# Patient Record
Sex: FEMALE | Race: WHITE | NOT HISPANIC OR LATINO | Employment: FULL TIME | URBAN - METROPOLITAN AREA
[De-identification: names, ages, dates, MRNs, and addresses within clinical notes are randomized per-mention and may not be internally consistent; named-entity substitution may affect disease eponyms.]

---

## 2020-07-19 ENCOUNTER — APPOINTMENT (OUTPATIENT)
Dept: RADIOLOGY | Facility: HOSPITAL | Age: 30
End: 2020-07-19
Payer: COMMERCIAL

## 2020-07-19 ENCOUNTER — HOSPITAL ENCOUNTER (OUTPATIENT)
Facility: HOSPITAL | Age: 30
Setting detail: OBSERVATION
Discharge: HOME | End: 2020-07-20
Attending: EMERGENCY MEDICINE | Admitting: INTERNAL MEDICINE
Payer: COMMERCIAL

## 2020-07-19 DIAGNOSIS — R11.2 NAUSEA & VOMITING: Primary | ICD-10-CM

## 2020-07-19 DIAGNOSIS — R10.9 INTRACTABLE ABDOMINAL PAIN: ICD-10-CM

## 2020-07-19 DIAGNOSIS — K25.9 MULTIPLE GASTRIC ULCERS: ICD-10-CM

## 2020-07-19 DIAGNOSIS — R10.13 EPIGASTRIC PAIN: ICD-10-CM

## 2020-07-19 PROBLEM — E87.6 HYPOKALEMIA: Status: ACTIVE | Noted: 2020-07-19

## 2020-07-19 PROBLEM — M43.06 LUMBAR SPONDYLOLYSIS: Status: ACTIVE | Noted: 2020-07-19

## 2020-07-19 LAB
ALBUMIN SERPL BCP-MCNC: 4.2 G/DL (ref 3.5–5)
ALP SERPL-CCNC: 55 U/L (ref 46–116)
ALT SERPL W P-5'-P-CCNC: 17 U/L (ref 12–78)
ANION GAP SERPL CALCULATED.3IONS-SCNC: 9 MMOL/L (ref 4–13)
AST SERPL W P-5'-P-CCNC: 9 U/L (ref 5–45)
BASOPHILS # BLD AUTO: 0.05 THOUSANDS/ΜL (ref 0–0.1)
BASOPHILS NFR BLD AUTO: 1 % (ref 0–1)
BILIRUB SERPL-MCNC: 0.85 MG/DL (ref 0.2–1)
BILIRUB UR QL STRIP: NEGATIVE
BUN SERPL-MCNC: 7 MG/DL (ref 5–25)
CALCIUM SERPL-MCNC: 9.9 MG/DL (ref 8.3–10.1)
CHLORIDE SERPL-SCNC: 106 MMOL/L (ref 100–108)
CLARITY UR: CLEAR
CO2 SERPL-SCNC: 23 MMOL/L (ref 21–32)
COLOR UR: YELLOW
CREAT SERPL-MCNC: 0.78 MG/DL (ref 0.6–1.3)
EOSINOPHIL # BLD AUTO: 0.13 THOUSAND/ΜL (ref 0–0.61)
EOSINOPHIL NFR BLD AUTO: 1 % (ref 0–6)
ERYTHROCYTE [DISTWIDTH] IN BLOOD BY AUTOMATED COUNT: 12.8 % (ref 11.6–15.1)
EXT PREG TEST URINE: NEGATIVE
EXT. CONTROL ED NAV: NORMAL
GFR SERPL CREATININE-BSD FRML MDRD: 104 ML/MIN/1.73SQ M
GLUCOSE SERPL-MCNC: 106 MG/DL (ref 65–140)
GLUCOSE UR STRIP-MCNC: NEGATIVE MG/DL
HCT VFR BLD AUTO: 42.1 % (ref 34.8–46.1)
HGB BLD-MCNC: 14.8 G/DL (ref 11.5–15.4)
HGB UR QL STRIP.AUTO: NEGATIVE
IMM GRANULOCYTES # BLD AUTO: 0.05 THOUSAND/UL (ref 0–0.2)
IMM GRANULOCYTES NFR BLD AUTO: 1 % (ref 0–2)
KETONES UR STRIP-MCNC: ABNORMAL MG/DL
LEUKOCYTE ESTERASE UR QL STRIP: NEGATIVE
LIPASE SERPL-CCNC: 84 U/L (ref 73–393)
LYMPHOCYTES # BLD AUTO: 1.95 THOUSANDS/ΜL (ref 0.6–4.47)
LYMPHOCYTES NFR BLD AUTO: 19 % (ref 14–44)
MCH RBC QN AUTO: 30.6 PG (ref 26.8–34.3)
MCHC RBC AUTO-ENTMCNC: 35.2 G/DL (ref 31.4–37.4)
MCV RBC AUTO: 87 FL (ref 82–98)
MONOCYTES # BLD AUTO: 0.8 THOUSAND/ΜL (ref 0.17–1.22)
MONOCYTES NFR BLD AUTO: 8 % (ref 4–12)
NEUTROPHILS # BLD AUTO: 7.51 THOUSANDS/ΜL (ref 1.85–7.62)
NEUTS SEG NFR BLD AUTO: 70 % (ref 43–75)
NITRITE UR QL STRIP: NEGATIVE
NRBC BLD AUTO-RTO: 0 /100 WBCS
PH UR STRIP.AUTO: 8.5 [PH] (ref 4.5–8)
PLATELET # BLD AUTO: 191 THOUSANDS/UL (ref 149–390)
PMV BLD AUTO: 11.4 FL (ref 8.9–12.7)
POTASSIUM SERPL-SCNC: 3.4 MMOL/L (ref 3.5–5.3)
PROT SERPL-MCNC: 7.3 G/DL (ref 6.4–8.2)
PROT UR STRIP-MCNC: NEGATIVE MG/DL
RBC # BLD AUTO: 4.83 MILLION/UL (ref 3.81–5.12)
SODIUM SERPL-SCNC: 138 MMOL/L (ref 136–145)
SP GR UR STRIP.AUTO: 1.02 (ref 1–1.03)
UROBILINOGEN UR QL STRIP.AUTO: 0.2 E.U./DL
WBC # BLD AUTO: 10.49 THOUSAND/UL (ref 4.31–10.16)

## 2020-07-19 PROCEDURE — 80053 COMPREHEN METABOLIC PANEL: CPT | Performed by: STUDENT IN AN ORGANIZED HEALTH CARE EDUCATION/TRAINING PROGRAM

## 2020-07-19 PROCEDURE — 74022 RADEX COMPL AQT ABD SERIES: CPT

## 2020-07-19 PROCEDURE — 96375 TX/PRO/DX INJ NEW DRUG ADDON: CPT

## 2020-07-19 PROCEDURE — 81003 URINALYSIS AUTO W/O SCOPE: CPT

## 2020-07-19 PROCEDURE — 36415 COLL VENOUS BLD VENIPUNCTURE: CPT | Performed by: STUDENT IN AN ORGANIZED HEALTH CARE EDUCATION/TRAINING PROGRAM

## 2020-07-19 PROCEDURE — 96374 THER/PROPH/DIAG INJ IV PUSH: CPT

## 2020-07-19 PROCEDURE — C9113 INJ PANTOPRAZOLE SODIUM, VIA: HCPCS | Performed by: INTERNAL MEDICINE

## 2020-07-19 PROCEDURE — 85025 COMPLETE CBC W/AUTO DIFF WBC: CPT | Performed by: STUDENT IN AN ORGANIZED HEALTH CARE EDUCATION/TRAINING PROGRAM

## 2020-07-19 PROCEDURE — 81025 URINE PREGNANCY TEST: CPT | Performed by: STUDENT IN AN ORGANIZED HEALTH CARE EDUCATION/TRAINING PROGRAM

## 2020-07-19 PROCEDURE — 83690 ASSAY OF LIPASE: CPT | Performed by: STUDENT IN AN ORGANIZED HEALTH CARE EDUCATION/TRAINING PROGRAM

## 2020-07-19 PROCEDURE — 99220 PR INITIAL OBSERVATION CARE/DAY 70 MINUTES: CPT | Performed by: INTERNAL MEDICINE

## 2020-07-19 PROCEDURE — 96376 TX/PRO/DX INJ SAME DRUG ADON: CPT

## 2020-07-19 PROCEDURE — 99285 EMERGENCY DEPT VISIT HI MDM: CPT

## 2020-07-19 PROCEDURE — 99285 EMERGENCY DEPT VISIT HI MDM: CPT | Performed by: EMERGENCY MEDICINE

## 2020-07-19 PROCEDURE — 96361 HYDRATE IV INFUSION ADD-ON: CPT

## 2020-07-19 RX ORDER — PROMETHAZINE HYDROCHLORIDE 25 MG/ML
25 INJECTION, SOLUTION INTRAMUSCULAR; INTRAVENOUS EVERY 6 HOURS PRN
Status: DISCONTINUED | OUTPATIENT
Start: 2020-07-19 | End: 2020-07-20 | Stop reason: HOSPADM

## 2020-07-19 RX ORDER — LORAZEPAM 2 MG/ML
0.5 INJECTION INTRAMUSCULAR EVERY 6 HOURS PRN
Status: DISCONTINUED | OUTPATIENT
Start: 2020-07-19 | End: 2020-07-20 | Stop reason: HOSPADM

## 2020-07-19 RX ORDER — ONDANSETRON 2 MG/ML
4 INJECTION INTRAMUSCULAR; INTRAVENOUS ONCE
Status: COMPLETED | OUTPATIENT
Start: 2020-07-19 | End: 2020-07-19

## 2020-07-19 RX ORDER — SUCRALFATE ORAL 1 G/10ML
1000 SUSPENSION ORAL
Status: DISCONTINUED | OUTPATIENT
Start: 2020-07-19 | End: 2020-07-20 | Stop reason: HOSPADM

## 2020-07-19 RX ORDER — FENTANYL CITRATE 50 UG/ML
INJECTION, SOLUTION INTRAMUSCULAR; INTRAVENOUS
Status: COMPLETED
Start: 2020-07-19 | End: 2020-07-19

## 2020-07-19 RX ORDER — METHOCARBAMOL 500 MG/1
500 TABLET, FILM COATED ORAL DAILY PRN
COMMUNITY
Start: 2020-06-17 | End: 2020-12-05 | Stop reason: HOSPADM

## 2020-07-19 RX ORDER — HYDROMORPHONE HCL/PF 1 MG/ML
1 SYRINGE (ML) INJECTION ONCE
Status: DISCONTINUED | OUTPATIENT
Start: 2020-07-19 | End: 2020-07-20

## 2020-07-19 RX ORDER — HEPARIN SODIUM 5000 [USP'U]/ML
5000 INJECTION, SOLUTION INTRAVENOUS; SUBCUTANEOUS EVERY 8 HOURS SCHEDULED
Status: DISCONTINUED | OUTPATIENT
Start: 2020-07-19 | End: 2020-07-20 | Stop reason: HOSPADM

## 2020-07-19 RX ORDER — SENNOSIDES 8.6 MG
1 TABLET ORAL DAILY
Status: DISCONTINUED | OUTPATIENT
Start: 2020-07-19 | End: 2020-07-20 | Stop reason: HOSPADM

## 2020-07-19 RX ORDER — PANTOPRAZOLE SODIUM 40 MG/1
40 INJECTION, POWDER, FOR SOLUTION INTRAVENOUS
Status: DISCONTINUED | OUTPATIENT
Start: 2020-07-19 | End: 2020-07-20

## 2020-07-19 RX ORDER — DOCUSATE SODIUM 100 MG/1
100 CAPSULE, LIQUID FILLED ORAL 2 TIMES DAILY
Status: DISCONTINUED | OUTPATIENT
Start: 2020-07-19 | End: 2020-07-20 | Stop reason: HOSPADM

## 2020-07-19 RX ORDER — METOCLOPRAMIDE HYDROCHLORIDE 5 MG/5ML
10 SOLUTION ORAL ONCE
Status: DISCONTINUED | OUTPATIENT
Start: 2020-07-19 | End: 2020-07-19

## 2020-07-19 RX ORDER — DICYCLOMINE HYDROCHLORIDE 10 MG/5ML
10 SOLUTION ORAL
Status: DISCONTINUED | OUTPATIENT
Start: 2020-07-19 | End: 2020-07-19

## 2020-07-19 RX ORDER — LIDOCAINE HYDROCHLORIDE 20 MG/ML
15 SOLUTION OROPHARYNGEAL ONCE
Status: COMPLETED | OUTPATIENT
Start: 2020-07-19 | End: 2020-07-19

## 2020-07-19 RX ORDER — DICYCLOMINE HYDROCHLORIDE 10 MG/1
10 CAPSULE ORAL
Status: DISCONTINUED | OUTPATIENT
Start: 2020-07-19 | End: 2020-07-20 | Stop reason: HOSPADM

## 2020-07-19 RX ORDER — FENTANYL CITRATE 50 UG/ML
50 INJECTION, SOLUTION INTRAMUSCULAR; INTRAVENOUS ONCE
Status: COMPLETED | OUTPATIENT
Start: 2020-07-19 | End: 2020-07-19

## 2020-07-19 RX ORDER — SUCRALFATE ORAL 1 G/10ML
1000 SUSPENSION ORAL ONCE
Status: COMPLETED | OUTPATIENT
Start: 2020-07-19 | End: 2020-07-19

## 2020-07-19 RX ORDER — ONDANSETRON 2 MG/ML
4 INJECTION INTRAMUSCULAR; INTRAVENOUS EVERY 6 HOURS PRN
Status: DISCONTINUED | OUTPATIENT
Start: 2020-07-19 | End: 2020-07-19

## 2020-07-19 RX ORDER — SUCRALFATE ORAL 1 G/10ML
1 SUSPENSION ORAL
COMMUNITY
Start: 2020-07-16 | End: 2022-02-16 | Stop reason: SDUPTHER

## 2020-07-19 RX ORDER — ACETAMINOPHEN 325 MG/1
975 TABLET ORAL ONCE
Status: COMPLETED | OUTPATIENT
Start: 2020-07-19 | End: 2020-07-19

## 2020-07-19 RX ORDER — DEXTROSE, SODIUM CHLORIDE, AND POTASSIUM CHLORIDE 5; .45; .15 G/100ML; G/100ML; G/100ML
100 INJECTION INTRAVENOUS CONTINUOUS
Status: DISCONTINUED | OUTPATIENT
Start: 2020-07-19 | End: 2020-07-20

## 2020-07-19 RX ORDER — PANTOPRAZOLE SODIUM 40 MG/1
40 TABLET, DELAYED RELEASE ORAL 2 TIMES DAILY
Status: ON HOLD | COMMUNITY
Start: 2020-07-16 | End: 2021-01-23 | Stop reason: SDUPTHER

## 2020-07-19 RX ORDER — METHOCARBAMOL 500 MG/1
500 TABLET, FILM COATED ORAL EVERY 6 HOURS PRN
Status: DISCONTINUED | OUTPATIENT
Start: 2020-07-19 | End: 2020-07-19

## 2020-07-19 RX ORDER — ONDANSETRON 2 MG/ML
4 INJECTION INTRAMUSCULAR; INTRAVENOUS EVERY 4 HOURS PRN
Status: DISCONTINUED | OUTPATIENT
Start: 2020-07-19 | End: 2020-07-20 | Stop reason: HOSPADM

## 2020-07-19 RX ORDER — ONDANSETRON 4 MG/1
4 TABLET, ORALLY DISINTEGRATING ORAL EVERY 8 HOURS PRN
COMMUNITY
Start: 2020-07-16 | End: 2020-12-05 | Stop reason: HOSPADM

## 2020-07-19 RX ORDER — MAGNESIUM HYDROXIDE/ALUMINUM HYDROXICE/SIMETHICONE 120; 1200; 1200 MG/30ML; MG/30ML; MG/30ML
30 SUSPENSION ORAL ONCE
Status: COMPLETED | OUTPATIENT
Start: 2020-07-19 | End: 2020-07-19

## 2020-07-19 RX ORDER — PROMETHAZINE HYDROCHLORIDE 25 MG/ML
25 INJECTION, SOLUTION INTRAMUSCULAR; INTRAVENOUS EVERY 6 HOURS PRN
Status: DISCONTINUED | OUTPATIENT
Start: 2020-07-19 | End: 2020-07-19

## 2020-07-19 RX ORDER — METOCLOPRAMIDE HYDROCHLORIDE 5 MG/ML
10 INJECTION INTRAMUSCULAR; INTRAVENOUS ONCE
Status: COMPLETED | OUTPATIENT
Start: 2020-07-19 | End: 2020-07-19

## 2020-07-19 RX ORDER — HYDROMORPHONE HCL/PF 1 MG/ML
0.5 SYRINGE (ML) INJECTION EVERY 4 HOURS PRN
Status: DISCONTINUED | OUTPATIENT
Start: 2020-07-19 | End: 2020-07-20

## 2020-07-19 RX ADMIN — LIDOCAINE HYDROCHLORIDE 15 ML: 20 SOLUTION ORAL; TOPICAL at 11:34

## 2020-07-19 RX ADMIN — SUCRALFATE 1000 MG: 1 SUSPENSION ORAL at 21:12

## 2020-07-19 RX ADMIN — FENTANYL CITRATE 50 MCG: 50 INJECTION, SOLUTION INTRAMUSCULAR; INTRAVENOUS at 09:58

## 2020-07-19 RX ADMIN — MORPHINE SULFATE 2 MG: 2 INJECTION, SOLUTION INTRAMUSCULAR; INTRAVENOUS at 13:45

## 2020-07-19 RX ADMIN — FENTANYL CITRATE 50 MCG: 50 INJECTION INTRAMUSCULAR; INTRAVENOUS at 09:58

## 2020-07-19 RX ADMIN — ONDANSETRON 4 MG: 2 INJECTION INTRAMUSCULAR; INTRAVENOUS at 13:45

## 2020-07-19 RX ADMIN — ONDANSETRON 4 MG: 2 INJECTION INTRAMUSCULAR; INTRAVENOUS at 09:38

## 2020-07-19 RX ADMIN — FENTANYL CITRATE 50 MCG: 50 INJECTION INTRAMUSCULAR; INTRAVENOUS at 12:15

## 2020-07-19 RX ADMIN — LIDOCAINE HYDROCHLORIDE 10 ML: 20 SOLUTION ORAL; TOPICAL at 21:11

## 2020-07-19 RX ADMIN — SODIUM CHLORIDE 1000 ML: 0.9 INJECTION, SOLUTION INTRAVENOUS at 09:59

## 2020-07-19 RX ADMIN — SUCRALFATE 1000 MG: 1 SUSPENSION ORAL at 10:37

## 2020-07-19 RX ADMIN — METOCLOPRAMIDE 10 MG: 5 INJECTION, SOLUTION INTRAMUSCULAR; INTRAVENOUS at 11:33

## 2020-07-19 RX ADMIN — ALUMINUM HYDROXIDE, MAGNESIUM HYDROXIDE, AND SIMETHICONE 30 ML: 200; 200; 20 SUSPENSION ORAL at 11:34

## 2020-07-19 RX ADMIN — DEXTROSE, SODIUM CHLORIDE, AND POTASSIUM CHLORIDE 100 ML/HR: 5; .45; .15 INJECTION INTRAVENOUS at 14:59

## 2020-07-19 RX ADMIN — PANTOPRAZOLE SODIUM 40 MG: 40 INJECTION, POWDER, FOR SOLUTION INTRAVENOUS at 14:59

## 2020-07-19 RX ADMIN — SUCRALFATE 1000 MG: 1 SUSPENSION ORAL at 17:37

## 2020-07-19 RX ADMIN — PROMETHAZINE HYDROCHLORIDE 25 MG: 25 INJECTION INTRAMUSCULAR; INTRAVENOUS at 17:43

## 2020-07-19 RX ADMIN — ACETAMINOPHEN 975 MG: 325 TABLET, FILM COATED ORAL at 11:33

## 2020-07-19 RX ADMIN — MORPHINE SULFATE 2 MG: 2 INJECTION, SOLUTION INTRAMUSCULAR; INTRAVENOUS at 17:33

## 2020-07-19 RX ADMIN — HYDROMORPHONE HYDROCHLORIDE 0.5 MG: 1 INJECTION, SOLUTION INTRAMUSCULAR; INTRAVENOUS; SUBCUTANEOUS at 21:23
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
## 2020-07-19 NOTE — ASSESSMENT & PLAN NOTE
Probably due to GI losses due to vomiting and not eating  Will continue monitoring   Started fluids with potassium as she already not able to take po
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
## 2020-07-19 NOTE — ED PROVIDER NOTES
History  Chief Complaint   Patient presents with    Vomiting     Pt  reports being to the hospital numerous times over the past 9 days  States that she is having severe epigastric pain and vomiting  States she cant even drink water or take medications  Every time she trys to take anything it keeps coming back up  Patient is a 55-year-old female with a past medical history of multiple antral gastric ulcers who presents to the ED for constant, nonradiating, sharp, epigastric abdominal pain  Patient states she has been seen multiple times for this in the past several days, where she usually gets pain control and that is discharged  However, over the last day, patient has been unable to tolerate p o  And so she cannot take her prescribed Carafate and Protonix  This morning, she could not tolerate the pain any longer so she decided to come in  She states she has a follow-up appointment with GI in the next week or so, but could not wait till then to be seen  Associated symptoms include chills  Last menstrual period 1 week ago  History provided by:  Patient and significant other   used: No    Vomiting   Severity:  Mild  Timing:  Constant  Progression:  Worsening  Chronicity:  Recurrent  Recent urination:  Normal  Associated symptoms: abdominal pain and chills    Associated symptoms: no cough, no diarrhea, no fever and no sore throat        Prior to Admission Medications   Prescriptions Last Dose Informant Patient Reported? Taking? methocarbamol (ROBAXIN) 500 mg tablet   Yes Yes   Sig: Take 500 mg by mouth daily as needed   ondansetron (ZOFRAN-ODT) 4 mg disintegrating tablet   Yes Yes   Sig: Take 4 mg by mouth every 8 (eight) hours as needed   pantoprazole (PROTONIX) 40 mg tablet   Yes Yes   Sig: Take 40 mg by mouth 2 (two) times a day   sucralfate (CARAFATE) 1 g/10 mL suspension   Yes Yes   Sig: Take 1 g by mouth      Facility-Administered Medications: None       History reviewed   No pertinent past medical history  History reviewed  No pertinent surgical history  History reviewed  No pertinent family history  I have reviewed and agree with the history as documented  E-Cigarette/Vaping    E-Cigarette Use Never User      E-Cigarette/Vaping Substances     Social History     Tobacco Use    Smoking status: Never Smoker    Smokeless tobacco: Never Used   Substance Use Topics    Alcohol use: Not Currently     Frequency: Monthly or less     Drinks per session: 1 or 2     Binge frequency: Never    Drug use: Yes     Types: Marijuana        Review of Systems   Constitutional: Positive for chills  Negative for fever  HENT: Negative for sore throat and trouble swallowing  Eyes: Negative for redness and itching  Respiratory: Negative for cough and shortness of breath  Cardiovascular: Negative for chest pain and leg swelling  Gastrointestinal: Positive for abdominal pain, nausea and vomiting  Negative for diarrhea  Genitourinary: Negative for difficulty urinating, dysuria and hematuria  Musculoskeletal: Negative for back pain, neck pain and neck stiffness  Skin: Negative for rash and wound  All other systems reviewed and are negative  Physical Exam  ED Triage Vitals [07/19/20 0920]   Temperature Pulse Respirations Blood Pressure SpO2   98 °F (36 7 °C) 94 (!) 24 135/90 99 %      Temp Source Heart Rate Source Patient Position - Orthostatic VS BP Location FiO2 (%)   Oral Monitor Lying Right arm --      Pain Score       9             Orthostatic Vital Signs  Vitals:    07/19/20 0920 07/19/20 1106 07/19/20 1200   BP: 135/90 (!) 143/109 128/67   Pulse: 94 92 64   Patient Position - Orthostatic VS: Lying Lying Lying       Physical Exam   Constitutional: She appears well-developed and well-nourished  She appears distressed  HENT:   Head: Normocephalic and atraumatic     Right Ear: External ear normal    Left Ear: External ear normal    Nose: Nose normal    Eyes: EOM and lids are normal  No scleral icterus  Neck: Normal range of motion  Neck supple  Cardiovascular: Normal rate, regular rhythm and normal heart sounds  Exam reveals no gallop and no friction rub  No murmur heard  Pulmonary/Chest: Effort normal and breath sounds normal  No respiratory distress  She has no wheezes  She has no rales  Abdominal: Soft  Normal appearance and bowel sounds are normal  She exhibits no distension  There is tenderness in the epigastric area  There is guarding (Voluntary)  There is no rebound  Musculoskeletal: Normal range of motion  She exhibits no edema or deformity  Neurological: She is alert  Skin: Skin is warm and dry  Capillary refill takes less than 2 seconds  She is not diaphoretic  Psychiatric: She has a normal mood and affect  Her behavior is normal    Nursing note and vitals reviewed        ED Medications  Medications   ondansetron (ZOFRAN) injection 4 mg (4 mg Intravenous Given 7/19/20 0938)   sodium chloride 0 9 % bolus 1,000 mL (0 mL Intravenous Stopped 7/19/20 1134)   fentanyl citrate (PF) 100 MCG/2ML 50 mcg (50 mcg Intravenous Given 7/19/20 0958)   sucralfate (CARAFATE) oral suspension 1,000 mg (1,000 mg Oral Given 7/19/20 1037)   Lidocaine Viscous HCl (XYLOCAINE) 2 % mucosal solution 15 mL (15 mL Swish & Swallow Given 7/19/20 1134)   aluminum-magnesium hydroxide-simethicone (MYLANTA) 200-200-20 mg/5 mL oral suspension 30 mL (30 mL Oral Given 7/19/20 1134)   acetaminophen (TYLENOL) tablet 975 mg (975 mg Oral Given 7/19/20 1133)   metoclopramide (REGLAN) injection 10 mg (10 mg Intravenous Given 7/19/20 1133)   fentanyl citrate (PF) 100 MCG/2ML 50 mcg (50 mcg Intravenous Given 7/19/20 1215)       Diagnostic Studies  Results Reviewed     Procedure Component Value Units Date/Time    POCT pregnancy, urine [[id number]]  (Normal) Resulted:  07/19/20 1152    Lab Status:  Final result Updated:  07/19/20 1152     EXT PREG TEST UR (Ref: Negative) Negative     Control Valid    Urine Macroscopic, POC [[id number]]  (Abnormal) Collected:  07/19/20 1152    Lab Status:  Final result Specimen:  Urine Updated:  07/19/20 1151     Color, UA Yellow     Clarity, UA Clear     pH, UA 8 5     Leukocytes, UA Negative     Nitrite, UA Negative     Protein, UA Negative mg/dl      Glucose, UA Negative mg/dl      Ketones, UA 40 (2+) mg/dl      Urobilinogen, UA 0 2 E U /dl      Bilirubin, UA Negative     Blood, UA Negative     Specific Gravity, UA 1 020    Narrative:       CLINITEK RESULT    Lipase [[id number]]  (Normal) Collected:  07/19/20 1000    Lab Status:  Final result Specimen:  Blood from Arm, Right Updated:  07/19/20 1034     Lipase 84 u/L     Comprehensive metabolic panel [[id number]]  (Abnormal) Collected:  07/19/20 1000    Lab Status:  Final result Specimen:  Blood from Arm, Right Updated:  07/19/20 1034     Sodium 138 mmol/L      Potassium 3 4 mmol/L      Chloride 106 mmol/L      CO2 23 mmol/L      ANION GAP 9 mmol/L      BUN 7 mg/dL      Creatinine 0 78 mg/dL      Glucose 106 mg/dL      Calcium 9 9 mg/dL      AST 9 U/L      ALT 17 U/L      Alkaline Phosphatase 55 U/L      Total Protein 7 3 g/dL      Albumin 4 2 g/dL      Total Bilirubin 0 85 mg/dL      eGFR 104 ml/min/1 73sq m     Narrative:       [place]or guidelines for Chronic Kidney Disease (CKD):     Stage 1 with normal or high GFR (GFR > 90 mL/min/1 73 square meters)    Stage 2 Mild CKD (GFR = 60-89 mL/min/1 73 square meters)    Stage 3A Moderate CKD (GFR = 45-59 mL/min/1 73 square meters)    Stage 3B Moderate CKD (GFR = 30-44 mL/min/1 73 square meters)    Stage 4 Severe CKD (GFR = 15-29 mL/min/1 73 square meters)    Stage 5 End Stage CKD (GFR <15 mL/min/1 73 square meters)  Note: GFR calculation is accurate only with a steady state creatinine    CBC and differential [[id number]]  (Abnormal) Collected:  07/19/20 1000    Lab Status:  Final result Specimen:  Blood from Arm, Right Updated:  07/19/20 1007     WBC 10 49 Thousand/uL      RBC 4 83 Million/uL      Hemoglobin 14 8 g/dL      Hematocrit 42 1 %      MCV 87 fL      MCH 30 6 pg      MCHC 35 2 g/dL      RDW 12 8 %      MPV 11 4 fL      Platelets 828 Thousands/uL      nRBC 0 /100 WBCs      Neutrophils Relative 70 %      Immat GRANS % 1 %      Lymphocytes Relative 19 %      Monocytes Relative 8 %      Eosinophils Relative 1 %      Basophils Relative 1 %      Neutrophils Absolute 7 51 Thousands/µL      Immature Grans Absolute 0 05 Thousand/uL      Lymphocytes Absolute 1 95 Thousands/µL      Monocytes Absolute 0 80 Thousand/µL      Eosinophils Absolute 0 13 Thousand/µL      Basophils Absolute 0 05 Thousands/µL                  XR abdomen obstruction series    (Results Pending)         Procedures  Procedures      ED Course  ED Course as of Jul 19 1242   Sun Jul 19, 2020   1002 Zofran and 50 of fentanyl given  Patient reports significant resolution of her pain with some nausea relief  Will get abdominal labs and hydrate,      1032 Patient states she still has nausea  Will try Reglan  1035 Lipase   1139 Patient is actively dry heaving  States the pain has returned, and is very severe  Will attempt pain control with Tylenol, Maalox, and viscous lidocaine  If nausea and pain cannot be controlled, will admit for intractable pain  1151 Patient reports pain is unrelieved  Will admit for intractable pain  Fifty more of fentanyl given  US AUDIT      Most Recent Value   Initial Alcohol Screen: US AUDIT-C    1  How often do you have a drink containing alcohol?  0 Filed at: 07/19/2020 0925   2  How many drinks containing alcohol do you have on a typical day you are drinking? 0 Filed at: 07/19/2020 0925   3a  Male UNDER 65: How often do you have five or more drinks on one occasion? 0 Filed at: 07/19/2020 0925   3b  FEMALE Any Age, or MALE 65+: How often do you have 4 or more drinks on one occassion?   0 Filed at: 07/19/2020 0925   Audit-C Score  0 Filed at: 07/19/2020 0925                  CATHY/DAST-10      Most Recent Value   How many times in the past year have you    Used an illegal drug or used a prescription medication for non-medical reasons? Never Filed at: 07/19/2020 2777                              [facility]  Number of Diagnoses or Management Options  Epigastric pain:   Nausea & vomiting:   Diagnosis management comments: Patient is a 27-year-old female with a past medical history of multiple antral gastric ulcers diagnosed by EGD who presents to the ED for recurrent epigastric abdominal pain  Patient states the pain is identical to her previous epigastric pain  She was discharged with Compazine, Carafate, and Protonix with GI follow-up, but has been unable to tolerate p o  Patient initially presented with severe pain and nausea, moaning unable to sit still  Exam was positive for epigastric abdominal tenderness  Differential includes gastric ulcers, doubt ulcer perforation, doubt SBO, doubt pancreatitis  Zofran and fentanyl were given  Abdominal labs and U preg ordered to assess for any changes  Labs showed no other causes for her abdominal pain  After a total of 100 of fentanyl, Maalox, lidocaine, Tylenol, and Reglan, patient's pain and nausea persisted  As patient is unable to tolerate p o , and patient's pain persists, will admit for intractable pain  The patient understands and agrees with plan of care         Amount and/or Complexity of Data Reviewed  Clinical lab tests: ordered and reviewed  Tests in the medicine section of CPT®: ordered and reviewed    Risk of Complications, Morbidity, and/or Mortality  Presenting problems: minimal  Diagnostic procedures: minimal  Management options: minimal    Patient Progress  Patient progress: stable        Disposition  Final diagnoses:   Nausea & vomiting   Epigastric pain     Time reflects when diagnosis was documented in both MDM as applicable and the Disposition within this note     Time User Action Codes Description Comment    7/19/2020 12:30 PM Yue Corporal Add [R11 2] Nausea & vomiting     7/19/2020 12:30 PM Yue Corporal Add [R10 13] Epigastric pain       ED Disposition     ED Disposition Condition Date/Time Comment    Admit Stable Sun Jul 19, 2020 12:31 PM Case was discussed with Rafael Virk and the patient's admission status was agreed to be Admission Status: observation status to the service of Dr Rafael Virk   Follow-up Information    None         Patient's Medications   Discharge Prescriptions    No medications on file     No discharge procedures on file  PDMP Review     None           ED Provider  Attending physically available and evaluated Tyree Monge I managed the patient along with the ED Attending      Electronically Signed by         Yaya Williamson DO  07/19/20 5189

## 2020-07-19 NOTE — ASSESSMENT & PLAN NOTE
[facility] on 7/13/2020 abdominal pain and vomiting for one week  Intractable vomiting and hypokalemia 7/10 -Left AMA  She returned to ED with blood tinged vomiting, abdominal pain again twice to ED promting admission  She was noted to have K of 3 1, CT at that time did not have any findings  Hypoechoic lesion in right lobe of liver possible hemangioma  Subsequent MRI no cholelithiasis or hydronephrosis  7/16 she under went EGD, showed diffuse non erosive ulcers and H  Pylori biospies were taken pending   She has follow up with GI next [facility] at [facility]  She has intractable abdominal pain now 10/10, not able to tolerate pain  She received carofate, pantoprazole, lidocaine-menthol patch  Admit to med/surg observation status    And fentanyl ivss given for pain  -Up right abdominal xray to ensure patient does not have perforation   -continue with pain medications and nausea management  Gastrin levels  GI consult
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
## 2020-07-20 VITALS
HEART RATE: 75 BPM | BODY MASS INDEX: 23.86 KG/M2 | TEMPERATURE: 99.3 F | HEIGHT: 65 IN | RESPIRATION RATE: 18 BRPM | WEIGHT: 143.2 LBS | SYSTOLIC BLOOD PRESSURE: 115 MMHG | DIASTOLIC BLOOD PRESSURE: 71 MMHG | OXYGEN SATURATION: 99 %

## 2020-07-20 PROBLEM — J45.20 MILD INTERMITTENT ASTHMA: Status: ACTIVE | Noted: 2020-03-22

## 2020-07-20 PROBLEM — E44.1 MILD PROTEIN-CALORIE MALNUTRITION (HCC): Status: ACTIVE | Noted: 2020-07-20

## 2020-07-20 PROBLEM — E87.6 HYPOKALEMIA: Status: ACTIVE | Noted: 2020-03-22

## 2020-07-20 PROBLEM — R10.9 INTRACTABLE ABDOMINAL PAIN: Status: RESOLVED | Noted: 2020-07-19 | Resolved: 2020-07-20

## 2020-07-20 PROBLEM — E55.9 VITAMIN D DEFICIENCY: Status: ACTIVE | Noted: 2018-05-31

## 2020-07-20 LAB
ALBUMIN SERPL BCP-MCNC: 3.1 G/DL (ref 3.5–5)
ALP SERPL-CCNC: 43 U/L (ref 46–116)
ALT SERPL W P-5'-P-CCNC: 13 U/L (ref 12–78)
ANION GAP SERPL CALCULATED.3IONS-SCNC: 7 MMOL/L (ref 4–13)
AST SERPL W P-5'-P-CCNC: 7 U/L (ref 5–45)
BASOPHILS # BLD AUTO: 0.04 THOUSANDS/ΜL (ref 0–0.1)
BASOPHILS NFR BLD AUTO: 1 % (ref 0–1)
BILIRUB SERPL-MCNC: 0.5 MG/DL (ref 0.2–1)
BUN SERPL-MCNC: 6 MG/DL (ref 5–25)
CALCIUM SERPL-MCNC: 9.2 MG/DL (ref 8.3–10.1)
CHLORIDE SERPL-SCNC: 108 MMOL/L (ref 100–108)
CO2 SERPL-SCNC: 25 MMOL/L (ref 21–32)
CREAT SERPL-MCNC: 0.87 MG/DL (ref 0.6–1.3)
EOSINOPHIL # BLD AUTO: 0.21 THOUSAND/ΜL (ref 0–0.61)
EOSINOPHIL NFR BLD AUTO: 3 % (ref 0–6)
ERYTHROCYTE [DISTWIDTH] IN BLOOD BY AUTOMATED COUNT: 13 % (ref 11.6–15.1)
GFR SERPL CREATININE-BSD FRML MDRD: 91 ML/MIN/1.73SQ M
GLUCOSE SERPL-MCNC: 97 MG/DL (ref 65–140)
HCT VFR BLD AUTO: 37.5 % (ref 34.8–46.1)
HGB BLD-MCNC: 12.6 G/DL (ref 11.5–15.4)
IMM GRANULOCYTES # BLD AUTO: 0.02 THOUSAND/UL (ref 0–0.2)
IMM GRANULOCYTES NFR BLD AUTO: 0 % (ref 0–2)
LYMPHOCYTES # BLD AUTO: 2.88 THOUSANDS/ΜL (ref 0.6–4.47)
LYMPHOCYTES NFR BLD AUTO: 39 % (ref 14–44)
MAGNESIUM SERPL-MCNC: 2.2 MG/DL (ref 1.6–2.6)
MCH RBC QN AUTO: 30.7 PG (ref 26.8–34.3)
MCHC RBC AUTO-ENTMCNC: 33.6 G/DL (ref 31.4–37.4)
MCV RBC AUTO: 91 FL (ref 82–98)
MONOCYTES # BLD AUTO: 0.67 THOUSAND/ΜL (ref 0.17–1.22)
MONOCYTES NFR BLD AUTO: 9 % (ref 4–12)
NEUTROPHILS # BLD AUTO: 3.5 THOUSANDS/ΜL (ref 1.85–7.62)
NEUTS SEG NFR BLD AUTO: 48 % (ref 43–75)
NRBC BLD AUTO-RTO: 0 /100 WBCS
PHOSPHATE SERPL-MCNC: 3.5 MG/DL (ref 2.7–4.5)
PLATELET # BLD AUTO: 169 THOUSANDS/UL (ref 149–390)
PMV BLD AUTO: 11.8 FL (ref 8.9–12.7)
POTASSIUM SERPL-SCNC: 3.3 MMOL/L (ref 3.5–5.3)
PROT SERPL-MCNC: 6 G/DL (ref 6.4–8.2)
RBC # BLD AUTO: 4.11 MILLION/UL (ref 3.81–5.12)
SODIUM SERPL-SCNC: 140 MMOL/L (ref 136–145)
WBC # BLD AUTO: 7.32 THOUSAND/UL (ref 4.31–10.16)

## 2020-07-20 PROCEDURE — 83735 ASSAY OF MAGNESIUM: CPT | Performed by: INTERNAL MEDICINE

## 2020-07-20 PROCEDURE — 84100 ASSAY OF PHOSPHORUS: CPT | Performed by: INTERNAL MEDICINE

## 2020-07-20 PROCEDURE — 99217 PR OBSERVATION CARE DISCHARGE MANAGEMENT: CPT | Performed by: INTERNAL MEDICINE

## 2020-07-20 PROCEDURE — C9113 INJ PANTOPRAZOLE SODIUM, VIA: HCPCS | Performed by: INTERNAL MEDICINE

## 2020-07-20 PROCEDURE — 82941 ASSAY OF GASTRIN: CPT | Performed by: INTERNAL MEDICINE

## 2020-07-20 PROCEDURE — 85025 COMPLETE CBC W/AUTO DIFF WBC: CPT | Performed by: INTERNAL MEDICINE

## 2020-07-20 PROCEDURE — 99244 OFF/OP CNSLTJ NEW/EST MOD 40: CPT | Performed by: INTERNAL MEDICINE

## 2020-07-20 PROCEDURE — 80053 COMPREHEN METABOLIC PANEL: CPT | Performed by: INTERNAL MEDICINE

## 2020-07-20 RX ORDER — PANTOPRAZOLE SODIUM 40 MG/1
40 INJECTION, POWDER, FOR SOLUTION INTRAVENOUS EVERY 12 HOURS SCHEDULED
Status: DISCONTINUED | OUTPATIENT
Start: 2020-07-20 | End: 2020-07-20 | Stop reason: HOSPADM

## 2020-07-20 RX ORDER — POTASSIUM CHLORIDE 20MEQ/15ML
20 LIQUID (ML) ORAL ONCE
Status: COMPLETED | OUTPATIENT
Start: 2020-07-20 | End: 2020-07-20

## 2020-07-20 RX ADMIN — SENNOSIDES 8.6 MG: 8.6 TABLET ORAL at 08:31

## 2020-07-20 RX ADMIN — LIDOCAINE HYDROCHLORIDE 10 ML: 20 SOLUTION ORAL; TOPICAL at 05:03

## 2020-07-20 RX ADMIN — POTASSIUM CHLORIDE 20 MEQ: 20 SOLUTION ORAL at 17:21

## 2020-07-20 RX ADMIN — DICYCLOMINE HYDROCHLORIDE 10 MG: 10 CAPSULE ORAL at 17:21

## 2020-07-20 RX ADMIN — ONDANSETRON 4 MG: 2 INJECTION INTRAMUSCULAR; INTRAVENOUS at 05:05

## 2020-07-20 RX ADMIN — DEXTROSE, SODIUM CHLORIDE, AND POTASSIUM CHLORIDE 100 ML/HR: 5; .45; .15 INJECTION INTRAVENOUS at 12:31

## 2020-07-20 RX ADMIN — DICYCLOMINE HYDROCHLORIDE 10 MG: 10 CAPSULE ORAL at 12:13

## 2020-07-20 RX ADMIN — DOCUSATE SODIUM 100 MG: 100 CAPSULE, LIQUID FILLED ORAL at 08:31

## 2020-07-20 RX ADMIN — SUCRALFATE 1000 MG: 1 SUSPENSION ORAL at 06:04

## 2020-07-20 RX ADMIN — SUCRALFATE 1000 MG: 1 SUSPENSION ORAL at 12:13

## 2020-07-20 RX ADMIN — PANTOPRAZOLE SODIUM 40 MG: 40 INJECTION, POWDER, FOR SOLUTION INTRAVENOUS at 08:26

## 2020-07-20 RX ADMIN — DEXTROSE, SODIUM CHLORIDE, AND POTASSIUM CHLORIDE 100 ML/HR: 5; .45; .15 INJECTION INTRAVENOUS at 00:23

## 2020-07-20 RX ADMIN — SUCRALFATE 1000 MG: 1 SUSPENSION ORAL at 17:21

## 2020-07-20 NOTE — ASSESSMENT & PLAN NOTE
Probably due to GI losses due to vomiting and not eating  Will continue monitoring   Started fluids with potassium as she already not able to take po  Will give oral potassium today if she is able to tolerate
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
## 2020-07-20 NOTE — ASSESSMENT & PLAN NOTE
- [facility] on 7/13/2020 abdominal pain and vomiting for one week  Intractable vomiting and hypokalemia 7/10 -Left AMA  - USS showed Hypoechoic lesion in right lobe of liver possible hemangioma  Subsequent MRI no cholelithiasis or hydronephrosis  - 7/16 she under went EGD, showed diffuse non erosive ulcers and H  Pylori biospies negetive   - DC'd on Protonix, viscous lidocaine, Carafate  She has follow up with GI next week at [facility]  - 7/19 Presented to [facility] with worsening  similar complaints of epigastric pain, nausea, vomiting, inability to tolerate p o  Intake  - symptoms are most likely gastritis secondary to chronic NSAID use    She takes 5-6 pills of NSAIDs daily for back pain less likely gastrinoma   - GI consultant recommendations appreciated  Plan  · Continue Protonix 40 mg b i d   · Continue Carafate, magic mouthwash and Zofran  · Follow-up results of  gastrin level  · Per GI, Will hold off on EGD given it was recently done and likely would not

## 2020-07-20 NOTE — SOCIAL WORK
Met with pt, explained CM program/CM role  LOS-2  Bundle-No  Unplanned readmission color-green  30 Day readmission-No  Resides with: SO, child  Type home:2 SH  ADRIANA:2  Bedroom located: 2nd floor, 12 steps  Primary caregiver: self  ADL's/ambulation: I PTA  Drive-yes  PCP-SUNITA Ashford  Pharmacy-[facility]  Afford medication-yes  HHC:No  DME:No  IP Rehab:No  MH illness-    No          Drug abuse:No  Alcohol abuse:No  Employed-yes  Primary Contact- SO Mckenna Cole, [phone]  POA:No  LW: No  Transportation home:family    CM reviewed d/c planning process including the following: identifying help at home, patient preference for d/c planning needs, Discharge Lounge, [facility] Meds to Bed program, availability of treatment team to discuss questions or concerns patient and/or family may have regarding understanding medications and recognizing signs and symptoms once discharged  CM also encouraged patient to follow up with all recommended appointments after discharge  Patient advised of importance for patient and family to participate in managing patients medical well being  Patient/caregiver received discharge checklist  Content reviewed  Patient/caregiver encouraged to participate in discharge plan of care prior to discharge home

## 2020-07-20 NOTE — ASSESSMENT & PLAN NOTE
Malnutrition Findings:      Degree of Malnutrition: Malnutrition of mild degree(in the context of abd pain resulting in poor po tolerance and wt loss)    BMI Findings: Body mass index is 23 83 kg/m²       Encourage adequate oral intake
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
## 2020-07-20 NOTE — MALNUTRITION/BMI
This medical record reflects one or more clinical indicators suggestive of malnutrition  Malnutrition Findings:      Degree of Malnutrition: Malnutrition of mild degree(in the context of abd pain resulting in poor po tolerance and wt loss)  Malnutrition Characteristics: Inadequate energy, Weight loss(4 7% wt loss since 7/11/20 and energy intake meeting less than 75% estimated needs for > than 5 days; treated with PO diet and nutrition cnslg)        See Nutrition note dated 7/20/20 for additional details  Completed nutrition assessment is viewable in the nutrition documentation
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
## 2020-07-22 LAB — GASTRIN SERPL-MCNC: 146 PG/ML (ref 0–115)

## 2020-10-11 ENCOUNTER — HOSPITAL ENCOUNTER (INPATIENT)
Facility: HOSPITAL | Age: 30
LOS: 1 days | Discharge: HOME | DRG: 392 | End: 2020-10-14
Attending: EMERGENCY MEDICINE | Admitting: INTERNAL MEDICINE
Payer: COMMERCIAL

## 2020-10-11 DIAGNOSIS — R10.13 EPIGASTRIC PAIN: Primary | ICD-10-CM

## 2020-10-11 DIAGNOSIS — K25.9 MULTIPLE GASTRIC ULCERS: ICD-10-CM

## 2020-10-11 DIAGNOSIS — R11.0 NAUSEA: ICD-10-CM

## 2020-10-11 DIAGNOSIS — K52.9 COLITIS: ICD-10-CM

## 2020-10-11 DIAGNOSIS — R11.2 INTRACTABLE NAUSEA AND VOMITING: ICD-10-CM

## 2020-10-11 DIAGNOSIS — K25.9 GASTRIC ULCER: ICD-10-CM

## 2020-10-11 DIAGNOSIS — R11.10 VOMITING: ICD-10-CM

## 2020-10-11 PROBLEM — K59.1 FUNCTIONAL DIARRHEA: Status: ACTIVE | Noted: 2020-10-11

## 2020-10-11 LAB
ALBUMIN SERPL BCP-MCNC: 4.2 G/DL (ref 3.5–5)
ALP SERPL-CCNC: 62 U/L (ref 46–116)
ALT SERPL W P-5'-P-CCNC: 15 U/L (ref 12–78)
ANION GAP SERPL CALCULATED.3IONS-SCNC: 7 MMOL/L (ref 4–13)
AST SERPL W P-5'-P-CCNC: 6 U/L (ref 5–45)
BASOPHILS # BLD AUTO: 0.04 THOUSANDS/ΜL (ref 0–0.1)
BASOPHILS NFR BLD AUTO: 0 % (ref 0–1)
BILIRUB SERPL-MCNC: 0.65 MG/DL (ref 0.2–1)
BUN SERPL-MCNC: 10 MG/DL (ref 5–25)
CALCIUM SERPL-MCNC: 8.4 MG/DL (ref 8.3–10.1)
CHLORIDE SERPL-SCNC: 108 MMOL/L (ref 100–108)
CO2 SERPL-SCNC: 23 MMOL/L (ref 21–32)
CREAT SERPL-MCNC: 0.94 MG/DL (ref 0.6–1.3)
EOSINOPHIL # BLD AUTO: 0.01 THOUSAND/ΜL (ref 0–0.61)
EOSINOPHIL NFR BLD AUTO: 0 % (ref 0–6)
ERYTHROCYTE [DISTWIDTH] IN BLOOD BY AUTOMATED COUNT: 12.3 % (ref 11.6–15.1)
GFR SERPL CREATININE-BSD FRML MDRD: 83 ML/MIN/1.73SQ M
GLUCOSE SERPL-MCNC: 122 MG/DL (ref 65–140)
HCT VFR BLD AUTO: 38.6 % (ref 34.8–46.1)
HGB BLD-MCNC: 13.2 G/DL (ref 11.5–15.4)
IMM GRANULOCYTES # BLD AUTO: 0.05 THOUSAND/UL (ref 0–0.2)
IMM GRANULOCYTES NFR BLD AUTO: 0 % (ref 0–2)
LIPASE SERPL-CCNC: 50 U/L (ref 73–393)
LYMPHOCYTES # BLD AUTO: 1.94 THOUSANDS/ΜL (ref 0.6–4.47)
LYMPHOCYTES NFR BLD AUTO: 12 % (ref 14–44)
MCH RBC QN AUTO: 30.3 PG (ref 26.8–34.3)
MCHC RBC AUTO-ENTMCNC: 34.2 G/DL (ref 31.4–37.4)
MCV RBC AUTO: 89 FL (ref 82–98)
MONOCYTES # BLD AUTO: 0.92 THOUSAND/ΜL (ref 0.17–1.22)
MONOCYTES NFR BLD AUTO: 6 % (ref 4–12)
NEUTROPHILS # BLD AUTO: 12.89 THOUSANDS/ΜL (ref 1.85–7.62)
NEUTS SEG NFR BLD AUTO: 82 % (ref 43–75)
NRBC BLD AUTO-RTO: 0 /100 WBCS
PLATELET # BLD AUTO: 180 THOUSANDS/UL (ref 149–390)
PMV BLD AUTO: 11.3 FL (ref 8.9–12.7)
POTASSIUM SERPL-SCNC: 3.3 MMOL/L (ref 3.5–5.3)
PROT SERPL-MCNC: 7.3 G/DL (ref 6.4–8.2)
RBC # BLD AUTO: 4.36 MILLION/UL (ref 3.81–5.12)
SODIUM SERPL-SCNC: 138 MMOL/L (ref 136–145)
WBC # BLD AUTO: 15.85 THOUSAND/UL (ref 4.31–10.16)

## 2020-10-11 PROCEDURE — 99219 PR INITIAL OBSERVATION CARE/DAY 50 MINUTES: CPT | Performed by: INTERNAL MEDICINE

## 2020-10-11 PROCEDURE — 81025 URINE PREGNANCY TEST: CPT | Performed by: PHYSICIAN ASSISTANT

## 2020-10-11 PROCEDURE — 36415 COLL VENOUS BLD VENIPUNCTURE: CPT

## 2020-10-11 PROCEDURE — 83690 ASSAY OF LIPASE: CPT | Performed by: EMERGENCY MEDICINE

## 2020-10-11 PROCEDURE — 80053 COMPREHEN METABOLIC PANEL: CPT | Performed by: EMERGENCY MEDICINE

## 2020-10-11 PROCEDURE — 96374 THER/PROPH/DIAG INJ IV PUSH: CPT

## 2020-10-11 PROCEDURE — 96361 HYDRATE IV INFUSION ADD-ON: CPT

## 2020-10-11 PROCEDURE — 99214 OFFICE O/P EST MOD 30 MIN: CPT | Performed by: INTERNAL MEDICINE

## 2020-10-11 PROCEDURE — 96372 THER/PROPH/DIAG INJ SC/IM: CPT

## 2020-10-11 PROCEDURE — 99285 EMERGENCY DEPT VISIT HI MDM: CPT | Performed by: EMERGENCY MEDICINE

## 2020-10-11 PROCEDURE — 99284 EMERGENCY DEPT VISIT MOD MDM: CPT

## 2020-10-11 PROCEDURE — 85025 COMPLETE CBC W/AUTO DIFF WBC: CPT | Performed by: EMERGENCY MEDICINE

## 2020-10-11 RX ORDER — LIDOCAINE HYDROCHLORIDE 20 MG/ML
15 SOLUTION OROPHARYNGEAL ONCE
Status: COMPLETED | OUTPATIENT
Start: 2020-10-11 | End: 2020-10-11

## 2020-10-11 RX ORDER — PANTOPRAZOLE SODIUM 40 MG/1
40 TABLET, DELAYED RELEASE ORAL 2 TIMES DAILY
Status: DISCONTINUED | OUTPATIENT
Start: 2020-10-11 | End: 2020-10-12

## 2020-10-11 RX ORDER — DICYCLOMINE HYDROCHLORIDE 10 MG/1
20 CAPSULE ORAL 3 TIMES DAILY
Status: DISCONTINUED | OUTPATIENT
Start: 2020-10-11 | End: 2020-10-14 | Stop reason: HOSPADM

## 2020-10-11 RX ORDER — ONDANSETRON 2 MG/ML
4 INJECTION INTRAMUSCULAR; INTRAVENOUS EVERY 6 HOURS PRN
Status: DISCONTINUED | OUTPATIENT
Start: 2020-10-11 | End: 2020-10-11

## 2020-10-11 RX ORDER — ACETAMINOPHEN 325 MG/1
650 TABLET ORAL EVERY 6 HOURS PRN
Status: DISCONTINUED | OUTPATIENT
Start: 2020-10-11 | End: 2020-10-13

## 2020-10-11 RX ORDER — POTASSIUM CHLORIDE 20 MEQ/1
40 TABLET, EXTENDED RELEASE ORAL ONCE
Status: DISCONTINUED | OUTPATIENT
Start: 2020-10-11 | End: 2020-10-14 | Stop reason: HOSPADM

## 2020-10-11 RX ORDER — SODIUM CHLORIDE, SODIUM LACTATE, POTASSIUM CHLORIDE, CALCIUM CHLORIDE 600; 310; 30; 20 MG/100ML; MG/100ML; MG/100ML; MG/100ML
100 INJECTION, SOLUTION INTRAVENOUS CONTINUOUS
Status: DISPENSED | OUTPATIENT
Start: 2020-10-11 | End: 2020-10-12

## 2020-10-11 RX ORDER — MAGNESIUM HYDROXIDE/ALUMINUM HYDROXICE/SIMETHICONE 120; 1200; 1200 MG/30ML; MG/30ML; MG/30ML
30 SUSPENSION ORAL EVERY 6 HOURS PRN
Status: DISCONTINUED | OUTPATIENT
Start: 2020-10-11 | End: 2020-10-14 | Stop reason: HOSPADM

## 2020-10-11 RX ORDER — ONDANSETRON 2 MG/ML
4 INJECTION INTRAMUSCULAR; INTRAVENOUS ONCE
Status: COMPLETED | OUTPATIENT
Start: 2020-10-11 | End: 2020-10-11

## 2020-10-11 RX ORDER — DICYCLOMINE HCL 20 MG
20 TABLET ORAL ONCE
Status: COMPLETED | OUTPATIENT
Start: 2020-10-11 | End: 2020-10-11

## 2020-10-11 RX ORDER — SUCRALFATE 1 G/1
1 TABLET ORAL
Status: DISCONTINUED | OUTPATIENT
Start: 2020-10-11 | End: 2020-10-14 | Stop reason: HOSPADM

## 2020-10-11 RX ORDER — HALOPERIDOL 5 MG/ML
2 INJECTION INTRAMUSCULAR ONCE
Status: COMPLETED | OUTPATIENT
Start: 2020-10-11 | End: 2020-10-11

## 2020-10-11 RX ORDER — METOCLOPRAMIDE HYDROCHLORIDE 5 MG/ML
10 INJECTION INTRAMUSCULAR; INTRAVENOUS EVERY 6 HOURS PRN
Status: DISCONTINUED | OUTPATIENT
Start: 2020-10-11 | End: 2020-10-11

## 2020-10-11 RX ORDER — ONDANSETRON 2 MG/ML
4 INJECTION INTRAMUSCULAR; INTRAVENOUS EVERY 4 HOURS PRN
Status: DISCONTINUED | OUTPATIENT
Start: 2020-10-11 | End: 2020-10-13

## 2020-10-11 RX ORDER — METHOCARBAMOL 500 MG/1
500 TABLET, FILM COATED ORAL EVERY 6 HOURS PRN
Status: DISCONTINUED | OUTPATIENT
Start: 2020-10-11 | End: 2020-10-13

## 2020-10-11 RX ORDER — SODIUM CHLORIDE, SODIUM LACTATE, POTASSIUM CHLORIDE, CALCIUM CHLORIDE 600; 310; 30; 20 MG/100ML; MG/100ML; MG/100ML; MG/100ML
100 INJECTION, SOLUTION INTRAVENOUS CONTINUOUS
Status: DISPENSED | OUTPATIENT
Start: 2020-10-11 | End: 2020-10-11

## 2020-10-11 RX ORDER — PROMETHAZINE HYDROCHLORIDE 25 MG/1
25 SUPPOSITORY RECTAL
Status: DISCONTINUED | OUTPATIENT
Start: 2020-10-11 | End: 2020-10-13

## 2020-10-11 RX ORDER — ALBUTEROL SULFATE 90 UG/1
2 AEROSOL, METERED RESPIRATORY (INHALATION) EVERY 4 HOURS PRN
Status: DISCONTINUED | OUTPATIENT
Start: 2020-10-11 | End: 2020-10-14 | Stop reason: HOSPADM

## 2020-10-11 RX ORDER — LIDOCAINE 50 MG/G
1 PATCH TOPICAL DAILY
Status: DISCONTINUED | OUTPATIENT
Start: 2020-10-11 | End: 2020-10-14 | Stop reason: HOSPADM

## 2020-10-11 RX ORDER — METOCLOPRAMIDE HYDROCHLORIDE 5 MG/ML
10 INJECTION INTRAMUSCULAR; INTRAVENOUS EVERY 6 HOURS PRN
Status: DISCONTINUED | OUTPATIENT
Start: 2020-10-11 | End: 2020-10-13

## 2020-10-11 RX ORDER — LIDOCAINE HYDROCHLORIDE 20 MG/ML
10 SOLUTION OROPHARYNGEAL 4 TIMES DAILY PRN
Status: DISCONTINUED | OUTPATIENT
Start: 2020-10-11 | End: 2020-10-14 | Stop reason: HOSPADM

## 2020-10-11 RX ORDER — MAGNESIUM HYDROXIDE/ALUMINUM HYDROXICE/SIMETHICONE 120; 1200; 1200 MG/30ML; MG/30ML; MG/30ML
30 SUSPENSION ORAL ONCE
Status: COMPLETED | OUTPATIENT
Start: 2020-10-11 | End: 2020-10-11

## 2020-10-11 RX ADMIN — LIDOCAINE HYDROCHLORIDE 15 ML: 20 SOLUTION ORAL; TOPICAL at 02:31

## 2020-10-11 RX ADMIN — ONDANSETRON 4 MG: 2 INJECTION INTRAMUSCULAR; INTRAVENOUS at 09:03

## 2020-10-11 RX ADMIN — LIDOCAINE 5% 1 PATCH: 700 PATCH TOPICAL at 09:09

## 2020-10-11 RX ADMIN — DICYCLOMINE HYDROCHLORIDE 20 MG: 20 TABLET ORAL at 02:32

## 2020-10-11 RX ADMIN — ONDANSETRON 4 MG: 2 INJECTION INTRAMUSCULAR; INTRAVENOUS at 20:48

## 2020-10-11 RX ADMIN — SODIUM CHLORIDE, SODIUM LACTATE, POTASSIUM CHLORIDE, AND CALCIUM CHLORIDE 100 ML/HR: .6; .31; .03; .02 INJECTION, SOLUTION INTRAVENOUS at 17:09

## 2020-10-11 RX ADMIN — DICYCLOMINE HYDROCHLORIDE 20 MG: 10 CAPSULE ORAL at 17:05

## 2020-10-11 RX ADMIN — METHOCARBAMOL TABLETS 500 MG: 500 TABLET, COATED ORAL at 17:08

## 2020-10-11 RX ADMIN — PROMETHAZINE HYDROCHLORIDE 25 MG: 25 SUPPOSITORY RECTAL at 22:05

## 2020-10-11 RX ADMIN — ONDANSETRON 4 MG: 2 INJECTION INTRAMUSCULAR; INTRAVENOUS at 02:31

## 2020-10-11 RX ADMIN — ACETAMINOPHEN 650 MG: 325 TABLET, FILM COATED ORAL at 17:08

## 2020-10-11 RX ADMIN — HALOPERIDOL LACTATE 2 MG: 5 INJECTION, SOLUTION INTRAMUSCULAR at 04:42

## 2020-10-11 RX ADMIN — SODIUM CHLORIDE 1000 ML: 0.9 INJECTION, SOLUTION INTRAVENOUS at 02:33

## 2020-10-11 RX ADMIN — METOCLOPRAMIDE 10 MG: 5 INJECTION, SOLUTION INTRAMUSCULAR; INTRAVENOUS at 07:40

## 2020-10-11 RX ADMIN — SODIUM CHLORIDE, SODIUM LACTATE, POTASSIUM CHLORIDE, AND CALCIUM CHLORIDE 100 ML/HR: .6; .31; .03; .02 INJECTION, SOLUTION INTRAVENOUS at 07:43

## 2020-10-11 RX ADMIN — ONDANSETRON 4 MG: 2 INJECTION INTRAMUSCULAR; INTRAVENOUS at 13:10

## 2020-10-11 RX ADMIN — ALUMINUM HYDROXIDE, MAGNESIUM HYDROXIDE, AND SIMETHICONE 30 ML: 200; 200; 20 SUSPENSION ORAL at 02:31

## 2020-10-11 RX ADMIN — SUCRALFATE 1 G: 1 TABLET ORAL at 17:05

## 2020-10-11 RX ADMIN — DICYCLOMINE HYDROCHLORIDE 20 MG: 10 CAPSULE ORAL at 22:04

## 2020-10-11 RX ADMIN — PANTOPRAZOLE SODIUM 40 MG: 40 TABLET, DELAYED RELEASE ORAL at 17:05

## 2020-10-12 ENCOUNTER — APPOINTMENT (OUTPATIENT)
Dept: GASTROENTEROLOGY | Facility: HOSPITAL | Age: 30
DRG: 392 | End: 2020-10-12
Payer: COMMERCIAL

## 2020-10-12 ENCOUNTER — ANESTHESIA EVENT (OUTPATIENT)
Dept: GASTROENTEROLOGY | Facility: HOSPITAL | Age: 30
DRG: 392 | End: 2020-10-12
Payer: COMMERCIAL

## 2020-10-12 ENCOUNTER — ANESTHESIA (OUTPATIENT)
Dept: GASTROENTEROLOGY | Facility: HOSPITAL | Age: 30
DRG: 392 | End: 2020-10-12
Payer: COMMERCIAL

## 2020-10-12 VITALS — HEART RATE: 61 BPM

## 2020-10-12 PROBLEM — E87.6 HYPOKALEMIA: Status: RESOLVED | Noted: 2020-03-22 | Resolved: 2020-10-12

## 2020-10-12 LAB
ALBUMIN SERPL BCP-MCNC: 4.2 G/DL (ref 3.5–5)
ALP SERPL-CCNC: 60 U/L (ref 46–116)
ALT SERPL W P-5'-P-CCNC: 14 U/L (ref 12–78)
ANION GAP SERPL CALCULATED.3IONS-SCNC: 9 MMOL/L (ref 4–13)
AST SERPL W P-5'-P-CCNC: 6 U/L (ref 5–45)
BASOPHILS # BLD AUTO: 0.01 THOUSANDS/ΜL (ref 0–0.1)
BASOPHILS NFR BLD AUTO: 0 % (ref 0–1)
BILIRUB SERPL-MCNC: 0.51 MG/DL (ref 0.2–1)
BUN SERPL-MCNC: 4 MG/DL (ref 5–25)
CALCIUM SERPL-MCNC: 9.6 MG/DL (ref 8.3–10.1)
CHLORIDE SERPL-SCNC: 108 MMOL/L (ref 100–108)
CO2 SERPL-SCNC: 23 MMOL/L (ref 21–32)
CREAT SERPL-MCNC: 0.63 MG/DL (ref 0.6–1.3)
EOSINOPHIL # BLD AUTO: 0.03 THOUSAND/ΜL (ref 0–0.61)
EOSINOPHIL NFR BLD AUTO: 0 % (ref 0–6)
ERYTHROCYTE [DISTWIDTH] IN BLOOD BY AUTOMATED COUNT: 12.7 % (ref 11.6–15.1)
EXT PREGNANCY TEST URINE: NEGATIVE
EXT. CONTROL: NORMAL
GFR SERPL CREATININE-BSD FRML MDRD: 122 ML/MIN/1.73SQ M
GLUCOSE SERPL-MCNC: 98 MG/DL (ref 65–140)
HCT VFR BLD AUTO: 40.3 % (ref 34.8–46.1)
HGB BLD-MCNC: 14 G/DL (ref 11.5–15.4)
IMM GRANULOCYTES # BLD AUTO: 0.03 THOUSAND/UL (ref 0–0.2)
IMM GRANULOCYTES NFR BLD AUTO: 0 % (ref 0–2)
LYMPHOCYTES # BLD AUTO: 1.86 THOUSANDS/ΜL (ref 0.6–4.47)
LYMPHOCYTES NFR BLD AUTO: 17 % (ref 14–44)
MCH RBC QN AUTO: 30.8 PG (ref 26.8–34.3)
MCHC RBC AUTO-ENTMCNC: 34.7 G/DL (ref 31.4–37.4)
MCV RBC AUTO: 89 FL (ref 82–98)
MONOCYTES # BLD AUTO: 0.85 THOUSAND/ΜL (ref 0.17–1.22)
MONOCYTES NFR BLD AUTO: 8 % (ref 4–12)
NEUTROPHILS # BLD AUTO: 8 THOUSANDS/ΜL (ref 1.85–7.62)
NEUTS SEG NFR BLD AUTO: 75 % (ref 43–75)
NRBC BLD AUTO-RTO: 0 /100 WBCS
PLATELET # BLD AUTO: 181 THOUSANDS/UL (ref 149–390)
PMV BLD AUTO: 12.2 FL (ref 8.9–12.7)
POTASSIUM SERPL-SCNC: 3.6 MMOL/L (ref 3.5–5.3)
PROT SERPL-MCNC: 7.5 G/DL (ref 6.4–8.2)
RBC # BLD AUTO: 4.54 MILLION/UL (ref 3.81–5.12)
SODIUM SERPL-SCNC: 140 MMOL/L (ref 136–145)
WBC # BLD AUTO: 10.78 THOUSAND/UL (ref 4.31–10.16)

## 2020-10-12 PROCEDURE — C9113 INJ PANTOPRAZOLE SODIUM, VIA: HCPCS | Performed by: NURSE PRACTITIONER

## 2020-10-12 PROCEDURE — 88312 SPECIAL STAINS GROUP 1: CPT | Performed by: PATHOLOGY

## 2020-10-12 PROCEDURE — 99213 OFFICE O/P EST LOW 20 MIN: CPT | Performed by: NURSE PRACTITIONER

## 2020-10-12 PROCEDURE — 88305 TISSUE EXAM BY PATHOLOGIST: CPT | Performed by: PATHOLOGY

## 2020-10-12 PROCEDURE — 0DB68ZX EXCISION OF STOMACH, VIA NATURAL OR ARTIFICIAL OPENING ENDOSCOPIC, DIAGNOSTIC: ICD-10-PCS | Performed by: INTERNAL MEDICINE

## 2020-10-12 PROCEDURE — 0DB58ZX EXCISION OF ESOPHAGUS, VIA NATURAL OR ARTIFICIAL OPENING ENDOSCOPIC, DIAGNOSTIC: ICD-10-PCS | Performed by: INTERNAL MEDICINE

## 2020-10-12 PROCEDURE — 85025 COMPLETE CBC W/AUTO DIFF WBC: CPT | Performed by: PHYSICIAN ASSISTANT

## 2020-10-12 PROCEDURE — 80053 COMPREHEN METABOLIC PANEL: CPT | Performed by: PHYSICIAN ASSISTANT

## 2020-10-12 PROCEDURE — 43239 EGD BIOPSY SINGLE/MULTIPLE: CPT | Performed by: INTERNAL MEDICINE

## 2020-10-12 RX ORDER — LIDOCAINE HYDROCHLORIDE 10 MG/ML
INJECTION, SOLUTION EPIDURAL; INFILTRATION; INTRACAUDAL; PERINEURAL AS NEEDED
Status: DISCONTINUED | OUTPATIENT
Start: 2020-10-12 | End: 2020-10-12

## 2020-10-12 RX ORDER — PROPOFOL 10 MG/ML
INJECTION, EMULSION INTRAVENOUS AS NEEDED
Status: DISCONTINUED | OUTPATIENT
Start: 2020-10-12 | End: 2020-10-12

## 2020-10-12 RX ORDER — PROPOFOL 10 MG/ML
INJECTION, EMULSION INTRAVENOUS CONTINUOUS PRN
Status: DISCONTINUED | OUTPATIENT
Start: 2020-10-12 | End: 2020-10-12

## 2020-10-12 RX ORDER — METOCLOPRAMIDE HYDROCHLORIDE 5 MG/ML
10 INJECTION INTRAMUSCULAR; INTRAVENOUS ONCE AS NEEDED
Status: DISCONTINUED | OUTPATIENT
Start: 2020-10-12 | End: 2020-10-13

## 2020-10-12 RX ORDER — ONDANSETRON 2 MG/ML
4 INJECTION INTRAMUSCULAR; INTRAVENOUS ONCE AS NEEDED
Status: DISCONTINUED | OUTPATIENT
Start: 2020-10-12 | End: 2020-10-13

## 2020-10-12 RX ORDER — GLYCOPYRROLATE 0.2 MG/ML
INJECTION INTRAMUSCULAR; INTRAVENOUS AS NEEDED
Status: DISCONTINUED | OUTPATIENT
Start: 2020-10-12 | End: 2020-10-12

## 2020-10-12 RX ORDER — DIPHENHYDRAMINE HYDROCHLORIDE 50 MG/ML
12.5 INJECTION INTRAMUSCULAR; INTRAVENOUS ONCE AS NEEDED
Status: COMPLETED | OUTPATIENT
Start: 2020-10-12 | End: 2020-10-13

## 2020-10-12 RX ORDER — LIDOCAINE HYDROCHLORIDE 10 MG/ML
0.5 INJECTION, SOLUTION EPIDURAL; INFILTRATION; INTRACAUDAL; PERINEURAL ONCE AS NEEDED
Status: DISCONTINUED | OUTPATIENT
Start: 2020-10-12 | End: 2020-10-14 | Stop reason: HOSPADM

## 2020-10-12 RX ORDER — SODIUM CHLORIDE, SODIUM LACTATE, POTASSIUM CHLORIDE, CALCIUM CHLORIDE 600; 310; 30; 20 MG/100ML; MG/100ML; MG/100ML; MG/100ML
100 INJECTION, SOLUTION INTRAVENOUS CONTINUOUS
Status: DISCONTINUED | OUTPATIENT
Start: 2020-10-12 | End: 2020-10-12

## 2020-10-12 RX ORDER — SODIUM CHLORIDE 9 MG/ML
INJECTION, SOLUTION INTRAVENOUS CONTINUOUS PRN
Status: DISCONTINUED | OUTPATIENT
Start: 2020-10-12 | End: 2020-10-12

## 2020-10-12 RX ORDER — PANTOPRAZOLE SODIUM 40 MG/1
40 INJECTION, POWDER, FOR SOLUTION INTRAVENOUS EVERY 12 HOURS SCHEDULED
Status: DISCONTINUED | OUTPATIENT
Start: 2020-10-12 | End: 2020-10-14 | Stop reason: HOSPADM

## 2020-10-12 RX ADMIN — SODIUM CHLORIDE, SODIUM LACTATE, POTASSIUM CHLORIDE, AND CALCIUM CHLORIDE 100 ML/HR: .6; .31; .03; .02 INJECTION, SOLUTION INTRAVENOUS at 13:00

## 2020-10-12 RX ADMIN — SODIUM CHLORIDE, SODIUM LACTATE, POTASSIUM CHLORIDE, AND CALCIUM CHLORIDE 100 ML/HR: .6; .31; .03; .02 INJECTION, SOLUTION INTRAVENOUS at 01:24

## 2020-10-12 RX ADMIN — ONDANSETRON 4 MG: 2 INJECTION INTRAMUSCULAR; INTRAVENOUS at 06:05

## 2020-10-12 RX ADMIN — MORPHINE SULFATE 2 MG: 2 INJECTION, SOLUTION INTRAMUSCULAR; INTRAVENOUS at 22:37

## 2020-10-12 RX ADMIN — DICYCLOMINE HYDROCHLORIDE 20 MG: 10 CAPSULE ORAL at 22:24

## 2020-10-12 RX ADMIN — GLYCOPYRROLATE 0.2 MG: 0.2 INJECTION, SOLUTION INTRAMUSCULAR; INTRAVENOUS at 15:36

## 2020-10-12 RX ADMIN — LIDOCAINE 5% 1 PATCH: 700 PATCH TOPICAL at 09:55

## 2020-10-12 RX ADMIN — PANTOPRAZOLE SODIUM 40 MG: 40 INJECTION, POWDER, FOR SOLUTION INTRAVENOUS at 13:00

## 2020-10-12 RX ADMIN — METHOCARBAMOL TABLETS 500 MG: 500 TABLET, COATED ORAL at 16:51

## 2020-10-12 RX ADMIN — METOCLOPRAMIDE 10 MG: 5 INJECTION, SOLUTION INTRAMUSCULAR; INTRAVENOUS at 02:52

## 2020-10-12 RX ADMIN — PROMETHAZINE HYDROCHLORIDE 25 MG: 25 SUPPOSITORY RECTAL at 22:25

## 2020-10-12 RX ADMIN — DICYCLOMINE HYDROCHLORIDE 20 MG: 10 CAPSULE ORAL at 16:41

## 2020-10-12 RX ADMIN — MORPHINE SULFATE 2 MG: 2 INJECTION, SOLUTION INTRAMUSCULAR; INTRAVENOUS at 03:04

## 2020-10-12 RX ADMIN — ONDANSETRON 4 MG: 2 INJECTION INTRAMUSCULAR; INTRAVENOUS at 16:07

## 2020-10-12 RX ADMIN — LIDOCAINE HYDROCHLORIDE 80 MG: 10 INJECTION, SOLUTION EPIDURAL; INFILTRATION; INTRACAUDAL; PERINEURAL at 15:36

## 2020-10-12 RX ADMIN — SUCRALFATE 1 G: 1 TABLET ORAL at 16:41

## 2020-10-12 RX ADMIN — PROPOFOL 100 MG: 10 INJECTION, EMULSION INTRAVENOUS at 15:36

## 2020-10-12 RX ADMIN — PANTOPRAZOLE SODIUM 40 MG: 40 INJECTION, POWDER, FOR SOLUTION INTRAVENOUS at 22:24

## 2020-10-12 RX ADMIN — METHOCARBAMOL TABLETS 500 MG: 500 TABLET, COATED ORAL at 02:11

## 2020-10-12 RX ADMIN — PROPOFOL 100 MCG/KG/MIN: 10 INJECTION, EMULSION INTRAVENOUS at 15:36

## 2020-10-12 RX ADMIN — MORPHINE SULFATE 2 MG: 2 INJECTION, SOLUTION INTRAMUSCULAR; INTRAVENOUS at 16:51

## 2020-10-12 RX ADMIN — ONDANSETRON 4 MG: 2 INJECTION INTRAMUSCULAR; INTRAVENOUS at 02:05

## 2020-10-12 RX ADMIN — METHOCARBAMOL TABLETS 500 MG: 500 TABLET, COATED ORAL at 22:37

## 2020-10-12 RX ADMIN — PROPOFOL 40 MG: 10 INJECTION, EMULSION INTRAVENOUS at 15:42

## 2020-10-12 RX ADMIN — MORPHINE SULFATE 2 MG: 2 INJECTION, SOLUTION INTRAMUSCULAR; INTRAVENOUS at 09:55

## 2020-10-12 RX ADMIN — SODIUM CHLORIDE: 9 INJECTION, SOLUTION INTRAVENOUS at 15:31

## 2020-10-12 RX ADMIN — METOCLOPRAMIDE 10 MG: 5 INJECTION, SOLUTION INTRAMUSCULAR; INTRAVENOUS at 18:51

## 2020-10-13 PROBLEM — G89.29 CHRONIC BILATERAL LOW BACK PAIN WITHOUT SCIATICA: Status: ACTIVE | Noted: 2020-10-13

## 2020-10-13 PROBLEM — M54.50 CHRONIC BILATERAL LOW BACK PAIN WITHOUT SCIATICA: Status: ACTIVE | Noted: 2020-10-13

## 2020-10-13 LAB
ANION GAP SERPL CALCULATED.3IONS-SCNC: 9 MMOL/L (ref 4–13)
BASOPHILS # BLD AUTO: 0.03 THOUSANDS/ΜL (ref 0–0.1)
BASOPHILS NFR BLD AUTO: 0 % (ref 0–1)
BUN SERPL-MCNC: 8 MG/DL (ref 5–25)
CALCIUM SERPL-MCNC: 8.7 MG/DL (ref 8.3–10.1)
CHLORIDE SERPL-SCNC: 103 MMOL/L (ref 100–108)
CO2 SERPL-SCNC: 25 MMOL/L (ref 21–32)
CREAT SERPL-MCNC: 0.82 MG/DL (ref 0.6–1.3)
EOSINOPHIL # BLD AUTO: 0.03 THOUSAND/ΜL (ref 0–0.61)
EOSINOPHIL NFR BLD AUTO: 0 % (ref 0–6)
ERYTHROCYTE [DISTWIDTH] IN BLOOD BY AUTOMATED COUNT: 12.5 % (ref 11.6–15.1)
GFR SERPL CREATININE-BSD FRML MDRD: 98 ML/MIN/1.73SQ M
GLUCOSE SERPL-MCNC: 91 MG/DL (ref 65–140)
HCT VFR BLD AUTO: 42.6 % (ref 34.8–46.1)
HGB BLD-MCNC: 14.9 G/DL (ref 11.5–15.4)
IMM GRANULOCYTES # BLD AUTO: 0.05 THOUSAND/UL (ref 0–0.2)
IMM GRANULOCYTES NFR BLD AUTO: 0 % (ref 0–2)
LYMPHOCYTES # BLD AUTO: 1.97 THOUSANDS/ΜL (ref 0.6–4.47)
LYMPHOCYTES NFR BLD AUTO: 16 % (ref 14–44)
MCH RBC QN AUTO: 30.7 PG (ref 26.8–34.3)
MCHC RBC AUTO-ENTMCNC: 35 G/DL (ref 31.4–37.4)
MCV RBC AUTO: 88 FL (ref 82–98)
MONOCYTES # BLD AUTO: 0.8 THOUSAND/ΜL (ref 0.17–1.22)
MONOCYTES NFR BLD AUTO: 6 % (ref 4–12)
NEUTROPHILS # BLD AUTO: 9.85 THOUSANDS/ΜL (ref 1.85–7.62)
NEUTS SEG NFR BLD AUTO: 78 % (ref 43–75)
NRBC BLD AUTO-RTO: 0 /100 WBCS
PLATELET # BLD AUTO: 186 THOUSANDS/UL (ref 149–390)
PMV BLD AUTO: 11.8 FL (ref 8.9–12.7)
POTASSIUM SERPL-SCNC: 3.3 MMOL/L (ref 3.5–5.3)
RBC # BLD AUTO: 4.86 MILLION/UL (ref 3.81–5.12)
SODIUM SERPL-SCNC: 137 MMOL/L (ref 136–145)
WBC # BLD AUTO: 12.73 THOUSAND/UL (ref 4.31–10.16)

## 2020-10-13 PROCEDURE — 85025 COMPLETE CBC W/AUTO DIFF WBC: CPT | Performed by: NURSE PRACTITIONER

## 2020-10-13 PROCEDURE — C9113 INJ PANTOPRAZOLE SODIUM, VIA: HCPCS | Performed by: NURSE PRACTITIONER

## 2020-10-13 PROCEDURE — 99232 SBSQ HOSP IP/OBS MODERATE 35: CPT | Performed by: NURSE PRACTITIONER

## 2020-10-13 PROCEDURE — 80048 BASIC METABOLIC PNL TOTAL CA: CPT | Performed by: NURSE PRACTITIONER

## 2020-10-13 RX ORDER — PROMETHAZINE HYDROCHLORIDE 25 MG/1
25 SUPPOSITORY RECTAL
Qty: 7 EACH | Refills: 0 | Status: SHIPPED | OUTPATIENT
Start: 2020-10-13 | End: 2020-12-05 | Stop reason: HOSPADM

## 2020-10-13 RX ORDER — ONDANSETRON 4 MG/1
4 TABLET, ORALLY DISINTEGRATING ORAL EVERY 6 HOURS PRN
Qty: 20 TABLET | Refills: 0 | Status: ON HOLD | OUTPATIENT
Start: 2020-10-13 | End: 2020-12-05 | Stop reason: SDUPTHER

## 2020-10-13 RX ORDER — METOCLOPRAMIDE 10 MG/1
10 TABLET ORAL
Status: DISCONTINUED | OUTPATIENT
Start: 2020-10-13 | End: 2020-10-13

## 2020-10-13 RX ORDER — METOCLOPRAMIDE 10 MG/1
10 TABLET ORAL 3 TIMES DAILY PRN
Status: DISCONTINUED | OUTPATIENT
Start: 2020-10-13 | End: 2020-10-14 | Stop reason: HOSPADM

## 2020-10-13 RX ORDER — PROMETHAZINE HYDROCHLORIDE 25 MG/1
25 SUPPOSITORY RECTAL EVERY 4 HOURS PRN
Status: DISCONTINUED | OUTPATIENT
Start: 2020-10-13 | End: 2020-10-14 | Stop reason: HOSPADM

## 2020-10-13 RX ORDER — METOCLOPRAMIDE 10 MG/1
10 TABLET ORAL
Qty: 42 TABLET | Refills: 0 | Status: SHIPPED | OUTPATIENT
Start: 2020-10-13 | End: 2020-10-27

## 2020-10-13 RX ORDER — PROMETHAZINE HYDROCHLORIDE 25 MG/1
25 SUPPOSITORY RECTAL EVERY 4 HOURS PRN
Status: DISCONTINUED | OUTPATIENT
Start: 2020-10-13 | End: 2020-10-13

## 2020-10-13 RX ORDER — ONDANSETRON 2 MG/ML
4 INJECTION INTRAMUSCULAR; INTRAVENOUS ONCE AS NEEDED
Status: DISCONTINUED | OUTPATIENT
Start: 2020-10-13 | End: 2020-10-13

## 2020-10-13 RX ORDER — METOCLOPRAMIDE HYDROCHLORIDE 5 MG/ML
10 INJECTION INTRAMUSCULAR; INTRAVENOUS ONCE AS NEEDED
Status: DISCONTINUED | OUTPATIENT
Start: 2020-10-13 | End: 2020-10-13

## 2020-10-13 RX ORDER — ONDANSETRON 4 MG/1
4 TABLET, ORALLY DISINTEGRATING ORAL EVERY 6 HOURS PRN
Status: DISCONTINUED | OUTPATIENT
Start: 2020-10-13 | End: 2020-10-14 | Stop reason: HOSPADM

## 2020-10-13 RX ORDER — CYCLOBENZAPRINE HCL 10 MG
10 TABLET ORAL 3 TIMES DAILY PRN
Status: DISCONTINUED | OUTPATIENT
Start: 2020-10-13 | End: 2020-10-14 | Stop reason: HOSPADM

## 2020-10-13 RX ORDER — ACETAMINOPHEN 325 MG/1
975 TABLET ORAL EVERY 8 HOURS SCHEDULED
Status: DISCONTINUED | OUTPATIENT
Start: 2020-10-13 | End: 2020-10-14 | Stop reason: HOSPADM

## 2020-10-13 RX ORDER — SODIUM CHLORIDE, SODIUM LACTATE, POTASSIUM CHLORIDE, CALCIUM CHLORIDE 600; 310; 30; 20 MG/100ML; MG/100ML; MG/100ML; MG/100ML
50 INJECTION, SOLUTION INTRAVENOUS CONTINUOUS
Status: DISPENSED | OUTPATIENT
Start: 2020-10-13 | End: 2020-10-14

## 2020-10-13 RX ORDER — LIDOCAINE 50 MG/G
2 PATCH TOPICAL DAILY
Status: DISCONTINUED | OUTPATIENT
Start: 2020-10-13 | End: 2020-10-14 | Stop reason: HOSPADM

## 2020-10-13 RX ADMIN — SODIUM CHLORIDE, SODIUM LACTATE, POTASSIUM CHLORIDE, AND CALCIUM CHLORIDE 50 ML/HR: .6; .31; .03; .02 INJECTION, SOLUTION INTRAVENOUS at 14:53

## 2020-10-13 RX ADMIN — CYCLOBENZAPRINE HYDROCHLORIDE 10 MG: 10 TABLET, FILM COATED ORAL at 14:53

## 2020-10-13 RX ADMIN — SUCRALFATE 1 G: 1 TABLET ORAL at 05:34

## 2020-10-13 RX ADMIN — METOCLOPRAMIDE 10 MG: 10 TABLET ORAL at 11:49

## 2020-10-13 RX ADMIN — METOCLOPRAMIDE 10 MG: 5 INJECTION, SOLUTION INTRAMUSCULAR; INTRAVENOUS at 10:04

## 2020-10-13 RX ADMIN — ONDANSETRON 4 MG: 4 TABLET, ORALLY DISINTEGRATING ORAL at 12:53

## 2020-10-13 RX ADMIN — LIDOCAINE 1 PATCH: 50 PATCH TOPICAL at 14:53

## 2020-10-13 RX ADMIN — METOCLOPRAMIDE 10 MG: 10 TABLET ORAL at 21:18

## 2020-10-13 RX ADMIN — PANTOPRAZOLE SODIUM 40 MG: 40 INJECTION, POWDER, FOR SOLUTION INTRAVENOUS at 08:15

## 2020-10-13 RX ADMIN — MORPHINE SULFATE 2 MG: 2 INJECTION, SOLUTION INTRAMUSCULAR; INTRAVENOUS at 08:15

## 2020-10-13 RX ADMIN — DICYCLOMINE HYDROCHLORIDE 20 MG: 10 CAPSULE ORAL at 15:59

## 2020-10-13 RX ADMIN — CYCLOBENZAPRINE HYDROCHLORIDE 10 MG: 10 TABLET, FILM COATED ORAL at 20:14

## 2020-10-13 RX ADMIN — METOCLOPRAMIDE 10 MG: 10 TABLET ORAL at 17:55

## 2020-10-13 RX ADMIN — LIDOCAINE 5% 1 PATCH: 700 PATCH TOPICAL at 08:15

## 2020-10-13 RX ADMIN — PROMETHAZINE HYDROCHLORIDE 25 MG: 25 SUPPOSITORY RECTAL at 17:58

## 2020-10-13 RX ADMIN — ONDANSETRON 4 MG: 4 TABLET, ORALLY DISINTEGRATING ORAL at 20:14

## 2020-10-13 RX ADMIN — DIPHENHYDRAMINE HYDROCHLORIDE 12.5 MG: 50 INJECTION, SOLUTION INTRAMUSCULAR; INTRAVENOUS at 05:37

## 2020-10-13 RX ADMIN — ACETAMINOPHEN 975 MG: 325 TABLET, FILM COATED ORAL at 14:53

## 2020-10-13 RX ADMIN — ONDANSETRON 4 MG: 2 INJECTION INTRAMUSCULAR; INTRAVENOUS at 02:30

## 2020-10-13 RX ADMIN — ACETAMINOPHEN 975 MG: 325 TABLET, FILM COATED ORAL at 21:17

## 2020-10-13 RX ADMIN — DICYCLOMINE HYDROCHLORIDE 20 MG: 10 CAPSULE ORAL at 21:18

## 2020-10-13 RX ADMIN — PANTOPRAZOLE SODIUM 40 MG: 40 INJECTION, POWDER, FOR SOLUTION INTRAVENOUS at 21:18

## 2020-10-13 RX ADMIN — SUCRALFATE 1 G: 1 TABLET ORAL at 15:59

## 2020-10-13 RX ADMIN — DICYCLOMINE HYDROCHLORIDE 20 MG: 10 CAPSULE ORAL at 08:15

## 2020-10-13 RX ADMIN — ONDANSETRON 4 MG: 2 INJECTION INTRAMUSCULAR; INTRAVENOUS at 07:55

## 2020-10-13 RX ADMIN — METOCLOPRAMIDE 10 MG: 5 INJECTION, SOLUTION INTRAMUSCULAR; INTRAVENOUS at 04:04

## 2020-10-14 VITALS
HEART RATE: 95 BPM | TEMPERATURE: 99 F | RESPIRATION RATE: 16 BRPM | WEIGHT: 145 LBS | SYSTOLIC BLOOD PRESSURE: 119 MMHG | DIASTOLIC BLOOD PRESSURE: 83 MMHG | BODY MASS INDEX: 24.16 KG/M2 | OXYGEN SATURATION: 96 % | HEIGHT: 65 IN

## 2020-10-14 PROBLEM — K59.1 FUNCTIONAL DIARRHEA: Status: RESOLVED | Noted: 2020-10-11 | Resolved: 2020-10-14

## 2020-10-14 PROBLEM — R10.13 EPIGASTRIC PAIN: Status: RESOLVED | Noted: 2020-10-11 | Resolved: 2020-10-14

## 2020-10-14 LAB
ANION GAP SERPL CALCULATED.3IONS-SCNC: 7 MMOL/L (ref 4–13)
BASOPHILS # BLD AUTO: 0.06 THOUSANDS/ΜL (ref 0–0.1)
BASOPHILS NFR BLD AUTO: 1 % (ref 0–1)
BUN SERPL-MCNC: 7 MG/DL (ref 5–25)
CALCIUM SERPL-MCNC: 8.7 MG/DL (ref 8.3–10.1)
CHLORIDE SERPL-SCNC: 106 MMOL/L (ref 100–108)
CO2 SERPL-SCNC: 25 MMOL/L (ref 21–32)
CREAT SERPL-MCNC: 0.82 MG/DL (ref 0.6–1.3)
EOSINOPHIL # BLD AUTO: 0.11 THOUSAND/ΜL (ref 0–0.61)
EOSINOPHIL NFR BLD AUTO: 1 % (ref 0–6)
ERYTHROCYTE [DISTWIDTH] IN BLOOD BY AUTOMATED COUNT: 12.4 % (ref 11.6–15.1)
GFR SERPL CREATININE-BSD FRML MDRD: 98 ML/MIN/1.73SQ M
GLUCOSE SERPL-MCNC: 87 MG/DL (ref 65–140)
HCT VFR BLD AUTO: 42 % (ref 34.8–46.1)
HGB BLD-MCNC: 14.7 G/DL (ref 11.5–15.4)
IMM GRANULOCYTES # BLD AUTO: 0.02 THOUSAND/UL (ref 0–0.2)
IMM GRANULOCYTES NFR BLD AUTO: 0 % (ref 0–2)
LYMPHOCYTES # BLD AUTO: 3.18 THOUSANDS/ΜL (ref 0.6–4.47)
LYMPHOCYTES NFR BLD AUTO: 35 % (ref 14–44)
MCH RBC QN AUTO: 30.7 PG (ref 26.8–34.3)
MCHC RBC AUTO-ENTMCNC: 35 G/DL (ref 31.4–37.4)
MCV RBC AUTO: 88 FL (ref 82–98)
MONOCYTES # BLD AUTO: 0.86 THOUSAND/ΜL (ref 0.17–1.22)
MONOCYTES NFR BLD AUTO: 9 % (ref 4–12)
NEUTROPHILS # BLD AUTO: 4.98 THOUSANDS/ΜL (ref 1.85–7.62)
NEUTS SEG NFR BLD AUTO: 54 % (ref 43–75)
NRBC BLD AUTO-RTO: 0 /100 WBCS
PLATELET # BLD AUTO: 181 THOUSANDS/UL (ref 149–390)
PMV BLD AUTO: 11.5 FL (ref 8.9–12.7)
POTASSIUM SERPL-SCNC: 3 MMOL/L (ref 3.5–5.3)
RBC # BLD AUTO: 4.79 MILLION/UL (ref 3.81–5.12)
SODIUM SERPL-SCNC: 138 MMOL/L (ref 136–145)
WBC # BLD AUTO: 9.21 THOUSAND/UL (ref 4.31–10.16)

## 2020-10-14 PROCEDURE — 99239 HOSP IP/OBS DSCHRG MGMT >30: CPT | Performed by: NURSE PRACTITIONER

## 2020-10-14 PROCEDURE — C9113 INJ PANTOPRAZOLE SODIUM, VIA: HCPCS | Performed by: NURSE PRACTITIONER

## 2020-10-14 PROCEDURE — 85025 COMPLETE CBC W/AUTO DIFF WBC: CPT | Performed by: NURSE PRACTITIONER

## 2020-10-14 PROCEDURE — 80048 BASIC METABOLIC PNL TOTAL CA: CPT | Performed by: NURSE PRACTITIONER

## 2020-10-14 RX ORDER — POTASSIUM CHLORIDE 20 MEQ/1
20 TABLET, EXTENDED RELEASE ORAL ONCE
Status: DISCONTINUED | OUTPATIENT
Start: 2020-10-14 | End: 2020-10-14 | Stop reason: HOSPADM

## 2020-10-14 RX ORDER — POTASSIUM CHLORIDE 14.9 MG/ML
20 INJECTION INTRAVENOUS ONCE
Status: DISCONTINUED | OUTPATIENT
Start: 2020-10-14 | End: 2020-10-14

## 2020-10-14 RX ORDER — POTASSIUM CHLORIDE 20 MEQ/1
20 TABLET, EXTENDED RELEASE ORAL ONCE
Status: COMPLETED | OUTPATIENT
Start: 2020-10-14 | End: 2020-10-14

## 2020-10-14 RX ORDER — SODIUM CHLORIDE, SODIUM LACTATE, POTASSIUM CHLORIDE, CALCIUM CHLORIDE 600; 310; 30; 20 MG/100ML; MG/100ML; MG/100ML; MG/100ML
50 INJECTION, SOLUTION INTRAVENOUS CONTINUOUS
Status: DISCONTINUED | OUTPATIENT
Start: 2020-10-14 | End: 2020-10-14

## 2020-10-14 RX ORDER — LORAZEPAM 0.5 MG/1
0.5 TABLET ORAL ONCE AS NEEDED
Status: COMPLETED | OUTPATIENT
Start: 2020-10-14 | End: 2020-10-14

## 2020-10-14 RX ADMIN — PANTOPRAZOLE SODIUM 40 MG: 40 INJECTION, POWDER, FOR SOLUTION INTRAVENOUS at 11:03

## 2020-10-14 RX ADMIN — ACETAMINOPHEN 975 MG: 325 TABLET, FILM COATED ORAL at 06:22

## 2020-10-14 RX ADMIN — SUCRALFATE 1 G: 1 TABLET ORAL at 06:22

## 2020-10-14 RX ADMIN — DICYCLOMINE HYDROCHLORIDE 20 MG: 10 CAPSULE ORAL at 11:02

## 2020-10-14 RX ADMIN — ONDANSETRON 4 MG: 4 TABLET, ORALLY DISINTEGRATING ORAL at 01:17

## 2020-10-14 RX ADMIN — LORAZEPAM 0.5 MG: 0.5 TABLET ORAL at 01:26

## 2020-10-14 RX ADMIN — LIDOCAINE 5% 1 PATCH: 700 PATCH TOPICAL at 11:05

## 2020-10-14 RX ADMIN — SODIUM CHLORIDE, SODIUM LACTATE, POTASSIUM CHLORIDE, AND CALCIUM CHLORIDE 50 ML/HR: .6; .31; .03; .02 INJECTION, SOLUTION INTRAVENOUS at 11:20

## 2020-10-14 RX ADMIN — POTASSIUM CHLORIDE 20 MEQ: 14.9 INJECTION, SOLUTION INTRAVENOUS at 11:42

## 2020-10-14 RX ADMIN — POTASSIUM CHLORIDE 20 MEQ: 1500 TABLET, EXTENDED RELEASE ORAL at 11:41

## 2020-10-16 ENCOUNTER — TELEPHONE (OUTPATIENT)
Dept: GASTROENTEROLOGY | Facility: CLINIC | Age: 30
End: 2020-10-16

## 2020-11-30 ENCOUNTER — HOSPITAL ENCOUNTER (INPATIENT)
Facility: HOSPITAL | Age: 30
LOS: 3 days | Discharge: HOME | DRG: 832 | End: 2020-12-05
Attending: EMERGENCY MEDICINE | Admitting: INTERNAL MEDICINE
Payer: COMMERCIAL

## 2020-11-30 DIAGNOSIS — R10.9 ABDOMINAL PAIN: ICD-10-CM

## 2020-11-30 DIAGNOSIS — M43.06 LUMBAR SPONDYLOLYSIS: ICD-10-CM

## 2020-11-30 DIAGNOSIS — R11.2 NAUSEA & VOMITING: Primary | ICD-10-CM

## 2020-11-30 DIAGNOSIS — O21.9 VOMITING AFFECTING PREGNANCY: ICD-10-CM

## 2020-11-30 DIAGNOSIS — R11.2 INTRACTABLE NAUSEA AND VOMITING: ICD-10-CM

## 2020-11-30 PROBLEM — Z3A.09 9 WEEKS GESTATION OF PREGNANCY: Status: ACTIVE | Noted: 2020-11-30

## 2020-11-30 LAB
ALBUMIN SERPL BCP-MCNC: 4.1 G/DL (ref 3.5–5)
ALP SERPL-CCNC: 45 U/L (ref 46–116)
ALT SERPL W P-5'-P-CCNC: 29 U/L (ref 12–78)
ANION GAP SERPL CALCULATED.3IONS-SCNC: 13 MMOL/L (ref 4–13)
AST SERPL W P-5'-P-CCNC: 13 U/L (ref 5–45)
B-HCG SERPL-ACNC: ABNORMAL MIU/ML
BACTERIA UR QL AUTO: NORMAL /HPF
BASOPHILS # BLD AUTO: 0.04 THOUSANDS/ΜL (ref 0–0.1)
BASOPHILS NFR BLD AUTO: 0 % (ref 0–1)
BILIRUB SERPL-MCNC: 0.68 MG/DL (ref 0.2–1)
BILIRUB UR QL STRIP: NEGATIVE
BUN SERPL-MCNC: 9 MG/DL (ref 5–25)
CALCIUM SERPL-MCNC: 10.1 MG/DL (ref 8.3–10.1)
CHLORIDE SERPL-SCNC: 104 MMOL/L (ref 100–108)
CLARITY UR: CLEAR
CO2 SERPL-SCNC: 18 MMOL/L (ref 21–32)
COLOR UR: YELLOW
COLOR, POC: NORMAL
CREAT SERPL-MCNC: 0.68 MG/DL (ref 0.6–1.3)
EOSINOPHIL # BLD AUTO: 0.01 THOUSAND/ΜL (ref 0–0.61)
EOSINOPHIL NFR BLD AUTO: 0 % (ref 0–6)
ERYTHROCYTE [DISTWIDTH] IN BLOOD BY AUTOMATED COUNT: 12.5 % (ref 11.6–15.1)
GFR SERPL CREATININE-BSD FRML MDRD: 119 ML/MIN/1.73SQ M
GLUCOSE SERPL-MCNC: 124 MG/DL (ref 65–140)
GLUCOSE UR STRIP-MCNC: NEGATIVE MG/DL
HCT VFR BLD AUTO: 38.4 % (ref 34.8–46.1)
HGB BLD-MCNC: 13.4 G/DL (ref 11.5–15.4)
HGB UR QL STRIP.AUTO: NEGATIVE
HYALINE CASTS #/AREA URNS LPF: NORMAL /LPF
IMM GRANULOCYTES # BLD AUTO: 0.09 THOUSAND/UL (ref 0–0.2)
IMM GRANULOCYTES NFR BLD AUTO: 1 % (ref 0–2)
KETONES UR STRIP-MCNC: ABNORMAL MG/DL
LEUKOCYTE ESTERASE UR QL STRIP: NEGATIVE
LIPASE SERPL-CCNC: 47 U/L (ref 73–393)
LYMPHOCYTES # BLD AUTO: 0.99 THOUSANDS/ΜL (ref 0.6–4.47)
LYMPHOCYTES NFR BLD AUTO: 6 % (ref 14–44)
MCH RBC QN AUTO: 30 PG (ref 26.8–34.3)
MCHC RBC AUTO-ENTMCNC: 34.9 G/DL (ref 31.4–37.4)
MCV RBC AUTO: 86 FL (ref 82–98)
MONOCYTES # BLD AUTO: 0.4 THOUSAND/ΜL (ref 0.17–1.22)
MONOCYTES NFR BLD AUTO: 3 % (ref 4–12)
NEUTROPHILS # BLD AUTO: 14.51 THOUSANDS/ΜL (ref 1.85–7.62)
NEUTS SEG NFR BLD AUTO: 90 % (ref 43–75)
NITRITE UR QL STRIP: NEGATIVE
NON-SQ EPI CELLS URNS QL MICRO: NORMAL /HPF
NRBC BLD AUTO-RTO: 0 /100 WBCS
PH UR STRIP.AUTO: 8.5 [PH] (ref 4.5–8)
PLATELET # BLD AUTO: 258 THOUSANDS/UL (ref 149–390)
PMV BLD AUTO: 11.3 FL (ref 8.9–12.7)
POTASSIUM SERPL-SCNC: 3.2 MMOL/L (ref 3.5–5.3)
PROT SERPL-MCNC: 7.6 G/DL (ref 6.4–8.2)
PROT UR STRIP-MCNC: ABNORMAL MG/DL
RBC # BLD AUTO: 4.47 MILLION/UL (ref 3.81–5.12)
RBC #/AREA URNS AUTO: NORMAL /HPF
SODIUM SERPL-SCNC: 135 MMOL/L (ref 136–145)
SP GR UR STRIP.AUTO: 1.02 (ref 1–1.03)
UROBILINOGEN UR QL STRIP.AUTO: 0.2 E.U./DL
WBC # BLD AUTO: 16.04 THOUSAND/UL (ref 4.31–10.16)
WBC #/AREA URNS AUTO: NORMAL /HPF

## 2020-11-30 PROCEDURE — C9113 INJ PANTOPRAZOLE SODIUM, VIA: HCPCS | Performed by: INTERNAL MEDICINE

## 2020-11-30 PROCEDURE — 84702 CHORIONIC GONADOTROPIN TEST: CPT | Performed by: EMERGENCY MEDICINE

## 2020-11-30 PROCEDURE — 96374 THER/PROPH/DIAG INJ IV PUSH: CPT

## 2020-11-30 PROCEDURE — 96361 HYDRATE IV INFUSION ADD-ON: CPT

## 2020-11-30 PROCEDURE — 99220 PR INITIAL OBSERVATION CARE/DAY 70 MINUTES: CPT | Performed by: INTERNAL MEDICINE

## 2020-11-30 PROCEDURE — 99284 EMERGENCY DEPT VISIT MOD MDM: CPT

## 2020-11-30 PROCEDURE — 96376 TX/PRO/DX INJ SAME DRUG ADON: CPT

## 2020-11-30 PROCEDURE — 80053 COMPREHEN METABOLIC PANEL: CPT | Performed by: EMERGENCY MEDICINE

## 2020-11-30 PROCEDURE — 99285 EMERGENCY DEPT VISIT HI MDM: CPT | Performed by: EMERGENCY MEDICINE

## 2020-11-30 PROCEDURE — 96375 TX/PRO/DX INJ NEW DRUG ADDON: CPT

## 2020-11-30 PROCEDURE — 85025 COMPLETE CBC W/AUTO DIFF WBC: CPT | Performed by: EMERGENCY MEDICINE

## 2020-11-30 PROCEDURE — 36415 COLL VENOUS BLD VENIPUNCTURE: CPT | Performed by: EMERGENCY MEDICINE

## 2020-11-30 PROCEDURE — 99232 SBSQ HOSP IP/OBS MODERATE 35: CPT | Performed by: INTERNAL MEDICINE

## 2020-11-30 PROCEDURE — 83690 ASSAY OF LIPASE: CPT | Performed by: EMERGENCY MEDICINE

## 2020-11-30 PROCEDURE — 81001 URINALYSIS AUTO W/SCOPE: CPT

## 2020-11-30 RX ORDER — PANTOPRAZOLE SODIUM 40 MG/1
40 INJECTION, POWDER, FOR SOLUTION INTRAVENOUS
Status: DISCONTINUED | OUTPATIENT
Start: 2020-11-30 | End: 2020-12-04

## 2020-11-30 RX ORDER — DIPHENHYDRAMINE HYDROCHLORIDE 50 MG/ML
25 INJECTION INTRAMUSCULAR; INTRAVENOUS ONCE
Status: COMPLETED | OUTPATIENT
Start: 2020-11-30 | End: 2020-11-30

## 2020-11-30 RX ORDER — PROMETHAZINE HYDROCHLORIDE 25 MG/1
25 SUPPOSITORY RECTAL
Status: DISCONTINUED | OUTPATIENT
Start: 2020-11-30 | End: 2020-12-01

## 2020-11-30 RX ORDER — ONDANSETRON 2 MG/ML
4 INJECTION INTRAMUSCULAR; INTRAVENOUS ONCE
Status: COMPLETED | OUTPATIENT
Start: 2020-11-30 | End: 2020-11-30

## 2020-11-30 RX ORDER — LIDOCAINE 50 MG/G
2 PATCH TOPICAL DAILY
Status: DISCONTINUED | OUTPATIENT
Start: 2020-12-01 | End: 2020-12-05 | Stop reason: HOSPADM

## 2020-11-30 RX ORDER — POTASSIUM CHLORIDE 14.9 MG/ML
20 INJECTION INTRAVENOUS ONCE
Status: COMPLETED | OUTPATIENT
Start: 2020-11-30 | End: 2020-11-30

## 2020-11-30 RX ORDER — ONDANSETRON 2 MG/ML
4 INJECTION INTRAMUSCULAR; INTRAVENOUS EVERY 6 HOURS PRN
Status: DISCONTINUED | OUTPATIENT
Start: 2020-11-30 | End: 2020-12-01

## 2020-11-30 RX ORDER — METOCLOPRAMIDE HYDROCHLORIDE 5 MG/ML
10 INJECTION INTRAMUSCULAR; INTRAVENOUS ONCE
Status: COMPLETED | OUTPATIENT
Start: 2020-11-30 | End: 2020-11-30

## 2020-11-30 RX ORDER — DEXTROSE AND SODIUM CHLORIDE 5; .9 G/100ML; G/100ML
100 INJECTION, SOLUTION INTRAVENOUS CONTINUOUS
Status: DISCONTINUED | OUTPATIENT
Start: 2020-11-30 | End: 2020-12-05 | Stop reason: HOSPADM

## 2020-11-30 RX ORDER — METOCLOPRAMIDE HYDROCHLORIDE 5 MG/ML
10 INJECTION INTRAMUSCULAR; INTRAVENOUS EVERY 6 HOURS PRN
Status: COMPLETED | OUTPATIENT
Start: 2020-11-30 | End: 2020-12-01

## 2020-11-30 RX ORDER — MORPHINE SULFATE 4 MG/ML
4 INJECTION, SOLUTION INTRAMUSCULAR; INTRAVENOUS ONCE
Status: COMPLETED | OUTPATIENT
Start: 2020-11-30 | End: 2020-11-30

## 2020-11-30 RX ORDER — POTASSIUM CHLORIDE 14.9 MG/ML
20 INJECTION INTRAVENOUS ONCE
Status: COMPLETED | OUTPATIENT
Start: 2020-11-30 | End: 2020-12-01

## 2020-11-30 RX ORDER — POTASSIUM CHLORIDE 20 MEQ/1
40 TABLET, EXTENDED RELEASE ORAL ONCE
Status: DISCONTINUED | OUTPATIENT
Start: 2020-11-30 | End: 2020-11-30

## 2020-11-30 RX ORDER — ACETAMINOPHEN 325 MG/1
650 TABLET ORAL EVERY 6 HOURS PRN
Status: DISCONTINUED | OUTPATIENT
Start: 2020-11-30 | End: 2020-12-01

## 2020-11-30 RX ORDER — MAGNESIUM HYDROXIDE/ALUMINUM HYDROXICE/SIMETHICONE 120; 1200; 1200 MG/30ML; MG/30ML; MG/30ML
30 SUSPENSION ORAL EVERY 6 HOURS PRN
Status: DISCONTINUED | OUTPATIENT
Start: 2020-11-30 | End: 2020-12-05 | Stop reason: HOSPADM

## 2020-11-30 RX ORDER — SUCRALFATE 1 G/1
1 TABLET ORAL
Status: DISCONTINUED | OUTPATIENT
Start: 2020-11-30 | End: 2020-12-05 | Stop reason: HOSPADM

## 2020-11-30 RX ORDER — MUSCLE RUB CREAM 100; 150 MG/G; MG/G
CREAM TOPICAL 4 TIMES DAILY PRN
Status: DISCONTINUED | OUTPATIENT
Start: 2020-11-30 | End: 2020-12-05 | Stop reason: HOSPADM

## 2020-11-30 RX ADMIN — MORPHINE SULFATE 4 MG: 4 INJECTION INTRAVENOUS at 18:10

## 2020-11-30 RX ADMIN — POTASSIUM CHLORIDE 20 MEQ: 14.9 INJECTION, SOLUTION INTRAVENOUS at 21:38

## 2020-11-30 RX ADMIN — PYRIDOXINE HYDROCHLORIDE 25 MG: 100 INJECTION, SOLUTION INTRAMUSCULAR; INTRAVENOUS at 18:09

## 2020-11-30 RX ADMIN — DIPHENHYDRAMINE HYDROCHLORIDE 25 MG: 50 INJECTION, SOLUTION INTRAMUSCULAR; INTRAVENOUS at 17:03

## 2020-11-30 RX ADMIN — ONDANSETRON 4 MG: 2 INJECTION INTRAMUSCULAR; INTRAVENOUS at 15:54

## 2020-11-30 RX ADMIN — ONDANSETRON 4 MG: 2 INJECTION INTRAMUSCULAR; INTRAVENOUS at 18:09

## 2020-11-30 RX ADMIN — MORPHINE SULFATE 2 MG: 2 INJECTION, SOLUTION INTRAMUSCULAR; INTRAVENOUS at 22:26

## 2020-11-30 RX ADMIN — METOCLOPRAMIDE 10 MG: 5 INJECTION, SOLUTION INTRAMUSCULAR; INTRAVENOUS at 21:57

## 2020-11-30 RX ADMIN — DEXTROSE AND SODIUM CHLORIDE 100 ML/HR: 5; .9 INJECTION, SOLUTION INTRAVENOUS at 23:17

## 2020-11-30 RX ADMIN — POTASSIUM CHLORIDE 20 MEQ: 14.9 INJECTION, SOLUTION INTRAVENOUS at 19:48

## 2020-11-30 RX ADMIN — PANTOPRAZOLE SODIUM 40 MG: 40 INJECTION, POWDER, FOR SOLUTION INTRAVENOUS at 21:57

## 2020-11-30 RX ADMIN — SODIUM CHLORIDE 1000 ML: 0.9 INJECTION, SOLUTION INTRAVENOUS at 15:55

## 2020-11-30 RX ADMIN — METOCLOPRAMIDE 10 MG: 5 INJECTION, SOLUTION INTRAMUSCULAR; INTRAVENOUS at 17:04

## 2020-12-01 LAB
ANION GAP SERPL CALCULATED.3IONS-SCNC: 7 MMOL/L (ref 4–13)
BUN SERPL-MCNC: 5 MG/DL (ref 5–25)
CALCIUM SERPL-MCNC: 8.7 MG/DL (ref 8.3–10.1)
CHLORIDE SERPL-SCNC: 111 MMOL/L (ref 100–108)
CO2 SERPL-SCNC: 20 MMOL/L (ref 21–32)
CREAT SERPL-MCNC: 0.38 MG/DL (ref 0.6–1.3)
GFR SERPL CREATININE-BSD FRML MDRD: 145 ML/MIN/1.73SQ M
GLUCOSE SERPL-MCNC: 99 MG/DL (ref 65–140)
MAGNESIUM SERPL-MCNC: 2 MG/DL (ref 1.6–2.6)
POTASSIUM SERPL-SCNC: 3.4 MMOL/L (ref 3.5–5.3)
SODIUM SERPL-SCNC: 138 MMOL/L (ref 136–145)

## 2020-12-01 PROCEDURE — C9113 INJ PANTOPRAZOLE SODIUM, VIA: HCPCS | Performed by: INTERNAL MEDICINE

## 2020-12-01 PROCEDURE — 83735 ASSAY OF MAGNESIUM: CPT | Performed by: INTERNAL MEDICINE

## 2020-12-01 PROCEDURE — 80048 BASIC METABOLIC PNL TOTAL CA: CPT | Performed by: INTERNAL MEDICINE

## 2020-12-01 PROCEDURE — 99242 OFF/OP CONSLTJ NEW/EST SF 20: CPT | Performed by: OBSTETRICS & GYNECOLOGY

## 2020-12-01 RX ORDER — METOCLOPRAMIDE HYDROCHLORIDE 5 MG/ML
10 INJECTION INTRAMUSCULAR; INTRAVENOUS EVERY 6 HOURS SCHEDULED
Status: DISCONTINUED | OUTPATIENT
Start: 2020-12-01 | End: 2020-12-02

## 2020-12-01 RX ORDER — MORPHINE SULFATE 4 MG/ML
4 INJECTION, SOLUTION INTRAMUSCULAR; INTRAVENOUS ONCE
Status: DISCONTINUED | OUTPATIENT
Start: 2020-12-01 | End: 2020-12-01

## 2020-12-01 RX ORDER — ONDANSETRON 2 MG/ML
4 INJECTION INTRAMUSCULAR; INTRAVENOUS EVERY 6 HOURS PRN
Status: DISCONTINUED | OUTPATIENT
Start: 2020-12-01 | End: 2020-12-04

## 2020-12-01 RX ORDER — ACETAMINOPHEN 325 MG/1
975 TABLET ORAL EVERY 8 HOURS SCHEDULED
Status: DISCONTINUED | OUTPATIENT
Start: 2020-12-01 | End: 2020-12-02

## 2020-12-01 RX ORDER — PYRIDOXINE HCL (VITAMIN B6) 50 MG
25 TABLET ORAL 3 TIMES DAILY
Status: DISCONTINUED | OUTPATIENT
Start: 2020-12-01 | End: 2020-12-05 | Stop reason: HOSPADM

## 2020-12-01 RX ORDER — POTASSIUM CHLORIDE 20 MEQ/1
40 TABLET, EXTENDED RELEASE ORAL ONCE
Status: COMPLETED | OUTPATIENT
Start: 2020-12-01 | End: 2020-12-01

## 2020-12-01 RX ORDER — PROMETHAZINE HYDROCHLORIDE 25 MG/ML
25 INJECTION, SOLUTION INTRAMUSCULAR; INTRAVENOUS EVERY 6 HOURS PRN
Status: DISCONTINUED | OUTPATIENT
Start: 2020-12-01 | End: 2020-12-02

## 2020-12-01 RX ADMIN — ONDANSETRON 4 MG: 2 INJECTION INTRAMUSCULAR; INTRAVENOUS at 15:42

## 2020-12-01 RX ADMIN — METOCLOPRAMIDE 10 MG: 5 INJECTION, SOLUTION INTRAMUSCULAR; INTRAVENOUS at 05:32

## 2020-12-01 RX ADMIN — METOCLOPRAMIDE 10 MG: 5 INJECTION, SOLUTION INTRAMUSCULAR; INTRAVENOUS at 12:25

## 2020-12-01 RX ADMIN — MENTHOL, UNSPECIFIED FORM AND METHYL SALICYLATE: 10; 150 CREAM TOPICAL at 23:41

## 2020-12-01 RX ADMIN — ONDANSETRON 4 MG: 2 INJECTION INTRAMUSCULAR; INTRAVENOUS at 21:14

## 2020-12-01 RX ADMIN — ACETAMINOPHEN 650 MG: 325 TABLET, FILM COATED ORAL at 14:31

## 2020-12-01 RX ADMIN — Medication 25 MG: at 08:34

## 2020-12-01 RX ADMIN — METOCLOPRAMIDE 10 MG: 5 INJECTION, SOLUTION INTRAMUSCULAR; INTRAVENOUS at 17:41

## 2020-12-01 RX ADMIN — PROMETHAZINE HYDROCHLORIDE 25 MG: 25 INJECTION INTRAMUSCULAR; INTRAVENOUS at 01:51

## 2020-12-01 RX ADMIN — SUCRALFATE 1 G: 1 TABLET ORAL at 12:28

## 2020-12-01 RX ADMIN — ONDANSETRON 4 MG: 2 INJECTION INTRAMUSCULAR; INTRAVENOUS at 02:38

## 2020-12-01 RX ADMIN — PANTOPRAZOLE SODIUM 40 MG: 40 INJECTION, POWDER, FOR SOLUTION INTRAVENOUS at 08:33

## 2020-12-01 RX ADMIN — MORPHINE SULFATE 2 MG: 2 INJECTION, SOLUTION INTRAMUSCULAR; INTRAVENOUS at 01:51

## 2020-12-01 RX ADMIN — DEXTROSE AND SODIUM CHLORIDE 100 ML/HR: 5; .9 INJECTION, SOLUTION INTRAVENOUS at 06:44

## 2020-12-01 RX ADMIN — DOXYLAMINE SUCCINATE 25 MG: 25 TABLET ORAL at 21:02

## 2020-12-01 RX ADMIN — DEXTROSE AND SODIUM CHLORIDE 100 ML/HR: 5; .9 INJECTION, SOLUTION INTRAVENOUS at 20:16

## 2020-12-01 RX ADMIN — METOCLOPRAMIDE 10 MG: 5 INJECTION, SOLUTION INTRAMUSCULAR; INTRAVENOUS at 23:40

## 2020-12-01 RX ADMIN — SUCRALFATE 1 G: 1 TABLET ORAL at 15:45

## 2020-12-01 RX ADMIN — Medication 25 MG: at 15:45

## 2020-12-01 RX ADMIN — LIDOCAINE 2 PATCH: 50 PATCH TOPICAL at 12:28

## 2020-12-01 RX ADMIN — MORPHINE SULFATE 2 MG: 2 INJECTION, SOLUTION INTRAMUSCULAR; INTRAVENOUS at 16:01

## 2020-12-01 RX ADMIN — ACETAMINOPHEN 975 MG: 325 TABLET, FILM COATED ORAL at 21:02

## 2020-12-01 RX ADMIN — POTASSIUM CHLORIDE 40 MEQ: 1500 TABLET, EXTENDED RELEASE ORAL at 14:31

## 2020-12-01 RX ADMIN — PROMETHAZINE HYDROCHLORIDE 25 MG: 25 INJECTION INTRAMUSCULAR; INTRAVENOUS at 18:17

## 2020-12-01 RX ADMIN — MORPHINE SULFATE 2 MG: 2 INJECTION, SOLUTION INTRAMUSCULAR; INTRAVENOUS at 06:17

## 2020-12-02 LAB
ANION GAP SERPL CALCULATED.3IONS-SCNC: 9 MMOL/L (ref 4–13)
ATRIAL RATE: 79 BPM
ATRIAL RATE: 83 BPM
BASOPHILS # BLD AUTO: 0.03 THOUSANDS/ΜL (ref 0–0.1)
BASOPHILS NFR BLD AUTO: 0 % (ref 0–1)
BUN SERPL-MCNC: 2 MG/DL (ref 5–25)
CALCIUM SERPL-MCNC: 8.7 MG/DL (ref 8.3–10.1)
CHLORIDE SERPL-SCNC: 111 MMOL/L (ref 100–108)
CO2 SERPL-SCNC: 20 MMOL/L (ref 21–32)
CREAT SERPL-MCNC: 0.42 MG/DL (ref 0.6–1.3)
EOSINOPHIL # BLD AUTO: 0.04 THOUSAND/ΜL (ref 0–0.61)
EOSINOPHIL NFR BLD AUTO: 0 % (ref 0–6)
ERYTHROCYTE [DISTWIDTH] IN BLOOD BY AUTOMATED COUNT: 12.9 % (ref 11.6–15.1)
GFR SERPL CREATININE-BSD FRML MDRD: 140 ML/MIN/1.73SQ M
GLUCOSE SERPL-MCNC: 114 MG/DL (ref 65–140)
HCT VFR BLD AUTO: 33.9 % (ref 34.8–46.1)
HGB BLD-MCNC: 11.6 G/DL (ref 11.5–15.4)
IMM GRANULOCYTES # BLD AUTO: 0.04 THOUSAND/UL (ref 0–0.2)
IMM GRANULOCYTES NFR BLD AUTO: 0 % (ref 0–2)
LYMPHOCYTES # BLD AUTO: 2.11 THOUSANDS/ΜL (ref 0.6–4.47)
LYMPHOCYTES NFR BLD AUTO: 19 % (ref 14–44)
MCH RBC QN AUTO: 30.1 PG (ref 26.8–34.3)
MCHC RBC AUTO-ENTMCNC: 34.2 G/DL (ref 31.4–37.4)
MCV RBC AUTO: 88 FL (ref 82–98)
MONOCYTES # BLD AUTO: 0.51 THOUSAND/ΜL (ref 0.17–1.22)
MONOCYTES NFR BLD AUTO: 5 % (ref 4–12)
NEUTROPHILS # BLD AUTO: 8.5 THOUSANDS/ΜL (ref 1.85–7.62)
NEUTS SEG NFR BLD AUTO: 76 % (ref 43–75)
NRBC BLD AUTO-RTO: 0 /100 WBCS
P AXIS: 69 DEGREES
P AXIS: 70 DEGREES
PLATELET # BLD AUTO: 209 THOUSANDS/UL (ref 149–390)
PMV BLD AUTO: 11 FL (ref 8.9–12.7)
POTASSIUM SERPL-SCNC: 3 MMOL/L (ref 3.5–5.3)
PR INTERVAL: 144 MS
PR INTERVAL: 146 MS
QRS AXIS: 66 DEGREES
QRS AXIS: 66 DEGREES
QRSD INTERVAL: 78 MS
QRSD INTERVAL: 80 MS
QT INTERVAL: 400 MS
QT INTERVAL: 406 MS
QTC INTERVAL: 458 MS
QTC INTERVAL: 477 MS
RBC # BLD AUTO: 3.85 MILLION/UL (ref 3.81–5.12)
SODIUM SERPL-SCNC: 140 MMOL/L (ref 136–145)
T WAVE AXIS: 43 DEGREES
T WAVE AXIS: 45 DEGREES
VENTRICULAR RATE: 79 BPM
VENTRICULAR RATE: 83 BPM
WBC # BLD AUTO: 11.23 THOUSAND/UL (ref 4.31–10.16)

## 2020-12-02 PROCEDURE — 93005 ELECTROCARDIOGRAM TRACING: CPT

## 2020-12-02 PROCEDURE — 80048 BASIC METABOLIC PNL TOTAL CA: CPT | Performed by: INTERNAL MEDICINE

## 2020-12-02 PROCEDURE — 99232 SBSQ HOSP IP/OBS MODERATE 35: CPT | Performed by: PHYSICIAN ASSISTANT

## 2020-12-02 PROCEDURE — 85025 COMPLETE CBC W/AUTO DIFF WBC: CPT | Performed by: INTERNAL MEDICINE

## 2020-12-02 PROCEDURE — C9113 INJ PANTOPRAZOLE SODIUM, VIA: HCPCS | Performed by: INTERNAL MEDICINE

## 2020-12-02 PROCEDURE — 93010 ELECTROCARDIOGRAM REPORT: CPT | Performed by: INTERNAL MEDICINE

## 2020-12-02 PROCEDURE — 99232 SBSQ HOSP IP/OBS MODERATE 35: CPT | Performed by: OBSTETRICS & GYNECOLOGY

## 2020-12-02 RX ORDER — POTASSIUM CHLORIDE 14.9 MG/ML
20 INJECTION INTRAVENOUS
Status: COMPLETED | OUTPATIENT
Start: 2020-12-02 | End: 2020-12-02

## 2020-12-02 RX ORDER — HYDROMORPHONE HCL/PF 1 MG/ML
0.5 SYRINGE (ML) INJECTION EVERY 4 HOURS PRN
Status: DISCONTINUED | OUTPATIENT
Start: 2020-12-02 | End: 2020-12-03

## 2020-12-02 RX ORDER — HYDROCODONE BITARTRATE AND ACETAMINOPHEN 5; 325 MG/1; MG/1
1 TABLET ORAL ONCE
Status: COMPLETED | OUTPATIENT
Start: 2020-12-02 | End: 2020-12-02

## 2020-12-02 RX ORDER — ACETAMINOPHEN 650 MG/1
975 SUPPOSITORY RECTAL EVERY 8 HOURS SCHEDULED
Status: DISCONTINUED | OUTPATIENT
Start: 2020-12-02 | End: 2020-12-05

## 2020-12-02 RX ORDER — OXYCODONE HYDROCHLORIDE 5 MG/1
5 TABLET ORAL EVERY 4 HOURS PRN
Status: DISCONTINUED | OUTPATIENT
Start: 2020-12-02 | End: 2020-12-04

## 2020-12-02 RX ORDER — CYCLOBENZAPRINE HCL 10 MG
5 TABLET ORAL 3 TIMES DAILY PRN
Status: DISCONTINUED | OUTPATIENT
Start: 2020-12-02 | End: 2020-12-05 | Stop reason: HOSPADM

## 2020-12-02 RX ORDER — PROMETHAZINE HYDROCHLORIDE 25 MG/ML
25 INJECTION, SOLUTION INTRAMUSCULAR; INTRAVENOUS EVERY 6 HOURS SCHEDULED
Status: DISCONTINUED | OUTPATIENT
Start: 2020-12-02 | End: 2020-12-03

## 2020-12-02 RX ORDER — METOCLOPRAMIDE HYDROCHLORIDE 5 MG/ML
10 INJECTION INTRAMUSCULAR; INTRAVENOUS EVERY 6 HOURS PRN
Status: DISCONTINUED | OUTPATIENT
Start: 2020-12-02 | End: 2020-12-04

## 2020-12-02 RX ORDER — DIPHENHYDRAMINE HYDROCHLORIDE 50 MG/ML
25 INJECTION INTRAMUSCULAR; INTRAVENOUS ONCE
Status: COMPLETED | OUTPATIENT
Start: 2020-12-02 | End: 2020-12-02

## 2020-12-02 RX ADMIN — ACETAMINOPHEN 975 MG: 650 SUPPOSITORY RECTAL at 06:07

## 2020-12-02 RX ADMIN — DEXTROSE AND SODIUM CHLORIDE 100 ML/HR: 5; .9 INJECTION, SOLUTION INTRAVENOUS at 06:15

## 2020-12-02 RX ADMIN — METOCLOPRAMIDE 10 MG: 5 INJECTION, SOLUTION INTRAMUSCULAR; INTRAVENOUS at 06:08

## 2020-12-02 RX ADMIN — CYCLOBENZAPRINE HYDROCHLORIDE 5 MG: 5 TABLET, FILM COATED ORAL at 21:40

## 2020-12-02 RX ADMIN — HYDROMORPHONE HYDROCHLORIDE 0.5 MG: 1 INJECTION, SOLUTION INTRAMUSCULAR; INTRAVENOUS; SUBCUTANEOUS at 15:25

## 2020-12-02 RX ADMIN — POTASSIUM CHLORIDE 20 MEQ: 14.9 INJECTION, SOLUTION INTRAVENOUS at 11:57

## 2020-12-02 RX ADMIN — ONDANSETRON 4 MG: 2 INJECTION INTRAMUSCULAR; INTRAVENOUS at 21:40

## 2020-12-02 RX ADMIN — HYDROMORPHONE HYDROCHLORIDE 0.5 MG: 1 INJECTION, SOLUTION INTRAMUSCULAR; INTRAVENOUS; SUBCUTANEOUS at 09:33

## 2020-12-02 RX ADMIN — DOXYLAMINE SUCCINATE 25 MG: 25 TABLET ORAL at 23:32

## 2020-12-02 RX ADMIN — HYDROCODONE BITARTRATE AND ACETAMINOPHEN 1 TABLET: 5; 325 TABLET ORAL at 01:13

## 2020-12-02 RX ADMIN — SUCRALFATE 1 G: 1 TABLET ORAL at 21:35

## 2020-12-02 RX ADMIN — ONDANSETRON 4 MG: 2 INJECTION INTRAMUSCULAR; INTRAVENOUS at 04:15

## 2020-12-02 RX ADMIN — SUCRALFATE 1 G: 1 TABLET ORAL at 15:33

## 2020-12-02 RX ADMIN — PANTOPRAZOLE SODIUM 40 MG: 40 INJECTION, POWDER, FOR SOLUTION INTRAVENOUS at 08:21

## 2020-12-02 RX ADMIN — DEXTROSE AND SODIUM CHLORIDE 100 ML/HR: 5; .9 INJECTION, SOLUTION INTRAVENOUS at 17:28

## 2020-12-02 RX ADMIN — PROMETHAZINE HYDROCHLORIDE 25 MG: 25 INJECTION INTRAMUSCULAR; INTRAVENOUS at 23:31

## 2020-12-02 RX ADMIN — DIPHENHYDRAMINE HYDROCHLORIDE 25 MG: 50 INJECTION, SOLUTION INTRAMUSCULAR; INTRAVENOUS at 00:46

## 2020-12-02 RX ADMIN — PROMETHAZINE HYDROCHLORIDE 25 MG: 25 INJECTION INTRAMUSCULAR; INTRAVENOUS at 17:25

## 2020-12-02 RX ADMIN — Medication 25 MG: at 21:35

## 2020-12-02 RX ADMIN — PROMETHAZINE HYDROCHLORIDE 25 MG: 25 INJECTION INTRAMUSCULAR; INTRAVENOUS at 01:51

## 2020-12-02 RX ADMIN — PROMETHAZINE HYDROCHLORIDE 25 MG: 25 INJECTION INTRAMUSCULAR; INTRAVENOUS at 13:28

## 2020-12-02 RX ADMIN — LIDOCAINE 2 PATCH: 50 PATCH TOPICAL at 08:21

## 2020-12-02 RX ADMIN — PROMETHAZINE HYDROCHLORIDE 25 MG: 25 INJECTION INTRAMUSCULAR; INTRAVENOUS at 08:21

## 2020-12-02 RX ADMIN — Medication 25 MG: at 15:33

## 2020-12-02 RX ADMIN — OXYCODONE HYDROCHLORIDE 5 MG: 5 TABLET ORAL at 19:24

## 2020-12-02 RX ADMIN — POTASSIUM CHLORIDE 20 MEQ: 14.9 INJECTION, SOLUTION INTRAVENOUS at 09:28

## 2020-12-03 PROBLEM — R10.9 ABDOMINAL PAIN: Status: RESOLVED | Noted: 2020-07-19 | Resolved: 2020-12-03

## 2020-12-03 LAB
ANION GAP SERPL CALCULATED.3IONS-SCNC: 6 MMOL/L (ref 4–13)
BASOPHILS # BLD AUTO: 0.04 THOUSANDS/ΜL (ref 0–0.1)
BASOPHILS NFR BLD AUTO: 0 % (ref 0–1)
BUN SERPL-MCNC: 2 MG/DL (ref 5–25)
CALCIUM SERPL-MCNC: 8.5 MG/DL (ref 8.3–10.1)
CHLORIDE SERPL-SCNC: 111 MMOL/L (ref 100–108)
CO2 SERPL-SCNC: 23 MMOL/L (ref 21–32)
CREAT SERPL-MCNC: 0.42 MG/DL (ref 0.6–1.3)
EOSINOPHIL # BLD AUTO: 0.16 THOUSAND/ΜL (ref 0–0.61)
EOSINOPHIL NFR BLD AUTO: 2 % (ref 0–6)
ERYTHROCYTE [DISTWIDTH] IN BLOOD BY AUTOMATED COUNT: 12.8 % (ref 11.6–15.1)
GFR SERPL CREATININE-BSD FRML MDRD: 139 ML/MIN/1.73SQ M
GLUCOSE SERPL-MCNC: 85 MG/DL (ref 65–140)
HCT VFR BLD AUTO: 32.9 % (ref 34.8–46.1)
HGB BLD-MCNC: 11.2 G/DL (ref 11.5–15.4)
IMM GRANULOCYTES # BLD AUTO: 0.03 THOUSAND/UL (ref 0–0.2)
IMM GRANULOCYTES NFR BLD AUTO: 0 % (ref 0–2)
LYMPHOCYTES # BLD AUTO: 3.1 THOUSANDS/ΜL (ref 0.6–4.47)
LYMPHOCYTES NFR BLD AUTO: 35 % (ref 14–44)
MCH RBC QN AUTO: 30.4 PG (ref 26.8–34.3)
MCHC RBC AUTO-ENTMCNC: 34 G/DL (ref 31.4–37.4)
MCV RBC AUTO: 89 FL (ref 82–98)
MONOCYTES # BLD AUTO: 0.51 THOUSAND/ΜL (ref 0.17–1.22)
MONOCYTES NFR BLD AUTO: 6 % (ref 4–12)
NEUTROPHILS # BLD AUTO: 5.09 THOUSANDS/ΜL (ref 1.85–7.62)
NEUTS SEG NFR BLD AUTO: 57 % (ref 43–75)
NRBC BLD AUTO-RTO: 0 /100 WBCS
PLATELET # BLD AUTO: 197 THOUSANDS/UL (ref 149–390)
PMV BLD AUTO: 11 FL (ref 8.9–12.7)
POTASSIUM SERPL-SCNC: 3.2 MMOL/L (ref 3.5–5.3)
RBC # BLD AUTO: 3.69 MILLION/UL (ref 3.81–5.12)
SODIUM SERPL-SCNC: 140 MMOL/L (ref 136–145)
WBC # BLD AUTO: 8.93 THOUSAND/UL (ref 4.31–10.16)

## 2020-12-03 PROCEDURE — NC001 PR NO CHARGE: Performed by: STUDENT IN AN ORGANIZED HEALTH CARE EDUCATION/TRAINING PROGRAM

## 2020-12-03 PROCEDURE — 80048 BASIC METABOLIC PNL TOTAL CA: CPT | Performed by: PHYSICIAN ASSISTANT

## 2020-12-03 PROCEDURE — 99232 SBSQ HOSP IP/OBS MODERATE 35: CPT | Performed by: PHYSICIAN ASSISTANT

## 2020-12-03 PROCEDURE — C9113 INJ PANTOPRAZOLE SODIUM, VIA: HCPCS | Performed by: INTERNAL MEDICINE

## 2020-12-03 PROCEDURE — 85025 COMPLETE CBC W/AUTO DIFF WBC: CPT | Performed by: PHYSICIAN ASSISTANT

## 2020-12-03 RX ORDER — POTASSIUM CHLORIDE 20 MEQ/1
40 TABLET, EXTENDED RELEASE ORAL ONCE
Status: COMPLETED | OUTPATIENT
Start: 2020-12-03 | End: 2020-12-03

## 2020-12-03 RX ORDER — HYDROMORPHONE HCL/PF 1 MG/ML
0.5 SYRINGE (ML) INJECTION EVERY 4 HOURS PRN
Status: DISCONTINUED | OUTPATIENT
Start: 2020-12-03 | End: 2020-12-03

## 2020-12-03 RX ORDER — HYDROMORPHONE HCL/PF 1 MG/ML
1 SYRINGE (ML) INJECTION ONCE
Status: COMPLETED | OUTPATIENT
Start: 2020-12-03 | End: 2020-12-03

## 2020-12-03 RX ORDER — PROMETHAZINE HYDROCHLORIDE 25 MG/1
25 TABLET ORAL EVERY 6 HOURS SCHEDULED
Status: DISCONTINUED | OUTPATIENT
Start: 2020-12-03 | End: 2020-12-05 | Stop reason: HOSPADM

## 2020-12-03 RX ADMIN — Medication 25 MG: at 09:49

## 2020-12-03 RX ADMIN — OXYCODONE HYDROCHLORIDE 5 MG: 5 TABLET ORAL at 05:18

## 2020-12-03 RX ADMIN — HYDROMORPHONE HYDROCHLORIDE 0.5 MG: 1 INJECTION, SOLUTION INTRAMUSCULAR; INTRAVENOUS; SUBCUTANEOUS at 14:16

## 2020-12-03 RX ADMIN — SUCRALFATE 1 G: 1 TABLET ORAL at 21:49

## 2020-12-03 RX ADMIN — Medication 25 MG: at 16:07

## 2020-12-03 RX ADMIN — DEXTROSE AND SODIUM CHLORIDE 100 ML/HR: 5; .9 INJECTION, SOLUTION INTRAVENOUS at 03:15

## 2020-12-03 RX ADMIN — DEXTROSE AND SODIUM CHLORIDE 100 ML/HR: 5; .9 INJECTION, SOLUTION INTRAVENOUS at 16:05

## 2020-12-03 RX ADMIN — PROMETHAZINE HYDROCHLORIDE 25 MG: 25 TABLET ORAL at 11:46

## 2020-12-03 RX ADMIN — POTASSIUM CHLORIDE 40 MEQ: 1500 TABLET, EXTENDED RELEASE ORAL at 11:46

## 2020-12-03 RX ADMIN — SUCRALFATE 1 G: 1 TABLET ORAL at 16:07

## 2020-12-03 RX ADMIN — PROMETHAZINE HYDROCHLORIDE 25 MG: 25 TABLET ORAL at 18:30

## 2020-12-03 RX ADMIN — Medication 25 MG: at 20:38

## 2020-12-03 RX ADMIN — CYCLOBENZAPRINE HYDROCHLORIDE 5 MG: 5 TABLET, FILM COATED ORAL at 20:50

## 2020-12-03 RX ADMIN — PANTOPRAZOLE SODIUM 40 MG: 40 INJECTION, POWDER, FOR SOLUTION INTRAVENOUS at 09:49

## 2020-12-03 RX ADMIN — ONDANSETRON 4 MG: 2 INJECTION INTRAMUSCULAR; INTRAVENOUS at 13:46

## 2020-12-03 RX ADMIN — DOXYLAMINE SUCCINATE 25 MG: 25 TABLET ORAL at 21:49

## 2020-12-03 RX ADMIN — METOCLOPRAMIDE 10 MG: 5 INJECTION, SOLUTION INTRAMUSCULAR; INTRAVENOUS at 20:50

## 2020-12-03 RX ADMIN — HYDROMORPHONE HYDROCHLORIDE 1 MG: 1 INJECTION, SOLUTION INTRAMUSCULAR; INTRAVENOUS; SUBCUTANEOUS at 14:30

## 2020-12-03 RX ADMIN — OXYCODONE HYDROCHLORIDE 5 MG: 5 TABLET ORAL at 20:50

## 2020-12-03 RX ADMIN — SUCRALFATE 1 G: 1 TABLET ORAL at 07:03

## 2020-12-03 RX ADMIN — PROMETHAZINE HYDROCHLORIDE 25 MG: 25 TABLET ORAL at 07:59

## 2020-12-03 RX ADMIN — METOCLOPRAMIDE 10 MG: 5 INJECTION, SOLUTION INTRAMUSCULAR; INTRAVENOUS at 05:14

## 2020-12-03 RX ADMIN — LIDOCAINE 2 PATCH: 50 PATCH TOPICAL at 09:49

## 2020-12-04 LAB
ALBUMIN SERPL BCP-MCNC: 3.2 G/DL (ref 3.5–5)
ALP SERPL-CCNC: 41 U/L (ref 46–116)
ALT SERPL W P-5'-P-CCNC: 17 U/L (ref 12–78)
AMPHETAMINES SERPL QL SCN: NEGATIVE
ANION GAP SERPL CALCULATED.3IONS-SCNC: 7 MMOL/L (ref 4–13)
ANION GAP SERPL CALCULATED.3IONS-SCNC: 8 MMOL/L (ref 4–13)
AST SERPL W P-5'-P-CCNC: 14 U/L (ref 5–45)
BARBITURATES UR QL: NEGATIVE
BASOPHILS # BLD AUTO: 0.03 THOUSANDS/ΜL (ref 0–0.1)
BASOPHILS NFR BLD AUTO: 0 % (ref 0–1)
BENZODIAZ UR QL: NEGATIVE
BILIRUB SERPL-MCNC: 0.44 MG/DL (ref 0.2–1)
BUN SERPL-MCNC: 2 MG/DL (ref 5–25)
BUN SERPL-MCNC: 3 MG/DL (ref 5–25)
CALCIUM ALBUM COR SERPL-MCNC: 9.5 MG/DL (ref 8.3–10.1)
CALCIUM SERPL-MCNC: 8.9 MG/DL (ref 8.3–10.1)
CALCIUM SERPL-MCNC: 9.3 MG/DL (ref 8.3–10.1)
CHLORIDE SERPL-SCNC: 107 MMOL/L (ref 100–108)
CHLORIDE SERPL-SCNC: 108 MMOL/L (ref 100–108)
CO2 SERPL-SCNC: 25 MMOL/L (ref 21–32)
CO2 SERPL-SCNC: 26 MMOL/L (ref 21–32)
COCAINE UR QL: NEGATIVE
CREAT SERPL-MCNC: 0.4 MG/DL (ref 0.6–1.3)
CREAT SERPL-MCNC: 0.43 MG/DL (ref 0.6–1.3)
EOSINOPHIL # BLD AUTO: 0.11 THOUSAND/ΜL (ref 0–0.61)
EOSINOPHIL NFR BLD AUTO: 1 % (ref 0–6)
ERYTHROCYTE [DISTWIDTH] IN BLOOD BY AUTOMATED COUNT: 12.5 % (ref 11.6–15.1)
GFR SERPL CREATININE-BSD FRML MDRD: 138 ML/MIN/1.73SQ M
GFR SERPL CREATININE-BSD FRML MDRD: 141 ML/MIN/1.73SQ M
GLUCOSE SERPL-MCNC: 101 MG/DL (ref 65–140)
GLUCOSE SERPL-MCNC: 88 MG/DL (ref 65–140)
HCT VFR BLD AUTO: 31.7 % (ref 34.8–46.1)
HGB BLD-MCNC: 11.1 G/DL (ref 11.5–15.4)
IMM GRANULOCYTES # BLD AUTO: 0.04 THOUSAND/UL (ref 0–0.2)
IMM GRANULOCYTES NFR BLD AUTO: 0 % (ref 0–2)
LIPASE SERPL-CCNC: 39 U/L (ref 73–393)
LYMPHOCYTES # BLD AUTO: 1.44 THOUSANDS/ΜL (ref 0.6–4.47)
LYMPHOCYTES NFR BLD AUTO: 13 % (ref 14–44)
MCH RBC QN AUTO: 30.7 PG (ref 26.8–34.3)
MCHC RBC AUTO-ENTMCNC: 35 G/DL (ref 31.4–37.4)
MCV RBC AUTO: 88 FL (ref 82–98)
METHADONE UR QL: NEGATIVE
MONOCYTES # BLD AUTO: 0.58 THOUSAND/ΜL (ref 0.17–1.22)
MONOCYTES NFR BLD AUTO: 5 % (ref 4–12)
NEUTROPHILS # BLD AUTO: 9.05 THOUSANDS/ΜL (ref 1.85–7.62)
NEUTS SEG NFR BLD AUTO: 81 % (ref 43–75)
NRBC BLD AUTO-RTO: 0 /100 WBCS
OPIATES UR QL SCN: POSITIVE
OXYCODONE+OXYMORPHONE UR QL SCN: NEGATIVE
PCP UR QL: NEGATIVE
PLATELET # BLD AUTO: 192 THOUSANDS/UL (ref 149–390)
PMV BLD AUTO: 11.1 FL (ref 8.9–12.7)
POTASSIUM SERPL-SCNC: 2.9 MMOL/L (ref 3.5–5.3)
POTASSIUM SERPL-SCNC: 3.1 MMOL/L (ref 3.5–5.3)
PROT SERPL-MCNC: 6.1 G/DL (ref 6.4–8.2)
RBC # BLD AUTO: 3.61 MILLION/UL (ref 3.81–5.12)
SODIUM SERPL-SCNC: 140 MMOL/L (ref 136–145)
SODIUM SERPL-SCNC: 141 MMOL/L (ref 136–145)
THC UR QL: POSITIVE
WBC # BLD AUTO: 11.25 THOUSAND/UL (ref 4.31–10.16)

## 2020-12-04 PROCEDURE — 99255 IP/OBS CONSLTJ NEW/EST HI 80: CPT | Performed by: INTERNAL MEDICINE

## 2020-12-04 PROCEDURE — 80048 BASIC METABOLIC PNL TOTAL CA: CPT | Performed by: PHYSICIAN ASSISTANT

## 2020-12-04 PROCEDURE — C9113 INJ PANTOPRAZOLE SODIUM, VIA: HCPCS | Performed by: INTERNAL MEDICINE

## 2020-12-04 PROCEDURE — 83690 ASSAY OF LIPASE: CPT | Performed by: PHYSICIAN ASSISTANT

## 2020-12-04 PROCEDURE — 80053 COMPREHEN METABOLIC PANEL: CPT | Performed by: PHYSICIAN ASSISTANT

## 2020-12-04 PROCEDURE — 80307 DRUG TEST PRSMV CHEM ANLYZR: CPT | Performed by: INTERNAL MEDICINE

## 2020-12-04 PROCEDURE — 99232 SBSQ HOSP IP/OBS MODERATE 35: CPT | Performed by: PHYSICIAN ASSISTANT

## 2020-12-04 PROCEDURE — 97163 PT EVAL HIGH COMPLEX 45 MIN: CPT

## 2020-12-04 PROCEDURE — NC001 PR NO CHARGE: Performed by: OBSTETRICS & GYNECOLOGY

## 2020-12-04 PROCEDURE — 85025 COMPLETE CBC W/AUTO DIFF WBC: CPT | Performed by: PHYSICIAN ASSISTANT

## 2020-12-04 PROCEDURE — 97166 OT EVAL MOD COMPLEX 45 MIN: CPT

## 2020-12-04 RX ORDER — POTASSIUM CHLORIDE 14.9 MG/ML
20 INJECTION INTRAVENOUS ONCE
Status: COMPLETED | OUTPATIENT
Start: 2020-12-04 | End: 2020-12-04

## 2020-12-04 RX ORDER — PANTOPRAZOLE SODIUM 40 MG/1
40 INJECTION, POWDER, FOR SOLUTION INTRAVENOUS EVERY 12 HOURS SCHEDULED
Status: DISCONTINUED | OUTPATIENT
Start: 2020-12-04 | End: 2020-12-05 | Stop reason: HOSPADM

## 2020-12-04 RX ORDER — HYDROMORPHONE HCL/PF 1 MG/ML
0.5 SYRINGE (ML) INJECTION ONCE AS NEEDED
Status: COMPLETED | OUTPATIENT
Start: 2020-12-04 | End: 2020-12-04

## 2020-12-04 RX ORDER — ONDANSETRON 2 MG/ML
4 INJECTION INTRAMUSCULAR; INTRAVENOUS EVERY 8 HOURS SCHEDULED
Status: DISCONTINUED | OUTPATIENT
Start: 2020-12-04 | End: 2020-12-05 | Stop reason: HOSPADM

## 2020-12-04 RX ORDER — OXYCODONE HYDROCHLORIDE 5 MG/1
5 TABLET ORAL EVERY 4 HOURS PRN
Status: DISCONTINUED | OUTPATIENT
Start: 2020-12-04 | End: 2020-12-05 | Stop reason: HOSPADM

## 2020-12-04 RX ORDER — HYDROMORPHONE HCL/PF 1 MG/ML
0.5 SYRINGE (ML) INJECTION ONCE
Status: COMPLETED | OUTPATIENT
Start: 2020-12-04 | End: 2020-12-04

## 2020-12-04 RX ORDER — METOCLOPRAMIDE HYDROCHLORIDE 5 MG/ML
10 INJECTION INTRAMUSCULAR; INTRAVENOUS EVERY 6 HOURS SCHEDULED
Status: DISCONTINUED | OUTPATIENT
Start: 2020-12-04 | End: 2020-12-05 | Stop reason: HOSPADM

## 2020-12-04 RX ADMIN — POTASSIUM CHLORIDE 20 MEQ: 14.9 INJECTION, SOLUTION INTRAVENOUS at 10:30

## 2020-12-04 RX ADMIN — OXYCODONE HYDROCHLORIDE 5 MG: 5 TABLET ORAL at 15:37

## 2020-12-04 RX ADMIN — POTASSIUM CHLORIDE 20 MEQ: 14.9 INJECTION, SOLUTION INTRAVENOUS at 08:15

## 2020-12-04 RX ADMIN — HYDROMORPHONE HYDROCHLORIDE 0.5 MG: 1 INJECTION, SOLUTION INTRAMUSCULAR; INTRAVENOUS; SUBCUTANEOUS at 09:22

## 2020-12-04 RX ADMIN — ONDANSETRON 4 MG: 2 INJECTION INTRAMUSCULAR; INTRAVENOUS at 04:27

## 2020-12-04 RX ADMIN — HYDROMORPHONE HYDROCHLORIDE 0.5 MG: 1 INJECTION, SOLUTION INTRAMUSCULAR; INTRAVENOUS; SUBCUTANEOUS at 05:00

## 2020-12-04 RX ADMIN — LIDOCAINE 2 PATCH: 50 PATCH TOPICAL at 08:16

## 2020-12-04 RX ADMIN — PROMETHAZINE HYDROCHLORIDE 25 MG: 25 TABLET ORAL at 13:08

## 2020-12-04 RX ADMIN — PANTOPRAZOLE SODIUM 40 MG: 40 INJECTION, POWDER, FOR SOLUTION INTRAVENOUS at 08:05

## 2020-12-04 RX ADMIN — DEXTROSE AND SODIUM CHLORIDE 100 ML/HR: 5; .9 INJECTION, SOLUTION INTRAVENOUS at 09:30

## 2020-12-04 RX ADMIN — PROMETHAZINE HYDROCHLORIDE 25 MG: 25 TABLET ORAL at 17:28

## 2020-12-04 RX ADMIN — OXYCODONE HYDROCHLORIDE 5 MG: 5 TABLET ORAL at 02:56

## 2020-12-04 RX ADMIN — ALUMINUM HYDROXIDE, MAGNESIUM HYDROXIDE, AND SIMETHICONE 30 ML: 200; 200; 20 SUSPENSION ORAL at 04:28

## 2020-12-04 RX ADMIN — POTASSIUM CHLORIDE 20 MEQ: 14.9 INJECTION, SOLUTION INTRAVENOUS at 20:06

## 2020-12-04 RX ADMIN — ONDANSETRON 4 MG: 2 INJECTION INTRAMUSCULAR; INTRAVENOUS at 21:43

## 2020-12-04 RX ADMIN — METOCLOPRAMIDE 10 MG: 5 INJECTION, SOLUTION INTRAMUSCULAR; INTRAVENOUS at 08:19

## 2020-12-04 RX ADMIN — SUCRALFATE 1 G: 1 TABLET ORAL at 15:36

## 2020-12-04 RX ADMIN — SUCRALFATE 1 G: 1 TABLET ORAL at 21:43

## 2020-12-04 RX ADMIN — PANTOPRAZOLE SODIUM 40 MG: 40 INJECTION, POWDER, FOR SOLUTION INTRAVENOUS at 20:03

## 2020-12-04 RX ADMIN — SUCRALFATE 1 G: 1 TABLET ORAL at 13:08

## 2020-12-04 RX ADMIN — METOCLOPRAMIDE 10 MG: 5 INJECTION, SOLUTION INTRAMUSCULAR; INTRAVENOUS at 03:52

## 2020-12-04 RX ADMIN — PROMETHAZINE HYDROCHLORIDE 25 MG: 25 TABLET ORAL at 02:47

## 2020-12-04 RX ADMIN — CYCLOBENZAPRINE HYDROCHLORIDE 5 MG: 5 TABLET, FILM COATED ORAL at 15:36

## 2020-12-04 RX ADMIN — METOCLOPRAMIDE 10 MG: 5 INJECTION, SOLUTION INTRAMUSCULAR; INTRAVENOUS at 17:28

## 2020-12-05 VITALS
HEIGHT: 65 IN | BODY MASS INDEX: 22.82 KG/M2 | RESPIRATION RATE: 16 BRPM | HEART RATE: 95 BPM | DIASTOLIC BLOOD PRESSURE: 62 MMHG | TEMPERATURE: 98.6 F | WEIGHT: 137 LBS | OXYGEN SATURATION: 98 % | SYSTOLIC BLOOD PRESSURE: 106 MMHG

## 2020-12-05 PROBLEM — R10.9 ABDOMINAL PAIN: Status: RESOLVED | Noted: 2020-07-19 | Resolved: 2020-12-05

## 2020-12-05 PROBLEM — R11.2 INTRACTABLE NAUSEA AND VOMITING: Status: RESOLVED | Noted: 2020-10-11 | Resolved: 2020-12-05

## 2020-12-05 PROBLEM — E87.6 HYPOKALEMIA: Status: RESOLVED | Noted: 2020-03-22 | Resolved: 2020-12-05

## 2020-12-05 LAB
ANION GAP SERPL CALCULATED.3IONS-SCNC: 9 MMOL/L (ref 4–13)
BUN SERPL-MCNC: 2 MG/DL (ref 5–25)
CALCIUM SERPL-MCNC: 8.8 MG/DL (ref 8.3–10.1)
CHLORIDE SERPL-SCNC: 110 MMOL/L (ref 100–108)
CO2 SERPL-SCNC: 22 MMOL/L (ref 21–32)
CREAT SERPL-MCNC: 0.44 MG/DL (ref 0.6–1.3)
ERYTHROCYTE [DISTWIDTH] IN BLOOD BY AUTOMATED COUNT: 12.6 % (ref 11.6–15.1)
GFR SERPL CREATININE-BSD FRML MDRD: 137 ML/MIN/1.73SQ M
GLUCOSE SERPL-MCNC: 89 MG/DL (ref 65–140)
HCT VFR BLD AUTO: 31.5 % (ref 34.8–46.1)
HGB BLD-MCNC: 10.9 G/DL (ref 11.5–15.4)
MAGNESIUM SERPL-MCNC: 2.2 MG/DL (ref 1.6–2.6)
MCH RBC QN AUTO: 30.4 PG (ref 26.8–34.3)
MCHC RBC AUTO-ENTMCNC: 34.6 G/DL (ref 31.4–37.4)
MCV RBC AUTO: 88 FL (ref 82–98)
PLATELET # BLD AUTO: 194 THOUSANDS/UL (ref 149–390)
PMV BLD AUTO: 10.8 FL (ref 8.9–12.7)
POTASSIUM SERPL-SCNC: 3.1 MMOL/L (ref 3.5–5.3)
RBC # BLD AUTO: 3.58 MILLION/UL (ref 3.81–5.12)
SODIUM SERPL-SCNC: 141 MMOL/L (ref 136–145)
WBC # BLD AUTO: 7.97 THOUSAND/UL (ref 4.31–10.16)

## 2020-12-05 PROCEDURE — 83735 ASSAY OF MAGNESIUM: CPT | Performed by: PHYSICIAN ASSISTANT

## 2020-12-05 PROCEDURE — C9113 INJ PANTOPRAZOLE SODIUM, VIA: HCPCS | Performed by: INTERNAL MEDICINE

## 2020-12-05 PROCEDURE — 80048 BASIC METABOLIC PNL TOTAL CA: CPT | Performed by: PHYSICIAN ASSISTANT

## 2020-12-05 PROCEDURE — 99239 HOSP IP/OBS DSCHRG MGMT >30: CPT | Performed by: STUDENT IN AN ORGANIZED HEALTH CARE EDUCATION/TRAINING PROGRAM

## 2020-12-05 PROCEDURE — 85027 COMPLETE CBC AUTOMATED: CPT | Performed by: PHYSICIAN ASSISTANT

## 2020-12-05 RX ORDER — ACETAMINOPHEN 325 MG/1
650 TABLET ORAL EVERY 6 HOURS PRN
Qty: 60 TABLET | Refills: 0 | Status: SHIPPED | OUTPATIENT
Start: 2020-12-05

## 2020-12-05 RX ORDER — ACETAMINOPHEN 325 MG/1
975 TABLET ORAL EVERY 8 HOURS SCHEDULED
Status: DISCONTINUED | OUTPATIENT
Start: 2020-12-05 | End: 2020-12-05 | Stop reason: HOSPADM

## 2020-12-05 RX ORDER — ONDANSETRON 4 MG/1
4 TABLET, ORALLY DISINTEGRATING ORAL EVERY 6 HOURS PRN
Qty: 30 TABLET | Refills: 0 | Status: ON HOLD | OUTPATIENT
Start: 2020-12-05 | End: 2021-01-23 | Stop reason: SDUPTHER

## 2020-12-05 RX ADMIN — ACETAMINOPHEN 975 MG: 325 TABLET, FILM COATED ORAL at 08:00

## 2020-12-05 RX ADMIN — METOCLOPRAMIDE 10 MG: 5 INJECTION, SOLUTION INTRAMUSCULAR; INTRAVENOUS at 00:32

## 2020-12-05 RX ADMIN — LIDOCAINE 2 PATCH: 50 PATCH TOPICAL at 08:00

## 2020-12-05 RX ADMIN — PROMETHAZINE HYDROCHLORIDE 25 MG: 25 TABLET ORAL at 06:43

## 2020-12-05 RX ADMIN — OXYCODONE HYDROCHLORIDE 5 MG: 5 TABLET ORAL at 03:20

## 2020-12-05 RX ADMIN — SUCRALFATE 1 G: 1 TABLET ORAL at 06:43

## 2020-12-05 RX ADMIN — Medication 25 MG: at 00:25

## 2020-12-05 RX ADMIN — CYCLOBENZAPRINE HYDROCHLORIDE 5 MG: 5 TABLET, FILM COATED ORAL at 03:20

## 2020-12-05 RX ADMIN — DEXTROSE AND SODIUM CHLORIDE 100 ML/HR: 5; .9 INJECTION, SOLUTION INTRAVENOUS at 10:03

## 2020-12-05 RX ADMIN — PROMETHAZINE HYDROCHLORIDE 25 MG: 25 TABLET ORAL at 00:24

## 2020-12-05 RX ADMIN — DOXYLAMINE SUCCINATE 25 MG: 25 TABLET ORAL at 00:24

## 2020-12-05 RX ADMIN — METOCLOPRAMIDE 10 MG: 5 INJECTION, SOLUTION INTRAMUSCULAR; INTRAVENOUS at 06:45

## 2020-12-05 RX ADMIN — ONDANSETRON 4 MG: 2 INJECTION INTRAMUSCULAR; INTRAVENOUS at 06:47

## 2020-12-05 RX ADMIN — PANTOPRAZOLE SODIUM 40 MG: 40 INJECTION, POWDER, FOR SOLUTION INTRAVENOUS at 08:00

## 2020-12-25 ENCOUNTER — APPOINTMENT (EMERGENCY)
Dept: RADIOLOGY | Facility: HOSPITAL | Age: 30
End: 2020-12-25
Payer: COMMERCIAL

## 2020-12-25 ENCOUNTER — HOSPITAL ENCOUNTER (EMERGENCY)
Facility: HOSPITAL | Age: 30
Discharge: HOME | End: 2020-12-25
Attending: EMERGENCY MEDICINE | Admitting: EMERGENCY MEDICINE
Payer: COMMERCIAL

## 2020-12-25 VITALS
SYSTOLIC BLOOD PRESSURE: 115 MMHG | OXYGEN SATURATION: 98 % | DIASTOLIC BLOOD PRESSURE: 81 MMHG | RESPIRATION RATE: 20 BRPM | HEART RATE: 93 BPM | TEMPERATURE: 98.3 F

## 2020-12-25 DIAGNOSIS — R10.13 EPIGASTRIC PAIN: Primary | ICD-10-CM

## 2020-12-25 DIAGNOSIS — R11.2 NON-INTRACTABLE VOMITING WITH NAUSEA, UNSPECIFIED VOMITING TYPE: ICD-10-CM

## 2020-12-25 LAB
ALBUMIN SERPL BCP-MCNC: 3.9 G/DL (ref 3.5–5)
ALP SERPL-CCNC: 50 U/L (ref 46–116)
ALT SERPL W P-5'-P-CCNC: 21 U/L (ref 12–78)
ANION GAP SERPL CALCULATED.3IONS-SCNC: 10 MMOL/L (ref 4–13)
AST SERPL W P-5'-P-CCNC: 17 U/L (ref 5–45)
BASOPHILS # BLD AUTO: 0.05 THOUSANDS/ΜL (ref 0–0.1)
BASOPHILS NFR BLD AUTO: 0 % (ref 0–1)
BILIRUB SERPL-MCNC: 0.57 MG/DL (ref 0.2–1)
BILIRUB UR QL STRIP: NEGATIVE
BUN SERPL-MCNC: 6 MG/DL (ref 5–25)
CALCIUM SERPL-MCNC: 9.4 MG/DL (ref 8.3–10.1)
CHLORIDE SERPL-SCNC: 105 MMOL/L (ref 100–108)
CLARITY UR: CLEAR
CO2 SERPL-SCNC: 23 MMOL/L (ref 21–32)
COLOR UR: YELLOW
COLOR, POC: NORMAL
CREAT SERPL-MCNC: 0.58 MG/DL (ref 0.6–1.3)
EOSINOPHIL # BLD AUTO: 0.1 THOUSAND/ΜL (ref 0–0.61)
EOSINOPHIL NFR BLD AUTO: 1 % (ref 0–6)
ERYTHROCYTE [DISTWIDTH] IN BLOOD BY AUTOMATED COUNT: 12.9 % (ref 11.6–15.1)
GFR SERPL CREATININE-BSD FRML MDRD: 125 ML/MIN/1.73SQ M
GLUCOSE SERPL-MCNC: 97 MG/DL (ref 65–140)
GLUCOSE UR STRIP-MCNC: NEGATIVE MG/DL
HCT VFR BLD AUTO: 36.2 % (ref 34.8–46.1)
HGB BLD-MCNC: 12.7 G/DL (ref 11.5–15.4)
HGB UR QL STRIP.AUTO: NEGATIVE
IMM GRANULOCYTES # BLD AUTO: 0.07 THOUSAND/UL (ref 0–0.2)
IMM GRANULOCYTES NFR BLD AUTO: 1 % (ref 0–2)
KETONES UR STRIP-MCNC: ABNORMAL MG/DL
LEUKOCYTE ESTERASE UR QL STRIP: NEGATIVE
LIPASE SERPL-CCNC: 88 U/L (ref 73–393)
LYMPHOCYTES # BLD AUTO: 2.87 THOUSANDS/ΜL (ref 0.6–4.47)
LYMPHOCYTES NFR BLD AUTO: 20 % (ref 14–44)
MCH RBC QN AUTO: 30.4 PG (ref 26.8–34.3)
MCHC RBC AUTO-ENTMCNC: 35.1 G/DL (ref 31.4–37.4)
MCV RBC AUTO: 87 FL (ref 82–98)
MONOCYTES # BLD AUTO: 0.68 THOUSAND/ΜL (ref 0.17–1.22)
MONOCYTES NFR BLD AUTO: 5 % (ref 4–12)
NEUTROPHILS # BLD AUTO: 10.37 THOUSANDS/ΜL (ref 1.85–7.62)
NEUTS SEG NFR BLD AUTO: 73 % (ref 43–75)
NITRITE UR QL STRIP: NEGATIVE
NRBC BLD AUTO-RTO: 0 /100 WBCS
PH UR STRIP.AUTO: 7 [PH] (ref 4.5–8)
PLATELET # BLD AUTO: 244 THOUSANDS/UL (ref 149–390)
PMV BLD AUTO: 11 FL (ref 8.9–12.7)
POTASSIUM SERPL-SCNC: 3.2 MMOL/L (ref 3.5–5.3)
PROT SERPL-MCNC: 7.6 G/DL (ref 6.4–8.2)
PROT UR STRIP-MCNC: NEGATIVE MG/DL
RBC # BLD AUTO: 4.18 MILLION/UL (ref 3.81–5.12)
SODIUM SERPL-SCNC: 138 MMOL/L (ref 136–145)
SP GR UR STRIP.AUTO: 1.01 (ref 1–1.03)
UROBILINOGEN UR QL STRIP.AUTO: 0.2 E.U./DL
WBC # BLD AUTO: 14.14 THOUSAND/UL (ref 4.31–10.16)

## 2020-12-25 PROCEDURE — 96365 THER/PROPH/DIAG IV INF INIT: CPT

## 2020-12-25 PROCEDURE — 85025 COMPLETE CBC W/AUTO DIFF WBC: CPT | Performed by: EMERGENCY MEDICINE

## 2020-12-25 PROCEDURE — 76705 ECHO EXAM OF ABDOMEN: CPT | Performed by: EMERGENCY MEDICINE

## 2020-12-25 PROCEDURE — 80053 COMPREHEN METABOLIC PANEL: CPT | Performed by: EMERGENCY MEDICINE

## 2020-12-25 PROCEDURE — 81003 URINALYSIS AUTO W/O SCOPE: CPT

## 2020-12-25 PROCEDURE — 36415 COLL VENOUS BLD VENIPUNCTURE: CPT | Performed by: EMERGENCY MEDICINE

## 2020-12-25 PROCEDURE — 83690 ASSAY OF LIPASE: CPT | Performed by: EMERGENCY MEDICINE

## 2020-12-25 PROCEDURE — 99284 EMERGENCY DEPT VISIT MOD MDM: CPT

## 2020-12-25 PROCEDURE — 96374 THER/PROPH/DIAG INJ IV PUSH: CPT

## 2020-12-25 PROCEDURE — 96361 HYDRATE IV INFUSION ADD-ON: CPT

## 2020-12-25 PROCEDURE — 76815 OB US LIMITED FETUS(S): CPT | Performed by: EMERGENCY MEDICINE

## 2020-12-25 PROCEDURE — 87086 URINE CULTURE/COLONY COUNT: CPT | Performed by: EMERGENCY MEDICINE

## 2020-12-25 PROCEDURE — 96375 TX/PRO/DX INJ NEW DRUG ADDON: CPT

## 2020-12-25 PROCEDURE — 99285 EMERGENCY DEPT VISIT HI MDM: CPT | Performed by: EMERGENCY MEDICINE

## 2020-12-25 PROCEDURE — 71046 X-RAY EXAM CHEST 2 VIEWS: CPT

## 2020-12-25 RX ORDER — MORPHINE SULFATE 4 MG/ML
4 INJECTION, SOLUTION INTRAMUSCULAR; INTRAVENOUS ONCE
Status: COMPLETED | OUTPATIENT
Start: 2020-12-25 | End: 2020-12-25

## 2020-12-25 RX ORDER — POTASSIUM CHLORIDE 14.9 MG/ML
20 INJECTION INTRAVENOUS
Status: DISCONTINUED | OUTPATIENT
Start: 2020-12-25 | End: 2020-12-25 | Stop reason: HOSPADM

## 2020-12-25 RX ORDER — METOCLOPRAMIDE 10 MG/1
10 TABLET ORAL EVERY 6 HOURS PRN
Qty: 28 TABLET | Refills: 0 | Status: SHIPPED | OUTPATIENT
Start: 2020-12-25 | End: 2021-01-01

## 2020-12-25 RX ORDER — METOCLOPRAMIDE HYDROCHLORIDE 5 MG/ML
10 INJECTION INTRAMUSCULAR; INTRAVENOUS ONCE
Status: COMPLETED | OUTPATIENT
Start: 2020-12-25 | End: 2020-12-25

## 2020-12-25 RX ADMIN — METOCLOPRAMIDE 10 MG: 5 INJECTION, SOLUTION INTRAMUSCULAR; INTRAVENOUS at 08:25

## 2020-12-25 RX ADMIN — MORPHINE SULFATE 4 MG: 4 INJECTION INTRAVENOUS at 08:30

## 2020-12-25 RX ADMIN — SODIUM CHLORIDE 1000 ML: 0.9 INJECTION, SOLUTION INTRAVENOUS at 09:49

## 2020-12-25 RX ADMIN — POTASSIUM CHLORIDE 20 MEQ: 14.9 INJECTION, SOLUTION INTRAVENOUS at 09:49

## 2020-12-26 LAB — BACTERIA UR CULT: NORMAL

## 2021-01-17 ENCOUNTER — HOSPITAL ENCOUNTER (INPATIENT)
Facility: HOSPITAL | Age: 31
LOS: 5 days | Discharge: HOME | DRG: 832 | End: 2021-01-23
Attending: EMERGENCY MEDICINE | Admitting: INTERNAL MEDICINE
Payer: COMMERCIAL

## 2021-01-17 DIAGNOSIS — R11.2 INTRACTABLE NAUSEA AND VOMITING: ICD-10-CM

## 2021-01-17 DIAGNOSIS — O21.9 NAUSEA AND VOMITING DURING PREGNANCY: ICD-10-CM

## 2021-01-17 DIAGNOSIS — O21.9 NAUSEA AND VOMITING IN PREGNANCY: Primary | ICD-10-CM

## 2021-01-17 DIAGNOSIS — E43 SEVERE PROTEIN-CALORIE MALNUTRITION (HCC): ICD-10-CM

## 2021-01-17 DIAGNOSIS — E87.6 HYPOKALEMIA DUE TO EXCESSIVE GASTROINTESTINAL LOSS OF POTASSIUM: ICD-10-CM

## 2021-01-17 DIAGNOSIS — F32.A DEPRESSION WITH SUICIDAL IDEATION: ICD-10-CM

## 2021-01-17 DIAGNOSIS — Z34.90 PREGNANCY: ICD-10-CM

## 2021-01-17 DIAGNOSIS — R45.851 DEPRESSION WITH SUICIDAL IDEATION: ICD-10-CM

## 2021-01-17 DIAGNOSIS — Z87.19 HISTORY OF DUODENAL ULCER: ICD-10-CM

## 2021-01-17 DIAGNOSIS — R10.13 EPIGASTRIC PAIN: ICD-10-CM

## 2021-01-17 PROBLEM — M54.9 CHRONIC BACK PAIN: Status: ACTIVE | Noted: 2021-01-17

## 2021-01-17 PROBLEM — F12.90 MARIJUANA USE: Status: ACTIVE | Noted: 2021-01-17

## 2021-01-17 PROBLEM — G89.29 CHRONIC BACK PAIN: Status: ACTIVE | Noted: 2021-01-17

## 2021-01-17 LAB
ALBUMIN SERPL BCP-MCNC: 3.6 G/DL (ref 3.5–5)
ALP SERPL-CCNC: 60 U/L (ref 46–116)
ALT SERPL W P-5'-P-CCNC: 14 U/L (ref 12–78)
AMORPH PHOS CRY URNS QL MICRO: ABNORMAL /HPF
ANION GAP SERPL CALCULATED.3IONS-SCNC: 5 MMOL/L (ref 4–13)
AST SERPL W P-5'-P-CCNC: 12 U/L (ref 5–45)
BACTERIA UR QL AUTO: ABNORMAL /HPF
BASOPHILS # BLD AUTO: 0.04 THOUSANDS/ΜL (ref 0–0.1)
BASOPHILS NFR BLD AUTO: 0 % (ref 0–1)
BILIRUB SERPL-MCNC: 0.36 MG/DL (ref 0.2–1)
BILIRUB UR QL STRIP: NEGATIVE
BUN SERPL-MCNC: 6 MG/DL (ref 5–25)
CALCIUM SERPL-MCNC: 8.9 MG/DL (ref 8.3–10.1)
CHLORIDE SERPL-SCNC: 109 MMOL/L (ref 100–108)
CLARITY UR: CLEAR
CLARITY, POC: CLEAR
CO2 SERPL-SCNC: 24 MMOL/L (ref 21–32)
COARSE GRAN CASTS URNS QL MICRO: ABNORMAL /LPF
COLOR UR: YELLOW
COLOR, POC: YELLOW
CREAT SERPL-MCNC: 0.56 MG/DL (ref 0.6–1.3)
EOSINOPHIL # BLD AUTO: 0.04 THOUSAND/ΜL (ref 0–0.61)
EOSINOPHIL NFR BLD AUTO: 0 % (ref 0–6)
ERYTHROCYTE [DISTWIDTH] IN BLOOD BY AUTOMATED COUNT: 13 % (ref 11.6–15.1)
GFR SERPL CREATININE-BSD FRML MDRD: 126 ML/MIN/1.73SQ M
GLUCOSE SERPL-MCNC: 121 MG/DL (ref 65–140)
GLUCOSE UR STRIP-MCNC: NEGATIVE MG/DL
HCT VFR BLD AUTO: 35.9 % (ref 34.8–46.1)
HGB BLD-MCNC: 12.4 G/DL (ref 11.5–15.4)
HGB UR QL STRIP.AUTO: NEGATIVE
IMM GRANULOCYTES # BLD AUTO: 0.07 THOUSAND/UL (ref 0–0.2)
IMM GRANULOCYTES NFR BLD AUTO: 0 % (ref 0–2)
KETONES UR STRIP-MCNC: ABNORMAL MG/DL
LEUKOCYTE ESTERASE UR QL STRIP: NEGATIVE
LIPASE SERPL-CCNC: 39 U/L (ref 73–393)
LYMPHOCYTES # BLD AUTO: 1.61 THOUSANDS/ΜL (ref 0.6–4.47)
LYMPHOCYTES NFR BLD AUTO: 10 % (ref 14–44)
MCH RBC QN AUTO: 30.8 PG (ref 26.8–34.3)
MCHC RBC AUTO-ENTMCNC: 34.5 G/DL (ref 31.4–37.4)
MCV RBC AUTO: 89 FL (ref 82–98)
MONOCYTES # BLD AUTO: 0.38 THOUSAND/ΜL (ref 0.17–1.22)
MONOCYTES NFR BLD AUTO: 2 % (ref 4–12)
MUCOUS THREADS UR QL AUTO: ABNORMAL
NEUTROPHILS # BLD AUTO: 13.68 THOUSANDS/ΜL (ref 1.85–7.62)
NEUTS SEG NFR BLD AUTO: 88 % (ref 43–75)
NITRITE UR QL STRIP: NEGATIVE
NON-SQ EPI CELLS URNS QL MICRO: ABNORMAL /HPF
NRBC BLD AUTO-RTO: 0 /100 WBCS
PH UR STRIP.AUTO: 7.5 [PH] (ref 4.5–8)
PLATELET # BLD AUTO: 209 THOUSANDS/UL (ref 149–390)
PMV BLD AUTO: 11.6 FL (ref 8.9–12.7)
POTASSIUM SERPL-SCNC: 3.5 MMOL/L (ref 3.5–5.3)
PROT SERPL-MCNC: 7.3 G/DL (ref 6.4–8.2)
PROT UR STRIP-MCNC: ABNORMAL MG/DL
RBC # BLD AUTO: 4.03 MILLION/UL (ref 3.81–5.12)
RBC #/AREA URNS AUTO: ABNORMAL /HPF
SODIUM SERPL-SCNC: 138 MMOL/L (ref 136–145)
SP GR UR STRIP.AUTO: 1.02 (ref 1–1.03)
UROBILINOGEN UR QL STRIP.AUTO: 0.2 E.U./DL
WBC # BLD AUTO: 15.82 THOUSAND/UL (ref 4.31–10.16)
WBC #/AREA URNS AUTO: ABNORMAL /HPF

## 2021-01-17 PROCEDURE — 96372 THER/PROPH/DIAG INJ SC/IM: CPT

## 2021-01-17 PROCEDURE — 83690 ASSAY OF LIPASE: CPT | Performed by: EMERGENCY MEDICINE

## 2021-01-17 PROCEDURE — 83036 HEMOGLOBIN GLYCOSYLATED A1C: CPT | Performed by: PHYSICIAN ASSISTANT

## 2021-01-17 PROCEDURE — 99285 EMERGENCY DEPT VISIT HI MDM: CPT

## 2021-01-17 PROCEDURE — C9113 INJ PANTOPRAZOLE SODIUM, VIA: HCPCS | Performed by: PHYSICIAN ASSISTANT

## 2021-01-17 PROCEDURE — 85025 COMPLETE CBC W/AUTO DIFF WBC: CPT | Performed by: EMERGENCY MEDICINE

## 2021-01-17 PROCEDURE — 93005 ELECTROCARDIOGRAM TRACING: CPT

## 2021-01-17 PROCEDURE — 99220 PR INITIAL OBSERVATION CARE/DAY 70 MINUTES: CPT | Performed by: FAMILY MEDICINE

## 2021-01-17 PROCEDURE — 81001 URINALYSIS AUTO W/SCOPE: CPT

## 2021-01-17 PROCEDURE — 80053 COMPREHEN METABOLIC PANEL: CPT | Performed by: EMERGENCY MEDICINE

## 2021-01-17 PROCEDURE — 36415 COLL VENOUS BLD VENIPUNCTURE: CPT | Performed by: EMERGENCY MEDICINE

## 2021-01-17 PROCEDURE — 99285 EMERGENCY DEPT VISIT HI MDM: CPT | Performed by: EMERGENCY MEDICINE

## 2021-01-17 PROCEDURE — 87086 URINE CULTURE/COLONY COUNT: CPT

## 2021-01-17 PROCEDURE — 80307 DRUG TEST PRSMV CHEM ANLYZR: CPT | Performed by: OBSTETRICS & GYNECOLOGY

## 2021-01-17 PROCEDURE — 96365 THER/PROPH/DIAG IV INF INIT: CPT

## 2021-01-17 PROCEDURE — 96375 TX/PRO/DX INJ NEW DRUG ADDON: CPT

## 2021-01-17 PROCEDURE — 96366 THER/PROPH/DIAG IV INF ADDON: CPT

## 2021-01-17 RX ORDER — LIDOCAINE 50 MG/G
2 PATCH TOPICAL DAILY
Status: DISCONTINUED | OUTPATIENT
Start: 2021-01-17 | End: 2021-01-17

## 2021-01-17 RX ORDER — PROMETHAZINE HYDROCHLORIDE 12.5 MG/1
12.5 SUPPOSITORY RECTAL EVERY 6 HOURS PRN
Status: DISCONTINUED | OUTPATIENT
Start: 2021-01-17 | End: 2021-01-23 | Stop reason: HOSPADM

## 2021-01-17 RX ORDER — MORPHINE SULFATE 4 MG/ML
4 INJECTION, SOLUTION INTRAMUSCULAR; INTRAVENOUS ONCE
Status: COMPLETED | OUTPATIENT
Start: 2021-01-17 | End: 2021-01-17

## 2021-01-17 RX ORDER — PANTOPRAZOLE SODIUM 40 MG/1
40 INJECTION, POWDER, FOR SOLUTION INTRAVENOUS EVERY 12 HOURS SCHEDULED
Status: DISCONTINUED | OUTPATIENT
Start: 2021-01-17 | End: 2021-01-23 | Stop reason: HOSPADM

## 2021-01-17 RX ORDER — METOCLOPRAMIDE HYDROCHLORIDE 5 MG/ML
10 INJECTION INTRAMUSCULAR; INTRAVENOUS ONCE
Status: COMPLETED | OUTPATIENT
Start: 2021-01-17 | End: 2021-01-17

## 2021-01-17 RX ORDER — PROMETHAZINE HYDROCHLORIDE 25 MG/ML
25 INJECTION, SOLUTION INTRAMUSCULAR; INTRAVENOUS ONCE
Status: COMPLETED | OUTPATIENT
Start: 2021-01-17 | End: 2021-01-17

## 2021-01-17 RX ORDER — SUCRALFATE 1 G/1
1 TABLET ORAL
Status: DISCONTINUED | OUTPATIENT
Start: 2021-01-17 | End: 2021-01-18

## 2021-01-17 RX ORDER — ONDANSETRON 2 MG/ML
4 INJECTION INTRAMUSCULAR; INTRAVENOUS EVERY 6 HOURS PRN
Status: DISCONTINUED | OUTPATIENT
Start: 2021-01-17 | End: 2021-01-18

## 2021-01-17 RX ORDER — ONDANSETRON 2 MG/ML
4 INJECTION INTRAMUSCULAR; INTRAVENOUS ONCE
Status: COMPLETED | OUTPATIENT
Start: 2021-01-17 | End: 2021-01-17

## 2021-01-17 RX ORDER — PANTOPRAZOLE SODIUM 40 MG/1
40 TABLET, DELAYED RELEASE ORAL
Status: DISCONTINUED | OUTPATIENT
Start: 2021-01-18 | End: 2021-01-17

## 2021-01-17 RX ORDER — PYRIDOXINE HCL (VITAMIN B6) 50 MG
25 TABLET ORAL DAILY
Status: DISCONTINUED | OUTPATIENT
Start: 2021-01-18 | End: 2021-01-19

## 2021-01-17 RX ORDER — ACETAMINOPHEN 325 MG/1
650 TABLET ORAL EVERY 6 HOURS PRN
Status: DISCONTINUED | OUTPATIENT
Start: 2021-01-17 | End: 2021-01-23 | Stop reason: HOSPADM

## 2021-01-17 RX ADMIN — DOXYLAMINE SUCCINATE 12.5 MG: 25 TABLET ORAL at 15:45

## 2021-01-17 RX ADMIN — MORPHINE SULFATE 4 MG: 4 INJECTION INTRAVENOUS at 14:33

## 2021-01-17 RX ADMIN — ONDANSETRON 4 MG: 2 INJECTION INTRAMUSCULAR; INTRAVENOUS at 20:45

## 2021-01-17 RX ADMIN — SUCRALFATE 1 G: 1 TABLET ORAL at 21:56

## 2021-01-17 RX ADMIN — ONDANSETRON 4 MG: 2 INJECTION INTRAMUSCULAR; INTRAVENOUS at 14:32

## 2021-01-17 RX ADMIN — PANTOPRAZOLE SODIUM 40 MG: 40 INJECTION, POWDER, FOR SOLUTION INTRAVENOUS at 21:56

## 2021-01-17 RX ADMIN — PROMETHAZINE HYDROCHLORIDE 25 MG: 25 INJECTION INTRAMUSCULAR; INTRAVENOUS at 18:08

## 2021-01-17 RX ADMIN — PROMETHAZINE HYDROCHLORIDE 12.5 MG: 12.5 SUPPOSITORY RECTAL at 21:52

## 2021-01-17 RX ADMIN — PYRIDOXINE HYDROCHLORIDE 25 MG: 100 INJECTION, SOLUTION INTRAMUSCULAR; INTRAVENOUS at 15:47

## 2021-01-17 RX ADMIN — METOCLOPRAMIDE 10 MG: 5 INJECTION, SOLUTION INTRAMUSCULAR; INTRAVENOUS at 13:46

## 2021-01-17 RX ADMIN — MORPHINE SULFATE 2 MG: 2 INJECTION, SOLUTION INTRAMUSCULAR; INTRAVENOUS at 21:55

## 2021-01-17 RX ADMIN — SODIUM CHLORIDE, SODIUM LACTATE, POTASSIUM CHLORIDE, AND CALCIUM CHLORIDE 1000 ML: .6; .31; .03; .02 INJECTION, SOLUTION INTRAVENOUS at 13:49

## 2021-01-17 RX ADMIN — DOXYLAMINE SUCCINATE 25 MG: 25 TABLET ORAL at 22:06

## 2021-01-17 NOTE — ASSESSMENT & PLAN NOTE
· Multiple ED visits and hospitalizations both here and at [facility] for the same  · Possible etiologies include hyperemesis gravidarum, gastroparesis, cyclical vomiting syndrome, and cannabinoid hyperemesis syndrome   · Will appreciate GI and OB/GYN consults  · Per review of records from recent [facility] hospitalization, patient's regimen in regards to her N/V at discharge was as follows:   · Unison 12 5mg BID, Unison 25mg QHS, Phenergan suppository 12 5mg Q6hr PRN, Carafate 1G 4x/day and Protonix 40mg BID  · However, patient reports she has only been using Reglan, Pantoprazole, and medical marijuana  She ran out of Zofran and is not using  She stopped using Phenergan because she is using Reglan     · Will place on: Zofran Q6hr PRN, Unison 12 5mg BID and 25mg QHS, Protonix 40mg BID, Carafate 1G 4x daily and PRN Phenergan suppository

## 2021-01-17 NOTE — ASSESSMENT & PLAN NOTE
· Patient reports medical marijuana use for this as well as her nausea/vomiting   · Tylenol PRN   · Aqua K pad  · Consider APS consult

## 2021-01-17 NOTE — ED ATTENDING ATTESTATION
Final Diagnosis:  1  Nausea and vomiting in pregnancy    2  Intractable nausea and vomiting    3  Epigastric pain    4  History of duodenal ulcer    5  Pregnancy      ED Course as of  1400   Sun 2021   1725 -146; images collected with resident  1725 Ketones, UA(!): 80 (3+)       I, Natasha Aguillon MD, saw and evaluated the patient  All available labs and X-rays were ordered by me or the resident and have been reviewed by myself  I discussed the patient with the resident / non-physician and agree with the resident's / non-physician practitioner's findings and plan as documented in the resident's / non-physician practicitioner's note, except where noted  At this point, I agree with the current assessment done in the ED  I was present during key portions of all procedures performed unless otherwise stated  Chief Complaint   Patient presents with    Vomiting During Pregnancy     16weeks pregnant, states " bryan been vomiting none stop since October, I get admitted for fluids and medication changes all the time"      This is a 35 y o  female presenting for evaluation of likely hyperemesis gravidarum  She  at 16 weeks gestation presenting for evaluation of likely hyper sats gravidarum  The patient states that throughout her pregnancy she has been having a lot of nausea and vomiting, nonbloody nonbilious  She has been using Reglan to help her symptoms orally however since last night been having increased nausea, inability tolerate oral intake, epigastric discomfort  No fevers chills no chest pain shortness of breath  No cough congestion rhinorrhea sore throat  No urinary tract infection symptoms including burning itching pain blood frequency  Feels exactly like previous episodes for which she had to be admitted for  Regular his work well for her in the past   No vaginal bleeding no vaginal discharge no leakage of fluid      PMH:   has a past medical history of Arthritis, Asthma, History of stomach ulcers, and Psychiatric disorder  PSH:   has a past surgical history that includes EGD; [place] tooth extraction; Breast surgery; and Cosmetic surgery  Social:  Social History     Substance and Sexual Activity   Alcohol Use Not Currently     Social History     Tobacco Use   Smoking Status Never Smoker   Smokeless Tobacco Never Used     Social History     Substance and Sexual Activity   Drug Use Not Currently    Types: Marijuana     PE:  Vitals:    01/17/21 2011 01/17/21 2043 01/17/21 2300 01/18/21 0824   BP: 115/72 121/78 113/60 106/59   BP Location: Left arm Left arm Right arm Right arm   Pulse: 76 79 72 88   Resp: 18 18 17 17   Temp:  98 1 °F (36 7 °C) 98 3 °F (36 8 °C) 98 6 °F (37 °C)   TempSrc:  Oral Oral Oral   SpO2: 100% 99% 99% 97%   Weight:  60 2 kg (132 lb 12 8 oz)     Height:  5' 5" (1 651 m)     General: VSS, repeatedyl vomiting into blue bag, awake, alert  Well-nourished, well-developed  Appears stated age  Head: Normocephalic, atraumatic, nontender  Eyes: PERRL, EOM-I  No diplopia  No hyphema  No subconjunctival hemorrhages  Symmetrical lids  ENTAtraumatic external nose and ears  Dry MM  Repeatedly vomiting  No stridor  Normal phonation  No drooling  Base of mouth is soft  No mastoid tenderness  Neck: Symmetric, trachea midline  No JVD  CV: Mild tachycardia   Peripheral pulses +2 throughout  No chest wall tenderness  Lungs:   Unlabored   No retractions  No crepitus  No tachypnea  No paradoxical motion  Abd: +BS, soft, diffuse mild tenderness  ND    +guarding in epigastric region  No rigidity  No rebound  No peritoneal signs  MSK:   FROM   No lower extremity edema  Back:   No CVAT  Skin: Dry, intact  Neuro: AAOx3, GCS 15, CN II-XII grossly intact  Motor grossly intact  Psychiatric/Behavioral: Appropriate mood and affect   Exam: deferred    A/P:  - Patient has story that is consistent with hyperemesis gravidarum    - Will check urine for ketones  - Beside U/S attempted: -146  - Will do aggressive fluids: 2L of        [ x ] Normal Saline       [    ] Lactated Ringers       [    ] Plasmalyte  - Will do an anti-emetic       [    ] Zofran 4mg IV       [ x ] Reglan 10mg IV       [    ] Diclegis 10mg PO       [    ] Phenergan PO  - Will likely DC with        [ x ] script for Zofran + Diclegis (unisom 1/2 tablet of 25mg + pyridoxine 1/2 tablet 25mg)       [ x ] Barbie pills 250mg TID + QHS       [ x ] Advise daily pre-som vitamin w/ iron supplement  - Clinically doubt Wernicke's encephalopathy; no indication at this time for acute thiamine administration  - Return precautions reviewed orally  - 13 point ROS was performed and all are normal unless stated in the history above  - Nursing note reviewed  Vitals reviewed  - Orders placed by myself and/or advanced practitioner / resident     - Previous chart was reviewed  - No language barrier    - History obtained from patient  - There are no limitations to the history obtained  - Critical care time: Not applicable for this patient       Code Status: Level 1 - Full Code  Advance Directive and Living Will:      Power of :    POLST:      Medications   acetaminophen (TYLENOL) tablet 650 mg (has no administration in time range)   doxylamine (UNISON) tablet 25 mg (25 mg Oral Given 21)   promethazine (PHENERGAN) rectal suppository 12 5 mg (12 5 mg Rectal Given 21)   enoxaparin (LOVENOX) subcutaneous injection 40 mg (40 mg Subcutaneous Refused 21 0814)   pyridoxine (VITAMIN B6) tablet 25 mg (25 mg Oral Given 2115)   morphine injection 2 mg (2 mg Intravenous Given 21 1146)   pantoprazole (PROTONIX) injection 40 mg (40 mg Intravenous Given 21 0815)   ondansetron (ZOFRAN) injection 4 mg (4 mg Intravenous Given 21 1147)   thiamine (VITAMIN B1) 906 mg, folic acid 1 mg, pyridoxine (VITAMIN B6) 25 mg in dextrose 5 % 1,000 mL infusion (has no administration in time range)   doxylamine (UNISON) tablet 12 5 mg (has no administration in time range)   promethazine (PHENERGAN) injection 25 mg (has no administration in time range)   methylPREDNISolone sodium succinate (Solu-MEDROL) injection 16 mg (has no administration in time range)   lactated ringers infusion (100 mL/hr Intravenous New Bag 1/18/21 1154)   lactated ringers bolus 1,000 mL (0 mL Intravenous Stopped 1/17/21 1547)   metoclopramide (REGLAN) injection 10 mg (10 mg Intravenous Given 1/17/21 1346)   ondansetron (ZOFRAN) injection 4 mg (4 mg Intravenous Given 1/17/21 1432)   morphine (PF) 4 mg/mL injection 4 mg (4 mg Intravenous Given 1/17/21 1433)   doxylamine (UNISON) tablet 12 5 mg (12 5 mg Oral Given 1/17/21 1545)   pyridoxine (VITAMIN B6) injection 25 mg (25 mg Intravenous Given 1/17/21 1547)   promethazine (PHENERGAN) injection 25 mg (25 mg Intramuscular Given 1/17/21 1808)     No orders to display     Orders Placed This Encounter   Procedures    Urine culture    CBC and differential    Comprehensive metabolic panel    Lipase    Urine Microscopic    Rapid drug screen, urine    Hemoglobin A1C w/ EAG Estimation    Basic metabolic panel    Diet NPO; Sips with meds    Insert peripheral IV    Vital Signs per unit routine    Up and OOB as tolerated    Apply SCD only    Aqua K    Level 1-Full Code: all life saving measures are indicated    Inpatient consult to gastroenterology    Inpatient consult to Obstetrics / Gynecology    POCT urinalysis dipstick    ECG 12 lead    ECG 12 lead    Place in Observation     Labs Reviewed   CBC AND DIFFERENTIAL - Abnormal       Result Value Ref Range Status    WBC 15 82 (*) 4 31 - 10 16 Thousand/uL Final    RBC 4 03  3 81 - 5 12 Million/uL Final    Hemoglobin 12 4  11 5 - 15 4 g/dL Final    Hematocrit 35 9  34 8 - 46 1 % Final    MCV 89  82 - 98 fL Final    MCH 30 8  26 8 - 34 3 pg Final    MCHC 34 5  31 4 - 37 4 g/dL Final    RDW 13 0  11 6 - 15 1 % Final    MPV 11 6  8 9 - 12 7 fL Final    Platelets 804  245 - 390 Thousands/uL Final    nRBC 0  /100 WBCs Final    Neutrophils Relative 88 (*) 43 - 75 % Final    Immat GRANS % 0  0 - 2 % Final    Lymphocytes Relative 10 (*) 14 - 44 % Final    Monocytes Relative 2 (*) 4 - 12 % Final    Eosinophils Relative 0  0 - 6 % Final    Basophils Relative 0  0 - 1 % Final    Neutrophils Absolute 13 68 (*) 1 85 - 7 62 Thousands/µL Final    Immature Grans Absolute 0 07  0 00 - 0 20 Thousand/uL Final    Lymphocytes Absolute 1 61  0 60 - 4 47 Thousands/µL Final    Monocytes Absolute 0 38  0 17 - 1 22 Thousand/µL Final    Eosinophils Absolute 0 04  0 00 - 0 61 Thousand/µL Final    Basophils Absolute 0 04  0 00 - 0 10 Thousands/µL Final   COMPREHENSIVE METABOLIC PANEL - Abnormal    Sodium 138  136 - 145 mmol/L Final    Potassium 3 5  3 5 - 5 3 mmol/L Final    Chloride 109 (*) 100 - 108 mmol/L Final    CO2 24  21 - 32 mmol/L Final    ANION GAP 5  4 - 13 mmol/L Final    BUN 6  5 - 25 mg/dL Final    Creatinine 0 56 (*) 0 60 - 1 30 mg/dL Final    Comment: Standardized to IDMS reference method    Glucose 121  65 - 140 mg/dL Final    Comment: If the patient is fasting, the ADA then defines impaired fasting glucose as > 100 mg/dL and diabetes as > or equal to 123 mg/dL  Specimen collection should occur prior to Sulfasalazine administration due to the potential for falsely depressed results  Specimen collection should occur prior to Sulfapyridine administration due to the potential for falsely elevated results  Calcium 8 9  8 3 - 10 1 mg/dL Final    AST 12  5 - 45 U/L Final    Comment: Specimen collection should occur prior to Sulfasalazine administration due to the potential for falsely depressed results  ALT 14  12 - 78 U/L Final    Comment: Specimen collection should occur prior to Sulfasalazine and/or Sulfapyridine administration due to the potential for falsely depressed results       Alkaline Phosphatase 60 46 - 116 U/L Final    Total Protein 7 3  6 4 - 8 2 g/dL Final    Albumin 3 6  3 5 - 5 0 g/dL Final    Total Bilirubin 0 36  0 20 - 1 00 mg/dL Final    Comment: Use of this assay is not recommended for patients undergoing treatment with eltrombopag due to the potential for falsely elevated results      eGFR 126  ml/min/1 73sq m Final    Narrative:     National Kidney Disease Foundation guidelines for Chronic Kidney Disease (CKD):     Stage 1 with normal or high GFR (GFR > 90 mL/min/1 73 square meters)    Stage 2 Mild CKD (GFR = 60-89 mL/min/1 73 square meters)    Stage 3A Moderate CKD (GFR = 45-59 mL/min/1 73 square meters)    Stage 3B Moderate CKD (GFR = 30-44 mL/min/1 73 square meters)    Stage 4 Severe CKD (GFR = 15-29 mL/min/1 73 square meters)    Stage 5 End Stage CKD (GFR <15 mL/min/1 73 square meters)  Note: GFR calculation is accurate only with a steady state creatinine   LIPASE - Abnormal    Lipase 39 (*) 73 - 393 u/L Final   URINE MICROSCOPIC - Abnormal    RBC, UA None Seen  None Seen, 2-4 /hpf Final    WBC, UA None Seen  None Seen, 2-4 /hpf Final    Epithelial Cells Moderate (*) None Seen, Occasional /hpf Final    Bacteria, UA Moderate (*) None Seen, Occasional /hpf Final    COARSE GRANULAR CASTS 0-3  /lpf Final    AMORPH PHOSPATES Moderate  /hpf Final    MUCUS THREADS Occasional (*) None Seen Final   URINE MACROSCOPIC, POC - Abnormal    Color, UA Yellow   Final    Clarity, UA Clear   Final    pH, UA 7 5  4 5 - 8 0 Final    Leukocytes, UA Negative  Negative Final    Nitrite, UA Negative  Negative Final    Protein, UA Trace (*) Negative mg/dl Final    Glucose, UA Negative  Negative mg/dl Final    Ketones, UA 80 (3+) (*) Negative mg/dl Final    Urobilinogen, UA 0 2  0 2, 1 0 E U /dl E U /dl Final    Bilirubin, UA Negative  Negative Final    Blood, UA Negative  Negative Final    Specific Gravity, UA 1 020  1 003 - 1 030 Final    Narrative:     CLINITEK RESULT   POCT URINALYSIS DIPSTICK - Normal    Color, UA yellow   Final    Clarity, UA clear   Final     Time reflects when diagnosis was documented in both MDM as applicable and the Disposition within this note     Time User Action Codes Description Comment    1/17/2021  7:06 PM Larinda Jovani Add [O21 9] Nausea and vomiting in pregnancy     1/17/2021  7:06 PM Larinda Jovani Add [R11 2] Intractable nausea and vomiting     1/17/2021  7:07 PM Larinda Jovani Add [R10 13] Epigastric pain     1/17/2021  7:12 PM Cathlamet Squibb Add [Z87 19] History of duodenal ulcer     1/17/2021  7:13 PM Garima Malik, [address] [U57 09] Pregnancy       ED Disposition     ED Disposition Condition Date/Time Comment    Admit Stable Sun Jan 17, 2021  7:06 PM Case was discussed with Dr Alvin Segura, and the patient's admission status was agreed to be Admission Status: observation status to the service of Dr Sandra Townsend, [facility]  Follow-up Information    None       Current Discharge Medication List      CONTINUE these medications which have NOT CHANGED    Details   acetaminophen (TYLENOL) 325 mg tablet Take 2 tablets (650 mg total) by mouth every 6 (six) hours as needed for mild pain  Qty: 60 tablet, Refills: 0    Associated Diagnoses: Lumbar spondylolysis      ondansetron (ZOFRAN-ODT) 4 mg disintegrating tablet Take 1 tablet (4 mg total) by mouth every 6 (six) hours as needed for nausea or vomiting  Qty: 30 tablet, Refills: 0    Associated Diagnoses: Intractable nausea and vomiting      pantoprazole (PROTONIX) 40 mg tablet Take 40 mg by mouth 2 (two) times a day      sucralfate (CARAFATE) 1 g/10 mL suspension Take 1 g by mouth           No discharge procedures on file  Prior to Admission Medications   Prescriptions Last Dose Informant Patient Reported?  Taking?   acetaminophen (TYLENOL) 325 mg tablet   No No   Sig: Take 2 tablets (650 mg total) by mouth every 6 (six) hours as needed for mild pain   ondansetron (ZOFRAN-ODT) 4 mg disintegrating tablet   No No   Sig: Take 1 tablet (4 mg total) by mouth every 6 (six) hours as needed for nausea or vomiting   pantoprazole (PROTONIX) 40 mg tablet   Yes No   Sig: Take 40 mg by mouth 2 (two) times a day   sucralfate (CARAFATE) 1 g/10 mL suspension   Yes No   Sig: Take 1 g by mouth      Facility-Administered Medications: None       Portions of the record may have been created with voice recognition software  Occasional wrong word or "sound a like" substitutions may have occurred due to the inherent limitations of voice recognition software  Read the chart carefully and recognize, using context, where substitutions have occurred      Electronically signed by:  Zoie Ambrose

## 2021-01-17 NOTE — ED PROVIDER NOTES
History  Chief Complaint   Patient presents with    Vomiting During Pregnancy     16weeks pregnant, states " bryan been vomiting none stop since October, I get admitted for fluids and medication changes all the time"      34 y/o female with hx of gastric ulcers and 16 weeks pregnant presents to the ED c/o worsening nausea and vomiting since last night  She has a history of chronic nausea and vomiting and was was being worked up for gastroparesis when she learned about this pregnancy; her N/V has been worse with this pregnancy  She also reports back spasms and intermittent diarrhea without blood or melena  She had been admitted medically for intractable nausea, vomiting, and abdominal pain in the past, most recently at the end of November (1-2 months ago)  She denies fever, chills, cough, SOB, chest pain, dysuria, hematuria, vaginal bleeding/discharge, neck pain, headaches, numbness, and weakness  Prior to Admission Medications   Prescriptions Last Dose Informant Patient Reported? Taking?   acetaminophen (TYLENOL) 325 mg tablet   No No   Sig: Take 2 tablets (650 mg total) by mouth every 6 (six) hours as needed for mild pain   ondansetron (ZOFRAN-ODT) 4 mg disintegrating tablet   No No   Sig: Take 1 tablet (4 mg total) by mouth every 6 (six) hours as needed for nausea or vomiting   pantoprazole (PROTONIX) 40 mg tablet   Yes No   Sig: Take 40 mg by mouth 2 (two) times a day   sucralfate (CARAFATE) 1 g/10 mL suspension   Yes No   Sig: Take 1 g by mouth      Facility-Administered Medications: None       Past Medical History:   Diagnosis Date    Arthritis     Asthma     History of stomach ulcers     Psychiatric disorder     anxiety       Past Surgical History:   Procedure Laterality Date    BREAST SURGERY      COSMETIC SURGERY      EGD      WISDOM TOOTH EXTRACTION         History reviewed  No pertinent family history  I have reviewed and agree with the history as documented      E-Cigarette/Vaping    E-Cigarette Use Never User      E-Cigarette/Vaping Substances    Nicotine No     THC Yes     CBD Yes     Flavoring No     Other No     Unknown No      Social History     Tobacco Use    Smoking status: Never Smoker    Smokeless tobacco: Never Used   Substance Use Topics    Alcohol use: Not Currently    Drug use: Not Currently     Types: Marijuana        Review of Systems   Constitutional: Negative for chills and fever  HENT: Negative for congestion and sore throat  Respiratory: Negative for cough and shortness of breath  Cardiovascular: Negative for chest pain and leg swelling  Gastrointestinal: Positive for abdominal pain, diarrhea, nausea and vomiting  Genitourinary: Negative for dysuria, hematuria, vaginal bleeding and vaginal discharge  Musculoskeletal: Positive for back pain  Negative for neck pain  Neurological: Negative for weakness, light-headedness, numbness and headaches  All other systems reviewed and are negative  Physical Exam  ED Triage Vitals [01/17/21 1329]   Temperature Pulse Respirations Blood Pressure SpO2   (!) 96 8 °F (36 °C) 98 20 153/85 98 %      Temp Source Heart Rate Source Patient Position - Orthostatic VS BP Location FiO2 (%)   Axillary Monitor Sitting Left arm --      Pain Score       Worst Possible Pain             Orthostatic Vital Signs  Vitals:    01/17/21 1329 01/17/21 1708 01/17/21 2011   BP: 153/85 126/92 115/72   Pulse: 98 81 76   Patient Position - Orthostatic VS: Sitting Lying Lying       Physical Exam  Vitals signs and nursing note reviewed  Constitutional:       General: She is in acute distress (2/2 nausea and vomiting)  Appearance: Normal appearance  She is normal weight  HENT:      Head: Normocephalic and atraumatic  Right Ear: External ear normal       Left Ear: External ear normal       Nose: Nose normal       Mouth/Throat:      Mouth: Mucous membranes are moist       Pharynx: Oropharynx is clear     Eyes:      Extraocular Movements: Extraocular movements intact  Conjunctiva/sclera: Conjunctivae normal       Pupils: Pupils are equal, round, and reactive to light  Neck:      Musculoskeletal: Normal range of motion and neck supple  No muscular tenderness  Cardiovascular:      Rate and Rhythm: Normal rate and regular rhythm  Pulses: Normal pulses  Heart sounds: Normal heart sounds  Pulmonary:      Effort: Pulmonary effort is normal       Breath sounds: Normal breath sounds  No wheezing or rales  Abdominal:      Tenderness: There is abdominal tenderness (epigastric)  There is no right CVA tenderness, left CVA tenderness or guarding  Musculoskeletal: Normal range of motion  General: No swelling or tenderness  Comments: Bilateral lower lumbar paraspinal muscle spasms  Skin:     General: Skin is warm and dry  Capillary Refill: Capillary refill takes less than 2 seconds  Neurological:      General: No focal deficit present  Mental Status: She is alert and oriented to person, place, and time  Sensory: No sensory deficit  Motor: No weakness           ED Medications  Medications   lactated ringers bolus 1,000 mL (0 mL Intravenous Stopped 1/17/21 1547)   metoclopramide (REGLAN) injection 10 mg (10 mg Intravenous Given 1/17/21 1346)   ondansetron (ZOFRAN) injection 4 mg (4 mg Intravenous Given 1/17/21 1432)   morphine (PF) 4 mg/mL injection 4 mg (4 mg Intravenous Given 1/17/21 1433)   doxylamine (UNISON) tablet 12 5 mg (12 5 mg Oral Given 1/17/21 1545)   pyridoxine (VITAMIN B6) injection 25 mg (25 mg Intravenous Given 1/17/21 1547)   promethazine (PHENERGAN) injection 25 mg (25 mg Intramuscular Given 1/17/21 1808)       Diagnostic Studies  Results Reviewed     Procedure Component Value Units Date/Time    Urine Microscopic [[id number]]  (Abnormal) Collected: 01/17/21 1550    Lab Status: Final result Specimen: Urine, Clean Catch Updated: 01/17/21 1647     RBC, UA None Seen /hpf      WBC, UA None Seen /hpf      Epithelial Cells Moderate /hpf      Bacteria, UA Moderate /hpf      COARSE GRANULAR CASTS 0-3 /lpf      AMORPH PHOSPATES Moderate /hpf      MUCUS THREADS Occasional    Urine culture [[id number]] Collected: 01/17/21 1550    Lab Status:  In process Specimen: Urine, Clean Catch Updated: 01/17/21 1557    POCT urinalysis dipstick [[id number]]  (Normal) Resulted: 01/17/21 1552    Lab Status: Final result Specimen: Urine Updated: 01/17/21 1552     Color, UA yellow     Clarity, UA clear    Urine Macroscopic, POC [[id number]]  (Abnormal) Collected: 01/17/21 1550    Lab Status: Final result Specimen: Urine Updated: 01/17/21 1551     Color, UA Yellow     Clarity, UA Clear     pH, UA 7 5     Leukocytes, UA Negative     Nitrite, UA Negative     Protein, UA Trace mg/dl      Glucose, UA Negative mg/dl      Ketones, UA 80 (3+) mg/dl      Urobilinogen, UA 0 2 E U /dl      Bilirubin, UA Negative     Blood, UA Negative     Specific Gravity, UA 1 020    Narrative:      CLINITEK RESULT    Comprehensive metabolic panel [[id number]]  (Abnormal) Collected: 01/17/21 1345    Lab Status: Final result Specimen: Blood from Arm, Left Updated: 01/17/21 1424     Sodium 138 mmol/L      Potassium 3 5 mmol/L      Chloride 109 mmol/L      CO2 24 mmol/L      ANION GAP 5 mmol/L      BUN 6 mg/dL      Creatinine 0 56 mg/dL      Glucose 121 mg/dL      Calcium 8 9 mg/dL      AST 12 U/L      ALT 14 U/L      Alkaline Phosphatase 60 U/L      Total Protein 7 3 g/dL      Albumin 3 6 g/dL      Total Bilirubin 0 36 mg/dL      eGFR 126 ml/min/1 73sq m     Narrative:      [place] guidelines for Chronic Kidney Disease (CKD):     Stage 1 with normal or high GFR (GFR > 90 mL/min/1 73 square meters)    Stage 2 Mild CKD (GFR = 60-89 mL/min/1 73 square meters)    Stage 3A Moderate CKD (GFR = 45-59 mL/min/1 73 square meters)    Stage 3B Moderate CKD (GFR = 30-44 mL/min/1 73 square meters)    Stage 4 Severe CKD (GFR = 15-29 mL/min/1 73 square meters)    Stage 5 End Stage CKD (GFR <15 mL/min/1 73 square meters)  Note: GFR calculation is accurate only with a steady state creatinine    Lipase [[id number]]  (Abnormal) Collected: 21 1345    Lab Status: Final result Specimen: Blood from Arm, Left Updated: 21 1424     Lipase 39 u/L     CBC and differential [[id number]]  (Abnormal) Collected: 21 1345    Lab Status: Final result Specimen: Blood from Arm, Left Updated: 21 1409     WBC 15 82 Thousand/uL      RBC 4 03 Million/uL      Hemoglobin 12 4 g/dL      Hematocrit 35 9 %      MCV 89 fL      MCH 30 8 pg      MCHC 34 5 g/dL      RDW 13 0 %      MPV 11 6 fL      Platelets 560 Thousands/uL      nRBC 0 /100 WBCs      Neutrophils Relative 88 %      Immat GRANS % 0 %      Lymphocytes Relative 10 %      Monocytes Relative 2 %      Eosinophils Relative 0 %      Basophils Relative 0 %      Neutrophils Absolute 13 68 Thousands/µL      Immature Grans Absolute 0 07 Thousand/uL      Lymphocytes Absolute 1 61 Thousands/µL      Monocytes Absolute 0 38 Thousand/µL      Eosinophils Absolute 0 04 Thousand/µL      Basophils Absolute 0 04 Thousands/µL                  No orders to display         Procedures  Procedures      ED Course  ED Course as of 2032   Sun 2021   1425 WBC(!): 15 82   1425 Lipase(!): 39                                       MDM  Number of Diagnoses or Management Options  Epigastric pain:   Intractable nausea and vomiting:   Nausea and vomiting in pregnancy:   Diagnosis management comments: 35 y F,  and 16 weeks pregnant, presenting with worsening nausea, vomiting, and epigastric pain since last night  She has a history of chronic nausea and vomiting (was being worked up for gastroparesis when she learned about this pregnancy), but her N/V has been worse with this pregnancy  She had been admitted medically for this in the past, most recently at the end of November   On exam she is afebrile, VSS, with obvious discomfort 2/2/ nausea and vomiting, mild epigastric tenderness, and bilateral lower back muscle spasms; no other focal exam findings  She has persistent nausea, vomiting, epigastric discomfort, and back spasms despite IV fluid, morphine, Reglan, Zofran, Phenergan, B6, and Unison  Fetal heart rate 146  I discussed findings and indications for admission with the patient and she understands and agrees  Case discussed with OBGYN on call, and she can be medically admitted with OBGYN consult  Case discussed with Dr Jana Alfred, who accepts the patient for admission  Disposition  Final diagnoses:   Nausea and vomiting in pregnancy   Intractable nausea and vomiting   Epigastric pain     Time reflects when diagnosis was documented in both MDM as applicable and the Disposition within this note     Time User Action Codes Description Comment    1/17/2021  7:06 PM Monserrat Chol Add [O21 9] Nausea and vomiting in pregnancy     1/17/2021  7:06 PM Monserrat Chol Add [R11 2] Intractable nausea and vomiting     1/17/2021  7:07 PM Monserrat Chol Add [R10 13] Epigastric pain     1/17/2021  7:12 PM Mil Erazo Add [Z87 19] History of duodenal ulcer     1/17/2021  7:13 PM Lisa Cary, [address] [O32 25] Pregnancy       ED Disposition     ED Disposition Condition Date/Time Comment    Admit Stable Sun Jan 17, 2021  7:06 PM Case was discussed with Dr Jana Alfred, and the patient's admission status was agreed to be Admission Status: observation status to the service of Rita Carvajal  Follow-up Information    None         Patient's Medications   Discharge Prescriptions    No medications on file     No discharge procedures on file  PDMP Review     None           ED Provider  Attending physically available and evaluated Kaye Miller  CHELSEA managed the patient along with the ED Attending      Electronically Signed by         Macie Kennedy MD  01/17/21 8974

## 2021-01-18 PROBLEM — E43 SEVERE PROTEIN-CALORIE MALNUTRITION (HCC): Status: ACTIVE | Noted: 2021-01-18

## 2021-01-18 LAB
AMPHETAMINES SERPL QL SCN: NEGATIVE
BACTERIA UR CULT: NORMAL
BARBITURATES UR QL: NEGATIVE
BENZODIAZ UR QL: NEGATIVE
COCAINE UR QL: NEGATIVE
EST. AVERAGE GLUCOSE BLD GHB EST-MCNC: 88 MG/DL
HBA1C MFR BLD: 4.7 %
METHADONE UR QL: NEGATIVE
OPIATES UR QL SCN: NEGATIVE
OXYCODONE+OXYMORPHONE UR QL SCN: NEGATIVE
PCP UR QL: NEGATIVE
THC UR QL: POSITIVE

## 2021-01-18 PROCEDURE — 99223 1ST HOSP IP/OBS HIGH 75: CPT | Performed by: INTERNAL MEDICINE

## 2021-01-18 PROCEDURE — 99225 PR SBSQ OBSERVATION CARE/DAY 25 MINUTES: CPT | Performed by: OBSTETRICS & GYNECOLOGY

## 2021-01-18 PROCEDURE — 99232 SBSQ HOSP IP/OBS MODERATE 35: CPT | Performed by: PHYSICIAN ASSISTANT

## 2021-01-18 PROCEDURE — C9113 INJ PANTOPRAZOLE SODIUM, VIA: HCPCS | Performed by: PHYSICIAN ASSISTANT

## 2021-01-18 RX ORDER — ONDANSETRON 2 MG/ML
4 INJECTION INTRAMUSCULAR; INTRAVENOUS EVERY 4 HOURS PRN
Status: DISCONTINUED | OUTPATIENT
Start: 2021-01-18 | End: 2021-01-21

## 2021-01-18 RX ORDER — METHYLPREDNISOLONE SODIUM SUCCINATE 40 MG/ML
16 INJECTION, POWDER, LYOPHILIZED, FOR SOLUTION INTRAMUSCULAR; INTRAVENOUS EVERY 8 HOURS SCHEDULED
Status: DISCONTINUED | OUTPATIENT
Start: 2021-01-18 | End: 2021-01-20

## 2021-01-18 RX ORDER — PROMETHAZINE HYDROCHLORIDE 25 MG/ML
25 INJECTION, SOLUTION INTRAMUSCULAR; INTRAVENOUS EVERY 6 HOURS
Status: DISCONTINUED | OUTPATIENT
Start: 2021-01-18 | End: 2021-01-21

## 2021-01-18 RX ORDER — SODIUM CHLORIDE 9 MG/ML
100 INJECTION, SOLUTION INTRAVENOUS CONTINUOUS
Status: DISCONTINUED | OUTPATIENT
Start: 2021-01-18 | End: 2021-01-18

## 2021-01-18 RX ORDER — SODIUM CHLORIDE, SODIUM GLUCONATE, SODIUM ACETATE, POTASSIUM CHLORIDE, MAGNESIUM CHLORIDE, SODIUM PHOSPHATE, DIBASIC, AND POTASSIUM PHOSPHATE .53; .5; .37; .037; .03; .012; .00082 G/100ML; G/100ML; G/100ML; G/100ML; G/100ML; G/100ML; G/100ML
100 INJECTION, SOLUTION INTRAVENOUS CONTINUOUS
Status: DISCONTINUED | OUTPATIENT
Start: 2021-01-18 | End: 2021-01-18

## 2021-01-18 RX ORDER — SODIUM CHLORIDE, SODIUM LACTATE, POTASSIUM CHLORIDE, CALCIUM CHLORIDE 600; 310; 30; 20 MG/100ML; MG/100ML; MG/100ML; MG/100ML
100 INJECTION, SOLUTION INTRAVENOUS CONTINUOUS
Status: DISCONTINUED | OUTPATIENT
Start: 2021-01-18 | End: 2021-01-19

## 2021-01-18 RX ORDER — LIDOCAINE 50 MG/G
1 PATCH TOPICAL
Status: DISCONTINUED | OUTPATIENT
Start: 2021-01-18 | End: 2021-01-23 | Stop reason: HOSPADM

## 2021-01-18 RX ORDER — METOCLOPRAMIDE HYDROCHLORIDE 5 MG/ML
10 INJECTION INTRAMUSCULAR; INTRAVENOUS EVERY 6 HOURS
Status: DISCONTINUED | OUTPATIENT
Start: 2021-01-18 | End: 2021-01-20

## 2021-01-18 RX ADMIN — PANTOPRAZOLE SODIUM 40 MG: 40 INJECTION, POWDER, FOR SOLUTION INTRAVENOUS at 08:15

## 2021-01-18 RX ADMIN — SODIUM CHLORIDE 100 ML/HR: 0.9 INJECTION, SOLUTION INTRAVENOUS at 06:23

## 2021-01-18 RX ADMIN — DOXYLAMINE SUCCINATE 25 MG: 25 TABLET ORAL at 21:00

## 2021-01-18 RX ADMIN — ONDANSETRON 4 MG: 2 INJECTION INTRAMUSCULAR; INTRAVENOUS at 15:52

## 2021-01-18 RX ADMIN — ONDANSETRON 4 MG: 2 INJECTION INTRAMUSCULAR; INTRAVENOUS at 06:08

## 2021-01-18 RX ADMIN — SODIUM CHLORIDE, SODIUM LACTATE, POTASSIUM CHLORIDE, AND CALCIUM CHLORIDE 100 ML/HR: .6; .31; .03; .02 INJECTION, SOLUTION INTRAVENOUS at 19:42

## 2021-01-18 RX ADMIN — LIDOCAINE 1 PATCH: 50 PATCH TOPICAL at 21:51

## 2021-01-18 RX ADMIN — MORPHINE SULFATE 2 MG: 2 INJECTION, SOLUTION INTRAMUSCULAR; INTRAVENOUS at 16:27

## 2021-01-18 RX ADMIN — MORPHINE SULFATE 2 MG: 2 INJECTION, SOLUTION INTRAMUSCULAR; INTRAVENOUS at 11:46

## 2021-01-18 RX ADMIN — METHYLPREDNISOLONE SODIUM SUCCINATE 16 MG: 40 INJECTION, POWDER, FOR SOLUTION INTRAMUSCULAR; INTRAVENOUS at 15:52

## 2021-01-18 RX ADMIN — METOCLOPRAMIDE 10 MG: 5 INJECTION, SOLUTION INTRAMUSCULAR; INTRAVENOUS at 20:23

## 2021-01-18 RX ADMIN — ONDANSETRON 4 MG: 2 INJECTION INTRAMUSCULAR; INTRAVENOUS at 01:20

## 2021-01-18 RX ADMIN — SODIUM CHLORIDE, SODIUM LACTATE, POTASSIUM CHLORIDE, AND CALCIUM CHLORIDE 100 ML/HR: .6; .31; .03; .02 INJECTION, SOLUTION INTRAVENOUS at 11:54

## 2021-01-18 RX ADMIN — SUCRALFATE 1 G: 1 TABLET ORAL at 06:15

## 2021-01-18 RX ADMIN — PROMETHAZINE HYDROCHLORIDE 25 MG: 25 INJECTION INTRAMUSCULAR; INTRAVENOUS at 18:53

## 2021-01-18 RX ADMIN — PYRIDOXINE HYDROCHLORIDE: 100 INJECTION, SOLUTION INTRAMUSCULAR; INTRAVENOUS at 15:53

## 2021-01-18 RX ADMIN — ONDANSETRON 4 MG: 2 INJECTION INTRAMUSCULAR; INTRAVENOUS at 20:23

## 2021-01-18 RX ADMIN — ONDANSETRON 4 MG: 2 INJECTION INTRAMUSCULAR; INTRAVENOUS at 11:47

## 2021-01-18 RX ADMIN — Medication 25 MG: at 08:15

## 2021-01-18 RX ADMIN — ACETAMINOPHEN 650 MG: 325 TABLET, FILM COATED ORAL at 21:00

## 2021-01-18 RX ADMIN — MORPHINE SULFATE 2 MG: 2 INJECTION, SOLUTION INTRAMUSCULAR; INTRAVENOUS at 06:20

## 2021-01-18 RX ADMIN — PANTOPRAZOLE SODIUM 40 MG: 40 INJECTION, POWDER, FOR SOLUTION INTRAVENOUS at 20:23

## 2021-01-18 RX ADMIN — METHYLPREDNISOLONE SODIUM SUCCINATE 16 MG: 40 INJECTION, POWDER, FOR SOLUTION INTRAMUSCULAR; INTRAVENOUS at 21:00

## 2021-01-18 NOTE — ASSESSMENT & PLAN NOTE
· Patient reports medical marijuana use for this as well as her nausea/vomiting   · Tylenol PRN   · Aqua K pad

## 2021-01-18 NOTE — NURSING NOTE
Patient currently in bed sleeping without any complaints  Not currently dry heaving or vomiting  Call bell and bedside table within reach  Will continue to monitor   SD 1/17/2021 9889

## 2021-01-18 NOTE — CONSULTS
Consultation - Gynecology  Tim George 35 y o  female MRN: [id number]  Unit/Bed#: [facility] 455-01 Encounter: [id number]      Consults:  Inpatient consult to Obstetrics/Gynecology      Chief Complaint   Patient presents with    Vomiting During Pregnancy     16weeks pregnant, states " bryan been vomiting none stop since October, I get admitted for fluids and medication changes all the time"        History of Present Illness   Physician Requesting Consult: Lina Mullen MD  Reason for Consult / Principal Problem:  Nausea and vomiting pregnancy  Subspeciality: General GYN  HPI: Tim George is a 35 y o   female at 12w2d who presented to [facility] yesterday with chronic daily nausea and vomiting  She is patient of [facility] ; she presented to our service because there was a 5 hour wait at the [facility] ED  She is admitted under observation with the [facility] service; gyn is consulted  The patient reports that she has had regular nausea and daily vomiting x5 since October; she last tolerated PO on Saturday, 1/15/21  Patient has had several ED presentations and hospitalizations for these complaints, and has been managed on multiple medication regimens  Patient reports that she will be on multiple medications while inpatient, be discharged with only 1 or 2 medications, and her symptoms will return after a day or so  She has tried Unisom, Zofran, Reglan, Carafate, and rectal Phenergan  Prior to presentation, the patient reports she was taking only oral Reglan, as she was advised that concurrent rectal Phenergan could cause tics" (presumably Extrapyramidal symptoms)  She is currently being worked up for QT prolongation  The patient has had one prior pregnancy 6 years ago, during which she dealt with very minimal "morning sickness " She reports that, prior to this pregnancy, she was being worked up for gastroparesis vs  Gastric outlet obstruction   She had a weeklong hospitalization in March for intractable nausea/vomiting  She reports that she has lost 60 lbs in the last year, 20 lbs of which occurred in pregnancy  Her younger sister and father have gastroparesis; her sister had a recent diagnosis after significant weight loss and her father has diabetes and currently requires a G-tube  The patient also has a history of bilateral lower back pain/spasm that has been managed with multiple medications, including Tylenol, lidocaine patch, and capsaicin patch  She has used Flexeril in the past, which makes her tired, and medical marijuana, which she reports is the most helpful  Prior to pregnancy, she was taking NSAIDs for her pain; of note she has a h/o duodenal ulcers  Review of Systems   Constitutional: Negative for chills and fever  Respiratory: Negative for cough and shortness of breath  Cardiovascular: Negative for chest pain  Gastrointestinal: Positive for constipation, nausea and vomiting  Negative for abdominal pain and diarrhea  Endocrine: Negative for polydipsia and polyphagia  Genitourinary: Negative for vaginal bleeding  Musculoskeletal: Positive for back pain  Neurological: Positive for dizziness (none currently) and light-headedness  Psychiatric/Behavioral: Positive for dysphoric mood (Patient frustrated with her daily nausea and vomiting)  Historical Information   Past Medical History:   Diagnosis Date    Arthritis     Asthma     History of stomach ulcers     Psychiatric disorder     anxiety     Past Surgical History:   Procedure Laterality Date    BREAST SURGERY      COSMETIC SURGERY      EGD      WISDOM TOOTH EXTRACTION       OB History    Para Term  AB Living   1             SAB TAB Ectopic Multiple Live Births                  # Outcome Date GA Lbr Aftab/2nd Weight Sex Delivery Anes PTL Lv   1 Current              History reviewed  No pertinent family history    Social History   Social History     Substance and Sexual Activity Alcohol Use Not Currently     Social History     Substance and Sexual Activity   Drug Use Not Currently    Types: Marijuana     Social History     Tobacco Use   Smoking Status Never Smoker   Smokeless Tobacco Never Used       Meds/Allergies   Current Facility-Administered Medications   Medication Dose Route Frequency    acetaminophen (TYLENOL) tablet 650 mg  650 mg Oral Q6H PRN    [START ON 1/19/2021] doxylamine (UNISON) tablet 12 5 mg  12 5 mg Oral Daily    doxylamine (UNISON) tablet 25 mg  25 mg Oral HS    enoxaparin (LOVENOX) subcutaneous injection 40 mg  40 mg Subcutaneous Daily    lactated ringers infusion  100 mL/hr Intravenous Continuous    methylPREDNISolone sodium succinate (Solu-MEDROL) injection 16 mg  16 mg Intravenous Q8H [facility]    morphine injection 2 mg  2 mg Intravenous Q4H PRN    ondansetron (ZOFRAN) injection 4 mg  4 mg Intravenous Q4H PRN    pantoprazole (PROTONIX) injection 40 mg  40 mg Intravenous Q12H [facility]    promethazine (PHENERGAN) injection 25 mg  25 mg Intravenous Q6H    promethazine (PHENERGAN) rectal suppository 12 5 mg  12 5 mg Rectal Q6H PRN    pyridoxine (VITAMIN B6) tablet 25 mg  25 mg Oral Daily    thiamine (VITAMIN B1) 038 mg, folic acid 1 mg, pyridoxine (VITAMIN B6) 25 mg in dextrose 5 % 1,000 mL infusion   Intravenous Once         No Known Allergies    Objective   Vitals: Blood pressure 106/59, pulse 88, temperature 98 6 °F (37 °C), temperature source Oral, resp  rate 17, height 5' 5" (1 651 m), weight 60 2 kg (132 lb 12 8 oz), last menstrual period 09/27/2020, SpO2 97 %  Body mass index is 22 1 kg/m²  Intake/Output Summary (Last 24 hours) at 1/18/2021 1313  Last data filed at 1/18/2021 1154  Gross per 24 hour   Intake 3231 67 ml   Output 800 ml   Net 2431 67 ml       Invasive Devices     Peripheral Intravenous Line            Peripheral IV 01/17/21 Left Forearm less than 1 day                Physical Exam  Constitutional:       Appearance: Normal appearance  Comments: Tired-appearing, however awake, alert, and appropriately responsive   Cardiovascular:      Rate and Rhythm: Normal rate and regular rhythm  Heart sounds: Normal heart sounds  No murmur  No friction rub  No gallop  Pulmonary:      Effort: Pulmonary effort is normal  No respiratory distress  Breath sounds: Normal breath sounds  No stridor  No wheezing, rhonchi or rales  Abdominal:      General: There is no distension  Palpations: Abdomen is soft  There is no mass  Tenderness: There is abdominal tenderness (diffuse TTP)  There is no guarding or rebound  Comments: gravid   Musculoskeletal:         General: No swelling or tenderness  Skin:     Capillary Refill: Capillary refill takes less than 2 seconds  Neurological:      Mental Status: She is alert         Lab Results:   Recent Results (from the past 24 hour(s))   CBC and differential    Collection Time: 01/17/21  1:45 PM   Result Value Ref Range    WBC 15 82 (H) 4 31 - 10 16 Thousand/uL    RBC 4 03 3 81 - 5 12 Million/uL    Hemoglobin 12 4 11 5 - 15 4 g/dL    Hematocrit 35 9 34 8 - 46 1 %    MCV 89 82 - 98 fL    MCH 30 8 26 8 - 34 3 pg    MCHC 34 5 31 4 - 37 4 g/dL    RDW 13 0 11 6 - 15 1 %    MPV 11 6 8 9 - 12 7 fL    Platelets 350 890 - 702 Thousands/uL    nRBC 0 /100 WBCs    Neutrophils Relative 88 (H) 43 - 75 %    Immat GRANS % 0 0 - 2 %    Lymphocytes Relative 10 (L) 14 - 44 %    Monocytes Relative 2 (L) 4 - 12 %    Eosinophils Relative 0 0 - 6 %    Basophils Relative 0 0 - 1 %    Neutrophils Absolute 13 68 (H) 1 85 - 7 62 Thousands/µL    Immature Grans Absolute 0 07 0 00 - 0 20 Thousand/uL    Lymphocytes Absolute 1 61 0 60 - 4 47 Thousands/µL    Monocytes Absolute 0 38 0 17 - 1 22 Thousand/µL    Eosinophils Absolute 0 04 0 00 - 0 61 Thousand/µL    Basophils Absolute 0 04 0 00 - 0 10 Thousands/µL   Comprehensive metabolic panel    Collection Time: 01/17/21  1:45 PM   Result Value Ref Range    Sodium 138 136 - 145 mmol/L Potassium 3 5 3 5 - 5 3 mmol/L    Chloride 109 (H) 100 - 108 mmol/L    CO2 24 21 - 32 mmol/L    ANION GAP 5 4 - 13 mmol/L    BUN 6 5 - 25 mg/dL    Creatinine 0 56 (L) 0 60 - 1 30 mg/dL    Glucose 121 65 - 140 mg/dL    Calcium 8 9 8 3 - 10 1 mg/dL    AST 12 5 - 45 U/L    ALT 14 12 - 78 U/L    Alkaline Phosphatase 60 46 - 116 U/L    Total Protein 7 3 6 4 - 8 2 g/dL    Albumin 3 6 3 5 - 5 0 g/dL    Total Bilirubin 0 36 0 20 - 1 00 mg/dL    eGFR 126 ml/min/1 73sq m   Lipase    Collection Time: 01/17/21  1:45 PM   Result Value Ref Range    Lipase 39 (L) 73 - 393 u/L   Hemoglobin A1C w/ EAG Estimation    Collection Time: 01/17/21  1:45 PM   Result Value Ref Range    Hemoglobin A1C 4 7 Normal 3 8-5 6%; PreDiabetic 5 7-6 4%;  Diabetic >=6 5%; Glycemic control for adults with diabetes <7 0% %    EAG 88 mg/dl   Urine Macroscopic, POC    Collection Time: 01/17/21  3:50 PM   Result Value Ref Range    Color, UA Yellow     Clarity, UA Clear     pH, UA 7 5 4 5 - 8 0    Leukocytes, UA Negative Negative    Nitrite, UA Negative Negative    Protein, UA Trace (A) Negative mg/dl    Glucose, UA Negative Negative mg/dl    Ketones, UA 80 (3+) (A) Negative mg/dl    Urobilinogen, UA 0 2 0 2, 1 0 E U /dl E U /dl    Bilirubin, UA Negative Negative    Blood, UA Negative Negative    Specific Gravity, UA 1 020 1 003 - 1 030   Urine Microscopic    Collection Time: 01/17/21  3:50 PM   Result Value Ref Range    RBC, UA None Seen None Seen, 2-4 /hpf    WBC, UA None Seen None Seen, 2-4 /hpf    Epithelial Cells Moderate (A) None Seen, Occasional /hpf    Bacteria, UA Moderate (A) None Seen, Occasional /hpf    COARSE GRANULAR CASTS 0-3 /lpf    AMORPH PHOSPATES Moderate /hpf    MUCUS THREADS Occasional (A) None Seen   Urine culture    Collection Time: 01/17/21  3:50 PM    Specimen: Urine, Clean Catch   Result Value Ref Range    Urine Culture No Growth <1000 cfu/mL    Rapid drug screen, urine    Collection Time: 01/17/21  3:50 PM   Result Value Ref Range Amph/Meth UR Negative Negative    Barbiturate Ur Negative Negative    Benzodiazepine Urine Negative Negative    Cocaine Urine Negative Negative    Methadone Urine Negative Negative    Opiate Urine Negative Negative    PCP Ur Negative Negative    THC Urine Positive (A) Negative    Oxycodone Urine Negative Negative   POCT urinalysis dipstick    Collection Time: 21  3:52 PM   Result Value Ref Range    Color, UA yellow     Clarity, UA clear        Assessment/Plan     Assessment:  Patient is a 30-year-old  at 16w1d who presents with an exacerbation of chronic nausea and vomiting that has been present prior to pregnancy       Recommendations/Plan:  1) Give D5 NS with thiamine, B6, and folic acid in 1L bag  2) Start Vit B6 25mg and Unisom 12 5mg BID scheduled (to decrease sleepiness)   - (can use Unisom 25 mg QHS in place of 12 5 mg evening dose)  3) Reglan 10mg every 8 hours IV scheduled  4) Phenergan 25mg every 6 hours IV scheduled  5) Obtain EKG and avoid Zofran due to patient's history of QT prolongation  6) Start methylprednisolone taper -16mg every 8 hour IV for 3 days, then can taper PO over 2 weeks to lowest effective dose  - If beneficial, can extend use --> limit total duration of use to 6 weeks  7) Discontinue prenatal vitamins, patient can consider taking oral folic acid if able to tolerate  8) Consider collecting HA1c  9) Ordered Urine Drug Screen    Discussed care with provider at [facility]; pt will follow-up on 2021  Discussed care with GI and hospitalist service    D/w Dr Nisreen Brennan (SOD)    Stephanie Rueda MD  2021  1:13 PM

## 2021-01-18 NOTE — UTILIZATION REVIEW
Initial Clinical Review    WAS OBSERVATION 01/17/2021 @ 1907, CONVERTED TO INPATIENT ADMISSION 01/18/2021 @ 1530, DUE TO CONTINUED STAY REQUIRED TO CARE FOR PATIENT WITH  Brisa FarrellPeryosef Farrelltingham Treating hyperemesis with IV steroids  Admission: Date/Time/Statement:   01/18/21 1530  Inpatient Admission Once     Question Answer Comment   Level of Care Med Surg    Estimated length of stay More than 2 Midnights    Certification I certify that inpatient services are medically necessary for this patient for a duration of greater than two midnights  See H&P and MD Progress Notes for additional information about the patient's course of treatment  ED Arrival Information     Expected Arrival Acuity Means of Arrival Escorted By Service Admission Type    - 1/17/2021 13:12 Urgent Walk-In Self Hospitalist Urgent    Arrival Complaint    vomiting        Chief Complaint   Patient presents with    Vomiting During Pregnancy     16weeks pregnant, states " bryan been vomiting none stop since October, I get admitted for fluids and medication changes all the time"      Assessment/Plan: 35year old female, presented to the ED @ [facility] from home via walk in  Admitted as Observation due to nausea and vomiting during pregnancy  Since October  Was being worked up for chronic back pain, abd pain, N/V, gastroparesis and learned about this pregnancy ( 16 weeks pregnant)  Consult GI  Consult OB/GYN  VTE Prophylaxis: none; pregnant and ambulatory / sequential compression device     01/18/2021  Consult GI:  Nausea, vomiting, abdominal pain - differential diagnosis includes hyperemesis gravidarum, gastroparesis, cyclic vomiting syndrome, cannabinoid hyperemesis syndrome  Vital signs stable  EGD on 10/12/2020 showed healing duodenal ulcer  Blood-tinged vomit likely saul griselda tear secondary to vomiting  Continue IV pantoprazole 40 mg q 12 hours  Continue Carafate 1 g q i d  Continue Zofran 4 mg q 4 hours p r n    Continue [employer] 25 mg HS  Continue Phenergan 12 5 mg rectal suppository q 6 hours p r n  Continue IV hydration  Hold off on endoscopy, given active pregnancy  Patient will need outpatient gastric emptying study  Recommend minimize use of narcotics   Continue Tylenol p r n  Pain  Recommend marijuana cessation  01/18/2021 Consult OB/GYN:  [id number] at 16w1d who presents with an exacerbation of chronic nausea and vomiting that has been present prior to pregnancy  Give D5 NS with thiamine, B6, and folic acid in 1L bag  Start Vit B6 25mg and Unisom 12 5mg BID scheduled (to decrease sleepiness), (can use Unisom 25 mg QHS in place of 12 5 mg evening dose)  Reglan 10mg every 8 hours IV scheduled  Phenergan 25mg every 6 hours IV scheduled  Obtain EKG and avoid Zofran due to patient's history of QT prolongation  Start methylprednisolone taper -16mg every 8 hour IV for 3 days, then can taper PO over 2 weeks to lowest effective dose  If beneficial, can extend use --> limit total duration of use to 6 weeks  Discontinue prenatal vitamins, patient can consider taking oral folic acid if able to tolerate  Consider collecting HA1c  Ordered Urine Drug Screen        ED Triage Vitals [01/17/21 1329]   Temperature Pulse Respirations Blood Pressure SpO2   (!) 96 8 °F (36 °C) 98 20 153/85 98 %      Temp Source Heart Rate Source Patient Position - Orthostatic VS BP Location FiO2 (%)   Axillary Monitor Sitting Left arm --      Pain Score       Worst Possible Pain          Wt Readings from Last 1 Encounters:   01/17/21 60 2 kg (132 lb 12 8 oz)     Additional Vital Signs:   Date/Time  Temp  Pulse  Resp  BP  SpO2  O2 Device  Patient Position - Orthostatic VS   01/18/21 0746  --  --  --  --  --  None (Room air)  --   01/17/21 2300  98 3 °F (36 8 °C)  72  17  113/60  99 %  None (Room air)  Sitting   01/17/21 2043  98 1 °F (36 7 °C)  79  18  121/78  99 %  None (Room air)  Sitting   01/17/21 2011  --  76  18  115/72  100 %  None (Room air)  Lying 21 1708  --  81  16  126/92  100 %  None (Room air)  Lying     Date and Time Eye Opening Best Verbal Response Best Motor Response Sindy Coma Scale Score   21 0746 4 5 6 15   21 0120 4 5 6 15   21 2049 4 5 6 15   21 1338 4 5 6 15     2020 @ 1126  EC, NSR, QTc 458    Pertinent Labs/Diagnostic Test Results:     Results from last 7 days   Lab Units 21  1345   WBC Thousand/uL 15 82*   HEMOGLOBIN g/dL 12 4   HEMATOCRIT % 35 9   PLATELETS Thousands/uL 209   NEUTROS ABS Thousands/µL 13 68*     Results from last 7 days   Lab Units 21  1345   SODIUM mmol/L 138   POTASSIUM mmol/L 3 5   CHLORIDE mmol/L 109*   CO2 mmol/L 24   ANION GAP mmol/L 5   BUN mg/dL 6   CREATININE mg/dL 0 56*   EGFR ml/min/1 73sq m 126   CALCIUM mg/dL 8 9     Results from last 7 days   Lab Units 21  1345   AST U/L 12   ALT U/L 14   ALK PHOS U/L 60   TOTAL PROTEIN g/dL 7 3   ALBUMIN g/dL 3 6   TOTAL BILIRUBIN mg/dL 0 36     Results from last 7 days   Lab Units 21  1345   GLUCOSE RANDOM mg/dL 121     Results from last 7 days   Lab Units 21  1345   LIPASE u/L 39*     Results from last 7 days   Lab Units 21  1552 21  1550   CLARITY UA  clear Clear   COLOR UA  yellow Yellow   SPEC GRAV UA   --  1 020   PH UA   --  7 5   GLUCOSE UA mg/dl  --  Negative   KETONES UA mg/dl  --  80 (3+)*   BLOOD UA   --  Negative   PROTEIN UA mg/dl  --  Trace*   NITRITE UA   --  Negative   BILIRUBIN UA   --  Negative   UROBILINOGEN UA E U /dl  --  0 2   LEUKOCYTES UA   --  Negative   WBC UA /hpf  --  None Seen   RBC UA /hpf  --  None Seen   BACTERIA UA /hpf  --  Moderate*   EPITHELIAL CELLS WET PREP /hpf  --  Moderate*   MUCUS THREADS   --  Occasional*     ED Treatment:   Medication Administration from 2021 1312 to 20218       Date/Time Order Dose Route Action     2021 1349 lactated ringers bolus 1,000 mL 1,000 mL Intravenous New Bag     2021 0546 metoclopramide (REGLAN) injection 10 mg 10 mg Intravenous Given     01/17/2021 1432 ondansetron (ZOFRAN) injection 4 mg 4 mg Intravenous Given     01/17/2021 1433 morphine (PF) 4 mg/mL injection 4 mg 4 mg Intravenous Given     01/17/2021 1545 doxylamine (UNISON) tablet 12 5 mg 12 5 mg Oral Given     01/17/2021 1547 pyridoxine (VITAMIN B6) injection 25 mg 25 mg Intravenous Given     01/17/2021 1808 promethazine (PHENERGAN) injection 25 mg 25 mg Intramuscular Given        Past Medical History:   Diagnosis Date    Arthritis     Asthma     History of stomach ulcers     Psychiatric disorder     anxiety     Admitting Diagnosis: Pregnancy [Z34 90]  Epigastric pain [R10 13]  Nausea and vomiting in pregnancy [O21 9]  History of duodenal ulcer [Z87 19]  Intractable nausea and vomiting [R11 2]  Age/Sex: 35 y o  female  Admission Orders:  Scheduled Medications:  doxylamine, 25 mg, Oral, HS  enoxaparin, 40 mg, Subcutaneous, Daily  pantoprazole, 40 mg, Intravenous, Q12H [facility]  prenatal multivitamin, 1 tablet, Oral, Daily  pyridoxine, 25 mg, Oral, Daily  sucralfate, 1 g, Oral, 4x Daily (AC & HS)      Continuous IV Infusions:  sodium chloride, 100 mL/hr, Intravenous, Continuous      PRN Meds:  acetaminophen, 650 mg, Oral, Q6H PRN  morphine injection, 2 mg, Intravenous, Q4H PRN  X 1 dose 1/17; x 1 dose 1/18  ondansetron, 4 mg, Intravenous, Q4H PRN x 1 dose 1/17 X 2 doses 1/18  promethazine, 12 5 mg, Rectal, Q6H PRN x 1 dose 1/17    NPO  Tiago SCDs  IP CONSULT TO GASTROENTEROLOGY  IP CONSULT TO OB GYN    Network Utilization Review Department  ATTENTION: Please call with any questions or concerns to [phone] and carefully listen to the prompts so that you are directed to the right person  All voicemails are confidential   Susan Dailey all requests for admission clinical reviews, approved or denied determinations and any other requests to dedicated fax number below belonging to the campus where the patient is receiving treatment   List of dedicated fax numbers for the Facilities:  [address] [place] [address] DENIALS (Administrative/Medical Necessity) [phone]   [address] (Maternity/NICU/Pediatrics) [address] [facility],7Th Floor [facility] 40 [address] [facility] [address] Sally Altman 3884 (  Page Tubbs "Kayce" 103) [address] [address] Anthony Pinto 1481 [address] [place] [address] 951 [phone]

## 2021-01-18 NOTE — H&P
H&P- Priscilla Amaya 1990, 35 y o  female MRN: [id number]    Unit/Bed#: ED 23 Encounter: [id number]    Primary Care Provider: Corey Mariscal DO   Date and time admitted to hospital: 1/17/2021  1:29 PM        * Nausea and vomiting during pregnancy  Assessment & Plan  · Multiple ED visits and hospitalizations both here and at [facility] for the same  · Possible etiologies include hyperemesis gravidarum, gastroparesis, cyclical vomiting syndrome, and cannabinoid hyperemesis syndrome   · Will appreciate GI and OB/GYN consults  · Per review of records from recent [facility] hospitalization, patient's regimen in regards to her N/V at discharge was as follows:   · Unison 12 5mg BID, Unison 25mg QHS, Phenergan suppository 12 5mg Q6hr PRN, Carafate 1G 4x/day and Protonix 40mg BID  · However, patient reports she has only been using Reglan, Pantoprazole, and medical marijuana  She ran out of Zofran and is not using  She stopped using Phenergan because she is using Reglan  · Will place on: Zofran Q6hr PRN, Unison 12 5mg BID and 25mg QHS, Protonix 40mg BID, Carafate 1G 4x daily and PRN Phenergan suppository       Pregnancy  Assessment & Plan  · OB/GYN consulted     Marijuana use  Assessment & Plan  · Patient with continued daily to multiple time per day use    Chronic back pain  Assessment & Plan  · Patient reports medical marijuana use for this as well as her nausea/vomiting   · Tylenol PRN   · Aqua K pad  · Consider APS consult     History of duodenal ulcer  Assessment & Plan  · Known history  · Continue PPI  · Defer possible endoscopic evaluation to GI     VTE Prophylaxis: none; pregnant and ambulatory / sequential compression device   Code Status: FULL CODE   POLST: POLST form is not discussed and not completed at this time  Discussion: with SLIM attending Dr Mikayla Davis     Anticipated Length of Stay:  Patient will be admitted on an Observation basis with an anticipated length of stay of  < 2 midnights     Justification for Hospital Stay: as above    Total Time for Visit, including Counseling / Coordination of Care: 1 hour  Greater than 50% of this total time spent on direct patient counseling and coordination of care  Chief Complaint:   "I can't stop throwing up"    History of Present Illness:    Micha Lan is a 35 y o  female with nausea and vomiting, pregnancy, history of duodenal ulcer, and chronic back pain who presents with complaint of "I can't stop throwing up" since October  She reports that symptoms have been worse since last night and she reports throwing up every 30 minutes since last night  She reports using Reglan without relief  She reports not using Zofran because she ran out  She reports not using Phenergan anymore because she is using Reglan  She reports using pantoprazole b i d  She reports using a heating pad on her abdomen and back  She reports the vomiting has made her chronic back pain worse  She describes the vomit as bilious and nonbloody  She reports diffuse abdominal pain described as "sharp, continuous" and squeezing my stomach"  She describes the pain as 9/10  She reports that the last thing she had eat or drink was some water this morning which she vomited up  She reports that she occasionally" uses marijuana - she describes as 1 to a few times every day  She reports that she is certified for medical marijuana and uses this for back pain and also improved her nausea  She reports that she last saw her OB last week and they have not done anything for me this whole time except change some medications"  She has had multiple ED visits as well as hospitalizations for similar complaints  The patient left her recent hospitalization from [facility]       Review of Systems:    Review of Systems    Past Medical and Surgical History:     Past Medical History:   Diagnosis Date    Arthritis     Asthma     History of stomach ulcers     Psychiatric disorder     anxiety       Past Surgical History:   Procedure Laterality Date    BREAST SURGERY      COSMETIC SURGERY      EGD      WISDOM TOOTH EXTRACTION         Meds/Allergies:    Prior to Admission medications    Medication Sig Start Date End Date Taking? Authorizing Provider   acetaminophen (TYLENOL) 325 mg tablet Take 2 tablets (650 mg total) by mouth every 6 (six) hours as needed for mild pain 12/5/20   Leopoldo Stall, MD   ondansetron (ZOFRAN-ODT) 4 mg disintegrating tablet Take 1 tablet (4 mg total) by mouth every 6 (six) hours as needed for nausea or vomiting 12/5/20   Leopoldo Stall, MD   pantoprazole (PROTONIX) 40 mg tablet Take 40 mg by mouth 2 (two) times a day 7/16/20   Historical Provider, MD   sucralfate (CARAFATE) 1 g/10 mL suspension Take 1 g by mouth 7/16/20   Historical Provider, MD     I have reviewed home medications with patient personally  Allergies: No Known Allergies    Social History:     Marital Status: Single  Patient Pre-hospital Living Situation: home  Patient Pre-hospital Level of Mobility: ambulatory  Patient Pre-hospital Diet Restrictions: none reported   Substance Use History:   Social History     Substance and Sexual Activity   Alcohol Use Not Currently     Social History     Tobacco Use   Smoking Status Never Smoker   Smokeless Tobacco Never Used     Social History     Substance and Sexual Activity   Drug Use Not Currently    Types: Marijuana       Family History:    History reviewed  No pertinent family history  Physical Exam:     Vitals:   Blood Pressure: 126/92 (01/17/21 1708)  Pulse: 81 (01/17/21 1708)  Temperature: (!) 96 8 °F (36 °C) (01/17/21 1329)  Temp Source: Axillary (01/17/21 1329)  Respirations: 16 (01/17/21 1708)  Height: 5' 5" (165 1 cm) (01/17/21 1329)  Weight - Scale: 61 2 kg (135 lb) (01/17/21 1329)  SpO2: 100 % (01/17/21 1708)    Physical Exam  Vitals signs and nursing note reviewed  Constitutional:       Comments: Patient seen lying in ED bed on right hand side      Cardiovascular:      Rate and Rhythm: Normal rate and regular rhythm  Pulmonary:      Effort: Pulmonary effort is normal  No respiratory distress  Breath sounds: Normal breath sounds  No wheezing  Abdominal:      General: Bowel sounds are normal       Palpations: Abdomen is soft  Tenderness: There is abdominal tenderness (Diffuse)  Comments: Patient sat up in bed and dry heaving into blue emesis bag   Musculoskeletal:      Right lower leg: No edema  Left lower leg: No edema  Skin:     General: Skin is warm  Neurological:      Mental Status: She is alert and oriented to person, place, and time  Psychiatric:         Mood and Affect: Mood normal          Behavior: Behavior normal              Additional Data:     Lab Results: I have personally reviewed pertinent reports  Results from last 7 days   Lab Units 01/17/21  1345   WBC Thousand/uL 15 82*   HEMOGLOBIN g/dL 12 4   HEMATOCRIT % 35 9   PLATELETS Thousands/uL 209   NEUTROS PCT % 88*   LYMPHS PCT % 10*   MONOS PCT % 2*   EOS PCT % 0     Results from last 7 days   Lab Units 01/17/21  1345   SODIUM mmol/L 138   POTASSIUM mmol/L 3 5   CHLORIDE mmol/L 109*   CO2 mmol/L 24   BUN mg/dL 6   CREATININE mg/dL 0 56*   ANION GAP mmol/L 5   CALCIUM mg/dL 8 9   ALBUMIN g/dL 3 6   TOTAL BILIRUBIN mg/dL 0 36   ALK PHOS U/L 60   ALT U/L 14   AST U/L 12   GLUCOSE RANDOM mg/dL 121                       Imaging:    No orders to display       EKG, Pathology, and Other Studies Reviewed on Admission:       [facility] Records Reviewed: Yes     ** Please Note: This note has been constructed using a voice recognition system   **

## 2021-01-18 NOTE — CASE MANAGEMENT
LOS: Day 1  Bundle: pt is not a bundle  Readmission risk: pt is a 30 day readmit    CM reviewed role with pt at bedside  Pt reported her s/o Shayna Walters as her emergency contact [phone]  Pt reported that she lives with her family in a 4 level home with 2 ADRIANA to enter and 12-14 ADRIANA between floors  Pt reported that was she IPTA with ADLs, pt drives and works  Pt denied having any DME in the home, pt denied [address] and any hx of both inpatient/outpatient rehab  Pt reported Dr Vikram Wang as her PCP, OBGYN is Dr Ezio Newsome; pharmacy of choice is  pharmacy  Pt denied MH and substance abuse tx, per pt chart, pt has a hx of medical marijuana use  Pt reported her family to p/u  CM reviewed d/c planning process including the following: identifying help at home, patient preference for d/c planning needs, Discharge Lounge, [facility] Meds to Bed program, availability of treatment team to discuss questions or concerns patient and/or family may have regarding understanding medications and recognizing signs and symptoms once discharged  CM also encouraged patient to follow up with all recommended appointments after discharge  Patient advised of importance for patient and family to participate in managing patients medical well being  Patient/caregiver received discharge checklist  Content reviewed  Patient/caregiver encouraged to participate in discharge plan of care prior to discharge home

## 2021-01-18 NOTE — PROGRESS NOTES
Progress Note - Brooklyn Jaime 1990, 35 y o  female MRN: [id number]    Unit/Bed#: -01 Encounter: [id number]    Primary Care Provider: Watson Farris DO   Date and time admitted to hospital: 1/17/2021  1:29 PM        * Nausea and vomiting during pregnancy  Assessment & Plan  · Multiple ED visits and hospitalizations both here and at [facility] for the same  · Possible etiologies include hyperemesis gravidarum, gastroparesis, cyclical vomiting syndrome, and cannabinoid hyperemesis syndrome   · appreciate GI and OB/GYN consults  · Started on IV steroids for up to 3 days, then can do a slow taper  · Continue phenergan OTC  · Unisom  · Hold on EGD for now      Marijuana use  Assessment & Plan  · Patient with continued daily to multiple time per day use  · Cessation strongly discouraged    Severe protein-calorie malnutrition ([facility] [address] )  Assessment & Plan  Malnutrition Findings:   Adult Malnutrition type: Chronic illness(r/t nausea/vomiting, as evidenced by intake meeting <75% estimated needs > 1 month, and 9 5% wt loss x 4 months (9/22/20: 146#, current wt: 132#)  Treatment: pending PO diet advancement)  Adult Degree of Malnutrition: Other severe protein calorie malnutrition    BMI Findings: Body mass index is 22 1 kg/m²  Pregnancy  Assessment & Plan  · OB/GYN consult appreciated     Chronic back pain  Assessment & Plan  · Patient reports medical marijuana use for this as well as her nausea/vomiting   · Tylenol PRN   · Aqua K pad     History of duodenal ulcer  Assessment & Plan  · Known history  · Continue PPI         VTE Pharmacologic Prophylaxis:   Pharmacologic: Enoxaparin (Lovenox)  Mechanical VTE Prophylaxis in Place: Yes    Patient Centered Rounds: I have performed bedside rounds with nursing staff today  Discussions with Specialists or Other Care Team Provider: GI, OB/GYN    Education and Discussions with Family / Patient: patient    Time Spent for Care: 30 minutes    More than 50% of total time spent on counseling and coordination of care as described above  Current Length of Stay: 0 day(s)    Current Patient Status: Inpatient   Certification Statement: The patient, admitted on an observation basis, will now require > 2 midnight hospital stay due to IV steroids for hyperemesis    Discharge Plan: await clinical course    Code Status: Level 1 - Full Code      Subjective:   Feels a little more comfortable this afternoon  Still with nausea  Vomiting seems to have slowed down  Moving around a little better  Objective:     Vitals:   Temp (24hrs), Av 3 °F (36 8 °C), Min:98 1 °F (36 7 °C), Max:98 6 °F (37 °C)    Temp:  [98 1 °F (36 7 °C)-98 6 °F (37 °C)] 98 6 °F (37 °C)  HR:  [72-88] 88  Resp:  [16-18] 17  BP: (106-126)/(59-92) 106/59  SpO2:  [97 %-100 %] 97 %  Body mass index is 22 1 kg/m²  Input and Output Summary (last 24 hours): Intake/Output Summary (Last 24 hours) at 2021 1534  Last data filed at 2021 1154  Gross per 24 hour   Intake 3231 67 ml   Output 800 ml   Net 2431 67 ml       Physical Exam:     Physical Exam  Constitutional:       Appearance: Normal appearance  Cardiovascular:      Rate and Rhythm: Normal rate and regular rhythm  Heart sounds: No murmur  Pulmonary:      Effort: Pulmonary effort is normal       Breath sounds: Normal breath sounds  Abdominal:      General: Bowel sounds are normal  There is no distension  Palpations: Abdomen is soft  Tenderness: There is no abdominal tenderness  Skin:     General: Skin is warm and dry  Neurological:      General: No focal deficit present  Mental Status: She is alert and oriented to person, place, and time     Psychiatric:         Mood and Affect: Mood normal          Additional Data:     Labs:    Results from last 7 days   Lab Units 21  1345   WBC Thousand/uL 15 82*   HEMOGLOBIN g/dL 12 4   HEMATOCRIT % 35 9   PLATELETS Thousands/uL 209   NEUTROS PCT % 88*   LYMPHS PCT % 10* MONOS PCT % 2*   EOS PCT % 0     Results from last 7 days   Lab Units 01/17/21  1345   SODIUM mmol/L 138   POTASSIUM mmol/L 3 5   CHLORIDE mmol/L 109*   CO2 mmol/L 24   BUN mg/dL 6   CREATININE mg/dL 0 56*   ANION GAP mmol/L 5   CALCIUM mg/dL 8 9   ALBUMIN g/dL 3 6   TOTAL BILIRUBIN mg/dL 0 36   ALK PHOS U/L 60   ALT U/L 14   AST U/L 12   GLUCOSE RANDOM mg/dL 121             Results from last 7 days   Lab Units 01/17/21  1345   HEMOGLOBIN A1C % 4 7               * I Have Reviewed All Lab Data Listed Above  * Additional Pertinent Lab Tests Reviewed:  All Labs Within Last 24 Hours Reviewed    Imaging:    Imaging Reports Reviewed Today Include: none  Imaging Personally Reviewed by Myself Includes:  none    Recent Cultures (last 7 days):     Results from last 7 days   Lab Units 01/17/21  1550   URINE CULTURE  No Growth <1000 cfu/mL       Last 24 Hours Medication List:   Current Facility-Administered Medications   Medication Dose Route Frequency Provider Last Rate    acetaminophen  650 mg Oral Q6H PRN Margarie ELMIRA Gomez      [START ON 1/19/2021] doxylamine  12 5 mg Oral Daily Ana Rodrigez PA-C      doxylamine  25 mg Oral HS Margarie Sep, PA-C      enoxaparin  40 mg Subcutaneous Daily Margarie Sep, PA-C      lactated ringers  100 mL/hr Intravenous Continuous Ana Rodrigez PA-C 100 mL/hr (01/18/21 1154)    methylPREDNISolone sodium succinate  16 mg Intravenous Q8H [facility] Margaret GENAO PA-C      morphine injection  2 mg Intravenous Q4H PRN [place] D ELMIRA Man      ondansetron  4 mg Intravenous Q4H PRN [place] D Dierdorff, ELMIRA      pantoprazole  40 mg Intravenous Q12H [facility] [place] D DieELMIRA good      promethazine  25 mg Intravenous Q6H Ana Rodrigez PA-C      promethazine  12 5 mg Rectal Q6H PRN Margarie Sep, PA-FANNIE      pyridoxine  25 mg Oral Daily Margarie Sep, PA-C      IV infusion builder   Intravenous Once aNncy Hilton MD          Today, Patient Was Seen By: Ana Rodrigez PA-C    ** Please Note: Dictation voice to text software may have been used in the creation of this document   **

## 2021-01-18 NOTE — ASSESSMENT & PLAN NOTE
· Multiple ED visits and hospitalizations both here and at [facility] for the same  · Possible etiologies include hyperemesis gravidarum, gastroparesis, cyclical vomiting syndrome, and cannabinoid hyperemesis syndrome   · appreciate GI and OB/GYN consults  · Started on IV steroids for up to 3 days, then can do a slow taper  · Continue phenergan OTC  · Unisom  · Hold on EGD for now

## 2021-01-18 NOTE — NUTRITION
01/18/21 1515   Recommendations/Interventions   Malnutrition/BMI Present Yes   Adult Malnutrition type Chronic illness  (r/t nausea/vomiting, as evidenced by intake meeting <75% estimated needs > 1 month, and 9 5% wt loss x 4 months (9/22/20: 146#, current wt: 132#)   Treatment: pending PO diet advancement)   Adult Degree of Malnutrition Other severe protein calorie malnutrition   Malnutrition Characteristics Inadequate energy;Weight loss   Nutrition Recommendations Initiate diet  (Advance to clear liquids when medically able, advancing as tolerated to regular diet)
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
## 2021-01-18 NOTE — ASSESSMENT & PLAN NOTE
Malnutrition Findings:   Adult Malnutrition type: Chronic illness(r/t nausea/vomiting, as evidenced by intake meeting <75% estimated needs > 1 month, and 9 5% wt loss x 4 months (9/22/20: 146#, current wt: 132#)  Treatment: pending PO diet advancement)  Adult Degree of Malnutrition: Other severe protein calorie malnutrition    BMI Findings: Body mass index is 22 1 kg/m²

## 2021-01-18 NOTE — PROGRESS NOTES
Consultation - Gynecology  Jsoie Laureano 35 y o  female MRN: [id number]  Unit/Bed#: [facility] 455-01 Encounter: [id number]      Consults:  Inpatient consult to Obstetrics/Gynecology      Chief Complaint   Patient presents with    Vomiting During Pregnancy     16weeks pregnant, states " bryan been vomiting none stop since October, I get admitted for fluids and medication changes all the time"        History of Present Illness   Physician Requesting Consult: Sera Stanley MD  Reason for Consult / Principal Problem:  Nausea and vomiting pregnancy  Subspeciality: General GYN  HPI: Josie Laureano is a 35 y o   female at 12w2d who presented to [facility] yesterday with chronic daily nausea and vomiting  She is patient of [facility] ; she presented to our service because there was a 5 hour wait at the [facility] ED  She is admitted under observation with the  [place] service; gyn is consulted  The patient reports that she has had regular nausea and daily vomiting x5 since October; she last tolerated PO on Saturday, 1/15/21  Patient has had several ED presentations and hospitalizations for these complaints, and has been managed on multiple medication regimens  Patient reports that she will be on multiple medications while inpatient, be discharged with only 1 or 2 medications, and her symptoms will return after a day or so  She has tried Unisom, Zofran, Reglan, Carafate, and rectal Phenergan  Prior to presentation, the patient reports she was taking only oral Reglan, as she was advised that concurrent rectal Phenergan could cause tics" (presumably Extrapyramidal symptoms)  She is currently being worked up for QT prolongation  The patient has had one prior pregnancy 6 years ago, during which she dealt with very minimal "morning sickness " She reports that, prior to this pregnancy, she was being worked up for gastroparesis vs  Gastric outlet obstruction   She had a weeklong hospitalization in March for intractable nausea/vomiting  She reports that she has lost 60 lbs in the last year, 20 lbs of which occurred in pregnancy  Her younger sister and father have gastroparesis; her sister had a recent diagnosis after significant weight loss and her father has diabetes and currently requires a G-tube  The patient also has a history of bilateral lower back pain/spasm that has been managed with multiple medications, including Tylenol, lidocaine patch, and capsaicin patch  She has used Flexeril in the past, which makes her tired, and medical marijuana, which she reports is the most helpful  Prior to pregnancy, she was taking NSAIDs for her pain; of note she has a h/o duodenal ulcers  Review of Systems   Constitutional: Negative for chills and fever  Respiratory: Negative for cough and shortness of breath  Cardiovascular: Negative for chest pain  Gastrointestinal: Positive for constipation, nausea and vomiting  Negative for abdominal pain and diarrhea  Endocrine: Negative for polydipsia and polyphagia  Genitourinary: Negative for vaginal bleeding  Musculoskeletal: Positive for back pain  Neurological: Positive for dizziness (none currently) and light-headedness  Psychiatric/Behavioral: Positive for dysphoric mood (Patient frustrated with her daily nausea and vomiting)  Historical Information   Past Medical History:   Diagnosis Date    Arthritis     Asthma     History of stomach ulcers     Psychiatric disorder     anxiety     Past Surgical History:   Procedure Laterality Date    BREAST SURGERY      COSMETIC SURGERY      EGD      WISDOM TOOTH EXTRACTION       OB History    Para Term  AB Living   1             SAB TAB Ectopic Multiple Live Births                  # Outcome Date GA Lbr Aftab/2nd Weight Sex Delivery Anes PTL Lv   1 Current              History reviewed  No pertinent family history    Social History   Social History     Substance and Sexual Activity Alcohol Use Not Currently     Social History     Substance and Sexual Activity   Drug Use Not Currently    Types: Marijuana     Social History     Tobacco Use   Smoking Status Never Smoker   Smokeless Tobacco Never Used       Meds/Allergies   Current Facility-Administered Medications   Medication Dose Route Frequency    acetaminophen (TYLENOL) tablet 650 mg  650 mg Oral Q6H PRN    doxylamine (UNISON) tablet 25 mg  25 mg Oral HS    enoxaparin (LOVENOX) subcutaneous injection 40 mg  40 mg Subcutaneous Daily    morphine injection 2 mg  2 mg Intravenous Q4H PRN    ondansetron (ZOFRAN) injection 4 mg  4 mg Intravenous Q4H PRN    pantoprazole (PROTONIX) injection 40 mg  40 mg Intravenous Q12H [address]    prenatal multivitamin tablet 1 tablet  1 tablet Oral Daily    promethazine (PHENERGAN) rectal suppository 12 5 mg  12 5 mg Rectal Q6H PRN    pyridoxine (VITAMIN B6) tablet 25 mg  25 mg Oral Daily    sodium chloride 0 9 % infusion  100 mL/hr Intravenous Continuous    sucralfate (CARAFATE) tablet 1 g  1 g Oral 4x Daily (AC & HS)         No Known Allergies    Objective   Vitals: Blood pressure 106/59, pulse 88, temperature 98 6 °F (37 °C), temperature source Oral, resp  rate 17, height 5' 5" (1 651 m), weight 60 2 kg (132 lb 12 8 oz), last menstrual period 09/27/2020, SpO2 97 %  Body mass index is 22 1 kg/m²  Intake/Output Summary (Last 24 hours) at 1/18/2021 0950  Last data filed at 1/18/2021 0601  Gross per 24 hour   Intake 2680 ml   Output 800 ml   Net 1880 ml       Invasive Devices     Peripheral Intravenous Line            Peripheral IV 01/17/21 Left Forearm less than 1 day                Physical Exam  Constitutional:       Appearance: Normal appearance  Comments: Tired-appearing, however awake, alert, and appropriately responsive   Cardiovascular:      Rate and Rhythm: Normal rate and regular rhythm  Heart sounds: Normal heart sounds  No murmur  No friction rub  No gallop      Pulmonary: Effort: Pulmonary effort is normal  No respiratory distress  Breath sounds: Normal breath sounds  No stridor  No wheezing, rhonchi or rales  Abdominal:      General: There is no distension  Palpations: Abdomen is soft  There is no mass  Tenderness: There is abdominal tenderness (diffuse TTP)  There is no guarding or rebound  Comments: gravid   Musculoskeletal:         General: No swelling or tenderness  Skin:     Capillary Refill: Capillary refill takes less than 2 seconds  Neurological:      Mental Status: She is alert         Lab Results:   Recent Results (from the past 24 hour(s))   CBC and differential    Collection Time: 01/17/21  1:45 PM   Result Value Ref Range    WBC 15 82 (H) 4 31 - 10 16 Thousand/uL    RBC 4 03 3 81 - 5 12 Million/uL    Hemoglobin 12 4 11 5 - 15 4 g/dL    Hematocrit 35 9 34 8 - 46 1 %    MCV 89 82 - 98 fL    MCH 30 8 26 8 - 34 3 pg    MCHC 34 5 31 4 - 37 4 g/dL    RDW 13 0 11 6 - 15 1 %    MPV 11 6 8 9 - 12 7 fL    Platelets 024 401 - 030 Thousands/uL    nRBC 0 /100 WBCs    Neutrophils Relative 88 (H) 43 - 75 %    Immat GRANS % 0 0 - 2 %    Lymphocytes Relative 10 (L) 14 - 44 %    Monocytes Relative 2 (L) 4 - 12 %    Eosinophils Relative 0 0 - 6 %    Basophils Relative 0 0 - 1 %    Neutrophils Absolute 13 68 (H) 1 85 - 7 62 Thousands/µL    Immature Grans Absolute 0 07 0 00 - 0 20 Thousand/uL    Lymphocytes Absolute 1 61 0 60 - 4 47 Thousands/µL    Monocytes Absolute 0 38 0 17 - 1 22 Thousand/µL    Eosinophils Absolute 0 04 0 00 - 0 61 Thousand/µL    Basophils Absolute 0 04 0 00 - 0 10 Thousands/µL   Comprehensive metabolic panel    Collection Time: 01/17/21  1:45 PM   Result Value Ref Range    Sodium 138 136 - 145 mmol/L    Potassium 3 5 3 5 - 5 3 mmol/L    Chloride 109 (H) 100 - 108 mmol/L    CO2 24 21 - 32 mmol/L    ANION GAP 5 4 - 13 mmol/L    BUN 6 5 - 25 mg/dL    Creatinine 0 56 (L) 0 60 - 1 30 mg/dL    Glucose 121 65 - 140 mg/dL    Calcium 8 9 8 3 - 10 1 mg/dL    AST 12 5 - 45 U/L    ALT 14 12 - 78 U/L    Alkaline Phosphatase 60 46 - 116 U/L    Total Protein 7 3 6 4 - 8 2 g/dL    Albumin 3 6 3 5 - 5 0 g/dL    Total Bilirubin 0 36 0 20 - 1 00 mg/dL    eGFR 126 ml/min/1 73sq m   Lipase    Collection Time: 21  1:45 PM   Result Value Ref Range    Lipase 39 (L) 73 - 393 u/L   Urine Macroscopic, POC    Collection Time: 21  3:50 PM   Result Value Ref Range    Color, UA Yellow     Clarity, UA Clear     pH, UA 7 5 4 5 - 8 0    Leukocytes, UA Negative Negative    Nitrite, UA Negative Negative    Protein, UA Trace (A) Negative mg/dl    Glucose, UA Negative Negative mg/dl    Ketones, UA 80 (3+) (A) Negative mg/dl    Urobilinogen, UA 0 2 0 2, 1 0 E U /dl E U /dl    Bilirubin, UA Negative Negative    Blood, UA Negative Negative    Specific Gravity, UA 1 020 1 003 - 1 030   Urine Microscopic    Collection Time: 21  3:50 PM   Result Value Ref Range    RBC, UA None Seen None Seen, 2-4 /hpf    WBC, UA None Seen None Seen, 2-4 /hpf    Epithelial Cells Moderate (A) None Seen, Occasional /hpf    Bacteria, UA Moderate (A) None Seen, Occasional /hpf    COARSE GRANULAR CASTS 0-3 /lpf    AMORPH PHOSPATES Moderate /hpf    MUCUS THREADS Occasional (A) None Seen   POCT urinalysis dipstick    Collection Time: 21  3:52 PM   Result Value Ref Range    Color, UA yellow     Clarity, UA clear        Assessment/Plan     Assessment:  Patient is a 45-year-old  at 16w1d who presents with an exacerbation of chronic nausea and vomiting that has been present prior to pregnancy       Recommendations/Plan:  1) Give D5 NS with thiamine, B6, and folic acid in 1L bag  2) Start Vit B6 25mg and Unisom 12 5mg BID scheduled (to decrease sleepiness)   - (can use Unisom 25 mg QHS in place of 12 5 mg evening dose)  3) Reglan 10mg every 8 hours IV scheduled  4) Phenergan 25mg every 6 hours IV scheduled  5) Obtain EKG and avoid Zofran due to patient's history of QT prolongation  6) Start methylprednisolone taper -16mg every 8 hour IV for 3 days, then can taper PO over 2 weeks to lowest effective dose  - If beneficial, can extend use --> limit total duration of use to 6 weeks  7) Discontinue prenatal vitamins, patient can consider taking oral folic acid if able to tolerate  8) Consider collecting HA1c  9) Ordered Urine Drug Screen    Discussed care with provider at [facility]; pt will follow-up on 01/21/2021  Discussed care with GI and hospitalist service    D/w Dr Matthew Swain (SOD)    David Woodson MD  1/18/2021  9:50 AM

## 2021-01-19 LAB
ANION GAP SERPL CALCULATED.3IONS-SCNC: 6 MMOL/L (ref 4–13)
BUN SERPL-MCNC: 5 MG/DL (ref 5–25)
CALCIUM SERPL-MCNC: 8.6 MG/DL (ref 8.3–10.1)
CHLORIDE SERPL-SCNC: 106 MMOL/L (ref 100–108)
CO2 SERPL-SCNC: 25 MMOL/L (ref 21–32)
CREAT SERPL-MCNC: 0.39 MG/DL (ref 0.6–1.3)
GFR SERPL CREATININE-BSD FRML MDRD: 142 ML/MIN/1.73SQ M
GLUCOSE SERPL-MCNC: 102 MG/DL (ref 65–140)
MAGNESIUM SERPL-MCNC: 1.8 MG/DL (ref 1.6–2.6)
POTASSIUM SERPL-SCNC: 2.9 MMOL/L (ref 3.5–5.3)
SODIUM SERPL-SCNC: 137 MMOL/L (ref 136–145)

## 2021-01-19 PROCEDURE — 99233 SBSQ HOSP IP/OBS HIGH 50: CPT | Performed by: OBSTETRICS & GYNECOLOGY

## 2021-01-19 PROCEDURE — 99232 SBSQ HOSP IP/OBS MODERATE 35: CPT | Performed by: INTERNAL MEDICINE

## 2021-01-19 PROCEDURE — C9113 INJ PANTOPRAZOLE SODIUM, VIA: HCPCS | Performed by: PHYSICIAN ASSISTANT

## 2021-01-19 PROCEDURE — 99232 SBSQ HOSP IP/OBS MODERATE 35: CPT | Performed by: PHYSICIAN ASSISTANT

## 2021-01-19 PROCEDURE — 80048 BASIC METABOLIC PNL TOTAL CA: CPT | Performed by: PHYSICIAN ASSISTANT

## 2021-01-19 PROCEDURE — 83735 ASSAY OF MAGNESIUM: CPT | Performed by: STUDENT IN AN ORGANIZED HEALTH CARE EDUCATION/TRAINING PROGRAM

## 2021-01-19 PROCEDURE — NC001 PR NO CHARGE: Performed by: OBSTETRICS & GYNECOLOGY

## 2021-01-19 RX ORDER — DIPHENHYDRAMINE HYDROCHLORIDE 50 MG/ML
25 INJECTION INTRAMUSCULAR; INTRAVENOUS EVERY 6 HOURS PRN
Status: DISCONTINUED | OUTPATIENT
Start: 2021-01-19 | End: 2021-01-20

## 2021-01-19 RX ORDER — DEXTROSE, SODIUM CHLORIDE, AND POTASSIUM CHLORIDE 5; .9; .15 G/100ML; G/100ML; G/100ML
75 INJECTION INTRAVENOUS CONTINUOUS
Status: DISCONTINUED | OUTPATIENT
Start: 2021-01-19 | End: 2021-01-23

## 2021-01-19 RX ORDER — POTASSIUM CHLORIDE 14.9 MG/ML
20 INJECTION INTRAVENOUS
Status: COMPLETED | OUTPATIENT
Start: 2021-01-19 | End: 2021-01-19

## 2021-01-19 RX ORDER — ACETAMINOPHEN 650 MG/1
650 SUPPOSITORY RECTAL EVERY 6 HOURS
Status: DISCONTINUED | OUTPATIENT
Start: 2021-01-19 | End: 2021-01-20

## 2021-01-19 RX ORDER — MUSCLE RUB CREAM 100; 150 MG/G; MG/G
CREAM TOPICAL 4 TIMES DAILY PRN
Status: DISCONTINUED | OUTPATIENT
Start: 2021-01-19 | End: 2021-01-23 | Stop reason: HOSPADM

## 2021-01-19 RX ORDER — LORAZEPAM 2 MG/ML
1 INJECTION INTRAMUSCULAR
Status: DISCONTINUED | OUTPATIENT
Start: 2021-01-19 | End: 2021-01-23 | Stop reason: HOSPADM

## 2021-01-19 RX ORDER — METHOCARBAMOL 500 MG/1
500 TABLET, FILM COATED ORAL EVERY 6 HOURS PRN
Status: DISCONTINUED | OUTPATIENT
Start: 2021-01-19 | End: 2021-01-23 | Stop reason: HOSPADM

## 2021-01-19 RX ADMIN — MORPHINE SULFATE 1 MG: 2 INJECTION, SOLUTION INTRAMUSCULAR; INTRAVENOUS at 12:29

## 2021-01-19 RX ADMIN — ONDANSETRON 4 MG: 2 INJECTION INTRAMUSCULAR; INTRAVENOUS at 11:17

## 2021-01-19 RX ADMIN — ACETAMINOPHEN 650 MG: 650 SUPPOSITORY RECTAL at 17:56

## 2021-01-19 RX ADMIN — ONDANSETRON 4 MG: 2 INJECTION INTRAMUSCULAR; INTRAVENOUS at 04:04

## 2021-01-19 RX ADMIN — MORPHINE SULFATE 1 MG: 2 INJECTION, SOLUTION INTRAMUSCULAR; INTRAVENOUS at 08:08

## 2021-01-19 RX ADMIN — DOXYLAMINE SUCCINATE 12.5 MG: 25 TABLET ORAL at 12:30

## 2021-01-19 RX ADMIN — DICLOFENAC 2 G: 10 GEL TOPICAL at 21:31

## 2021-01-19 RX ADMIN — DEXTROSE, SODIUM CHLORIDE, AND POTASSIUM CHLORIDE 75 ML/HR: 5; .9; .15 INJECTION INTRAVENOUS at 09:51

## 2021-01-19 RX ADMIN — POTASSIUM CHLORIDE 20 MEQ: 14.9 INJECTION, SOLUTION INTRAVENOUS at 12:31

## 2021-01-19 RX ADMIN — POTASSIUM CHLORIDE 20 MEQ: 14.9 INJECTION, SOLUTION INTRAVENOUS at 14:37

## 2021-01-19 RX ADMIN — METOCLOPRAMIDE 10 MG: 5 INJECTION, SOLUTION INTRAMUSCULAR; INTRAVENOUS at 14:36

## 2021-01-19 RX ADMIN — GLYCERIN 1 SUPPOSITORY: 2 SUPPOSITORY RECTAL at 17:56

## 2021-01-19 RX ADMIN — PROMETHAZINE HYDROCHLORIDE 25 MG: 25 INJECTION INTRAMUSCULAR; INTRAVENOUS at 00:17

## 2021-01-19 RX ADMIN — PANTOPRAZOLE SODIUM 40 MG: 40 INJECTION, POWDER, FOR SOLUTION INTRAVENOUS at 09:07

## 2021-01-19 RX ADMIN — Medication 25 MG: at 09:08

## 2021-01-19 RX ADMIN — METHYLPREDNISOLONE SODIUM SUCCINATE 16 MG: 40 INJECTION, POWDER, FOR SOLUTION INTRAMUSCULAR; INTRAVENOUS at 13:54

## 2021-01-19 RX ADMIN — PANTOPRAZOLE SODIUM 40 MG: 40 INJECTION, POWDER, FOR SOLUTION INTRAVENOUS at 21:18

## 2021-01-19 RX ADMIN — METOCLOPRAMIDE 10 MG: 5 INJECTION, SOLUTION INTRAMUSCULAR; INTRAVENOUS at 21:19

## 2021-01-19 RX ADMIN — PROMETHAZINE HYDROCHLORIDE 25 MG: 25 INJECTION INTRAMUSCULAR; INTRAVENOUS at 13:58

## 2021-01-19 RX ADMIN — PROMETHAZINE HYDROCHLORIDE 25 MG: 25 INJECTION INTRAMUSCULAR; INTRAVENOUS at 21:18

## 2021-01-19 RX ADMIN — METOCLOPRAMIDE 10 MG: 5 INJECTION, SOLUTION INTRAMUSCULAR; INTRAVENOUS at 04:15

## 2021-01-19 RX ADMIN — ACETAMINOPHEN 650 MG: 325 TABLET, FILM COATED ORAL at 11:18

## 2021-01-19 RX ADMIN — TRIMETHOBENZAMIDE HYDROCHLORIDE 200 MG: 100 INJECTION INTRAMUSCULAR at 20:17

## 2021-01-19 RX ADMIN — MORPHINE SULFATE 1 MG: 2 INJECTION, SOLUTION INTRAMUSCULAR; INTRAVENOUS at 00:33

## 2021-01-19 RX ADMIN — METHYLPREDNISOLONE SODIUM SUCCINATE 16 MG: 40 INJECTION, POWDER, FOR SOLUTION INTRAMUSCULAR; INTRAVENOUS at 05:54

## 2021-01-19 RX ADMIN — LIDOCAINE 1 PATCH: 50 PATCH TOPICAL at 21:17

## 2021-01-19 RX ADMIN — SODIUM CHLORIDE, SODIUM LACTATE, POTASSIUM CHLORIDE, AND CALCIUM CHLORIDE 100 ML/HR: .6; .31; .03; .02 INJECTION, SOLUTION INTRAVENOUS at 03:58

## 2021-01-19 RX ADMIN — DICLOFENAC 2 G: 10 GEL TOPICAL at 12:31

## 2021-01-19 RX ADMIN — LORAZEPAM 1 MG: 2 INJECTION INTRAMUSCULAR; INTRAVENOUS at 21:18

## 2021-01-19 RX ADMIN — METHYLPREDNISOLONE SODIUM SUCCINATE 16 MG: 40 INJECTION, POWDER, FOR SOLUTION INTRAMUSCULAR; INTRAVENOUS at 21:18

## 2021-01-19 RX ADMIN — WATER 10 ML: 1 INJECTION INTRAMUSCULAR; INTRAVENOUS; SUBCUTANEOUS at 00:18

## 2021-01-19 RX ADMIN — ONDANSETRON 4 MG: 2 INJECTION INTRAMUSCULAR; INTRAVENOUS at 18:04

## 2021-01-19 RX ADMIN — DICLOFENAC 2 G: 10 GEL TOPICAL at 17:56

## 2021-01-19 RX ADMIN — METOCLOPRAMIDE 10 MG: 5 INJECTION, SOLUTION INTRAMUSCULAR; INTRAVENOUS at 09:07

## 2021-01-19 NOTE — ASSESSMENT & PLAN NOTE
· Patient reports medical marijuana use for this as well as her nausea/vomiting   · Tylenol PRN   · Aqua K pad   · Add Voltaren QID

## 2021-01-19 NOTE — UTILIZATION REVIEW
Notification of Inpatient Admission/Inpatient Authorization Request   This is a Notification of Inpatient Admission for [facility]  Be advised that this patient was admitted to our facility under Inpatient Status  Contact Cholo Cervantes at [phone] for additional admission information  [facility] DEPT  DEDICATED -[phone]  Patient Name:   Tim George   YOB: 1990       [address]   [address]:   Latoya Rai  Tax ID: [id number]  NPI: [id number] Attending Provider/NPI:  Phone:  Address: Franco Bowman [[id number]]  [phone]  Same as FANNIE/Rose Suh 1106 of Service Code: 24 Place of Service Name:  [address] [place] [address]   Start Date: 1/18/21 1530 Discharge Date & Time: No discharge date for patient encounter  Type of Admission: Inpatient Status Discharge Disposition (if discharged): Home/Self Care   Patient Diagnoses: Pregnancy [Z34 90]  Epigastric pain [R10 13]  Nausea and vomiting in pregnancy [O21 9]  History of duodenal ulcer [Z87 19]  Intractable nausea and vomiting [R11 2]     Orders: Admission Orders (From admission, onward)     Ordered        01/18/21 1530  Inpatient Admission  Once         01/17/21 1907  Place in Observation  Once                    Assigned Utilization Review Contact: Cholo Cervantes  Utilization ,   Network Utilization Review Department  Phone: [phone]; Fax [phone]   Email: [email or web address]   ATTENTION PAYERS: Please call the assigned Utilization  directly with any questions or concerns ALL voicemails in the department are confidential  Send all requests for admission clinical reviews, approved or denied determinations and any other requests to dedicated fax number belonging to the campus where the patient is receiving treatment

## 2021-01-19 NOTE — PROGRESS NOTES
Progress Note - Alberta Found 1990, 35 y o  female MRN: [id number]    Unit/Bed#: -01 Encounter: [id number]    Primary Care Provider: Erma Pritchard DO   Date and time admitted to hospital: 1/17/2021  1:29 PM        * Nausea and vomiting during pregnancy  Assessment & Plan  · Multiple ED visits and hospitalizations both here and at [facility] for the same  · Possible etiologies include hyperemesis gravidarum, gastroparesis, cyclical vomiting syndrome, and cannabinoid hyperemesis syndrome   · appreciate GI and OB/GYN consults  · Started on IV steroids for up to 3 days, then can do a slow taper  · Continue phenergan, reglan OTC  · Unisom  · Hold on EGD given pregnancy  · Gastric emptying study as outpatient       Marijuana use  Assessment & Plan  · Patient with continued daily to multiple time per day use  · Cessation strongly discouraged    Severe protein-calorie malnutrition ([address] )  Assessment & Plan  Malnutrition Findings:   Adult Malnutrition type: Chronic illness(r/t nausea/vomiting, as evidenced by intake meeting <75% estimated needs > 1 month, and 9 5% wt loss x 4 months (9/22/20: 146#, current wt: 132#)  Treatment: pending PO diet advancement)  Adult Degree of Malnutrition: Other severe protein calorie malnutrition    BMI Findings: Body mass index is 22 1 kg/m²       Added Boost Breeze   May need to consider alternate form of nutrition if GI symptoms do not improve    Pregnancy  Assessment & Plan  · OB/GYN consult appreciated   · stable    Chronic back pain  Assessment & Plan  · Patient reports medical marijuana use for this as well as her nausea/vomiting   · Tylenol PRN   · Aqua K pad   · Add Voltaren QID    History of duodenal ulcer  Assessment & Plan  · Known history  · Continue PPI       Hypokalemia due to excessive gastrointestinal loss of potassium  Assessment & Plan  · Replace IV as patient not tolerating po this am      VTE Pharmacologic Prophylaxis:   Pharmacologic: Enoxaparin (Lovenox)  Mechanical VTE Prophylaxis in Place: Yes    Patient Centered Rounds: I have performed bedside rounds with nursing staff today  Discussions with Specialists or Other Care Team Provider: GI    Education and Discussions with Family / Patient: none    Time Spent for Care: 30 minutes  More than 50% of total time spent on counseling and coordination of care as described above  Current Length of Stay: 1 day(s)    Current Patient Status: Inpatient   Certification Statement: The patient will continue to require additional inpatient hospital stay due to IV steroids    Discharge Plan: await clinical course    Code Status: Level 1 - Full Code      Subjective:   Patient states she was vomiting all night  She attempted to eat some applesauce and vomited  Also complaining of back pain being exacerbated by vomiting  States she is exhausted from not sleeping  Objective:     Vitals:   Temp (24hrs), Av 9 °F (37 2 °C), Min:98 7 °F (37 1 °C), Max:99 °F (37 2 °C)    Temp:  [98 7 °F (37 1 °C)-99 °F (37 2 °C)] 99 °F (37 2 °C)  HR:  [76-81] 81  Resp:  [12-20] 12  BP: (117-123)/(56-78) 117/56  SpO2:  [97 %-99 %] 97 %  Body mass index is 22 1 kg/m²  Input and Output Summary (last 24 hours): Intake/Output Summary (Last 24 hours) at 2021 0921  Last data filed at 2021 0825  Gross per 24 hour   Intake 671 67 ml   Output 300 ml   Net 371 67 ml       Physical Exam:     Physical Exam  Constitutional:       Appearance: She is ill-appearing  Cardiovascular:      Rate and Rhythm: Normal rate and regular rhythm  Heart sounds: No murmur  Pulmonary:      Effort: Pulmonary effort is normal       Breath sounds: Normal breath sounds  Abdominal:      General: Bowel sounds are normal  There is no distension  Palpations: Abdomen is soft  Tenderness: There is no abdominal tenderness  Skin:     General: Skin is warm and dry  Neurological:      General: No focal deficit present        Mental Status: She is alert and oriented to person, place, and time  Psychiatric:         Mood and Affect: Mood normal          Additional Data:     Labs:    Results from last 7 days   Lab Units 01/17/21  1345   WBC Thousand/uL 15 82*   HEMOGLOBIN g/dL 12 4   HEMATOCRIT % 35 9   PLATELETS Thousands/uL 209   NEUTROS PCT % 88*   LYMPHS PCT % 10*   MONOS PCT % 2*   EOS PCT % 0     Results from last 7 days   Lab Units 01/19/21  0434 01/17/21  1345   SODIUM mmol/L 137 138   POTASSIUM mmol/L 2 9* 3 5   CHLORIDE mmol/L 106 109*   CO2 mmol/L 25 24   BUN mg/dL 5 6   CREATININE mg/dL 0 39* 0 56*   ANION GAP mmol/L 6 5   CALCIUM mg/dL 8 6 8 9   ALBUMIN g/dL  --  3 6   TOTAL BILIRUBIN mg/dL  --  0 36   ALK PHOS U/L  --  60   ALT U/L  --  14   AST U/L  --  12   GLUCOSE RANDOM mg/dL 102 121             Results from last 7 days   Lab Units 01/17/21  1345   HEMOGLOBIN A1C % 4 7               * I Have Reviewed All Lab Data Listed Above  * Additional Pertinent Lab Tests Reviewed:  All Labs Within Last 24 Hours Reviewed    Imaging:    Imaging Reports Reviewed Today Include: none  Imaging Personally Reviewed by Myself Includes:  none    Recent Cultures (last 7 days):     Results from last 7 days   Lab Units 01/17/21  1550   URINE CULTURE  No Growth <1000 cfu/mL       Last 24 Hours Medication List:   Current Facility-Administered Medications   Medication Dose Route Frequency Provider Last Rate    acetaminophen  650 mg Oral Q6H PRN Montserrat Flores PA-C      dextrose 5 % and sodium chloride 0 9 % with KCl 20 mEq/L  75 mL/hr Intravenous Continuous Fanta Hyatt PA-C      Diclofenac Sodium  2 g Topical 4x Daily Margaret Arora PA-C      doxylamine  12 5 mg Oral Daily Fanta Hyatt PA-C      doxylamine  25 mg Oral HS Montserrat Flores PA-C      enoxaparin  40 mg Subcutaneous Daily Montserrat Flores PA-C      lidocaine  1 patch Topical HS Taryn Leal PA-C      methylPREDNISolone sodium succinate  16 mg Intravenous [facility] Fanta Hyatt PA-C  metoclopramide  10 mg Intravenous Q6H Salvador Russo MD      morphine injection  1 mg Intravenous Q4H PRN Dileep Chicas PA-C      ondansetron  4 mg Intravenous Q4H PRN Alireza Man PA-C      pantoprazole  40 mg Intravenous Q12H [facility] Alireza Man PA-C      potassium chloride  20 mEq Intravenous Q2H Ana Rodrigez PA-C      promethazine  25 mg Intravenous Q6H Ana Rodrigez PA-C      promethazine  12 5 mg Rectal Q6H PRN Tiera Gomez PA-C      pyridoxine  25 mg Oral Daily Tiera Gomez PA-C          Today, Patient Was Seen By: Ana Rodrigez PA-C    ** Please Note: Dictation voice to text software may have been used in the creation of this document   **
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
## 2021-01-19 NOTE — ASSESSMENT & PLAN NOTE
Malnutrition Findings:   Adult Malnutrition type: Chronic illness(r/t nausea/vomiting, as evidenced by intake meeting <75% estimated needs > 1 month, and 9 5% wt loss x 4 months (9/22/20: 146#, current wt: 132#)  Treatment: pending PO diet advancement)  Adult Degree of Malnutrition: Other severe protein calorie malnutrition    BMI Findings: Body mass index is 22 1 kg/m²       Added Boost Cje   May need to consider alternate form of nutrition if GI symptoms do not improve

## 2021-01-19 NOTE — ASSESSMENT & PLAN NOTE
· Multiple ED visits and hospitalizations both here and at [facility] for the same  · Possible etiologies include hyperemesis gravidarum, gastroparesis, cyclical vomiting syndrome, and cannabinoid hyperemesis syndrome   · appreciate GI and OB/GYN consults  · Started on IV steroids for up to 3 days, then can do a slow taper  · Continue phenergan, reglan OTC  · Unisom  · Hold on EGD given pregnancy  · Gastric emptying study as outpatient

## 2021-01-19 NOTE — PROGRESS NOTES
Gynecology Progress note   Luz Maldonado 35 y o  female MRN: [id number]  Unit/Bed#: [facility] 861-43 Encounter: [id number]    A/P: Luz Maldonado is a 35 y o   female at 16w2d admitted for significant nausea/vomiting of pregnancy, complicated by history of gastroparesis and chronic back pain  Nausea/vomiting   - Pt continues to have persistent nausea with intermittent vomiting   - Phenergan 25mg IV Q6h david, Reglan 10 mg IV Q6h david, alternating   - Continue Vit B6 25mg, unisom 12 5mg qD  - Steroid taper: IV methylprednisolone 16 mg Q8h david x 3 days, then taper to lowest effective dose; limit total duration of use to 6 weeks if beneficial   - s/p IV fluids with vitamin repletion   - Would avoid Zofran due to documented history of QT prolongation; would obtain EKG   - D/c prenatal vitamins for now, can consider folic acid in its place which may be tolerated better  - UDS neg    IUP @ 16w2d   - Obtain fetal tones today   - Following [facility] for ObGyn care outpatient     Hx gastroparesis  - Strong family hx; likely exacerbated by pregnancy   - Pt was in middle of workup with GI but then found out she was pregnant  - Appreciate GI recommendations   - Hgb A1c 4 7    Chronic back pain   - Management per primary team     DVT PPx: SCDs, Lovenox   Encouraged activity/advance diet as tolerated  Dispo: remain inpatient until patient can tolerate PO     Subjective:  Pt had exacerbation in chronic back pain last night, requiring IV morphine  Using heating pad now, feels better  She continues to have consistent nausea with intermittent vomiting  As soon as she starts to feel better and tries to drink water or ginger ale, she vomits again  She would feel better about going home once she can keep something down  Otherwise denies fever, chills  Denies vaginal bleeding  At 16w2d today, has not yet started to feel baby move  Review of Systems   Constitutional: Negative for chills and fever     Gastrointestinal: Positive for nausea and vomiting  Negative for constipation and diarrhea  Genitourinary: Negative for vaginal bleeding  Musculoskeletal: Positive for back pain  Neurological: Negative for dizziness  Psychiatric/Behavioral: Negative for confusion  /78   Pulse 76   Temp 98 7 °F (37 1 °C) (Oral)   Resp 20   Ht 5' 5" (1 651 m)   Wt 60 2 kg (132 lb 12 8 oz)   LMP 09/27/2020 (Exact Date)   SpO2 99%   BMI 22 10 kg/m²     I/O last 3 completed shifts: In: 2231 7 [P O :1680; I V :551 7]  Out: 1100 [Emesis/NG output:1100]  No intake/output data recorded  Lab Results   Component Value Date    WBC 15 82 (H) 01/17/2021    HGB 12 4 01/17/2021    HCT 35 9 01/17/2021    MCV 89 01/17/2021     01/17/2021       Lab Results   Component Value Date    CALCIUM 8 6 01/19/2021    K 2 9 (L) 01/19/2021    CO2 25 01/19/2021     01/19/2021    BUN 5 01/19/2021    CREATININE 0 39 (L) 01/19/2021       No results found for: POCGLU    Physical Exam  Vitals signs reviewed  Constitutional:       General: She is not in acute distress  Appearance: Normal appearance  She is ill-appearing (Pt appears tired, pale)  She is not diaphoretic  Comments: Thin female   HENT:      Head: Normocephalic and atraumatic  Eyes:      Conjunctiva/sclera: Conjunctivae normal    Cardiovascular:      Rate and Rhythm: Normal rate and regular rhythm  Pulmonary:      Effort: Pulmonary effort is normal  No respiratory distress  Breath sounds: Normal breath sounds  Abdominal:      General: Abdomen is flat  There is no distension  Palpations: Abdomen is soft  There is no mass  Musculoskeletal: Normal range of motion  Skin:     General: Skin is warm and dry  Capillary Refill: Capillary refill takes less than 2 seconds  Neurological:      General: No focal deficit present  Mental Status: She is alert and oriented to person, place, and time     Psychiatric:         Mood and Affect: Mood normal          Behavior: Behavior normal          Tyrell Wang MD  PGY-2, OBGYN  1/19/2021 7:01 AM

## 2021-01-19 NOTE — PLAN OF CARE
Problem: PAIN - ADULT  Goal: Verbalizes/displays adequate comfort level or baseline comfort level  Description: Interventions:  - Encourage patient to monitor pain and request assistance  - Assess pain using appropriate pain scale  - Administer analgesics based on type and severity of pain and evaluate response  - Implement non-pharmacological measures as appropriate and evaluate response  - Consider cultural and social influences on pain and pain management  - Notify physician/advanced practitioner if interventions unsuccessful or patient reports new pain  Outcome: Progressing     Problem: INFECTION - ADULT  Goal: Absence or prevention of progression during hospitalization  Description: INTERVENTIONS:  - Assess and monitor for signs and symptoms of infection  - Monitor lab/diagnostic results  - Monitor all insertion sites, i e  indwelling lines, tubes, and drains  - Monitor endotracheal if appropriate and nasal secretions for changes in amount and color  - [place] appropriate cooling/warming therapies per order  - Administer medications as ordered  - Instruct and encourage patient and family to use good hand hygiene technique  - Identify and instruct in appropriate isolation precautions for identified infection/condition  Outcome: Progressing  Goal: Absence of fever/infection during neutropenic period  Description: INTERVENTIONS:  - Monitor WBC    Outcome: Progressing     Problem: SAFETY ADULT  Goal: Patient will remain free of falls  Description: INTERVENTIONS:  - Assess patient frequently for physical needs  -  Identify cognitive and physical deficits and behaviors that affect risk of falls    -  [place] fall precautions as indicated by assessment   - Educate patient/family on patient safety including physical limitations  - Instruct patient to call for assistance with activity based on assessment  - Modify environment to reduce risk of injury  - Consider OT/PT consult to assist with strengthening/mobility  Outcome: Progressing  Goal: Maintain or return to baseline ADL function  Description: INTERVENTIONS:  -  Assess patient's ability to carry out ADLs; assess patient's baseline for ADL function and identify physical deficits which impact ability to perform ADLs (bathing, care of mouth/teeth, toileting, grooming, dressing, etc )  - Assess/evaluate cause of self-care deficits   - Assess range of motion  - Assess patient's mobility; develop plan if impaired  - Assess patient's need for assistive devices and provide as appropriate  - Encourage maximum independence but intervene and supervise when necessary  - Involve family in performance of ADLs  - Assess for home care needs following discharge   - Consider OT consult to assist with ADL evaluation and planning for discharge  - Provide patient education as appropriate  Outcome: Progressing  Goal: Maintain or return mobility status to optimal level  Description: INTERVENTIONS:  - Assess patient's baseline mobility status (ambulation, transfers, stairs, etc )    - Identify cognitive and physical deficits and behaviors that affect mobility  - Identify mobility aids required to assist with transfers and/or ambulation (gait belt, sit-to-stand, lift, walker, cane, etc )  - [place] fall precautions as indicated by assessment  - Record patient progress and toleration of activity level on Mobility SBAR; progress patient to next Phase/Stage  - Instruct patient to call for assistance with activity based on assessment  - Consider rehabilitation consult to assist with strengthening/weightbearing, etc   Outcome: Progressing     Problem: DISCHARGE PLANNING  Goal: Discharge to home or other facility with appropriate resources  Description: INTERVENTIONS:  - Identify barriers to discharge w/patient and caregiver  - Arrange for needed discharge resources and transportation as appropriate  - Identify discharge learning needs (meds, wound care, etc )  - Arrange for interpretive services to assist at discharge as needed  - Refer to Case Management Department for coordinating discharge planning if the patient needs post-hospital services based on physician/advanced practitioner order or complex needs related to functional status, cognitive ability, or social support system  Outcome: Progressing     Problem: Knowledge Deficit  Goal: Patient/family/caregiver demonstrates understanding of disease process, treatment plan, medications, and discharge instructions  Description: Complete learning assessment and assess knowledge base  Interventions:  - Provide teaching at level of understanding  - Provide teaching via preferred learning methods  Outcome: Progressing     Problem: Nutrition/Hydration-ADULT  Goal: Nutrient/Hydration intake appropriate for improving, restoring or maintaining nutritional needs  Description: Monitor and assess patient's nutrition/hydration status for malnutrition  Collaborate with interdisciplinary team and initiate plan and interventions as ordered  Monitor patient's weight and dietary intake as ordered or per policy  Utilize nutrition screening tool and intervene as necessary  Determine patient's food preferences and provide high-protein, high-caloric foods as appropriate       INTERVENTIONS:  - Monitor oral intake, urinary output, labs, and treatment plans  - Assess nutrition and hydration status and recommend course of action  - Evaluate amount of meals eaten  - Assist patient with eating if necessary   - Allow adequate time for meals  - Recommend/ encourage appropriate diets, oral nutritional supplements, and vitamin/mineral supplements  - Order, calculate, and assess calorie counts as needed  - Recommend, monitor, and adjust tube feedings and TPN/PPN based on assessed needs  - Assess need for intravenous fluids  - Provide specific nutrition/hydration education as appropriate  - Include patient/family/caregiver in decisions related to nutrition  Outcome: Progressing

## 2021-01-19 NOTE — PROGRESS NOTES
PM Rounding Note - Gynecology     Gyn team reevaluated patient around 1300  Patient was tearful, clearly miserable, and was experiencing severe intermittent pain episodes during our encounter (at least 20 min)  She had 2 episodes of dry heaving and 1 episode of a back spasm relieved with tactile pressure on paraspinous muscles bilaterally  Her nausea/vomiting is worse  She has not been able to sleep  She has not had a recent bowel movement  She reports the 1st dose of IV steroids improved her nausea, but the second dose did not  The patient was visibly frustrated, and felt she was at her wits end  She is to the point where she wanted to discuss termination of this unintended, but desired, pregnancy, due to the severity of her symptoms and requiring multiple hospitalizations since October 2020  She said several times I just don't want to be in pain any more" and "I want to go to sleep and not wake up " She states that she feels depressed due to this pain  She identifies her daughter as a protective factor for her safety, and reports that her boyfriend will be supportive of whatever option she chooses regarding the pregnancy  She denies a history of / prior medication for depression/anxiety  We validated her frustration and empathized with her very reasonable reactions  We offered information about termination at this gestational age  We did discuss our concern that her symptoms are primarily from undiagnosed gastroparesis, with pregnancy as an exacerbating factor, and that we could not know for sure if termination of pregnancy would resolve her symptoms  However we will support the patient in her ultimate decision-making      We discussed this patient's case via telephone with Dr Timothy Garcia of Maternal-Fetal Medicine as well as GI team  After thorough discussion, we have formulated the following plan to address her multiple issues:    Nausea/Vomiting  - Continue david IV Phenergan/Reglan   - Continue IV Protonix   - Per GI, adding on IM Tigan 200mg  If symptoms do not improve over the next day, will plan for EGD Thursday  We can obtain fetal heart tones before and after the procedure  - Spoke with Charge RN - pt does not have shower in room but has access to unit's shower room  Nursing order placed - pt may shower     Pain:    - Rectal Tylenol,   - PO Robaxin PRN   - Voltaren gel, [place], and a lidocaine patch  - Aqua K pad   - Avoid morphine/other narcotic medications as it is likely contributing to nausea/constipation     Constipation  - Glycerin suppository   - Will avoid motility agents for now to avoid causing further spastic pain, but can add on as needed     Sleep  - IV benadryl q6h PRN  - IV Ativan 1 mg aHS     We will reevaluate the patient tomorrow morning       Patient seen with Dr Zeenat Luna

## 2021-01-20 PROBLEM — M54.50 ACUTE BILATERAL LOW BACK PAIN WITHOUT SCIATICA: Status: ACTIVE | Noted: 2021-01-17

## 2021-01-20 PROBLEM — F32.A DEPRESSION WITH SUICIDAL IDEATION: Status: ACTIVE | Noted: 2021-01-20

## 2021-01-20 PROBLEM — R45.851 DEPRESSION WITH SUICIDAL IDEATION: Status: ACTIVE | Noted: 2021-01-20

## 2021-01-20 LAB
ANION GAP SERPL CALCULATED.3IONS-SCNC: 7 MMOL/L (ref 4–13)
ATRIAL RATE: 75 BPM
BUN SERPL-MCNC: 5 MG/DL (ref 5–25)
CALCIUM SERPL-MCNC: 8.8 MG/DL (ref 8.3–10.1)
CHLORIDE SERPL-SCNC: 113 MMOL/L (ref 100–108)
CO2 SERPL-SCNC: 23 MMOL/L (ref 21–32)
CREAT SERPL-MCNC: 0.4 MG/DL (ref 0.6–1.3)
GFR SERPL CREATININE-BSD FRML MDRD: 141 ML/MIN/1.73SQ M
GLUCOSE SERPL-MCNC: 96 MG/DL (ref 65–140)
MAGNESIUM SERPL-MCNC: 1.9 MG/DL (ref 1.6–2.6)
P AXIS: 58 DEGREES
PHOSPHATE SERPL-MCNC: 3 MG/DL (ref 2.7–4.5)
POTASSIUM SERPL-SCNC: 3.5 MMOL/L (ref 3.5–5.3)
PR INTERVAL: 94 MS
QRS AXIS: 69 DEGREES
QRSD INTERVAL: 82 MS
QT INTERVAL: 410 MS
QTC INTERVAL: 435 MS
SODIUM SERPL-SCNC: 143 MMOL/L (ref 136–145)
T WAVE AXIS: 56 DEGREES
VENTRICULAR RATE: 68 BPM

## 2021-01-20 PROCEDURE — 99233 SBSQ HOSP IP/OBS HIGH 50: CPT | Performed by: OBSTETRICS & GYNECOLOGY

## 2021-01-20 PROCEDURE — 99357 PR PROLONGED SERV,INPATIENT,EA ADD 30 MIN: CPT | Performed by: OBSTETRICS & GYNECOLOGY

## 2021-01-20 PROCEDURE — 99233 SBSQ HOSP IP/OBS HIGH 50: CPT | Performed by: PHYSICIAN ASSISTANT

## 2021-01-20 PROCEDURE — NC001 PR NO CHARGE: Performed by: OBSTETRICS & GYNECOLOGY

## 2021-01-20 PROCEDURE — 80048 BASIC METABOLIC PNL TOTAL CA: CPT | Performed by: PHYSICIAN ASSISTANT

## 2021-01-20 PROCEDURE — 99223 1ST HOSP IP/OBS HIGH 75: CPT | Performed by: PSYCHIATRY & NEUROLOGY

## 2021-01-20 PROCEDURE — C9113 INJ PANTOPRAZOLE SODIUM, VIA: HCPCS | Performed by: PHYSICIAN ASSISTANT

## 2021-01-20 PROCEDURE — 99232 SBSQ HOSP IP/OBS MODERATE 35: CPT | Performed by: INTERNAL MEDICINE

## 2021-01-20 PROCEDURE — 84100 ASSAY OF PHOSPHORUS: CPT | Performed by: PHYSICIAN ASSISTANT

## 2021-01-20 PROCEDURE — 83735 ASSAY OF MAGNESIUM: CPT | Performed by: PHYSICIAN ASSISTANT

## 2021-01-20 PROCEDURE — 99356 PR PROLONGED SVC I/P OR OBS SETTING 1ST HOUR: CPT | Performed by: OBSTETRICS & GYNECOLOGY

## 2021-01-20 PROCEDURE — 93010 ELECTROCARDIOGRAM REPORT: CPT | Performed by: INTERNAL MEDICINE

## 2021-01-20 RX ORDER — METOCLOPRAMIDE 10 MG/1
10 TABLET ORAL EVERY 6 HOURS SCHEDULED
Status: DISCONTINUED | OUTPATIENT
Start: 2021-01-20 | End: 2021-01-20

## 2021-01-20 RX ORDER — DIAZEPAM 5 MG/ML
5 INJECTION, SOLUTION INTRAMUSCULAR; INTRAVENOUS ONCE
Status: COMPLETED | OUTPATIENT
Start: 2021-01-20 | End: 2021-01-20

## 2021-01-20 RX ORDER — SCOLOPAMINE TRANSDERMAL SYSTEM 1 MG/1
1 PATCH, EXTENDED RELEASE TRANSDERMAL
Status: DISCONTINUED | OUTPATIENT
Start: 2021-01-20 | End: 2021-01-23 | Stop reason: HOSPADM

## 2021-01-20 RX ORDER — DIPHENHYDRAMINE HCL 25 MG
25 TABLET ORAL EVERY 6 HOURS PRN
Status: DISCONTINUED | OUTPATIENT
Start: 2021-01-20 | End: 2021-01-23 | Stop reason: HOSPADM

## 2021-01-20 RX ORDER — MORPHINE SULFATE 4 MG/ML
4 INJECTION, SOLUTION INTRAMUSCULAR; INTRAVENOUS ONCE
Status: COMPLETED | OUTPATIENT
Start: 2021-01-20 | End: 2021-01-20

## 2021-01-20 RX ORDER — METOCLOPRAMIDE HYDROCHLORIDE 5 MG/ML
10 INJECTION INTRAMUSCULAR; INTRAVENOUS EVERY 6 HOURS PRN
Status: DISCONTINUED | OUTPATIENT
Start: 2021-01-20 | End: 2021-01-20

## 2021-01-20 RX ORDER — BISACODYL 10 MG
10 SUPPOSITORY, RECTAL RECTAL ONCE
Status: COMPLETED | OUTPATIENT
Start: 2021-01-20 | End: 2021-01-20

## 2021-01-20 RX ORDER — ACETAMINOPHEN 650 MG/1
650 SUPPOSITORY RECTAL EVERY 6 HOURS PRN
Status: DISCONTINUED | OUTPATIENT
Start: 2021-01-20 | End: 2021-01-23 | Stop reason: HOSPADM

## 2021-01-20 RX ORDER — ONDANSETRON 4 MG/1
4 TABLET, ORALLY DISINTEGRATING ORAL EVERY 6 HOURS PRN
Status: DISCONTINUED | OUTPATIENT
Start: 2021-01-20 | End: 2021-01-21

## 2021-01-20 RX ADMIN — SODIUM CHLORIDE 1000 ML: 0.9 INJECTION, SOLUTION INTRAVENOUS at 12:29

## 2021-01-20 RX ADMIN — METHYLPREDNISOLONE SODIUM SUCCINATE 16 MG: 40 INJECTION, POWDER, FOR SOLUTION INTRAMUSCULAR; INTRAVENOUS at 05:35

## 2021-01-20 RX ADMIN — DEXTROSE, SODIUM CHLORIDE, AND POTASSIUM CHLORIDE 75 ML/HR: 5; .9; .15 INJECTION INTRAVENOUS at 02:28

## 2021-01-20 RX ADMIN — TRIMETHOBENZAMIDE HYDROCHLORIDE 200 MG: 100 INJECTION INTRAMUSCULAR at 11:55

## 2021-01-20 RX ADMIN — PROMETHAZINE HYDROCHLORIDE 25 MG: 25 INJECTION INTRAMUSCULAR; INTRAVENOUS at 14:20

## 2021-01-20 RX ADMIN — PROMETHAZINE HYDROCHLORIDE 25 MG: 25 INJECTION INTRAMUSCULAR; INTRAVENOUS at 11:02

## 2021-01-20 RX ADMIN — METOCLOPRAMIDE 10 MG: 5 INJECTION, SOLUTION INTRAMUSCULAR; INTRAVENOUS at 08:57

## 2021-01-20 RX ADMIN — BISACODYL 10 MG: 10 SUPPOSITORY RECTAL at 12:32

## 2021-01-20 RX ADMIN — SCOPALAMINE 1 PATCH: 1 PATCH, EXTENDED RELEASE TRANSDERMAL at 20:14

## 2021-01-20 RX ADMIN — METOCLOPRAMIDE 10 MG: 5 INJECTION, SOLUTION INTRAMUSCULAR; INTRAVENOUS at 02:23

## 2021-01-20 RX ADMIN — MORPHINE SULFATE 4 MG: 4 INJECTION INTRAVENOUS at 16:24

## 2021-01-20 RX ADMIN — PANTOPRAZOLE SODIUM 40 MG: 40 INJECTION, POWDER, FOR SOLUTION INTRAVENOUS at 20:12

## 2021-01-20 RX ADMIN — MENTHOL, METHYL SALICYLATE: 10; 15 CREAM TOPICAL at 11:03

## 2021-01-20 RX ADMIN — PANTOPRAZOLE SODIUM 40 MG: 40 INJECTION, POWDER, FOR SOLUTION INTRAVENOUS at 08:58

## 2021-01-20 RX ADMIN — DICLOFENAC 2 G: 10 GEL TOPICAL at 21:17

## 2021-01-20 RX ADMIN — DICLOFENAC 2 G: 10 GEL TOPICAL at 18:33

## 2021-01-20 RX ADMIN — PROMETHAZINE HYDROCHLORIDE 25 MG: 25 INJECTION INTRAMUSCULAR; INTRAVENOUS at 20:15

## 2021-01-20 RX ADMIN — LORAZEPAM 1 MG: 2 INJECTION INTRAMUSCULAR; INTRAVENOUS at 21:17

## 2021-01-20 RX ADMIN — DICLOFENAC 2 G: 10 GEL TOPICAL at 11:10

## 2021-01-20 RX ADMIN — DICLOFENAC 2 G: 10 GEL TOPICAL at 08:57

## 2021-01-20 RX ADMIN — PROMETHAZINE HYDROCHLORIDE 25 MG: 25 INJECTION INTRAMUSCULAR; INTRAVENOUS at 05:34

## 2021-01-20 RX ADMIN — ONDANSETRON 4 MG: 2 INJECTION INTRAMUSCULAR; INTRAVENOUS at 10:11

## 2021-01-20 RX ADMIN — METHYLPREDNISOLONE SODIUM SUCCINATE 16 MG: 40 INJECTION, POWDER, FOR SOLUTION INTRAMUSCULAR; INTRAVENOUS at 14:20

## 2021-01-20 RX ADMIN — DIAZEPAM 5 MG: 10 INJECTION, SOLUTION INTRAMUSCULAR; INTRAVENOUS at 12:30

## 2021-01-20 RX ADMIN — ACETAMINOPHEN 650 MG: 650 SUPPOSITORY RECTAL at 11:16

## 2021-01-20 RX ADMIN — DEXTROSE, SODIUM CHLORIDE, AND POTASSIUM CHLORIDE 75 ML/HR: 5; .9; .15 INJECTION INTRAVENOUS at 21:42

## 2021-01-20 NOTE — NURSING NOTE
Pt reports she takes 5 showers a day to help with back pain  Pt had a shower today already & has been reattached to her IVF's now for dehydration secondary to vomiting  I also gave her IV morphine 4 mg now  PT is currently laying on a Aqua K Pad  Offered her lemon Иван ice since she currently did not want to attempt eating the full liquid tray

## 2021-01-20 NOTE — PROGRESS NOTES
Gynecology Progress note   Usha Singletary 35 y o  female MRN: [id number]  Unit/Bed#: [facility] 377-41 Encounter: [id number]    A/P: Usha Singletary is a 35 y o   female at 16w2d admitted for significant nausea/vomiting of pregnancy, complicated by history of gastroparesis and chronic back pain       Nausea/vomiting   - Symptoms improved from yesterday   - Phenergan 25mg IV Q6h david, Reglan 10 mg IV Q6h david, alternating--> can slowly transition to PO regimen this afternoon    - Steroid taper: Day 3 of IV methylprednisolone 16 mg Q8h david x 3 days, then taper to lowest effective dose; limit total duration of use to 6 weeks if beneficial   - IV Protonix   - Maintenance IV fluids - can d/c once patient tolerating PO   - Clear/surgical soft diet - if pt tolerates this AM, she can transition to PO med regimen   - Pt may shower   - Sparing use of Zofran due to documented history of QT prolongation; EKG from yesterday WNL  - Avoid prenatal vitamins for now, can consider folic acid in its place which may be tolerated better  - UDS neg except for THC - pt has ongoing medical marijuana use for chronic back pain - notes this help with nausea but likely also exacerbates it     Hypokalemia  - Improved after repletion from 2 9 --> 3 5 this AM    IUP @ 16w3d    - Will obtain fetal tones today; we were unable to obtain tones yesterday   - Following [facility] for ObGyn care outpatient; will ensure follow up appt is scheduled prior to discharge      Hx gastroparesis  - Likely primary cause or at least exacerbating factor for symptoms; exacerbated by pregnancy   - Strong family hx; complete remainder of GI workup outpatient toward end of / after pregnancy per GI consultation   - Hgb A1c 4 7    Chronic back pain   - Lidocaine patch   - Rectal Tylenol   - PO Robaxin PRN  - Voltaren/Bengay topical gel   - Aqua K pad  - Avoid narcotic medication     Constipation  - s/p glycerin suppository yesterday   - Will add on Colace PRN today and send Rx   - Pt uses medical marijuana, has tried to minimize in pregnancy     Sleep  - IV Benadryl PRN  - IV Ativan qHS     DVT PPx: SCDs, Lovenox   Encouraged activity/advance diet as tolerated  Dispo: remain inpatient until patient can tolerate PO     Subjective:  Pt feeling much improved from yesterday when she was in a very emotional state  Her medication regimen was changed  GI added on Tigan for nausea  She was able to shower last night which helped significantly  She fell asleep shortly after shower and had no vomiting overnight  She denies fever, chills, current nausea/vomiting  She denies chest pain, SOB  She still has epigastric soreness from repetitive vomiting but her back pain in improved  Denies vaginal bleeding  She continued to communicate with her family which improved her depressed mood and emotional state  She feels better about the pregnancy today  We briefly discussed transitioning to PO regimen today  In discussing home regimen, she feels Zofran ODT and Reglan helped the most  She needs refills for these prescriptions in addition to Protonix  Review of Systems   Constitutional: Negative for chills and fever  Gastrointestinal: Positive for nausea and vomiting  Negative for constipation and diarrhea  Genitourinary: Negative for vaginal bleeding  Musculoskeletal: Positive for back pain  Neurological: Negative for dizziness  Psychiatric/Behavioral: Negative for confusion  /58 (BP Location: Left arm)   Pulse 98   Temp 98 4 °F (36 9 °C) (Oral)   Resp 18   Ht 5' 5" (1 651 m)   Wt 60 2 kg (132 lb 12 8 oz)   LMP 09/27/2020 (Exact Date)   SpO2 98%   BMI 22 10 kg/m²     I/O last 3 completed shifts:   In: 0 [P O :340; I V :250]  Out: 300 [Emesis/NG output:300]  I/O this shift:  In: 118 [P O :118]  Out: -     Lab Results   Component Value Date    WBC 15 82 (H) 01/17/2021    HGB 12 4 01/17/2021    HCT 35 9 01/17/2021    MCV 89 01/17/2021     01/17/2021       Lab Results Component Value Date    CALCIUM 8 8 01/20/2021    K 3 5 01/20/2021    CO2 23 01/20/2021     (H) 01/20/2021    BUN 5 01/20/2021    CREATININE 0 40 (L) 01/20/2021       No results found for: POCGLU    Physical Exam  Vitals signs reviewed  Constitutional:       General: She is not in acute distress  Appearance: Normal appearance  She is ill-appearing (Pt appears tired, pale)  She is not diaphoretic  Comments: Thin female   HENT:      Head: Normocephalic and atraumatic  Eyes:      Conjunctiva/sclera: Conjunctivae normal    Cardiovascular:      Rate and Rhythm: Normal rate and regular rhythm  Pulmonary:      Effort: Pulmonary effort is normal  No respiratory distress  Breath sounds: Normal breath sounds  Abdominal:      General: Abdomen is flat  There is no distension  Palpations: Abdomen is soft  There is no mass  Musculoskeletal: Normal range of motion  Skin:     General: Skin is warm and dry  Capillary Refill: Capillary refill takes less than 2 seconds  Neurological:      General: No focal deficit present  Mental Status: She is alert and oriented to person, place, and time     Psychiatric:         Mood and Affect: Mood normal          Behavior: Behavior normal          Tyrell Wang MD  PGY-2, OBGYN  1/20/2021 8:22 AM

## 2021-01-20 NOTE — ASSESSMENT & PLAN NOTE
· Multiple ED visits and hospitalizations both here and at [facility] for the same  Patient has vomiting as well as back pain  Modalities below attempted with little relief  · Possible etiologies include hyperemesis gravidarum, gastroparesis, cyclical vomiting syndrome, and cannabinoid hyperemesis syndrome   · appreciate GI and OB/GYN consults - both teams with differing opinions  [employer] text sent to Dr Jesus Carranza and Dr Gallegos Members for them to discuss case attending to attending  Also asked them to address nutrition as patient has not eaten in several days  · Started on IV steroids for 3 days, GYN to order further taper  · Continue phenergan, reglan OTC, Tigam IM prn - added scopolamine patch today  Asked to have  bring in capsaicin cream to apply to periumbilical area - per GI has anecdotal benefit in cannabis hyperemesis syndrome     · [employer], vitamins  · GI hesitant for EGD given pregnancy and not sure what benefit it would offer as they are thinking that this is more cannibis hyperemesis syndrome   · Gastric emptying study as outpatient

## 2021-01-20 NOTE — PROGRESS NOTES
PM Rounding Note - Gynecology    Subjective:  Since morning rounding we visited patient twice more today for evaluation  At 0630 this AM, this patient was feeling well  She had been able to sleep through the night without vomiting  She initially tolerated a lemon ice pack and some chicken broth for breakfast, but around 3124-1486 the patient suddenly represented with intractable nausea/vomiting and back spasm  She is producing clear-yellow vomitus  Since then she has continued to be in a miserable state  She is returning to statements she made yesterday, including "I cannot do this anymore," "I want to go to sleep and never wake up," and "I don't want to be here " The patient is unable to identify triggering factors for her acute n/v/pain episodes, or what relieves them  She is unsure if the back spasms precede the n/v or vice versa  She finds it difficult to say what medications worked better than others at home  She states "I'm willing to try anything" to resolve her symptoms  At one point during this encounter patient also had very brief vagal episode where she required sternal rub by our resident  Objective:  Vitals:    01/19/21 1500 01/19/21 2300 01/20/21 0818 01/20/21 1500   BP: 132/81 125/62 103/58 125/84   BP Location: Left arm  Left arm Right arm   Pulse: 79 70 98 82   Resp: 20 17 18 18   Temp: 98 8 °F (37 1 °C) 98 8 °F (37 1 °C) 98 4 °F (36 9 °C) 99 4 °F (37 4 °C)   TempSrc: Oral  Oral Oral   SpO2: 100% 100% 98% 99%   Weight:       Height:         Assessment/Plan:    Nausea/Vomiting   - ObGyn and GI teams discussed plan via [id number] communication between Dr Alex Stapleton and Dr Pilar Palacios  There was concern suggested that this is classic presentation of cyclic vomiting syndrome  After further discussion, plan for EGD tomorrow with possible Dobhoff placement  Given that she is now multiple days without improvement in symptoms and inability to tolerate PO, there is growing concern for nutrition status  - Discontinue IV steroids as they do not appear to be significantly helping   - Continue scheduled Phenergan, Reglan, PRN Tigan     Back Pain  - Continue Topical gels, lidocaine patch, aqua k pad, PRN Robaxin if able to tolerate   - s/p Acute Pain consult, no further inpatient recommendations     Constipation  - s/p glycerin suppository   - Dulcolax suppository given today     Sleep   - IV benadryl PRN   - IV Ativan qHS     IUP at 16w3d   - Fetal tones obtained today, 150s bpm     Pt seen and examined with Dr Booth Hearing present  Total time spent on care, counseling, and coordination with other services: 2 hours       Charlesardieileen Ko MD  PGY-2, OBGYN  01/20/21

## 2021-01-20 NOTE — PROGRESS NOTES
D/W GYN, patient going for EGD tomorrow  Pending results, consider MRI brain to rule out possible central cause of vomiting  Called by nursing, patient continues to express suicidal ideations throughout the day because of her symptoms  APS said the same  I went back to see the patient and she said the same to me  Will place on 1:1 observation for now and consult Behavioral Health  Progress Note - Aline Rai 1990, 35 y o  female MRN: [id number]    Unit/Bed#: -01 Encounter: [id number]    Primary Care Provider: Nela Owusu DO   Date and time admitted to hospital: 1/17/2021  1:29 PM        * Nausea and vomiting during pregnancy  Assessment & Plan  · Multiple ED visits and hospitalizations both here and at [facility] for the same  Patient has vomiting as well as back pain  Modalities below attempted with little relief  · Possible etiologies include hyperemesis gravidarum, gastroparesis, cyclical vomiting syndrome, and cannabinoid hyperemesis syndrome   · appreciate GI and OB/GYN consults - both teams with differing opinions  Tiger text sent to Dr Rudd and Dr Mario Turner for them to discuss case attending to attending  Also asked them to address nutrition as patient has not eaten in several days  · Started on IV steroids for 3 days, GYN to order further taper  · Continue phenergan, reglan OTC, Tigam IM prn - added scopolamine patch today  Asked to have  bring in capsaicin cream to apply to periumbilical area - per GI has anecdotal benefit in cannabis hyperemesis syndrome     · Unisom, vitamins  · GI hesitant for EGD given pregnancy and not sure what benefit it would offer as they are thinking that this is more cannibis hyperemesis syndrome   · Gastric emptying study as outpatient       Marijuana use  Assessment & Plan  · Patient with continued daily to multiple time per day use  · Cessation strongly discouraged    Acute bilateral low back pain without sciatica  Assessment & Plan  · Acute on chronic back pain  · Patient reports medical marijuana use for this    · Appears to have mostly muscle spasms which is exacerbated by vomiting and back pain is exacerbating the vomiting  · APS consulted today to see if they have anything to offer for pain control as current regimen is not working and we really want to avoid narcotics  · Gave Valium x 1 dose during acute exacerbation  Severe protein-calorie malnutrition ([address] )  Assessment & Plan  Malnutrition Findings:   Adult Malnutrition type: Chronic illness(r/t nausea/vomiting, as evidenced by intake meeting <75% estimated needs > 1 month, and 9 5% wt loss x 4 months (9/22/20: 146#, current wt: 132#)  Treatment: pending PO diet advancement)  Adult Degree of Malnutrition: Other severe protein calorie malnutrition    BMI Findings: Body mass index is 22 1 kg/m²  Added Boost Breeze   May need to consider alternate form of nutrition if GI symptoms do not improve    Pregnancy  Assessment & Plan  · OB/GYN consult appreciated   · Stable  · GYN to check for fetal tones today  · Patient did inquire about possible pregnancy termination given her symptoms  History of duodenal ulcer  Assessment & Plan  · Known history  · Continue PPI       Hypokalemia due to excessive gastrointestinal loss of potassium  Assessment & Plan  · Replaced   · KCL in IVF      VTE Pharmacologic Prophylaxis:   Pharmacologic: Enoxaparin (Lovenox)  Mechanical VTE Prophylaxis in Place: Yes    Patient Centered Rounds: I have performed bedside rounds with nursing staff today  Discussions with Specialists or Other Care Team Provider: GI, GYN, acute pain service    Education and Discussions with Family / Patient: patient    Time Spent for Care: 1 hour  More than 50% of total time spent on counseling and coordination of care as described above   Time spent discussing care with specialists and coordinating care    Current Length of Stay: 2 day(s)    Current Patient Status: Inpatient   Certification Statement: The patient will continue to require additional inpatient hospital stay due to see above    Discharge Plan: await clinical course    Code Status: Level 1 - Full Code      Subjective:   Patient felt better this morning, but then developed severe nausea vomiting and back pain again this afternoon  Very distraught  Patient became presyncopal during her 1 episode of vomiting  Tried a couple bites of broth and Иван ice this morning    Objective:     Vitals:   Temp (24hrs), Av 7 °F (37 1 °C), Min:98 4 °F (36 9 °C), Max:98 8 °F (37 1 °C)    Temp:  [98 4 °F (36 9 °C)-98 8 °F (37 1 °C)] 98 4 °F (36 9 °C)  HR:  [70-98] 98  Resp:  [17-20] 18  BP: (103-132)/(58-81) 103/58  SpO2:  [98 %-100 %] 98 %  Body mass index is 22 1 kg/m²  Input and Output Summary (last 24 hours): Intake/Output Summary (Last 24 hours) at 2021 1352  Last data filed at 2021 0818  Gross per 24 hour   Intake 118 ml   Output --   Net 118 ml       Physical Exam:     Physical Exam  Constitutional:       Appearance: She is ill-appearing  Cardiovascular:      Rate and Rhythm: Normal rate and regular rhythm  Heart sounds: No murmur  Pulmonary:      Effort: Pulmonary effort is normal       Breath sounds: Normal breath sounds  Abdominal:      General: Bowel sounds are normal  There is no distension  Palpations: Abdomen is soft  Tenderness: There is no abdominal tenderness  Skin:     General: Skin is warm and dry  Neurological:      General: No focal deficit present  Mental Status: She is alert and oriented to person, place, and time     Psychiatric:         Mood and Affect: Mood normal            Additional Data:     Labs:    Results from last 7 days   Lab Units 21  1345   WBC Thousand/uL 15 82*   HEMOGLOBIN g/dL 12 4   HEMATOCRIT % 35 9   PLATELETS Thousands/uL 209   NEUTROS PCT % 88*   LYMPHS PCT % 10*   MONOS PCT % 2*   EOS PCT % 0     Results from last 7 days Lab Units 01/20/21  0506  01/17/21  1345   SODIUM mmol/L 143   < > 138   POTASSIUM mmol/L 3 5   < > 3 5   CHLORIDE mmol/L 113*   < > 109*   CO2 mmol/L 23   < > 24   BUN mg/dL 5   < > 6   CREATININE mg/dL 0 40*   < > 0 56*   ANION GAP mmol/L 7   < > 5   CALCIUM mg/dL 8 8   < > 8 9   ALBUMIN g/dL  --   --  3 6   TOTAL BILIRUBIN mg/dL  --   --  0 36   ALK PHOS U/L  --   --  60   ALT U/L  --   --  14   AST U/L  --   --  12   GLUCOSE RANDOM mg/dL 96   < > 121    < > = values in this interval not displayed  Results from last 7 days   Lab Units 01/17/21  1345   HEMOGLOBIN A1C % 4 7               * I Have Reviewed All Lab Data Listed Above  * Additional Pertinent Lab Tests Reviewed:  All Labs Within Last 24 Hours Reviewed    Imaging:    Imaging Reports Reviewed Today Include: none  Imaging Personally Reviewed by Myself Includes:  none    Recent Cultures (last 7 days):     Results from last 7 days   Lab Units 01/17/21  1550   URINE CULTURE  No Growth <1000 cfu/mL       Last 24 Hours Medication List:   Current Facility-Administered Medications   Medication Dose Route Frequency Provider Last Rate    acetaminophen  650 mg Rectal Q6H PRN Vadim Pedersen MD      acetaminophen  650 mg Oral Q6H PRN Kellee Aquino PA-C      dextrose 5 % and sodium chloride 0 9 % with KCl 20 mEq/L  75 mL/hr Intravenous Continuous Akbar Wilson PA-C 75 mL/hr (01/20/21 0228)    Diclofenac Sodium  2 g Topical 4x Daily Margaret Arora PA-C      diphenhydrAMINE  25 mg Oral Q6H PRN Vadim Pedersen MD      enoxaparin  40 mg Subcutaneous Daily Kellee Aquino PA-C      lidocaine  1 patch Topical HS Cammy FearsELMIRA      LORazepam  1 mg Intravenous HS Vadim Pedersen MD      menthol-methyl salicylate   Apply externally 4x Daily PRN Myesha Hayes MD      methocarbamol  500 mg Oral Q6H PRN Myesha Hayes MD      methylPREDNISolone sodium succinate  16 mg Intravenous [facility] Akbar Wilson PA-C      metoclopramide  10 mg Oral Q6H [address] Tiny Romberg, MD      ondansetron  4 mg Intravenous Q4H PRN Alireza Man PA-C      pantoprazole  40 mg Intravenous Q12H [address] Alireza Man PA-C      promethazine  25 mg Intravenous Q6H Fanta Hyatt PA-C      promethazine  12 5 mg Rectal Q6H PRN Montserrat Flores PA-C      scopolamine  1 patch Transdermal Q72H Fanta Hyatt PA-C      sodium chloride  1,000 mL Intravenous Once Fanta Hyatt PA-C 1,000 mL (01/20/21 1229)    trimethobenzamide  200 mg Intramuscular Q6H PRN Rick Kee DO          Today, Patient Was Seen By: Fanta Hyatt PA-C    ** Please Note: Dictation voice to text software may have been used in the creation of this document   **

## 2021-01-20 NOTE — ASSESSMENT & PLAN NOTE
· Acute on chronic back pain  · Patient reports medical marijuana use for this    · Appears to have mostly muscle spasms which is exacerbated by vomiting and back pain is exacerbating the vomiting  · APS consulted today to see if they have anything to offer for pain control as current regimen is not working and we really want to avoid narcotics  · Gave Valium x 1 dose during acute exacerbation

## 2021-01-20 NOTE — PLAN OF CARE
Problem: PAIN - ADULT  Goal: Verbalizes/displays adequate comfort level or baseline comfort level  Description: Interventions:  - Encourage patient to monitor pain and request assistance  - Assess pain using appropriate pain scale  - Administer analgesics based on type and severity of pain and evaluate response  - Implement non-pharmacological measures as appropriate and evaluate response  - Consider cultural and social influences on pain and pain management  - Notify physician/advanced practitioner if interventions unsuccessful or patient reports new pain  Outcome: Progressing     Problem: INFECTION - ADULT  Goal: Absence or prevention of progression during hospitalization  Description: INTERVENTIONS:  - Assess and monitor for signs and symptoms of infection  - Monitor lab/diagnostic results  - Monitor all insertion sites, i e  indwelling lines, tubes, and drains  - Monitor endotracheal if appropriate and nasal secretions for changes in amount and color  - [place] appropriate cooling/warming therapies per order  - Administer medications as ordered  - Instruct and encourage patient and family to use good hand hygiene technique  - Identify and instruct in appropriate isolation precautions for identified infection/condition  Outcome: Progressing  Goal: Absence of fever/infection during neutropenic period  Description: INTERVENTIONS:  - Monitor WBC    Outcome: Progressing     Problem: SAFETY ADULT  Goal: Patient will remain free of falls  Description: INTERVENTIONS:  - Assess patient frequently for physical needs  -  Identify cognitive and physical deficits and behaviors that affect risk of falls    -  [place] fall precautions as indicated by assessment   - Educate patient/family on patient safety including physical limitations  - Instruct patient to call for assistance with activity based on assessment  - Modify environment to reduce risk of injury  - Consider OT/PT consult to assist with strengthening/mobility  Outcome: Progressing  Goal: Maintain or return to baseline ADL function  Description: INTERVENTIONS:  -  Assess patient's ability to carry out ADLs; assess patient's baseline for ADL function and identify physical deficits which impact ability to perform ADLs (bathing, care of mouth/teeth, toileting, grooming, dressing, etc )  - Assess/evaluate cause of self-care deficits   - Assess range of motion  - Assess patient's mobility; develop plan if impaired  - Assess patient's need for assistive devices and provide as appropriate  - Encourage maximum independence but intervene and supervise when necessary  - Involve family in performance of ADLs  - Assess for home care needs following discharge   - Consider OT consult to assist with ADL evaluation and planning for discharge  - Provide patient education as appropriate  Outcome: Progressing  Goal: Maintain or return mobility status to optimal level  Description: INTERVENTIONS:  - Assess patient's baseline mobility status (ambulation, transfers, stairs, etc )    - Identify cognitive and physical deficits and behaviors that affect mobility  - Identify mobility aids required to assist with transfers and/or ambulation (gait belt, sit-to-stand, lift, walker, cane, etc )  - [place] fall precautions as indicated by assessment  - Record patient progress and toleration of activity level on Mobility SBAR; progress patient to next Phase/Stage  - Instruct patient to call for assistance with activity based on assessment  - Consider rehabilitation consult to assist with strengthening/weightbearing, etc   Outcome: Progressing     Problem: DISCHARGE PLANNING  Goal: Discharge to home or other facility with appropriate resources  Description: INTERVENTIONS:  - Identify barriers to discharge w/patient and caregiver  - Arrange for needed discharge resources and transportation as appropriate  - Identify discharge learning needs (meds, wound care, etc )  - Arrange for interpretive services to assist at discharge as needed  - Refer to Case Management Department for coordinating discharge planning if the patient needs post-hospital services based on physician/advanced practitioner order or complex needs related to functional status, cognitive ability, or social support system  Outcome: Progressing     Problem: Knowledge Deficit  Goal: Patient/family/caregiver demonstrates understanding of disease process, treatment plan, medications, and discharge instructions  Description: Complete learning assessment and assess knowledge base  Interventions:  - Provide teaching at level of understanding  - Provide teaching via preferred learning methods  Outcome: Progressing     Problem: Nutrition/Hydration-ADULT  Goal: Nutrient/Hydration intake appropriate for improving, restoring or maintaining nutritional needs  Description: Monitor and assess patient's nutrition/hydration status for malnutrition  Collaborate with interdisciplinary team and initiate plan and interventions as ordered  Monitor patient's weight and dietary intake as ordered or per policy  Utilize nutrition screening tool and intervene as necessary  Determine patient's food preferences and provide high-protein, high-caloric foods as appropriate       INTERVENTIONS:  - Monitor oral intake, urinary output, labs, and treatment plans  - Assess nutrition and hydration status and recommend course of action  - Evaluate amount of meals eaten  - Assist patient with eating if necessary   - Allow adequate time for meals  - Recommend/ encourage appropriate diets, oral nutritional supplements, and vitamin/mineral supplements  - Order, calculate, and assess calorie counts as needed  - Recommend, monitor, and adjust tube feedings and TPN/PPN based on assessed needs  - Assess need for intravenous fluids  - Provide specific nutrition/hydration education as appropriate  - Include patient/family/caregiver in decisions related to nutrition  Outcome: Progressing

## 2021-01-20 NOTE — CONSULTS
Consult Note- Acute Pain Service   Wan Teresa 35 y o  female MRN: [id number]  Unit/Bed#: -01 Encounter: [id number]               Assessment/Plan     Assessment:   Patient Active Problem List   Diagnosis    Lumbar spondylolysis    Multiple gastric ulcers    Hypokalemia due to excessive gastrointestinal loss of potassium    Mild protein-calorie malnutrition (HCC)    Vitamin D deficiency    Nausea and vomiting during pregnancy    Chronic bilateral low back pain without sciatica    9 weeks gestation of pregnancy    Acute bilateral low back pain without sciatica    Marijuana use    Pregnancy    History of duodenal ulcer    Severe protein-calorie malnutrition (HCC)      Wan Teresa is a 35 y o  female  currently 16 weeks and 2 days pregnant with history of severe nausea and vomiting during this pregnancy resulting in several hospitalizations  Also with history of chronic lower back pain now complaining of significant worsening of her lower back pain radiating into the bilateral buttocks with numbness and tingling of the lower extremities, worse with movement and with vomiting  APS is consulted to supply recommendations  Plan:    Continue Tylenol rectal suppository 650 milligrams p r  q 6 hours p r n  mild pain, fever if unable to tolerate p o  Brisa Per  Continue Tylenol 650 milligrams p o  q 6 hours p r n  mild pain if able to tolerate p o  Brisa Per  Continue Robaxin 500 milligrams p o  q 6 hours p r n  muscle spasms   Continue Voltaren gel 4 times daily scheduled   Continue lidocaine patch for up to 12 hours daily   Suggest alternate heat and ice for up to 20 minutes every hour to lower back   Although opioids are safe in pregnancy, they are not indicated for this issue and will likely contribute to worsening nausea and vomiting and therefore should be avoided   As outpatient, patient may want to consider supportive clothing, acupuncture, chiropractic or physical therapy     As her appears to be a component of anxiety and depression, suggest considering psychiatric consult  APS will sign off at this time  Thank you for the consult  All opioids and other analgesics to be written at discretion of primary team  Please call  / [phone] or [facility] Acute Pain Service - [facility] (/ between [phone] and on weekends) with questions or concerns    History of Present Illness    Admit Date:  1/17/2021  Hospital Day:  2 days  Primary Service:  Hospitalist  Attending Provider:  Julito Álvarez MD  Reason for Consult / Principal Problem:  Back pain  HPI: Brooklyn Jaime is a 35 y o  female who presents with intractable nausea and vomiting throughout her 16 week and 2 day pregnancy  Also with history of chronic lower back pain for which she follows with an outpatient pain management specialist   States that with the nausea and vomiting she has had significant worsening of her lower back pain which she describes as a knife stabbing into her back with radiation into the bilateral buttocks and also with numbness and tingling in the legs  Denies any weakness, trauma, bowel or bladder dysfunction or saddle anesthesia  Patient states that none of the current medication regimen has improved her pain at all  Patient admits to using marijuana several times a week including during the pregnancy for her back pain  Current pain location(s):  Lower back  Pain Scale:   10/10  Quality:  Stabbing  Current Analgesic regimen:    Tylenol rectal suppository 650 milligrams p r  q 6 hours p r n  mild pain, moderate pain or fever if unable to tolerate p o  Tylenol 650 milligrams p o  q 6 hours p r n  mild pain  Robaxin 500 milligrams p o  q 6 hours p r n  muscle spasms  Voltaren gel q i d  topical   Lidocaine patch for up to 12 hours daily      Pain History:  Chronic lower back pain  Pain Management Provider:  Dr Severiano Cunningham, [facility]    I have reviewed the patient's controlled substance dispensing history in the Prescription Drug Monitoring Program in compliance with the [facility] regulations before prescribing any controlled substances  Past 1 year review of PDMP:  Review of PDMP reveals no prescriptions for controlled substances  Consults    Review of Systems   Gastrointestinal: Positive for nausea and vomiting  Musculoskeletal: Positive for back pain  All other systems reviewed and are negative  Historical Information   Past Medical History:   Diagnosis Date    Arthritis     Asthma     History of stomach ulcers     Psychiatric disorder     anxiety     Past Surgical History:   Procedure Laterality Date    BREAST SURGERY      COSMETIC SURGERY      EGD      WISDOM TOOTH EXTRACTION       Social History   Social History     Substance and Sexual Activity   Alcohol Use Not Currently     Social History     Substance and Sexual Activity   Drug Use Not Currently    Types: Marijuana     Social History     Tobacco Use   Smoking Status Never Smoker   Smokeless Tobacco Never Used     Family History: History reviewed  No pertinent family history      Meds/Allergies   all current active meds have been reviewed, current meds:   Current Facility-Administered Medications   Medication Dose Route Frequency    acetaminophen (TYLENOL) rectal suppository 650 mg  650 mg Rectal Q6H PRN    acetaminophen (TYLENOL) tablet 650 mg  650 mg Oral Q6H PRN    dextrose 5 % and sodium chloride 0 9 % with KCl 20 mEq/L infusion (premix)  75 mL/hr Intravenous Continuous    Diclofenac Sodium (VOLTAREN) 1 % topical gel 2 g  2 g Topical 4x Daily    diphenhydrAMINE (BENADRYL) tablet 25 mg  25 mg Oral Q6H PRN    enoxaparin (LOVENOX) subcutaneous injection 40 mg  40 mg Subcutaneous Daily    lidocaine (LIDODERM) 5 % patch 1 patch  1 patch Topical HS    LORazepam (ATIVAN) injection 1 mg  1 mg Intravenous HS    menthol-methyl salicylate (BENGAY) 84-40 % cream   Apply externally 4x Daily PRN    methocarbamol (ROBAXIN) tablet 500 mg  500 mg Oral Q6H PRN    methylPREDNISolone sodium succinate (Solu-MEDROL) injection 16 mg  16 mg Intravenous Q8H [facility]    metoclopramide (REGLAN) tablet 10 mg  10 mg Oral Q6H [facility]    ondansetron (ZOFRAN) injection 4 mg  4 mg Intravenous Q4H PRN    pantoprazole (PROTONIX) injection 40 mg  40 mg Intravenous Q12H [facility]    promethazine (PHENERGAN) injection 25 mg  25 mg Intravenous Q6H    promethazine (PHENERGAN) rectal suppository 12 5 mg  12 5 mg Rectal Q6H PRN    scopolamine (TRANSDERM-SCOP) 1 5 mg/3 days TD 72 hr patch 1 patch  1 patch Transdermal Q72H    trimethobenzamide (TIGAN) IM injection 200 mg  200 mg Intramuscular Q6H PRN    and PTA meds:   Prior to Admission Medications   Prescriptions Last Dose Informant Patient Reported? Taking?   acetaminophen (TYLENOL) 325 mg tablet   No No   Sig: Take 2 tablets (650 mg total) by mouth every 6 (six) hours as needed for mild pain   ondansetron (ZOFRAN-ODT) 4 mg disintegrating tablet   No No   Sig: Take 1 tablet (4 mg total) by mouth every 6 (six) hours as needed for nausea or vomiting   pantoprazole (PROTONIX) 40 mg tablet   Yes No   Sig: Take 40 mg by mouth 2 (two) times a day   sucralfate (CARAFATE) 1 g/10 mL suspension   Yes No   Sig: Take 1 g by mouth      Facility-Administered Medications: None       No Known Allergies    Objective   Temp:  [98 4 °F (36 9 °C)-98 8 °F (37 1 °C)] 98 4 °F (36 9 °C)  HR:  [70-98] 98  Resp:  [17-20] 18  BP: (103-132)/(58-81) 103/58    Intake/Output Summary (Last 24 hours) at 1/20/2021 1419  Last data filed at 1/20/2021 0818  Gross per 24 hour   Intake 118 ml   Output --   Net 118 ml       Physical Exam  Vitals signs and nursing note reviewed  Constitutional:       General: She is awake  She is in acute distress  Appearance: She is ill-appearing  She is not toxic-appearing or diaphoretic  Eyes:      Pupils: Pupils are equal, round, and reactive to light  Skin:     General: Skin is warm and dry     Neurological:      General: No focal deficit present  Mental Status: She is alert and oriented to person, place, and time  GCS: GCS eye subscore is 4  GCS verbal subscore is 5  GCS motor subscore is 6  Psychiatric:         Attention and Perception: Attention and perception normal          Mood and Affect: Mood is depressed  Affect is tearful  Speech: Speech normal          Behavior: Behavior is cooperative  Lab Results:   I have personally reviewed pertinent labs  , CBC: No results found for: WBC, HGB, HCT, MCV, PLT, ADJUSTEDWBC, MCH, MCHC, RDW, MPV, NRBC, CMP:   Lab Results   Component Value Date    SODIUM 143 01/20/2021    K 3 5 01/20/2021     (H) 01/20/2021    CO2 23 01/20/2021    BUN 5 01/20/2021    CREATININE 0 40 (L) 01/20/2021    CALCIUM 8 8 01/20/2021    EGFR 141 01/20/2021   , BMP:  Lab Results   Component Value Date    SODIUM 143 01/20/2021    K 3 5 01/20/2021     (H) 01/20/2021    CO2 23 01/20/2021    BUN 5 01/20/2021    CREATININE 0 40 (L) 01/20/2021    GLUC 96 01/20/2021    CALCIUM 8 8 01/20/2021    AGAP 7 01/20/2021    EGFR 141 01/20/2021   , PT/PTT:No results found for: PT, PTT    Imaging Studies: I have personally reviewed pertinent reports  EKG, Pathology, and Other Studies: I have personally reviewed pertinent reports  Counseling / Coordination of Care  Total floor / unit time spent today Level 4 = 80 minutes  Greater than 50% of total time was spent with the patient and / or family counseling and / or coordination of care  A description of the counseling / coordination of care:  Patient interview, physical examination, review of PDMP, review of medical record, review of imaging and laboratory data, development of pain management plan, discussion of pain management plan with patient and primary service  Please note that the APS provides consultative services regarding pain management only    With the exception of ketamine and epidural infusions and except when indicated, final decisions regarding starting or changing doses of analgesic medications are at the discretion of the consulting service  Off hours consultation and/or medication management is generally not available      Benito ELMIRA Guerra  Acute Pain Service

## 2021-01-20 NOTE — CONSULTS
Consultation - [facility] 35 y o  female MRN: [id number]  Unit/Bed#: [facility] 455-01 Encounter: [id number]      Chief Complaint:  I am in too much pain    History of Present Illness   Physician Requesting Consult: Neel Fischer MD  Reason for Consult / Principal Problem:  Depression with suicidal ideation    Juan Carlos Murray is a 35 y o  female with gastro paresis, back pain, cyclical vomiting syndrome, marijuana use presents with Nausea and vomiting during pregnancy  She had multiple admissions secondary to these  Today she is pressed suicidal ideation  She states that she is in too much pain because all her body ache, she has severe back pain, she had abdominal pain and according to her she had little relief with any medication  She also is sad because her daughters birthday was yesterday and she was in the hospital   She also thinks that she lost her job because she had been in the hospital to often  She states that she wishes that she does not wake up but she denies any plan or intent  She states that she have a daughter and the daughter needs her  She denies any prior psychiatric encounter  She denies any psychotic symptoms  Psychiatric Review Of Systems:  sleep: yes  appetite changes: yes  weight changes: no  energy/anergy: no  interest/pleasure/anhedonia: no  somatic symptoms: no  anxiety/panic: no  damián: no  guilty/hopeless: no  self injurious behavior/risky behavior: no    Historical Information   Past Psychiatric History:   None  Currently in treatment with none    Past Suicide attempts:  None  Past Violent behavior:  None  Past Psychiatric medication trial:  None    Substance Abuse History:  She used marijuana daily, she states that she had the marijuana card, she denies any other drugs     I have assessed this patient for substance use within the past 12 months     History of IP/OP rehabilitation program:  None  Smoking history:  Never smoked  Family Psychiatric History: None    Social History  Education: Unknown  Learning Disabilities: None  Marital history: single  Living arrangement, social support: She lives with her fiance and her 10year-old daughter  Occupational History:  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  Functioning Relationships: good support system    Other Pertinent History: Financial    Traumatic History:   Abuse: None  Other Traumatic Events: None    Past Medical History:   Diagnosis Date    Arthritis     Asthma     History of stomach ulcers     Psychiatric disorder     anxiety       Medical Review Of Systems:  Review of Systems - Negative except nausea and vomiting, back pain, abdominal pain, sore throat, all other systems reviewed were negative    Meds/Allergies   current meds:   Current Facility-Administered Medications   Medication Dose Route Frequency    acetaminophen (TYLENOL) rectal suppository 650 mg  650 mg Rectal Q6H PRN    acetaminophen (TYLENOL) tablet 650 mg  650 mg Oral Q6H PRN    dextrose 5 % and sodium chloride 0 9 % with KCl 20 mEq/L infusion (premix)  75 mL/hr Intravenous Continuous    Diclofenac Sodium (VOLTAREN) 1 % topical gel 2 g  2 g Topical 4x Daily    diphenhydrAMINE (BENADRYL) tablet 25 mg  25 mg Oral Q6H PRN    enoxaparin (LOVENOX) subcutaneous injection 40 mg  40 mg Subcutaneous Daily    lidocaine (LIDODERM) 5 % patch 1 patch  1 patch Topical HS    LORazepam (ATIVAN) injection 1 mg  1 mg Intravenous HS    menthol-methyl salicylate (BENGAY) 37-51 % cream   Apply externally 4x Daily PRN    methocarbamol (ROBAXIN) tablet 500 mg  500 mg Oral Q6H PRN    methylPREDNISolone sodium succinate (Solu-MEDROL) injection 16 mg  16 mg Intravenous Q8H [facility]    metoclopramide (REGLAN) tablet 10 mg  10 mg Oral Q6H [facility]    morphine (PF) 4 mg/mL injection 4 mg  4 mg Intravenous Once    ondansetron (ZOFRAN) injection 4 mg  4 mg Intravenous Q4H PRN    pantoprazole (PROTONIX) injection 40 mg  40 mg Intravenous Q12H [facility]    promethazine (PHENERGAN) injection 25 mg  25 mg Intravenous Q6H    promethazine (PHENERGAN) rectal suppository 12 5 mg  12 5 mg Rectal Q6H PRN    scopolamine (TRANSDERM-SCOP) 1 5 mg/3 days TD 72 hr patch 1 patch  1 patch Transdermal Q72H    trimethobenzamide (TIGAN) IM injection 200 mg  200 mg Intramuscular Q6H PRN     No Known Allergies    Objective   Vital signs in last 24 hours:  Temp:  [98 4 °F (36 9 °C)-98 8 °F (37 1 °C)] 98 4 °F (36 9 °C)  HR:  [70-98] 98  Resp:  [17-18] 18  BP: (103-125)/(58-62) 103/58      Intake/Output Summary (Last 24 hours) at 1/20/2021 1519  Last data filed at 1/20/2021 0818  Gross per 24 hour   Intake 118 ml   Output --   Net 118 ml       Mental Status Evaluation:  Appearance:  age appropriate and casually dressed   Behavior:  normal   Speech:  soft   Mood:  sad   Affect:  mood-congruent   Language: naming objects and repeating phrases   Thought Process:  goal directed   Associations: intact associations   Thought Content:  normal   Perceptual Disturbances: None   Risk Potential: Suicidal Ideations none, Homicidal Ideations none and Potential for Aggression No   Sensorium:  person, place, time/date and situation   Memory:  recent and remote memory grossly intact   Cognition:  recent and remote memory grossly intact   Consciousness:  alert and awake    Attention: attention span and concentration were age appropriate   Intellect: within normal limits   Fund of Knowledge: awareness of current events: Fair, past history: Fair and vocabulary: Fair   Insight:  fair   Judgment: fair   Muscle Strength and Tone: Within normal limits   Gait/Station: Unable to assess patient is in bed   Motor Activity: no abnormal movements     Lab Results:    I have personally reviewed all pertinent laboratory/tests results    Labs in last 72 hours:   Recent Labs     01/20/21  0506   SODIUM 143   K 3 5   *   CO2 23   BUN 5   CREATININE 0 40*   GLUC 96   CALCIUM 8 8       Code Status: )Level 1 - Full Code    Assessment/Plan     Assessment:  Tim George is a 35 y o  female who is 13 with pregnancy presented to the hospital with nausea and vomiting, she does have a history of gastroparesis and chronic back pain expressed some suicidal thoughts today and a consult was request   She states that she feels depressed secondary to the pain, the pain and the nausea and vomiting are interfering with her life  She had multiple admission to the hospital and she had not been able to go back to work for several months, she has financial issues secondary to that     She also states that has 10year-old daughter and she is not able to be with her due to her medical issues  She had dead wishes but denies any suicidal plan or intent  She does not have any psychotic symptom     Diagnosis  Adjustment disorder with depressed mood  Plan:   Continue medical management  Discontinue one-to-one observation  At this time the most important thing is manage the patient pain  I will see her again tomorrow  Risks, benefits and possible side effects of Medications:   No medication given by  me      Naomie Santos MD

## 2021-01-20 NOTE — ASSESSMENT & PLAN NOTE
· OB/GYN consult appreciated   · Stable  · GYN to check for fetal tones today  · Patient did inquire about possible pregnancy termination given her symptoms
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
## 2021-01-21 ENCOUNTER — APPOINTMENT (INPATIENT)
Dept: GASTROENTEROLOGY | Facility: HOSPITAL | Age: 31
DRG: 832 | End: 2021-01-21
Payer: COMMERCIAL

## 2021-01-21 ENCOUNTER — ANESTHESIA EVENT (OUTPATIENT)
Dept: GASTROENTEROLOGY | Facility: HOSPITAL | Age: 31
End: 2021-01-21

## 2021-01-21 ENCOUNTER — ANESTHESIA (OUTPATIENT)
Dept: GASTROENTEROLOGY | Facility: HOSPITAL | Age: 31
End: 2021-01-21

## 2021-01-21 VITALS — HEART RATE: 111 BPM

## 2021-01-21 LAB
ANION GAP SERPL CALCULATED.3IONS-SCNC: 7 MMOL/L (ref 4–13)
BUN SERPL-MCNC: 6 MG/DL (ref 5–25)
CALCIUM SERPL-MCNC: 8.4 MG/DL (ref 8.3–10.1)
CHLORIDE SERPL-SCNC: 112 MMOL/L (ref 100–108)
CO2 SERPL-SCNC: 24 MMOL/L (ref 21–32)
CREAT SERPL-MCNC: 0.42 MG/DL (ref 0.6–1.3)
GFR SERPL CREATININE-BSD FRML MDRD: 139 ML/MIN/1.73SQ M
GLUCOSE SERPL-MCNC: 83 MG/DL (ref 65–140)
POTASSIUM SERPL-SCNC: 3 MMOL/L (ref 3.5–5.3)
SODIUM SERPL-SCNC: 143 MMOL/L (ref 136–145)

## 2021-01-21 PROCEDURE — 0DB98ZX EXCISION OF DUODENUM, VIA NATURAL OR ARTIFICIAL OPENING ENDOSCOPIC, DIAGNOSTIC: ICD-10-PCS | Performed by: INTERNAL MEDICINE

## 2021-01-21 PROCEDURE — 43239 EGD BIOPSY SINGLE/MULTIPLE: CPT | Performed by: INTERNAL MEDICINE

## 2021-01-21 PROCEDURE — 99232 SBSQ HOSP IP/OBS MODERATE 35: CPT | Performed by: INTERNAL MEDICINE

## 2021-01-21 PROCEDURE — 80048 BASIC METABOLIC PNL TOTAL CA: CPT | Performed by: PHYSICIAN ASSISTANT

## 2021-01-21 PROCEDURE — 0DB68ZX EXCISION OF STOMACH, VIA NATURAL OR ARTIFICIAL OPENING ENDOSCOPIC, DIAGNOSTIC: ICD-10-PCS | Performed by: INTERNAL MEDICINE

## 2021-01-21 PROCEDURE — NC001 PR NO CHARGE: Performed by: PSYCHIATRY & NEUROLOGY

## 2021-01-21 PROCEDURE — 99232 SBSQ HOSP IP/OBS MODERATE 35: CPT | Performed by: STUDENT IN AN ORGANIZED HEALTH CARE EDUCATION/TRAINING PROGRAM

## 2021-01-21 PROCEDURE — 88305 TISSUE EXAM BY PATHOLOGIST: CPT | Performed by: PATHOLOGY

## 2021-01-21 PROCEDURE — C9113 INJ PANTOPRAZOLE SODIUM, VIA: HCPCS | Performed by: PHYSICIAN ASSISTANT

## 2021-01-21 RX ORDER — ONDANSETRON 2 MG/ML
4 INJECTION INTRAMUSCULAR; INTRAVENOUS EVERY 6 HOURS SCHEDULED
Status: DISCONTINUED | OUTPATIENT
Start: 2021-01-21 | End: 2021-01-23 | Stop reason: HOSPADM

## 2021-01-21 RX ORDER — POTASSIUM CHLORIDE 14.9 MG/ML
20 INJECTION INTRAVENOUS ONCE
Status: COMPLETED | OUTPATIENT
Start: 2021-01-21 | End: 2021-01-21

## 2021-01-21 RX ORDER — PROPOFOL 10 MG/ML
INJECTION, EMULSION INTRAVENOUS AS NEEDED
Status: DISCONTINUED | OUTPATIENT
Start: 2021-01-21 | End: 2021-01-21

## 2021-01-21 RX ORDER — PROMETHAZINE HYDROCHLORIDE 25 MG/ML
25 INJECTION, SOLUTION INTRAMUSCULAR; INTRAVENOUS EVERY 6 HOURS PRN
Status: DISCONTINUED | OUTPATIENT
Start: 2021-01-21 | End: 2021-01-23 | Stop reason: HOSPADM

## 2021-01-21 RX ORDER — HYDROMORPHONE HCL/PF 1 MG/ML
0.5 SYRINGE (ML) INJECTION ONCE
Status: COMPLETED | OUTPATIENT
Start: 2021-01-21 | End: 2021-01-21

## 2021-01-21 RX ORDER — LIDOCAINE HYDROCHLORIDE 10 MG/ML
INJECTION, SOLUTION EPIDURAL; INFILTRATION; INTRACAUDAL; PERINEURAL AS NEEDED
Status: DISCONTINUED | OUTPATIENT
Start: 2021-01-21 | End: 2021-01-21

## 2021-01-21 RX ORDER — METOCLOPRAMIDE HYDROCHLORIDE 5 MG/ML
10 INJECTION INTRAMUSCULAR; INTRAVENOUS EVERY 6 HOURS SCHEDULED
Status: DISCONTINUED | OUTPATIENT
Start: 2021-01-21 | End: 2021-01-21

## 2021-01-21 RX ORDER — PROPOFOL 10 MG/ML
INJECTION, EMULSION INTRAVENOUS CONTINUOUS PRN
Status: DISCONTINUED | OUTPATIENT
Start: 2021-01-21 | End: 2021-01-21

## 2021-01-21 RX ORDER — CYPROHEPTADINE HYDROCHLORIDE 4 MG/1
4 TABLET ORAL 3 TIMES DAILY
Status: DISCONTINUED | OUTPATIENT
Start: 2021-01-21 | End: 2021-01-22

## 2021-01-21 RX ORDER — METOCLOPRAMIDE HYDROCHLORIDE 5 MG/ML
10 INJECTION INTRAMUSCULAR; INTRAVENOUS EVERY 6 HOURS SCHEDULED
Status: DISCONTINUED | OUTPATIENT
Start: 2021-01-21 | End: 2021-01-23 | Stop reason: HOSPADM

## 2021-01-21 RX ORDER — METOCLOPRAMIDE HYDROCHLORIDE 5 MG/ML
5 INJECTION INTRAMUSCULAR; INTRAVENOUS ONCE
Status: DISCONTINUED | OUTPATIENT
Start: 2021-01-21 | End: 2021-01-23 | Stop reason: HOSPADM

## 2021-01-21 RX ORDER — ONDANSETRON 2 MG/ML
4 INJECTION INTRAMUSCULAR; INTRAVENOUS EVERY 6 HOURS PRN
Status: DISCONTINUED | OUTPATIENT
Start: 2021-01-21 | End: 2021-01-21

## 2021-01-21 RX ADMIN — METOCLOPRAMIDE 10 MG: 5 INJECTION, SOLUTION INTRAMUSCULAR; INTRAVENOUS at 11:02

## 2021-01-21 RX ADMIN — PROMETHAZINE HYDROCHLORIDE 25 MG: 25 INJECTION INTRAMUSCULAR; INTRAVENOUS at 02:30

## 2021-01-21 RX ADMIN — HYDROMORPHONE HYDROCHLORIDE 0.5 MG: 1 INJECTION, SOLUTION INTRAMUSCULAR; INTRAVENOUS; SUBCUTANEOUS at 19:34

## 2021-01-21 RX ADMIN — DEXTROSE, SODIUM CHLORIDE, AND POTASSIUM CHLORIDE 75 ML/HR: 5; .9; .15 INJECTION INTRAVENOUS at 17:14

## 2021-01-21 RX ADMIN — DICLOFENAC 2 G: 10 GEL TOPICAL at 22:17

## 2021-01-21 RX ADMIN — POTASSIUM CHLORIDE 20 MEQ: 14.9 INJECTION, SOLUTION INTRAVENOUS at 09:03

## 2021-01-21 RX ADMIN — PROPOFOL 100 MCG/KG/MIN: 10 INJECTION, EMULSION INTRAVENOUS at 10:07

## 2021-01-21 RX ADMIN — ONDANSETRON 4 MG: 2 INJECTION INTRAMUSCULAR; INTRAVENOUS at 20:11

## 2021-01-21 RX ADMIN — HYDROMORPHONE HYDROCHLORIDE 0.5 MG: 1 INJECTION, SOLUTION INTRAMUSCULAR; INTRAVENOUS; SUBCUTANEOUS at 12:21

## 2021-01-21 RX ADMIN — PANTOPRAZOLE SODIUM 40 MG: 40 INJECTION, POWDER, FOR SOLUTION INTRAVENOUS at 22:17

## 2021-01-21 RX ADMIN — PROMETHAZINE HYDROCHLORIDE 25 MG: 25 INJECTION INTRAMUSCULAR; INTRAVENOUS at 15:32

## 2021-01-21 RX ADMIN — METOCLOPRAMIDE 10 MG: 5 INJECTION, SOLUTION INTRAMUSCULAR; INTRAVENOUS at 17:13

## 2021-01-21 RX ADMIN — PROMETHAZINE HYDROCHLORIDE 25 MG: 25 INJECTION INTRAMUSCULAR; INTRAVENOUS at 18:49

## 2021-01-21 RX ADMIN — ONDANSETRON 4 MG: 2 INJECTION INTRAMUSCULAR; INTRAVENOUS at 14:21

## 2021-01-21 RX ADMIN — PROPOFOL 80 MG: 10 INJECTION, EMULSION INTRAVENOUS at 10:07

## 2021-01-21 RX ADMIN — LIDOCAINE HYDROCHLORIDE 50 MG: 10 INJECTION, SOLUTION EPIDURAL; INFILTRATION; INTRACAUDAL; PERINEURAL at 10:06

## 2021-01-21 RX ADMIN — LORAZEPAM 1 MG: 2 INJECTION INTRAMUSCULAR; INTRAVENOUS at 22:17

## 2021-01-21 RX ADMIN — CYPROHEPTADINE HYDROCHLORIDE 4 MG: 4 TABLET ORAL at 18:07

## 2021-01-21 RX ADMIN — DICLOFENAC 2 G: 10 GEL TOPICAL at 11:59

## 2021-01-21 NOTE — PROGRESS NOTES
Pt vomiting large amounts of liquid, screaming and crying from severe back pain, 0 5 mg IV dilaudid administered (one time dose)  Continue to monitor

## 2021-01-21 NOTE — UTILIZATION REVIEW
Continued Stay Review    Date: 1/21                         Current Patient Class: Inpatient Current Level of Care: Med Surg    HPI:30 y o  female initially admitted on 1/18 presents with nausea and vomiting    Assessment/Plan: Nausea/vomiting, Hypokalemia, IUP @ 16w4d, Chronic back pain, Constipation  Plan for EGD today with GI; pt NPO; possible Dobhoff placement at time of EGD  Phenergan 25mg IV Q6h dvaid, Reglan 10 mg IV Q6h david, alternating  Scopolamine patch q72h  PRN antiemetics  S/p Iv steriods - 3 day treatment finished yesterday  IV Protonix  Continue IVFs  Avoid prenatal vitamins for now, can consider folic acid in its place which may be tolerated better  - UDS neg except for [facility] - pt has ongoing medical marijuana use for chronic back pain - notes this help with nausea but likely also exacerbates it  K 3 0 today  Continue to replete  Fetal tones yesterday 150s bpm  No BM since Saturday Pain control       Pertinent Labs/Diagnostic Results:       Results from last 7 days   Lab Units 01/17/21  1345   WBC Thousand/uL 15 82*   HEMOGLOBIN g/dL 12 4   HEMATOCRIT % 35 9   PLATELETS Thousands/uL 209   NEUTROS ABS Thousands/µL 13 68*         Results from last 7 days   Lab Units 01/21/21  0453 01/20/21  0506 01/19/21  0434 01/17/21  1345   SODIUM mmol/L 143 143 137 138   POTASSIUM mmol/L 3 0* 3 5 2 9* 3 5   CHLORIDE mmol/L 112* 113* 106 109*   CO2 mmol/L 24 23 25 24   ANION GAP mmol/L 7 7 6 5   BUN mg/dL 6 5 5 6   CREATININE mg/dL 0 42* 0 40* 0 39* 0 56*   EGFR ml/min/1 73sq m 139 141 142 126   CALCIUM mg/dL 8 4 8 8 8 6 8 9   MAGNESIUM mg/dL  --  1 9 1 8  --    PHOSPHORUS mg/dL  --  3 0  --   --      Results from last 7 days   Lab Units 01/17/21  1345   AST U/L 12   ALT U/L 14   ALK PHOS U/L 60   TOTAL PROTEIN g/dL 7 3   ALBUMIN g/dL 3 6   TOTAL BILIRUBIN mg/dL 0 36         Results from last 7 days   Lab Units 01/21/21  0453 01/20/21  0506 01/19/21  0434 01/17/21  1345   GLUCOSE RANDOM mg/dL 83 96 102 121 Results from last 7 days   Lab Units 01/17/21  1345   HEMOGLOBIN A1C % 4 7   EAG mg/dl 88     Results from last 7 days   Lab Units 01/17/21  1345   LIPASE u/L 39*             Results from last 7 days   Lab Units 01/17/21  1552 01/17/21  1550   CLARITY UA  clear Clear   COLOR UA  yellow Yellow   SPEC GRAV UA   --  1 020   PH UA   --  7 5   GLUCOSE UA mg/dl  --  Negative   KETONES UA mg/dl  --  80 (3+)*   BLOOD UA   --  Negative   PROTEIN UA mg/dl  --  Trace*   NITRITE UA   --  Negative   BILIRUBIN UA   --  Negative   UROBILINOGEN UA E U /dl  --  0 2   LEUKOCYTES UA   --  Negative   WBC UA /hpf  --  None Seen   RBC UA /hpf  --  None Seen   BACTERIA UA /hpf  --  Moderate*   EPITHELIAL CELLS WET PREP /hpf  --  Moderate*   MUCUS THREADS   --  Occasional*             Results from last 7 days   Lab Units 01/17/21  1550   AMPH/METH  Negative   BARBITURATE UR  Negative   BENZODIAZEPINE UR  Negative   COCAINE UR  Negative   METHADONE URINE  Negative   OPIATE UR  Negative   PCP UR  Negative   THC UR  Positive*     Results from last 7 days   Lab Units 01/17/21  1550   URINE CULTURE  No Growth <1000 cfu/mL     Vital Signs:   01/21/21 1144  98 5 °F (36 9 °C)  83  18  139/88  --  100 %  None (Room air)  Lying   01/21/21 1043  --  89  18  113/67  --  100 %  None (Room air)  --   01/21/21 1028  98 7 °F (37 1 °C)  90  18  107/67  --  100 %  None (Room air)  --   01/21/21 0914  98 3 °F (36 8 °C)  91  16  105/58  --  98 %  None (Room air)         Medications:   Scheduled Medications:  Diclofenac Sodium, 2 g, Topical, 4x Daily  enoxaparin, 40 mg, Subcutaneous, Daily  lidocaine, 1 patch, Topical, HS  LORazepam, 1 mg, Intravenous, HS  metoclopramide, 10 mg, Intravenous, Q6H KACI  metoclopramide, 5 mg, Intravenous, Once  pantoprazole, 40 mg, Intravenous, Q12H KACI  promethazine, 25 mg, Intravenous, Q6H  scopolamine, 1 patch, Transdermal, Q72H      Continuous IV Infusions:  dextrose 5 % and sodium chloride 0 9 % with KCl 20 mEq/L, 75 mL/hr, Intravenous, Continuous      PRN Meds:  acetaminophen, 650 mg, Rectal, Q6H PRN  acetaminophen, 650 mg, Oral, Q6H PRN  diphenhydrAMINE, 25 mg, Oral, Q6H PRN  menthol-methyl salicylate, , Apply externally, 4x Daily PRN  methocarbamol, 500 mg, Oral, Q6H PRN  ondansetron, 4 mg, Oral, Q6H PRN  promethazine, 12 5 mg, Rectal, Q6H PRN  trimethobenzamide, 200 mg, Intramuscular, Q6H PRN      Discharge Plan: [facility]    Network Utilization Review Department  ATTENTION: Please call with any questions or concerns to [phone] and carefully listen to the prompts so that you are directed to the right person  All voicemails are confidential   Mat Bicker all requests for admission clinical reviews, approved or denied determinations and any other requests to dedicated fax number below belonging to the campus where the patient is receiving treatment   List of dedicated fax numbers for the Facilities:  [address] DENIALS (Administrative/Medical Necessity) [phone]   [address] (Maternity/NICU/Pediatrics) [phone]   [address] [place] [address] [address] [place] [address] Dr Sally Altman [phone] (Junior Tubbs "Kayce" 103) [phone] [address] [id number] [address] [place]) [address] [place] [address] [phone]

## 2021-01-21 NOTE — ASSESSMENT & PLAN NOTE
· Multiple ED visits and hospitalizations both here and at [facility] for the same  Patient has vomiting as well as back pain  Modalities below attempted with little relief  · Possible etiologies include hyperemesis gravidarum, gastroparesis, cyclical vomiting syndrome, and cannabinoid hyperemesis syndrome   · appreciate GI and OB/GYN consults - both teams with differing opinions  [place] text sent to Dr Rukhsana Maurer and Dr Elier Her for them to discuss case attending to attending  Also asked them to address nutrition as patient has not eaten in several days  · Started on IV steroids for 3 days, GYN to order further taper  · Continue phenergan, reglan OTC, Tigam IM prn - added scopolamine patch today  Asked to have  bring in capsaicin cream to apply to periumbilical area - per GI has anecdotal benefit in cannabis hyperemesis syndrome  · Unisom, vitamins  · GI hesitant for EGD given pregnancy and not sure what benefit it would offer as they are thinking that this is more cannibis hyperemesis syndrome   · Gastric emptying study as outpatient     1/21 - both GYN, GI, and primary team agree patient is likely experiencing cannabis hyperemesis syndrome (a sub-set of Cyclical Vomiting Syndrome)  THC cessation is critical; continue supportive care  EGD today as per GI  NG tube for nutritional support   Hold steroids; continue anti-emetics

## 2021-01-21 NOTE — CASE MANAGEMENT
Cm reviewed pt's chart  Pt remaining inpatient for nausea and vomiting  Not dc stable  Cm will continue to follow

## 2021-01-21 NOTE — ASSESSMENT & PLAN NOTE
· Acute on chronic back pain  · Patient reports medical marijuana use for this    · Appears to have mostly muscle spasms which is exacerbated by vomiting and back pain is exacerbating the vomiting  · APS consulted to see if they have anything to offer for pain control as current regimen is not working and we want to avoid narcotics  · Dilaudid given post egd for back pain; monitor and adjust meds

## 2021-01-21 NOTE — ASSESSMENT & PLAN NOTE
· OB/GYN consult appreciated   · Stable  · Patient did inquire about possible pregnancy termination given her symptoms

## 2021-01-21 NOTE — PROGRESS NOTES
Gynecology Progress note   Francisca Amato 35 y o  female MRN: [id number]  Unit/Bed#: -81 Encounter: [id number]    A/P: Francisca Amato is a 35 y o   female at 17w2d admitted for significant nausea/vomiting of pregnancy, complicated by possible history of gastroparesis, chronic back pain, and chronic THC use  Cause of her nausea/vomiting is multi-factorial      Nausea/vomiting   - Plan for EGD today with GI; pt NPO; possible Dobhoff placement at time of EGD   - Phenergan 25mg IV Q6h david, Reglan 10 mg IV Q6h david, alternating  - Scopolamine patch q72h  - Zofran ODT Q4h PRN;  Tigan Q6h PRN  - IV methylprednisolone - discontinued yesterday after 3 days of treatment   - IV Protonix   - Maintenance IV fluids   - Pt may shower   - Avoid prenatal vitamins for now, can consider folic acid in its place which may be tolerated better  - UDS neg except for THC - pt has ongoing medical marijuana use for chronic back pain - notes this help with nausea but likely also exacerbates it     Hypokalemia  - 2 9 --> 3 5 --> 3 0 today   - Continue repletion as needed per primary team     IUP @ 16w4d    - Fetal tones yesterday 150s bpm  - Following [facility] for ObGyn care outpatient; will ensure follow up appt is scheduled prior to discharge    - Avoid prenatal vitamins for now 2/2 n/v    Hx possible gastroparesis  - Per GI not likely diagnosis; more classic presentation for cyclic vomiting syndrome  - Strong family hx; complete remainder of GI workup outpatient toward end of / after pregnancy per GI consultation   - Hgb A1c 4 7    Chronic back pain   - Lidocaine patch   - Rectal Tylenol   - PO Robaxin PRN  - Voltaren/Bengay topical gel   - Aqua K pad  - Avoid narcotic medication     Constipation  - Pt has not had BM since Saturday  - s/p glycerin suppository  - S/p dulcolax suppository yesterday     Sleep/Anxiety  - IV Benadryl PRN  - IV Ativan qHS     DVT PPx: SCDs, Lovenox (pt is refusing daily)  Encouraged activity/advance diet as tolerated  Dispo: remain inpatient today for EGD     Subjective:  Pt currently sleeping this AM  Per discussion with nursing, patient was stable from 7 pm now  Pt did not have any complaints, was watching TV and then sleeping overnight  Per nursing no vomiting events overnight  Review of Systems   Constitutional: Negative for chills and fever  Gastrointestinal: Positive for nausea and vomiting  Negative for constipation and diarrhea  Genitourinary: Negative for vaginal bleeding  Musculoskeletal: Positive for back pain  Neurological: Negative for dizziness  Psychiatric/Behavioral: Negative for confusion  /77   Pulse 85   Temp 99 1 °F (37 3 °C)   Resp 17   Ht 5' 5" (1 651 m)   Wt 60 2 kg (132 lb 12 8 oz)   LMP 09/27/2020 (Exact Date)   SpO2 100%   BMI 22 10 kg/m²     I/O last 3 completed shifts: In: 2245 [P O :118; IV Piggyback:1000]  Out: 550 [Emesis/NG output:550]  No intake/output data recorded  Lab Results   Component Value Date    WBC 15 82 (H) 01/17/2021    HGB 12 4 01/17/2021    HCT 35 9 01/17/2021    MCV 89 01/17/2021     01/17/2021       Lab Results   Component Value Date    CALCIUM 8 4 01/21/2021    K 3 0 (L) 01/21/2021    CO2 24 01/21/2021     (H) 01/21/2021    BUN 6 01/21/2021    CREATININE 0 42 (L) 01/21/2021       No results found for: POCGLU    Physical Exam  Vitals signs reviewed  Constitutional:       General: She is not in acute distress  Appearance: Normal appearance  She is ill-appearing (Pt appears tired, pale)  She is not diaphoretic  Comments: Thin female  Currently sleeping   HENT:      Head: Normocephalic and atraumatic  Pulmonary:      Effort: No respiratory distress  Abdominal:      Palpations: There is mass  Neurological:      General: No focal deficit present  Mental Status: She is alert         Verenice Hopkins MD  PGY-2, OBGYN  1/21/2021 7:13 AM

## 2021-01-21 NOTE — ANESTHESIA POSTPROCEDURE EVALUATION
Post-Op Assessment Note    CV Status:  Stable  Pain Score: 0    Pain management: adequate     Mental Status:  Alert and awake   Hydration Status:  Euvolemic   PONV Controlled:  Controlled   Airway Patency:  Patent   Two or more mitigation strategies used for obstructive sleep apnea   Post Op Vitals Reviewed: Yes      Staff: CRNA         No complications documented      /67 (01/21/21 1028)    Temp 98 7 °F (37 1 °C) (01/21/21 1028)    Pulse 90 (01/21/21 1028)   Resp 18 (01/21/21 1028)    SpO2 100 % (01/21/21 1028)

## 2021-01-21 NOTE — PROGRESS NOTES
I came to see the patient for follow-up but she just came from GI lab and is feeling very drowsy  Will follow-up tomorrow     Karuna Rodriguez MD

## 2021-01-21 NOTE — ANESTHESIA PREPROCEDURE EVALUATION
Procedure:  EGD    Relevant Problems   GI/HEPATIC   (+) Multiple gastric ulcers      GYN   (+) 9 weeks gestation of pregnancy   (+) Pregnancy      MUSCULOSKELETAL   (+) Acute bilateral low back pain without sciatica   (+) Chronic bilateral low back pain without sciatica      NEURO/PSYCH   (+) Depression with suicidal ideation   (+) History of duodenal ulcer             Anesthesia Plan  ASA Score- 3     Anesthesia Type- IV sedation with anesthesia with ASA Monitors  Additional Monitors:   Airway Plan:     Comment: Pt is 16 weeks pregnant; cyclic n/v, losing weight during pregnancy; pt needs dobhoff tube for feeding/nutrtion  Given that she is NPO, on mulitple anti-emetics and reglan, and has had no vomiting today or over night (and is 16 weeks pregnant < 20 wk), will start with MAC/deep sedation; if upon entry into stomach there is any concern for aspiration, will convert to GETA (propofol TIVA) to secure/protect airway  Risk of fetal demise discussed  Pt understands the plan, risks/benefits, agrees to proceed          Plan Factors-    Induction- intravenous  Postoperative Plan-     Informed Consent- Anesthetic plan and risks discussed with patient  I personally reviewed this patient with the CRNA  Discussed and agreed on the Anesthesia Plan with the CRNA  Shanna Bush

## 2021-01-21 NOTE — PROGRESS NOTES
Diet office called for Jevity 1 2 david tube feed to be delivered to floor  Patient to be started on tube feeds this evening

## 2021-01-21 NOTE — ASSESSMENT & PLAN NOTE
Malnutrition Findings:   Adult Malnutrition type: Chronic illness(r/t nausea/vomiting, as evidenced by intake meeting <75% estimated needs > 1 month, and 9 5% wt loss x 4 months (9/22/20: 146#, current wt: 132#)  Treatment: pending PO diet advancement)  Adult Degree of Malnutrition: Other severe protein calorie malnutrition    BMI Findings: Body mass index is 22 1 kg/m²       Added Boost Breeze   GI with plan to insert NG tube today

## 2021-01-22 LAB
ANION GAP SERPL CALCULATED.3IONS-SCNC: 6 MMOL/L (ref 4–13)
BUN SERPL-MCNC: 7 MG/DL (ref 5–25)
CALCIUM SERPL-MCNC: 8.8 MG/DL (ref 8.3–10.1)
CHLORIDE SERPL-SCNC: 111 MMOL/L (ref 100–108)
CO2 SERPL-SCNC: 24 MMOL/L (ref 21–32)
CREAT SERPL-MCNC: 0.42 MG/DL (ref 0.6–1.3)
GFR SERPL CREATININE-BSD FRML MDRD: 139 ML/MIN/1.73SQ M
GLUCOSE SERPL-MCNC: 94 MG/DL (ref 65–140)
POTASSIUM SERPL-SCNC: 3 MMOL/L (ref 3.5–5.3)
SODIUM SERPL-SCNC: 141 MMOL/L (ref 136–145)

## 2021-01-22 PROCEDURE — 99232 SBSQ HOSP IP/OBS MODERATE 35: CPT | Performed by: STUDENT IN AN ORGANIZED HEALTH CARE EDUCATION/TRAINING PROGRAM

## 2021-01-22 PROCEDURE — 99232 SBSQ HOSP IP/OBS MODERATE 35: CPT | Performed by: NURSE PRACTITIONER

## 2021-01-22 PROCEDURE — 80048 BASIC METABOLIC PNL TOTAL CA: CPT | Performed by: OBSTETRICS & GYNECOLOGY

## 2021-01-22 PROCEDURE — C9113 INJ PANTOPRAZOLE SODIUM, VIA: HCPCS | Performed by: PHYSICIAN ASSISTANT

## 2021-01-22 PROCEDURE — 99232 SBSQ HOSP IP/OBS MODERATE 35: CPT | Performed by: INTERNAL MEDICINE

## 2021-01-22 PROCEDURE — 99232 SBSQ HOSP IP/OBS MODERATE 35: CPT | Performed by: PSYCHIATRY & NEUROLOGY

## 2021-01-22 RX ORDER — CYPROHEPTADINE HYDROCHLORIDE 2 MG/5ML
4 SOLUTION ORAL 3 TIMES DAILY
Status: DISCONTINUED | OUTPATIENT
Start: 2021-01-22 | End: 2021-01-23 | Stop reason: HOSPADM

## 2021-01-22 RX ORDER — POTASSIUM CHLORIDE 20MEQ/15ML
40 LIQUID (ML) ORAL ONCE
Status: COMPLETED | OUTPATIENT
Start: 2021-01-22 | End: 2021-01-22

## 2021-01-22 RX ORDER — BISACODYL 10 MG
10 SUPPOSITORY, RECTAL RECTAL DAILY
Status: DISCONTINUED | OUTPATIENT
Start: 2021-01-22 | End: 2021-01-23 | Stop reason: HOSPADM

## 2021-01-22 RX ADMIN — METOCLOPRAMIDE 10 MG: 5 INJECTION, SOLUTION INTRAMUSCULAR; INTRAVENOUS at 12:11

## 2021-01-22 RX ADMIN — CYPROHEPTADINE HYDROCHLORIDE 4 MG: 2 SYRUP ORAL at 17:01

## 2021-01-22 RX ADMIN — ONDANSETRON 4 MG: 2 INJECTION INTRAMUSCULAR; INTRAVENOUS at 05:40

## 2021-01-22 RX ADMIN — ONDANSETRON 4 MG: 2 INJECTION INTRAMUSCULAR; INTRAVENOUS at 12:11

## 2021-01-22 RX ADMIN — LORAZEPAM 1 MG: 2 INJECTION INTRAMUSCULAR; INTRAVENOUS at 21:33

## 2021-01-22 RX ADMIN — METOCLOPRAMIDE 10 MG: 5 INJECTION, SOLUTION INTRAMUSCULAR; INTRAVENOUS at 17:02

## 2021-01-22 RX ADMIN — POTASSIUM CHLORIDE 40 MEQ: 20 SOLUTION ORAL at 12:23

## 2021-01-22 RX ADMIN — METOCLOPRAMIDE 10 MG: 5 INJECTION, SOLUTION INTRAMUSCULAR; INTRAVENOUS at 00:25

## 2021-01-22 RX ADMIN — DEXTROSE, SODIUM CHLORIDE, AND POTASSIUM CHLORIDE 75 ML/HR: 5; .9; .15 INJECTION INTRAVENOUS at 22:15

## 2021-01-22 RX ADMIN — DICLOFENAC 2 G: 10 GEL TOPICAL at 08:54

## 2021-01-22 RX ADMIN — PANTOPRAZOLE SODIUM 40 MG: 40 INJECTION, POWDER, FOR SOLUTION INTRAVENOUS at 08:53

## 2021-01-22 RX ADMIN — METHOCARBAMOL 500 MG: 500 TABLET, FILM COATED ORAL at 09:29

## 2021-01-22 RX ADMIN — CYPROHEPTADINE HYDROCHLORIDE 4 MG: 2 SYRUP ORAL at 12:11

## 2021-01-22 RX ADMIN — PANTOPRAZOLE SODIUM 40 MG: 40 INJECTION, POWDER, FOR SOLUTION INTRAVENOUS at 21:34

## 2021-01-22 RX ADMIN — ONDANSETRON 4 MG: 2 INJECTION INTRAMUSCULAR; INTRAVENOUS at 17:02

## 2021-01-22 RX ADMIN — ONDANSETRON 4 MG: 2 INJECTION INTRAMUSCULAR; INTRAVENOUS at 00:25

## 2021-01-22 RX ADMIN — METOCLOPRAMIDE 10 MG: 5 INJECTION, SOLUTION INTRAMUSCULAR; INTRAVENOUS at 05:40

## 2021-01-22 RX ADMIN — DEXTROSE, SODIUM CHLORIDE, AND POTASSIUM CHLORIDE 75 ML/HR: 5; .9; .15 INJECTION INTRAVENOUS at 07:55

## 2021-01-22 RX ADMIN — CYPROHEPTADINE HYDROCHLORIDE 4 MG: 2 SYRUP ORAL at 21:37

## 2021-01-22 NOTE — PLAN OF CARE
Problem: PAIN - ADULT  Goal: Verbalizes/displays adequate comfort level or baseline comfort level  Description: Interventions:  - Encourage patient to monitor pain and request assistance  - Assess pain using appropriate pain scale  - Administer analgesics based on type and severity of pain and evaluate response  - Implement non-pharmacological measures as appropriate and evaluate response  - Consider cultural and social influences on pain and pain management  - Notify physician/advanced practitioner if interventions unsuccessful or patient reports new pain  1/22/2021 1107 by Tiffany Cooper RN  Outcome: Progressing  1/22/2021 1105 by Tiffany Cooper RN  Outcome: Progressing

## 2021-01-22 NOTE — QUICK NOTE
Late entry note due to patient care  Patient was evaluated by me around 1000 after initial team rounds  Fetal tones were obtained via Doppler, 145 bpm  Pt felt movement for the first time yesterday  Fetal movements audible on Doppler ultrasound  I then discussed plan of care with Sonia Youngblood via [facility] Text  SLIM will determine final disposition/timeline for discharge and will send final outpatient scripts  Final recommendations per Ob:    Nausea/Vomiting likely 2/2 cyclic vomiting syndrome   - Discussed THC cessation once home - pt plans to throw out remaining supply when discharged home   - NG Dobhoff tube in place: tube feeds on boad (Jevity)  Continue inpatient and then in outpatient setting as outlined by GI team  She is to have it removed in 7-10 days outpatient  - Outpatient anti-nausea regimen:   - Zofran 4 mg ODT Q6h alternating and Reglan 10 mg PO Q6h (alternating these two every 3 hours)   - Cyproheptadine 4 mg TID PRN (available in liquid form)   - Continue Protonix 40 mg BID (PTA med)  - Continue hot showers as this tends to help in cyclic vomiting related to [facility] use   - She will follow up with her Ob and GI doctors     IUP @ 16w5d   - Continue to avoid prenatal vitamins for now to avoid further nausea  - Pt will follow up outpatient with her ObGyn     Constipation  - Pt finally had a BM earlier this AM  - Would recommend Dulcolax suppositories in short term after discharge; would then transition to Colace 100 mg BID PRN once tolerating PO     Chronic Back Pain  - Send short course of PO Robaxin 500 mg Q6h PRN until patient can discuss continuing this medication routinely with her ObGyn   - Would Rx short course of Lidocaine patches as needed   - Rx topical gels as needed/if patient desires     Thank you for allowing us to help care for this patient  Please reach out with further questions as needed!      Abdirashid Rajput MD  PGY-2, OBGYN  01/22/21

## 2021-01-22 NOTE — PROGRESS NOTES
Progress Note - [place] 35 y o  female MRN: [id number]  Unit/Bed#: -01 Encounter: [id number]      I came to see the patient continuation of care, today patient feels much better, after she had the feeding tube her vomiting stop  And she only have limited nausea  She also was able to tolerate some liquids today  She states that the pain have improved drastically  She is hoping to go home tomorrow  She talked to her daughter this morning who missed her  She states that she would had the feeling at least for this 14 days            Behavior over the last 24 hours:  improved  Sleep: normal  Appetite:  Improvement  Medication side effects: No  ROS: no complaints    Mental Status Evaluation:  Appearance:  age appropriate and casually dressed   Behavior:  normal   Speech:  normal pitch and normal volume   Mood:  euthymic   Affect:  mood-congruent   Language: naming objects and repeating phrases   Thought Process:  goal directed   Associations: intact associations   Thought Content:  normal   Perceptual Disturbances: None   Risk Potential: Suicidal Ideations none, Homicidal Ideations none and Potential for Aggression No   Sensorium:  person, place, time/date, situation, day of week and month of year   Memory:  recent and remote memory grossly intact   Cognition:  recent and remote memory grossly intact   Consciousness:  alert and awake    Attention: attention span and concentration were age appropriate   Intellect: within normal limits   Fund of Knowledge: awareness of current events: Good, past history: Good and vocabulary: Good   Insight:  good   Judgment: good   Muscle Strength and Tone: Within normal limits   Gait/Station: Unable to assess patient is in bed   Motor Activity: no abnormal movements         Assessment/Plan  Micha Lan is a 35 y o  female who is 15 weeks pregnant, has gastro paresis, back pain, cyclic vomiting syndrome was admitted to the hospital in nausea and vomiting during pregnancy  When she has the nausea and vomiting these exacerbated the back pain  Patient has now a feeding tube and she feels better since yesterday  She is looking forward to go home  She does not have any death wishes any longer    She denies any suicidal homicidal ideation plan or intent, patient does not have any psychotic symptoms or manic episodes  Diagnosis:    Adjustment disorder with depressed mood  Recommended Treatment:     Continue medical management  Patient mood improved because she is not vomiting any longer her pain is controlled  No other intervention at this moment  Discussed with primary team  I will sign off        Medications:   current meds:   Current Facility-Administered Medications   Medication Dose Route Frequency    acetaminophen (TYLENOL) rectal suppository 650 mg  650 mg Rectal Q6H PRN    acetaminophen (TYLENOL) tablet 650 mg  650 mg Oral Q6H PRN    bisacodyl (DULCOLAX) rectal suppository 10 mg  10 mg Rectal Daily    cyproheptadine hcl 2 mg/5 mL oral syrup 4 mg  4 mg Oral TID    dextrose 5 % and sodium chloride 0 9 % with KCl 20 mEq/L infusion (premix)  75 mL/hr Intravenous Continuous    Diclofenac Sodium (VOLTAREN) 1 % topical gel 2 g  2 g Topical 4x Daily    diphenhydrAMINE (BENADRYL) tablet 25 mg  25 mg Oral Q6H PRN    enoxaparin (LOVENOX) subcutaneous injection 40 mg  40 mg Subcutaneous Daily    lidocaine (LIDODERM) 5 % patch 1 patch  1 patch Topical HS    LORazepam (ATIVAN) injection 1 mg  1 mg Intravenous HS    menthol-methyl salicylate (BENGAY) 85-12 % cream   Apply externally 4x Daily PRN    methocarbamol (ROBAXIN) tablet 500 mg  500 mg Oral Q6H PRN    metoclopramide (REGLAN) injection 10 mg  10 mg Intravenous Q6H KACI    metoclopramide (REGLAN) injection 5 mg  5 mg Intravenous Once    ondansetron (ZOFRAN) injection 4 mg  4 mg Intravenous Q6H KACI    pantoprazole (PROTONIX) injection 40 mg  40 mg Intravenous Q12H [address]    promethazine (PHENERGAN) injection 25 mg  25 mg Intravenous Q6H PRN    promethazine (PHENERGAN) rectal suppository 12 5 mg  12 5 mg Rectal Q6H PRN    scopolamine (TRANSDERM-SCOP) 1 5 mg/3 days TD 72 hr patch 1 patch  1 patch Transdermal Q72H    trimethobenzamide (TIGAN) IM injection 200 mg  200 mg Intramuscular Q6H PRN         Risks, benefits and possible side effects of Medications:     No medication given  by me  Labs: I have personally reviewed all pertinent laboratory results  I have personally reviewed all pertinent laboratory/tests results    Labs in last 72 hours:   Recent Labs     01/22/21  0746   SODIUM 141   K 3 0*   *   CO2 24   BUN 7   CREATININE 0 42*   GLUC 94   CALCIUM 8 8         Amanda Parson MD
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
## 2021-01-22 NOTE — PROGRESS NOTES
Gynecology Progress note   Que Pina 35 y o  female MRN: [id number]  Unit/Bed#: -07 Encounter: [id number]    A/P: Que Pina is a 35 y o   female at 14w5d admitted for significant nausea/vomiting of pregnancy, complicated by possible history of gastroparesis, chronic back pain, and chronic THC use  Current working diagnosis is most cyclic vomiting syndrome, but likely is multi-factorial      Nausea/vomiting   - s/p EGD ; largely normal  - Dobhoff NG tube in place; Jevity 1 2 Romulo at goal rate of 30 ml/hr  - Continue small sips as tolerated   - Zofran 4mg IV Q6h david, Reglan 10 mg IV Q6h david, alternating  - Added on cyproheptadine 4 mg TID PO - will inquire about crushed form through feeding tube   - Scopolamine patch q72h  - Phenergan 25mg IV Q6h PRN;  Tigan Q6h PRN  - IV methylprednisolone - discontinued yesterday after 3 days of treatment   - IV Protonix Q12h IV  - Maintenance IV fluids   - Pt may shower   - Avoid prenatal vitamins for now, can consider folic acid in its place which may be tolerated better  - Discussed THC cessation today     Hypokalemia  - 2 9 --> 3 5 --> 3 0 --> f/u AM labs   - Continue repletion as needed per primary team     IUP @ 16w5d    - Fetal tones  150s bpm; will obtain again today  - Following [facility] for ObGyn care outpatient; will ensure follow up appt is scheduled prior to discharge    - Avoid prenatal vitamins for now 2/2 n/v    Chronic back pain   - Lidocaine patch   - Rectal Tylenol PRN   - PO Robaxin PRN - will inquire about crushed form through feeding tube   - Voltaren/Bengay topical gel   - Aqua K pad  - Highly recommend avoiding IV narcotic medication  - Discussed she may be able to continue epidural injections during pregnancy; discussed risk/benefits, as while there is some risk of x-ray radiation, the benefit of back pain relief may outweigh that, especially since THC cessation is being encouraged     Constipation  - Pt has not had BM since Saturday  - Dulcolax suppository PRN    Sleep/Anxiety  - IV Benadryl PRN  - IV Ativan qHS     DVT PPx: SCDs, Lovenox (pt is refusing daily)  Encouraged activity/advance diet as tolerated  Dispo: remain inpatient today to ensure she is tolerating tube feeds     Subjective:  Pt currently sleeping this AM  Slept well overnight  She had few vomiting episodes after NGT placement yesterday, but no vomiting overnight  She felt the baby move for the first time yesterday, which made her very happy  She feels better about continuing the pregnancy  She currently denies fever, chest pain, SOB, abdominal pain, back pain  Review of Systems   Constitutional: Negative for chills and fever  Respiratory: Negative for shortness of breath  Cardiovascular: Negative for chest pain  Gastrointestinal: Positive for nausea and vomiting  Negative for abdominal pain, constipation and diarrhea  Genitourinary: Negative for vaginal bleeding  Musculoskeletal: Negative for back pain  Neurological: Negative for dizziness  Psychiatric/Behavioral: Negative for confusion  /69   Pulse 75   Temp 98 8 °F (37 1 °C)   Resp 17   Ht 5' 5" (1 651 m)   Wt 60 2 kg (132 lb 12 8 oz)   LMP 09/27/2020 (Exact Date)   SpO2 100%   BMI 22 10 kg/m²     I/O last 3 completed shifts: In: 1009 [NG/GT:54; IV Piggyback:1000]  Out: 1525 [Emesis/NG output:1525]  No intake/output data recorded  Lab Results   Component Value Date    WBC 15 82 (H) 01/17/2021    HGB 12 4 01/17/2021    HCT 35 9 01/17/2021    MCV 89 01/17/2021     01/17/2021       Lab Results   Component Value Date    CALCIUM 8 4 01/21/2021    K 3 0 (L) 01/21/2021    CO2 24 01/21/2021     (H) 01/21/2021    BUN 6 01/21/2021    CREATININE 0 42 (L) 01/21/2021       No results found for: POCGLU    Physical Exam  Vitals signs reviewed  Constitutional:       General: She is not in acute distress  Appearance: Normal appearance   She is ill-appearing (Pt appears tired, pale)  She is not diaphoretic  Comments: Thin female  Appears in better mood this morning  HENT:      Head: Normocephalic and atraumatic  Nose:      Comments: NGT currently in place through right nostril  Eyes:      Conjunctiva/sclera: Conjunctivae normal    Cardiovascular:      Rate and Rhythm: Normal rate and regular rhythm  Pulmonary:      Effort: Pulmonary effort is normal  No respiratory distress  Abdominal:      General: Abdomen is flat  Palpations: Abdomen is soft  Musculoskeletal: Normal range of motion  Skin:     General: Skin is warm and dry  Neurological:      General: No focal deficit present  Mental Status: She is alert         Brandi Rocha MD  PGY-2, OBGYN  1/22/2021 7:10 AM

## 2021-01-22 NOTE — CONSULTS
Consult- wt loss, NGT placement, pregnancy     In light of inadequate PO intake, consider increasing TF to provide 75% pts nutrition needs  Rec transition to 45FC cyclic feed of Jevity 1 2 (pt agreeable to running during day)  Advance 20ml every 4 hrs as tolerated to goal of 100ml/hr  Rec adding minimum of 30ml water flushes every 4 hrs, and if IVF stopped increase to 150ml water every 4 hrs (while TF running)  Provides 1440kcal, 67gPRO, 1572ml free water  Discussed with patient  Will monitor PO intake/need for TF adjustments

## 2021-01-22 NOTE — PROGRESS NOTES
Progress Note - [place] Jaime 1990, 35 y o  female MRN: [id number]    Unit/Bed#: -01 Encounter: [id number]    Primary Care Provider: Watson Farris DO   Date and time admitted to hospital: 1/17/2021  1:29 PM        Depression with suicidal ideation  Assessment & Plan  Assessed by behavioral health and 1:1 stopped      Severe protein-calorie malnutrition ([address] )  Assessment & Plan  Malnutrition Findings:   Adult Malnutrition type: Chronic illness(r/t nausea/vomiting, as evidenced by intake meeting <75% estimated needs > 1 month, and 9 5% wt loss x 4 months (9/22/20: 146#, current wt: 132#)  Treatment: pending PO diet advancement)  Adult Degree of Malnutrition: Other severe protein calorie malnutrition    BMI Findings: Body mass index is 22 1 kg/m²  Added Boost Breeze   GI with plan to insert NG tube today    History of duodenal ulcer  Assessment & Plan  · Known history  · Continue PPI  · Repeat EGD today - largely negative       Pregnancy  Assessment & Plan  · OB/GYN consult appreciated   · Stable  · Patient did inquire about possible pregnancy termination given her symptoms       Marijuana use  Assessment & Plan  · Patient with continued daily to multiple time per day use  · Cessation strongly encouraged    Acute bilateral low back pain without sciatica  Assessment & Plan  · Acute on chronic back pain  · Patient reports medical marijuana use for this    · Appears to have mostly muscle spasms which is exacerbated by vomiting and back pain is exacerbating the vomiting  · APS consulted to see if they have anything to offer for pain control as current regimen is not working and we want to avoid narcotics  · Dilaudid given post egd for back pain; monitor and adjust meds    Hypokalemia due to excessive gastrointestinal loss of potassium  Assessment & Plan  · Replaced   · KCL in IVF    * Nausea and vomiting during pregnancy  Assessment & Plan  · Multiple ED visits and hospitalizations both here and at [facility] for the same  Patient has vomiting as well as back pain  Modalities below attempted with little relief  · Possible etiologies include hyperemesis gravidarum, gastroparesis, cyclical vomiting syndrome, and cannabinoid hyperemesis syndrome   · appreciate GI and OB/GYN consults - both teams with differing opinions  [place] text sent to Dr Selwyn Richardson and Dr Nury Marcos for them to discuss case attending to attending  Also asked them to address nutrition as patient has not eaten in several days  · Started on IV steroids for 3 days, GYN to order further taper  · Continue phenergan, reglan OTC, Tigam IM prn - added scopolamine patch today  Asked to have  bring in capsaicin cream to apply to periumbilical area - per GI has anecdotal benefit in cannabis hyperemesis syndrome  · [employer], vitamins  · GI hesitant for EGD given pregnancy and not sure what benefit it would offer as they are thinking that this is more cannibis hyperemesis syndrome   · Gastric emptying study as outpatient     1/21 - both GYN, GI, and primary team agree patient is likely experiencing cannabis hyperemesis syndrome (a sub-set of Cyclical Vomiting Syndrome)  THC cessation is critical; continue supportive care  EGD today as per GI  NG tube for nutritional support  Hold steroids; continue anti-emetics           VTE Pharmacologic Prophylaxis:   Pharmacologic: Enoxaparin (Lovenox)  Mechanical VTE Prophylaxis in Place: Yes    Patient Centered Rounds: I have performed bedside rounds with nursing staff today  Discussions with Specialists or Other Care Team Provider: GYN    Education and Discussions with Family / Patient: Care plan discussed with patient who voiced understanding and agrees with recommendations  Time Spent for Care: 45 minutes  More than 50% of total time spent on counseling and coordination of care as described above      Current Length of Stay: 3 day(s)    Current Patient Status: Inpatient   Certification Statement: The patient will continue to require additional inpatient hospital stay due to intractable nausea and vomiting    Discharge Plan: tbd    Code Status: Level 1 - Full Code      Subjective:   Patient vomiting several times as I entered the room; acutely ill and also c/o back pain  GI has added antiemetics; EGD showed some distal esophagitis, but no other pathology apprecitated  Temporary tube feeds started; IV fluids running  Objective:     Vitals:   Temp (24hrs), Av 6 °F (37 °C), Min:98 3 °F (36 8 °C), Max:99 1 °F (37 3 °C)    Temp:  [98 3 °F (36 8 °C)-99 1 °F (37 3 °C)] 98 6 °F (37 °C)  HR:  [] 74  Resp:  [16-18] 16  BP: (105-139)/(58-88) 132/72  SpO2:  [95 %-100 %] 95 %  Body mass index is 22 1 kg/m²  Input and Output Summary (last 24 hours): Intake/Output Summary (Last 24 hours) at 2021  Last data filed at 2021 190  Gross per 24 hour   Intake 1000 ml   Output 1525 ml   Net -525 ml       Physical Exam:     Physical Exam  Vitals signs and nursing note reviewed  Constitutional:       General: She is in acute distress  Appearance: She is ill-appearing  HENT:      Head: Normocephalic and atraumatic  Right Ear: External ear normal       Left Ear: External ear normal       Nose: Nose normal       Mouth/Throat:      Mouth: Mucous membranes are moist    Eyes:      Extraocular Movements: Extraocular movements intact  Conjunctiva/sclera: Conjunctivae normal       Pupils: Pupils are equal, round, and reactive to light  Neck:      Musculoskeletal: Normal range of motion and neck supple  Cardiovascular:      Rate and Rhythm: Normal rate and regular rhythm  Heart sounds: Normal heart sounds  Pulmonary:      Effort: Pulmonary effort is normal       Breath sounds: Normal breath sounds  Abdominal:      Palpations: Abdomen is soft  Musculoskeletal: Normal range of motion  General: Tenderness present  Skin:     General: Skin is warm and dry  Neurological:      Mental Status: She is alert and oriented to person, place, and time  Psychiatric:      Comments: distreesed           Additional Data:     Labs:    Results from last 7 days   Lab Units 01/17/21  1345   WBC Thousand/uL 15 82*   HEMOGLOBIN g/dL 12 4   HEMATOCRIT % 35 9   PLATELETS Thousands/uL 209   NEUTROS PCT % 88*   LYMPHS PCT % 10*   MONOS PCT % 2*   EOS PCT % 0     Results from last 7 days   Lab Units 01/21/21  0453  01/17/21  1345   SODIUM mmol/L 143   < > 138   POTASSIUM mmol/L 3 0*   < > 3 5   CHLORIDE mmol/L 112*   < > 109*   CO2 mmol/L 24   < > 24   BUN mg/dL 6   < > 6   CREATININE mg/dL 0 42*   < > 0 56*   ANION GAP mmol/L 7   < > 5   CALCIUM mg/dL 8 4   < > 8 9   ALBUMIN g/dL  --   --  3 6   TOTAL BILIRUBIN mg/dL  --   --  0 36   ALK PHOS U/L  --   --  60   ALT U/L  --   --  14   AST U/L  --   --  12   GLUCOSE RANDOM mg/dL 83   < > 121    < > = values in this interval not displayed  Results from last 7 days   Lab Units 01/17/21  1345   HEMOGLOBIN A1C % 4 7               * I Have Reviewed All Lab Data Listed Above  * Additional Pertinent Lab Tests Reviewed:  All Labs Within Last 24 Hours Reviewed    Imaging:    Imaging Reports Reviewed Today Include: egd  Imaging Personally Reviewed by Myself Includes:      Recent Cultures (last 7 days):     Results from last 7 days   Lab Units 01/17/21  1550   URINE CULTURE  No Growth <1000 cfu/mL       Last 24 Hours Medication List:   Current Facility-Administered Medications   Medication Dose Route Frequency Provider Last Rate    acetaminophen  650 mg Rectal Q6H PRN Sendy Mcnulty MD      acetaminophen  650 mg Oral Q6H PRN Vania Samuel PA-C      cyproheptadine  4 mg Oral TID Sendy Mcnulty MD      dextrose 5 % and sodium chloride 0 9 % with KCl 20 mEq/L  75 mL/hr Intravenous Continuous Areli Olivier PA-C 75 mL/hr (01/21/21 0453)    Diclofenac Sodium  2 g Topical 4x Daily Margaret Arora PA-C      diphenhydrAMINE  25 mg Oral Q6H PRN Tyrell Wang MD      enoxaparin  40 mg Subcutaneous Daily Damien Foot, PA-C      lidocaine  1 patch Topical HS Garnell Brazil, PA-C      LORazepam  1 mg Intravenous HS Tyrell Wang MD      menthol-methyl salicylate   Apply externally 4x Daily PRN Lenora Singletary MD      methocarbamol  500 mg Oral Q6H PRN Lenora Singletary MD      metoclopramide  10 mg Intravenous [facility] Tyrell Wang MD      metoclopramide  5 mg Intravenous Once Epifanio Ndiaye MD      ondansetron  4 mg Intravenous Q6H [address] Tyrell Wang MD      pantoprazole  40 mg Intravenous Q12H [facility], PA-C      promethazine  25 mg Intravenous Q6H PRN Tyrell Wang MD      promethazine  12 5 mg Rectal Q6H PRN Damien Foot, PA-C      scopolamine  1 patch Transdermal Q72H [place]ra Arena, PA-C      trimethobenzamide  200 mg Intramuscular Q6H PRN Hermila Shah DO          Today, Patient Was Seen By: Nito Rangel MD    ** Please Note: Dictation voice to text software may have been used in the creation of this document   **

## 2021-01-23 VITALS
WEIGHT: 132.8 LBS | RESPIRATION RATE: 19 BRPM | TEMPERATURE: 98.8 F | HEIGHT: 65 IN | OXYGEN SATURATION: 99 % | SYSTOLIC BLOOD PRESSURE: 104 MMHG | HEART RATE: 100 BPM | DIASTOLIC BLOOD PRESSURE: 59 MMHG | BODY MASS INDEX: 22.13 KG/M2

## 2021-01-23 PROBLEM — O21.9 NAUSEA AND VOMITING DURING PREGNANCY: Status: RESOLVED | Noted: 2020-10-11 | Resolved: 2021-01-23

## 2021-01-23 PROBLEM — E87.6 HYPOKALEMIA DUE TO EXCESSIVE GASTROINTESTINAL LOSS OF POTASSIUM: Status: RESOLVED | Noted: 2020-03-22 | Resolved: 2021-01-23

## 2021-01-23 PROBLEM — F12.90 MARIJUANA USE: Status: RESOLVED | Noted: 2021-01-17 | Resolved: 2021-01-23

## 2021-01-23 LAB
ANION GAP SERPL CALCULATED.3IONS-SCNC: 5 MMOL/L (ref 4–13)
BUN SERPL-MCNC: 5 MG/DL (ref 5–25)
CALCIUM SERPL-MCNC: 8.2 MG/DL (ref 8.3–10.1)
CHLORIDE SERPL-SCNC: 110 MMOL/L (ref 100–108)
CO2 SERPL-SCNC: 24 MMOL/L (ref 21–32)
CREAT SERPL-MCNC: 0.37 MG/DL (ref 0.6–1.3)
GFR SERPL CREATININE-BSD FRML MDRD: 145 ML/MIN/1.73SQ M
GLUCOSE SERPL-MCNC: 84 MG/DL (ref 65–140)
POTASSIUM SERPL-SCNC: 3.6 MMOL/L (ref 3.5–5.3)
SODIUM SERPL-SCNC: 139 MMOL/L (ref 136–145)

## 2021-01-23 PROCEDURE — 80048 BASIC METABOLIC PNL TOTAL CA: CPT | Performed by: NURSE PRACTITIONER

## 2021-01-23 PROCEDURE — C9113 INJ PANTOPRAZOLE SODIUM, VIA: HCPCS | Performed by: PHYSICIAN ASSISTANT

## 2021-01-23 PROCEDURE — 99239 HOSP IP/OBS DSCHRG MGMT >30: CPT | Performed by: NURSE PRACTITIONER

## 2021-01-23 RX ORDER — MUSCLE RUB CREAM 100; 150 MG/G; MG/G
CREAM TOPICAL 4 TIMES DAILY PRN
Qty: 1 TUBE | Refills: 0 | Status: SHIPPED | OUTPATIENT
Start: 2021-01-23 | End: 2022-05-28

## 2021-01-23 RX ORDER — SCOLOPAMINE TRANSDERMAL SYSTEM 1 MG/1
1 PATCH, EXTENDED RELEASE TRANSDERMAL
Qty: 5 PATCH | Refills: 0 | Status: SHIPPED | OUTPATIENT
Start: 2021-01-23 | End: 2022-05-27

## 2021-01-23 RX ORDER — LIDOCAINE 50 MG/G
1 PATCH TOPICAL
Qty: 7 PATCH | Refills: 0 | Status: ON HOLD | OUTPATIENT
Start: 2021-01-23 | End: 2022-05-27 | Stop reason: SDUPTHER

## 2021-01-23 RX ORDER — DIPHENHYDRAMINE HCL 25 MG
25 TABLET ORAL EVERY 6 HOURS PRN
Qty: 30 TABLET | Refills: 0 | Status: SHIPPED | OUTPATIENT
Start: 2021-01-23 | End: 2022-05-27

## 2021-01-23 RX ORDER — CYPROHEPTADINE HYDROCHLORIDE 2 MG/5ML
4 SOLUTION ORAL 3 TIMES DAILY PRN
Qty: 473 ML | Refills: 1 | Status: SHIPPED | OUTPATIENT
Start: 2021-01-23 | End: 2022-05-28

## 2021-01-23 RX ORDER — ONDANSETRON 4 MG/1
4 TABLET, ORALLY DISINTEGRATING ORAL EVERY 6 HOURS PRN
Qty: 30 TABLET | Refills: 1 | Status: SHIPPED | OUTPATIENT
Start: 2021-01-23 | End: 2022-02-16 | Stop reason: SDUPTHER

## 2021-01-23 RX ORDER — PANTOPRAZOLE SODIUM 40 MG/1
40 TABLET, DELAYED RELEASE ORAL 2 TIMES DAILY
Qty: 60 TABLET | Refills: 1 | Status: SHIPPED | OUTPATIENT
Start: 2021-01-23 | End: 2022-05-28

## 2021-01-23 RX ORDER — BISACODYL 10 MG
10 SUPPOSITORY, RECTAL RECTAL DAILY
Qty: 16 SUPPOSITORY | Refills: 0 | Status: SHIPPED | OUTPATIENT
Start: 2021-01-24 | End: 2022-05-30

## 2021-01-23 RX ORDER — METHOCARBAMOL 500 MG/1
500 TABLET, FILM COATED ORAL EVERY 6 HOURS PRN
Qty: 30 TABLET | Refills: 0 | Status: SHIPPED | OUTPATIENT
Start: 2021-01-23 | End: 2022-05-27

## 2021-01-23 RX ORDER — DOCUSATE SODIUM 100 MG/1
100 CAPSULE, LIQUID FILLED ORAL 2 TIMES DAILY
Qty: 30 CAPSULE | Refills: 0 | Status: SHIPPED | OUTPATIENT
Start: 2021-01-23 | End: 2022-05-28

## 2021-01-23 RX ORDER — METOCLOPRAMIDE 5 MG/1
10 TABLET ORAL EVERY 6 HOURS PRN
Qty: 30 TABLET | Refills: 0 | Status: ON HOLD | OUTPATIENT
Start: 2021-01-23 | End: 2022-05-27 | Stop reason: SDUPTHER

## 2021-01-23 RX ADMIN — CYPROHEPTADINE HYDROCHLORIDE 4 MG: 2 SYRUP ORAL at 16:21

## 2021-01-23 RX ADMIN — METOCLOPRAMIDE 10 MG: 5 INJECTION, SOLUTION INTRAMUSCULAR; INTRAVENOUS at 11:47

## 2021-01-23 RX ADMIN — CYPROHEPTADINE HYDROCHLORIDE 4 MG: 2 SYRUP ORAL at 09:23

## 2021-01-23 RX ADMIN — METOCLOPRAMIDE 10 MG: 5 INJECTION, SOLUTION INTRAMUSCULAR; INTRAVENOUS at 00:22

## 2021-01-23 RX ADMIN — ONDANSETRON 4 MG: 2 INJECTION INTRAMUSCULAR; INTRAVENOUS at 11:47

## 2021-01-23 RX ADMIN — PANTOPRAZOLE SODIUM 40 MG: 40 INJECTION, POWDER, FOR SOLUTION INTRAVENOUS at 09:23

## 2021-01-23 RX ADMIN — SCOPALAMINE 1 PATCH: 1 PATCH, EXTENDED RELEASE TRANSDERMAL at 16:17

## 2021-01-23 RX ADMIN — ONDANSETRON 4 MG: 2 INJECTION INTRAMUSCULAR; INTRAVENOUS at 05:39

## 2021-01-23 RX ADMIN — ONDANSETRON 4 MG: 2 INJECTION INTRAMUSCULAR; INTRAVENOUS at 00:22

## 2021-01-23 RX ADMIN — METOCLOPRAMIDE 10 MG: 5 INJECTION, SOLUTION INTRAMUSCULAR; INTRAVENOUS at 05:39

## 2021-01-23 NOTE — ASSESSMENT & PLAN NOTE
· Acute on chronic back pain  · Patient reports medical marijuana use for this    · Appears to have mostly muscle spasms which is exacerbated by vomiting and back pain is exacerbating the vomiting  · APS consulted to see if they have anything to offer for pain control as current regimen is not working and we want to avoid narcotics  · Dilaudid given post egd for back pain; monitor and adjust meds  · Aqua k pad

## 2021-01-23 NOTE — ASSESSMENT & PLAN NOTE
Malnutrition Findings:   Adult Malnutrition type: Chronic illness(r/t nausea/vomiting, as evidenced by intake meeting <75% estimated needs > 1 month, and 9 5% wt loss x 4 months (9/22/20: 146#, current wt: 132#)  Treatment: pending PO diet advancement)  Adult Degree of Malnutrition: Other severe protein calorie malnutrition    BMI Findings: Body mass index is 22 1 kg/m²       Added Boost Breeze   Sp keofed tube with tube feeds in process

## 2021-01-23 NOTE — DISCHARGE INSTRUCTIONS
Please try to refrain from Cannabis use at this time so that we can control the nausea and vomiting  Stay well hydrated  Cyclic Vomiting Syndrome   WHAT YOU NEED TO KNOW:   Cyclic vomiting syndrome is a condition that causes you to vomit many times in a row for no known reason  It is important to prevent dehydration and other serious complications from repeated vomiting  Work with your healthcare provider to manage or prevent episodes  DISCHARGE INSTRUCTIONS:   Seek care immediately if:   · You see blood in your vomit or your bowel movements  · You have sudden, severe pain in your chest and upper abdomen after hard vomiting or retching  · You have swelling in your neck and chest      · You are dizzy, cold, and thirsty, and your eyes and mouth are dry  · You are urinating very little or not at all  · You have muscle weakness, leg cramps, and trouble breathing  · Your heart is beating much faster than normal     · You continue to vomit for more than 48 hours  Contact your healthcare provider if:   · You have frequent dry heaves (vomiting but nothing comes out)  · You have questions or concerns about your condition or care  Medicines: You may need any of the following:  · Migraine medicine  may be used to prevent or stop migraine headaches  This may be given if you have migraines or are at risk because of a family history of migraines  You may need to take this medicine to prevent migraines or to stop a migraine that has started  · Antinausea medicine  may be needed to control your nausea  · Medicine  may be given to control the amount of acid your stomach makes  · Take your medicine as directed  Contact your healthcare provider if you think your medicine is not helping or if you have side effects  Tell him or her if you are allergic to any medicine  Keep a list of the medicines, vitamins, and herbs you take  Include the amounts, and when and why you take them   Bring the list or the pill bottles to follow-up visits  Carry your medicine list with you in case of an emergency  Prevent or manage episodes:   · Avoid triggers  Certain foods can trigger episodes, such as chocolate, cheese, and monosodium glutamate (MSG)  Caffeine can also trigger an episode  Your healthcare provider or dietitian can help you identify foods that trigger an episode  This will help you create meal plans to avoid those triggers  Other triggers include too much exercise, motion sickness, or being in hot weather too long  · Drink more liquids as directed  Vomiting can lead to dehydration  It is important to drink more liquids to help replace lost body fluids  Ask your healthcare provider how much liquid to drink each day and which liquids are best for you  Your provider may recommend that you drink an oral rehydration solution (ORS)  ORS contains water, salts, and sugar that are needed to replace the lost body fluids  Ask what kind of ORS to use, how much to drink, and where to get it  · Eat smaller meals, more often  Eat small amounts of food every 2 to 3 hours, even if you are not hungry  Food in your stomach may decrease your nausea  · Control stress  Stress or anxiety can trigger an episode  Find ways to relax and manage your stress  Get more rest and sleep  · Do not drink alcohol  Alcohol may upset or irritate your stomach  Too much alcohol can also cause nausea and vomiting  · Do not use marijuana (cannabis)  Repeated use of marijuana over a long period of time (chronic use) can cause episodes  This is called cannabis hyperemesis syndrome  If you have an episode caused by marijuana use, a hot shower may relieve your symptoms  Ask your healthcare provider for information if want to quit using marijuana and need help quitting  Follow up with your healthcare provider as directed:  Write down your questions so you remember to ask them during your visits     © [address] Information is for End User's use only and may not be sold, redistributed or otherwise used for commercial purposes  All illustrations and images included in CareNotes® are the copyrighted property of [employer] D [employer] M [employer]  or [employer]  The above information is an  only  It is not intended as medical advice for individual conditions or treatments  Talk to your doctor, nurse or pharmacist before following any medical regimen to see if it is safe and effective for you  Pregnancy at 15 to 18 Weeks   AMBULATORY CARE:   What changes are happening to your body:  Now that you are in your second trimester, you have more energy  You may also feel hungrier than usual  You may start to experience other symptoms, such as heartburn or dizziness  You may be gaining about ½ to 1 pound a week, and your pregnancy is beginning to show  You may need to start wearing maternity clothes  Seek care immediately if:   · You have pain or cramping in your abdomen or low back  · You have heavy vaginal bleeding or clotting  · You pass material that looks like tissue or large clots  Collect the material and bring it with you  Contact your healthcare provider if:   · You cannot keep food or drinks down, and you are losing weight  · You have light bleeding  · You have chills or a fever  · You have vaginal itching, burning, or pain  · You have yellow, green, white, or foul-smelling vaginal discharge  · You have pain or burning when you urinate, less urine than usual, or pink or bloody urine  · You have questions or concerns about your condition or care  How to care for yourself at this stage of your pregnancy:   · Manage heartburn  by eating 4 or 5 small meals each day instead of large meals  Avoid spicy foods  Avoid eating right before bedtime  · Manage nausea and vomiting  Avoid fatty and spicy foods  Eat small meals throughout the day instead of large meals   Barbie may help to decrease nausea  Ask your healthcare provider about other ways of decreasing nausea and vomiting  · Eat a variety of healthy foods  Healthy foods include fruits, vegetables, whole-grain breads, low-fat dairy foods, beans, lean meats, and fish  Drink liquids as directed  Ask how much liquid to drink each day and which liquids are best for you  Limit caffeine to less than 200 milligrams each day  Limit your intake of fish to 2 servings each week  Choose fish low in mercury such as canned light tuna, shrimp, salmon, cod, or tilapia  Do not  eat fish high in mercury such as swordfish, tilefish, annie mackerel, and shark  · Take prenatal vitamins as directed  Your need for certain vitamins and minerals, such as folic acid, increases during pregnancy  Prenatal vitamins provide some of the extra vitamins and minerals you need  Prenatal vitamins may also help to decrease the risk of certain birth defects  · Do not smoke  Smoking increases your risk of a miscarriage and other health problems during your pregnancy  Smoking can cause your baby to be born too early or weigh less at birth  Ask your healthcare provider for information if you need help quitting  · Do not drink alcohol  Alcohol passes from your body to your baby through the placenta  It can affect your baby's brain development and cause fetal alcohol syndrome (FAS)  FAS is a group of conditions that causes mental, behavior, and growth problems  · Talk to your healthcare provider before you take any medicines  Many medicines may harm your baby if you take them when you are pregnant  Do not take any medicines, vitamins, herbs, or supplements without first talking to your healthcare provider  Never use illegal or street drugs (such as marijuana or cocaine) while you are pregnant  Safety tips during pregnancy:   · Avoid hot tubs and saunas  Do not use a hot tub or sauna while you are pregnant, especially during your first trimester   Hot tubs and saunas may raise your baby's temperature and increase the risk of birth defects  · Avoid toxoplasmosis  This is an infection caused by eating raw meat or being around infected cat feces  It can cause birth defects, miscarriages, and other problems  Wash your hands after you touch raw meat  Make sure any meat is well-cooked before you eat it  Avoid raw eggs and unpasteurized milk  Use gloves or ask someone else to clean your cat's litter box while you are pregnant  Changes that are happening with your baby:  By 18 weeks, your baby may be about 6 inches long from the top of the head to the rump (baby's bottom)  Your baby may weigh about 11 ounces  You may be able to feel your baby's movement at about 18 weeks or later  The first movements may not be that noticeable  They may feel like a fluttering sensation  Your baby also makes sucking movements and can hear certain sounds  What you need to know about prenatal care:  During the first 28 weeks of your pregnancy, you will see your healthcare provider once a month  Your healthcare provider will check your blood pressure and weight  You may also need any of the following:  · A urine test  may also be done to check for sugar and protein  These can be signs of gestational diabetes or infection  · A blood test  may be done to check for anemia (low iron level)  · Fundal height check  is a measurement of your uterus to check your baby's growth  This number is usually the same as the number of weeks that you have been pregnant  · An ultrasound  may be done to check your baby's development  Your healthcare provider may be able to tell you what your baby's gender is during the ultrasound  · Your baby's heart rate  will be checked  © Copyright [address] Information is for End User's use only and may not be sold, redistributed or otherwise used for commercial purposes   All illustrations and images included in CareNotes® are the copyrighted property of A D A M , Inc  or [facility] Mira Baker   The above information is an  only  It is not intended as medical advice for individual conditions or treatments  Talk to your doctor, nurse or pharmacist before following any medical regimen to see if it is safe and effective for you

## 2021-01-23 NOTE — DISCHARGE SUMMARY
Discharge Summary - [address] [facility]    Patient Information: Wan Teresa 35 y o  female MRN: [id number]  Unit/Bed#: -01 Encounter: [id number]    Discharging Physician / Practitioner: Chica Lawson  PCP: Israel Ratliff DO  Admission Date: 1/17/2021  Discharge Date: 01/23/21    Disposition:     Home    Reason for Admission:  Nausea and vomiting  Discharge Diagnoses:     Principal Problem (Resolved):    Nausea and vomiting during pregnancy  Active Problems:    Acute bilateral low back pain without sciatica    Pregnancy    History of duodenal ulcer    Severe protein-calorie malnutrition ([facility] [address] )    Depression with suicidal ideation  Resolved Problems:    Hypokalemia due to excessive gastrointestinal loss of potassium    Marijuana use      Consultations During Hospital Stay:  · Dr Jessica Prince  · Dr Keri Barboza gynecology  · Acute pain service   · Dr Panchito Smith psychiatry    Procedures Performed:     · Discharge date 01/23/2021 potassium 3 6 at discharge BUN and creatinine stable  · Urine drug screen on admission positive for THC  · And urine culture shows no growth obtained 01/17/2021 less than 1000 CFUs per mL    Significant Findings / Test Results:     · See above    Incidental Findings:   · None     Test Results Pending at Discharge (will require follow up): · None     Outpatient Tests Requested:  · Please call to schedule follow-up with PCP within the next week  · Please call to schedule follow-up with Dr Victorina Bolanos  · And please call to schedule follow-up with your OBGYN/Anna Salvatore Sever    Complications:  None    Hospital Course:     Wan Teresa is a 35 y o  female patient who originally presented to the hospital on 1/17/2021 due to intractable vomiting  Patient has history of nausea vomiting, now pregnant, history of duodenal ulcer and chronic back pain    Patient presents to the emergency room reporting symptoms that have been worse since night prior she reported throwing up every 30 minutes since night prior  She reports using Reglan without relief  She reports that she was not using Zofran because she ran out  She reports that she does not use Phenergan anymore because she is using Reglan she also reports that she uses pin to praise all b i d  She is also utilizing heating pad on abdomen and back  She reports she has been vomiting which may have chronic back pain worse  She describes the vomit is bilious and nonbloody  She reports diffuse abdominal pain described as sharp continuous and squeezing her stomach  And rated her pain at 9/10  Patient reports that the last thing she had to drink was some water the morning prior which she vomited up  She reports that occasionally she uses marijuana and she describes as 1 to few times every day  She reports that she is certified for medical marijuana and uses this for back pain which also improves her nausea  She reports that she lost saw her Ob week ago and that they had not really done much for her except change her medications around she has had multiple ED visits as well as hospitalizations for similar complaints and recently left [facility]  However patient was seen by our GI team upon arrival she was started on IV pantoprazole Carafate Zofran Unasyn Phenergan as well as recommending minimal use of narcotics  Patient can utilize Tylenol as needed and recommendations to stop marijuana will highly recommended  Patient has previously undergone an EGD on 10/12/2020 which showed healing duodenal ulcer  Upon further discussion with GI team and Psychiatry it was determined that they would place the care feed tube with tube feedings  Keofed tube was indeed inserted patient tolerated procedure for approximately 4 days  Patient continues to receive tube feeds up until morning of 01/23/2021 and plan originally per the GI team was to discharge patient home with [facility]    However , however upon seeing patient day prior she was doing extremely well in eating and tolerating food in addition to her tube feedings  On day of discharge today patient again is doing extremely well  Extensive discussion had with patient and she is absolutely adamant that she will be fine  She reports that she would prefer to go home without keofed tube  So patient's tube feed was turned off early hours of morning approximately around 8:39 a m  and patient went all day without IV fluids and no tube feedings  She was able to tolerate her diet with no nausea or vomiting at all  Patient was discharged on oral anti praise all 40 mg q 12 hours, continued Carafate, Zofran and Reglan to alternate every 3 hours  Recommendations were to hold off on anoscopy given to that she was pregnant with plan to perform ultrasound  Patient will be seen by visiting nurses  Patient is medically stable to return home she will not be discharged with tube feeding  Condition at Discharge: good     Discharge Day Visit / Exam:     Subjective:  Extensive discussion had with patient today as she is gone more than 24 hours feeling well tolerating diet and on tube feeds  She reports to me that she feels that she may be able to tolerate going home without keofed tube and tube feeds  Discussion had with GI and if patient requesting to be discharged without and feel that she is tolerating diet they are agreeable to plan  Per my discussion with patient she is agreeable to having fluids and tube feeds turned off for the rest of the day with plan to discharge later on and right prior to discharge we can remove care fed 2  Patient does have some lower back pain but not too terrible at this time tolerable  No nausea vomiting     No dizziness or lightheadedness  Vitals: Blood Pressure: 104/59 (01/23/21 1300)  Pulse: 100 (01/23/21 1300)  Temperature: 98 8 °F (37 1 °C) (01/23/21 1300)  Temp Source: Oral (01/23/21 1300)  Respirations: 19 (01/23/21 1300)  Height: 5' 5" (165 1 cm) (01/17/21 2043)  Weight - Scale: 60 2 kg (132 lb 12 8 oz) (01/17/21 2043)  SpO2: 99 % (01/23/21 1300)  Exam:   Physical Exam  HENT:      Head: Normocephalic and atraumatic  Nose: No congestion  Eyes:      General:         Right eye: No discharge  Left eye: No discharge  Conjunctiva/sclera: Conjunctivae normal    Cardiovascular:      Rate and Rhythm: Normal rate  Pulses: Normal pulses  Heart sounds: No murmur  No friction rub  No gallop  Pulmonary:      Effort: No respiratory distress  Breath sounds: No stridor  No wheezing or rhonchi  Chest:      Chest wall: No tenderness  Abdominal:      General: There is no distension  Palpations: There is no mass  Tenderness: There is no abdominal tenderness  There is no right CVA tenderness, left CVA tenderness, guarding or rebound  Hernia: No hernia is present  Musculoskeletal:         General: No swelling, tenderness, deformity or signs of injury  Right lower leg: No edema  Left lower leg: No edema  Skin:     Coloration: Skin is not jaundiced or pale  Findings: No bruising, erythema, lesion or rash  Neurological:      Mental Status: She is alert and oriented to person, place, and time  Psychiatric:         Behavior: Behavior normal          Discussion with Family: not needed per pt    Discharge instructions/Information to patient and family:   See after visit summary for information provided to patient and family  Provisions for Follow-Up Care:  See after visit summary for information related to follow-up care and any pertinent home health orders  Planned Readmission:  High risk for readmission but no plan     Discharge Statement:  I spent 65 minutes discharging the patient  This time was spent on the day of discharge  I had direct contact with the patient on the day of discharge   Greater than 50% of the total time was spent examining patient, answering all patient questions, arranging and discussing plan of care with patient as well as directly providing post-discharge instructions  Additional time then spent on discharge activities  Discharge Medications:  See after visit summary for reconciled discharge medications provided to patient and family        ** Please Note: This note has been constructed using a voice recognition system **

## 2021-01-23 NOTE — NURSING NOTE
Pt discharged home as independent  Med list and instructions reviewed and given to the  Patient  No question or concerns at this time  No nausea, vomiting or pain

## 2021-01-23 NOTE — PROGRESS NOTES
Progress Note - Nury Hanson 1990, 35 y o  female MRN: [id number]    Unit/Bed#: -01 Encounter: [id number]    Primary Care Provider: Shraddha Cochran DO   Date and time admitted to hospital: 1/17/2021  1:29 PM        * Nausea and vomiting during pregnancy  Assessment & Plan  · Multiple ED visits and hospitalizations both here and at [facility] for the same  Patient has vomiting as well as back pain  Modalities below attempted with little relief  · Possible etiologies include hyperemesis gravidarum, gastroparesis, cyclical vomiting syndrome, and cannabinoid hyperemesis syndrome   · appreciate GI and OB/GYN consults - both teams with differing opinions  [place] text sent to Dr Ana Guzman and Dr Candy Long for them to discuss case attending to attending  Also asked them to address nutrition as patient has not eaten in several days  · Started on IV steroids for 3 days, completed   · Continue phenergan (although not much relief from this ) , reglan OTC, Tigam IM prn - added scopolamine patch   Asked to have  bring in capsaicin cream to apply to periumbilical area - per GI has anecdotal benefit in cannabis hyperemesis syndrome  · Unisom, vitamins on hold as likely potentiating symptoms   · GI hesitant for EGD given pregnancy and not sure what benefit it would offer as they are thinking that this is more cannibis hyperemesis syndrome   · Gastric emptying study as outpatient     1/21 - both GYN, GI, and primary team agree patient is likely experiencing cannabis hyperemesis syndrome (a sub-set of Cyclical Vomiting Syndrome)  THC cessation is critical; continue supportive care  EGD 1/21 as per GI  NG tube for nutritional support  Hold steroids; continue anti-emetics  · "EGD: Mild edematous mucosa in the lower third of the esophagus  · The stomach appeared normal  On direct and retroflex views  · Performed biopsies in the incisura and antrum  1 incisura, 1 antrum biopsies taken    · The duodenal bulb, 1st part of the duodenum and 2nd part of the duodenum appeared normal  The first and second part of the duodenum were visualized  · Performed multiple biopsies in the 2nd part of the duodenum  · Nasogastric tube placed"     Depression with suicidal ideation  Assessment & Plan  Assessed by behavioral health and 1:1 stopped  · Pt in good spirits today talkative   · Feeling better able to tolerate diet   · Felt baby move       Severe protein-calorie malnutrition ([address] )  Assessment & Plan  Malnutrition Findings:   Adult Malnutrition type: Chronic illness(r/t nausea/vomiting, as evidenced by intake meeting <75% estimated needs > 1 month, and 9 5% wt loss x 4 months (9/22/20: 146#, current wt: 132#)  Treatment: pending PO diet advancement)  Adult Degree of Malnutrition: Other severe protein calorie malnutrition    BMI Findings: Body mass index is 22 1 kg/m²  Added Boost Breeze   Sp keofed tube with tube feeds in process     History of duodenal ulcer  Assessment & Plan  · Known history  · Continue PPI also at discharge   · Repeat EGD 1/21- largely negative       Pregnancy  Assessment & Plan  · OB/GYN consult appreciated   · Stable  · Patient did inquire about possible pregnancy termination given her symptoms     · However now seems to be in better spirits and reports feeling the baby today helped her feel better     Marijuana use  Assessment & Plan  · Patient with continued daily to multiple time per day use  · Cessation strongly encouraged    Acute bilateral low back pain without sciatica  Assessment & Plan  · Acute on chronic back pain  · Patient reports medical marijuana use for this    · Appears to have mostly muscle spasms which is exacerbated by vomiting and back pain is exacerbating the vomiting  · APS consulted to see if they have anything to offer for pain control as current regimen is not working and we want to avoid narcotics  · Dilaudid given post egd for back pain; monitor and adjust meds  · Aqua k pad Hypokalemia due to excessive gastrointestinal loss of potassium  Assessment & Plan  · Replaced   · KCL in IVF, added po liquid potassium today   · Repeat labs in am   · Should improve with improved po intake       VTE Pharmacologic Prophylaxis:   Pharmacologic: Enoxaparin (Lovenox)  Mechanical VTE Prophylaxis in Place: Yes    Patient Centered Rounds: I have performed bedside rounds with nursing staff today  Discussions with Specialists or Other Care Team Provider: nursing     Education and Discussions with Family / Patient: patient     Time Spent for Care: 45 minutes  More than 50% of total time spent on counseling and coordination of care as described above  Current Length of Stay: 4 day(s)    Current Patient Status: Inpatient   Certification Statement: The patient will continue to require additional inpatient hospital stay due to ongoing keofed tube treatment iv fluids     Discharge Plan: discharge home likely next 48 hrs     Code Status: Level 1 - Full Code      Subjective:   Pt is feeling really well right now able to tolerate diet today  As well as keofed tube feeding   Still with lower lumbar back pain  Pt is able to tolerate fluid    Objective:     Vitals:   Temp (24hrs), Av 7 °F (37 1 °C), Min:98 4 °F (36 9 °C), Max:98 8 °F (37 1 °C)    Temp:  [98 4 °F (36 9 °C)-98 8 °F (37 1 °C)] 98 8 °F (37 1 °C)  HR:  [75-92] 90  Resp:  [16-18] 16  BP: (108-130)/(56-69) 108/56  SpO2:  [97 %-100 %] 97 %  Body mass index is 22 1 kg/m²  Input and Output Summary (last 24 hours): Intake/Output Summary (Last 24 hours) at 2021 193  Last data filed at 2021 1640  Gross per 24 hour   Intake 554 ml   Output --   Net 554 ml       Physical Exam:     Physical Exam  Constitutional:       General: She is not in acute distress  Appearance: She is not ill-appearing, toxic-appearing or diaphoretic  HENT:      Head: Normocephalic  Mouth/Throat:      Pharynx: Oropharynx is clear     Eyes: Conjunctiva/sclera: Conjunctivae normal    Cardiovascular:      Rate and Rhythm: Normal rate  Heart sounds: No murmur  No friction rub  No gallop  Pulmonary:      Effort: No respiratory distress  Breath sounds: No stridor  No wheezing, rhonchi or rales  Chest:      Chest wall: No tenderness  Abdominal:      General: Abdomen is flat  There is no distension  Palpations: Abdomen is soft  There is no mass  Tenderness: There is no abdominal tenderness  There is no right CVA tenderness, left CVA tenderness, guarding or rebound  Hernia: No hernia is present  Musculoskeletal:         General: No swelling, tenderness, deformity or signs of injury  Right lower leg: No edema  Left lower leg: No edema  Skin:     Coloration: Skin is not jaundiced or pale  Findings: No bruising, erythema, lesion or rash  Neurological:      Mental Status: She is alert and oriented to person, place, and time  Psychiatric:         Behavior: Behavior normal            Additional Data:     Labs:    Results from last 7 days   Lab Units 01/17/21  1345   WBC Thousand/uL 15 82*   HEMOGLOBIN g/dL 12 4   HEMATOCRIT % 35 9   PLATELETS Thousands/uL 209   NEUTROS PCT % 88*   LYMPHS PCT % 10*   MONOS PCT % 2*   EOS PCT % 0     Results from last 7 days   Lab Units 01/22/21  0746  01/17/21  1345   SODIUM mmol/L 141   < > 138   POTASSIUM mmol/L 3 0*   < > 3 5   CHLORIDE mmol/L 111*   < > 109*   CO2 mmol/L 24   < > 24   BUN mg/dL 7   < > 6   CREATININE mg/dL 0 42*   < > 0 56*   ANION GAP mmol/L 6   < > 5   CALCIUM mg/dL 8 8   < > 8 9   ALBUMIN g/dL  --   --  3 6   TOTAL BILIRUBIN mg/dL  --   --  0 36   ALK PHOS U/L  --   --  60   ALT U/L  --   --  14   AST U/L  --   --  12   GLUCOSE RANDOM mg/dL 94   < > 121    < > = values in this interval not displayed  Results from last 7 days   Lab Units 01/17/21  1345   HEMOGLOBIN A1C % 4 7               * I Have Reviewed All Lab Data Listed Above    * Additional Pertinent Lab Tests Reviewed: All Labs Within Last 24 Hours Reviewed    Imaging:    Imaging Reports Reviewed Today Include: reviewed     Recent Cultures (last 7 days):     Results from last 7 days   Lab Units 01/17/21  1550   URINE CULTURE  No Growth <1000 cfu/mL       Last 24 Hours Medication List:   Current Facility-Administered Medications   Medication Dose Route Frequency Provider Last Rate    acetaminophen  650 mg Rectal Q6H PRN Bee Rodríguez MD      acetaminophen  650 mg Oral Q6H PRN Kenyon Poon PA-C      bisacodyl  10 mg Rectal Daily Bee Rodríguez MD      cyproheptadine hcl  4 mg Oral TID Bee Rodríguez MD      dextrose 5 % and sodium chloride 0 9 % with KCl 20 mEq/L  75 mL/hr Intravenous Continuous Fernando Luna PA-C 75 mL/hr (01/22/21 9735)    Diclofenac Sodium  2 g Topical 4x Daily Margaret Arora PA-C      diphenhydrAMINE  25 mg Oral Q6H PRN Bee Rodríguez MD      enoxaparin  40 mg Subcutaneous Daily Kenyon Poon PA-C      lidocaine  1 patch Topical HS Jono RenderELMIRA      LORazepam  1 mg Intravenous HS Bee Rodríguez MD      menthol-methyl salicylate   Apply externally 4x Daily PRN Debbie Oconnell MD      methocarbamol  500 mg Oral Q6H PRN Debbie Oconnell MD      metoclopramide  10 mg Intravenous Q6H [address] Bee Rodríguez MD      metoclopramide  5 mg Intravenous Once Clara Graves MD      ondansetron  4 mg Intravenous Q6H [address] Bee Rodríguez MD      pantoprazole  40 mg Intravenous Q12H [address] Alireza Man PA-C      promethazine  25 mg Intravenous Q6H PRN Bee Rodríguez MD      promethazine  12 5 mg Rectal Q6H PRN Kenyon Poon PA-C      scopolamine  1 patch Transdermal Q72H Fernando Luna PA-C      trimethobenzamide  200 mg Intramuscular Q6H PRN Hidanette Jang DO          Today, Patient Was Seen By: TIFFANY [place]    ** Please Note: Dictation voice to text software may have been used in the creation of this document   **

## 2021-01-23 NOTE — ASSESSMENT & PLAN NOTE
· Replaced   · KCL in IVF, added po liquid potassium today   · Repeat labs in am   · Should improve with improved po intake

## 2021-01-23 NOTE — ASSESSMENT & PLAN NOTE
· OB/GYN consult appreciated   · Stable  · Patient did inquire about possible pregnancy termination given her symptoms     · However now seems to be in better spirits and reports feeling the baby today helped her feel better

## 2021-01-23 NOTE — ASSESSMENT & PLAN NOTE
Assessed by behavioral health and 1:1 stopped  · Pt in good spirits today talkative   · Feeling better able to tolerate diet   · Felt baby move

## 2021-01-23 NOTE — ASSESSMENT & PLAN NOTE
· Multiple ED visits and hospitalizations both here and at [facility] for the same  Patient has vomiting as well as back pain  Modalities below attempted with little relief  · Possible etiologies include hyperemesis gravidarum, gastroparesis, cyclical vomiting syndrome, and cannabinoid hyperemesis syndrome   · appreciate GI and OB/GYN consults - both teams with differing opinions  [place] text sent to Dr Marin Angelucci and Dr Joanna Duenas for them to discuss case attending to attending  Also asked them to address nutrition as patient has not eaten in several days  · Started on IV steroids for 3 days, completed   · Continue phenergan (although not much relief from this ) , reglan OTC, Tigam IM prn - added scopolamine patch   Asked to have  bring in capsaicin cream to apply to periumbilical area - per GI has anecdotal benefit in cannabis hyperemesis syndrome  · Unisom, vitamins on hold as likely potentiating symptoms   · GI hesitant for EGD given pregnancy and not sure what benefit it would offer as they are thinking that this is more cannibis hyperemesis syndrome   · Gastric emptying study as outpatient     1/21 - both GYN, GI, and primary team agree patient is likely experiencing cannabis hyperemesis syndrome (a sub-set of Cyclical Vomiting Syndrome)  THC cessation is critical; continue supportive care  EGD 1/21 as per GI  NG tube for nutritional support  Hold steroids; continue anti-emetics  · "EGD: Mild edematous mucosa in the lower third of the esophagus  · The stomach appeared normal  On direct and retroflex views  · Performed biopsies in the incisura and antrum  1 incisura, 1 antrum biopsies taken  · The duodenal bulb, 1st part of the duodenum and 2nd part of the duodenum appeared normal  The first and second part of the duodenum were visualized    · Performed multiple biopsies in the 2nd part of the duodenum  · Nasogastric tube placed"

## 2021-01-27 ENCOUNTER — TELEPHONE (OUTPATIENT)
Dept: GASTROENTEROLOGY | Facility: CLINIC | Age: 31
End: 2021-01-27

## 2021-01-27 NOTE — TELEPHONE ENCOUNTER
----- Message from Georgi Longo MD sent at 1/27/2021 12:07 PM EST -----  Negative duodenal and gastric biopsies  Please let patient know  Thank you

## 2021-06-18 NOTE — ED ATTENDING ATTESTATION
7/19/2020  I, Laurie Sun MD, saw and evaluated the patient  I have discussed the patient with the resident/non-physician practitioner and agree with the resident's/non-physician practitioner's findings, Plan of Care, and MDM as documented in the resident's/non-physician practitioner's note, except where noted  All available labs and Radiology studies were reviewed  I was present for key portions of any procedure(s) performed by the resident/non-physician practitioner and I was immediately available to provide assistance  At this point I agree with the current assessment done in the Emergency Department  I have conducted an independent evaluation of this patient a history and physical is as follows:    ED Course         Critical Care Time  Procedures     29 yo female with recurrent episodes of epigastric pain and has been seen multiple times in ed at [facility], had egd showing ulcers and gastritis  Pt following with gi and on ppi, carafate, zofran but unable to tolerate po and po meds last one day  Pt with no fever, no diarrhea, no cp, no sob  Vss, afebrile, mild distress, lungs cta, rrr, abdomen soft tender epigastric area, labs, urine preg, zofran, pain meds, ivf, symptom control 
No

## 2022-02-16 ENCOUNTER — APPOINTMENT (EMERGENCY)
Dept: RADIOLOGY | Facility: HOSPITAL | Age: 32
End: 2022-02-16
Payer: COMMERCIAL

## 2022-02-16 ENCOUNTER — HOSPITAL ENCOUNTER (EMERGENCY)
Facility: HOSPITAL | Age: 32
Discharge: HOME | End: 2022-02-16
Attending: EMERGENCY MEDICINE | Admitting: EMERGENCY MEDICINE
Payer: COMMERCIAL

## 2022-02-16 VITALS
BODY MASS INDEX: 29.12 KG/M2 | DIASTOLIC BLOOD PRESSURE: 77 MMHG | HEART RATE: 74 BPM | SYSTOLIC BLOOD PRESSURE: 137 MMHG | WEIGHT: 175 LBS | OXYGEN SATURATION: 98 % | RESPIRATION RATE: 18 BRPM | TEMPERATURE: 99.3 F

## 2022-02-16 DIAGNOSIS — R11.2 NON-INTRACTABLE VOMITING WITH NAUSEA, UNSPECIFIED VOMITING TYPE: Primary | ICD-10-CM

## 2022-02-16 DIAGNOSIS — R11.2 INTRACTABLE NAUSEA AND VOMITING: ICD-10-CM

## 2022-02-16 DIAGNOSIS — R10.13 EPIGASTRIC PAIN: ICD-10-CM

## 2022-02-16 LAB
ALBUMIN SERPL BCP-MCNC: 4.8 G/DL (ref 3.5–5)
ALP SERPL-CCNC: 92 U/L (ref 46–116)
ALT SERPL W P-5'-P-CCNC: 28 U/L (ref 12–78)
ANION GAP SERPL CALCULATED.3IONS-SCNC: 6 MMOL/L (ref 4–13)
AST SERPL W P-5'-P-CCNC: 21 U/L (ref 5–45)
BACTERIA UR QL AUTO: NORMAL /HPF
BASOPHILS # BLD AUTO: 0.05 THOUSANDS/ΜL (ref 0–0.1)
BASOPHILS NFR BLD AUTO: 0 % (ref 0–1)
BILIRUB SERPL-MCNC: 0.78 MG/DL (ref 0.2–1)
BILIRUB UR QL STRIP: NEGATIVE
BUN SERPL-MCNC: 12 MG/DL (ref 5–25)
CALCIUM SERPL-MCNC: 10 MG/DL (ref 8.3–10.1)
CHLORIDE SERPL-SCNC: 100 MMOL/L (ref 100–108)
CLARITY UR: CLEAR
CO2 SERPL-SCNC: 29 MMOL/L (ref 21–32)
COLOR UR: YELLOW
CREAT SERPL-MCNC: 0.86 MG/DL (ref 0.6–1.3)
EOSINOPHIL # BLD AUTO: 0.02 THOUSAND/ΜL (ref 0–0.61)
EOSINOPHIL NFR BLD AUTO: 0 % (ref 0–6)
ERYTHROCYTE [DISTWIDTH] IN BLOOD BY AUTOMATED COUNT: 13.1 % (ref 11.6–15.1)
EXT PREG TEST URINE: NEGATIVE
EXT. CONTROL ED NAV: NORMAL
GFR SERPL CREATININE-BSD FRML MDRD: 90 ML/MIN/1.73SQ M
GLUCOSE SERPL-MCNC: 112 MG/DL (ref 65–140)
GLUCOSE UR STRIP-MCNC: NEGATIVE MG/DL
HCT VFR BLD AUTO: 45.3 % (ref 34.8–46.1)
HGB BLD-MCNC: 15.3 G/DL (ref 11.5–15.4)
HGB UR QL STRIP.AUTO: NORMAL
HYALINE CASTS #/AREA URNS LPF: NORMAL /LPF
IMM GRANULOCYTES # BLD AUTO: 0.08 THOUSAND/UL (ref 0–0.2)
IMM GRANULOCYTES NFR BLD AUTO: 0 % (ref 0–2)
KETONES UR STRIP-MCNC: NEGATIVE MG/DL
LEUKOCYTE ESTERASE UR QL STRIP: NEGATIVE
LIPASE SERPL-CCNC: 63 U/L (ref 73–393)
LYMPHOCYTES # BLD AUTO: 2.71 THOUSANDS/ΜL (ref 0.6–4.47)
LYMPHOCYTES NFR BLD AUTO: 14 % (ref 14–44)
MCH RBC QN AUTO: 29.2 PG (ref 26.8–34.3)
MCHC RBC AUTO-ENTMCNC: 33.8 G/DL (ref 31.4–37.4)
MCV RBC AUTO: 87 FL (ref 82–98)
MONOCYTES # BLD AUTO: 1.24 THOUSAND/ΜL (ref 0.17–1.22)
MONOCYTES NFR BLD AUTO: 7 % (ref 4–12)
NEUTROPHILS # BLD AUTO: 14.93 THOUSANDS/ΜL (ref 1.85–7.62)
NEUTS SEG NFR BLD AUTO: 79 % (ref 43–75)
NITRITE UR QL STRIP: NEGATIVE
NON-SQ EPI CELLS URNS QL MICRO: NORMAL /HPF
NRBC BLD AUTO-RTO: 0 /100 WBCS
PH UR STRIP.AUTO: 7 [PH]
PLATELET # BLD AUTO: 276 THOUSANDS/UL (ref 149–390)
PMV BLD AUTO: 11.8 FL (ref 8.9–12.7)
POTASSIUM SERPL-SCNC: 3.2 MMOL/L (ref 3.5–5.3)
PROT SERPL-MCNC: 9.4 G/DL (ref 6.4–8.2)
PROT UR STRIP-MCNC: NEGATIVE MG/DL
RBC # BLD AUTO: 5.24 MILLION/UL (ref 3.81–5.12)
RBC #/AREA URNS AUTO: NORMAL /HPF
SODIUM SERPL-SCNC: 135 MMOL/L (ref 136–145)
SP GR UR STRIP.AUTO: <=1.005 (ref 1–1.03)
UROBILINOGEN UR QL STRIP.AUTO: 0.2 E.U./DL
WBC # BLD AUTO: 19.03 THOUSAND/UL (ref 4.31–10.16)
WBC #/AREA URNS AUTO: NORMAL /HPF

## 2022-02-16 PROCEDURE — 85025 COMPLETE CBC W/AUTO DIFF WBC: CPT | Performed by: EMERGENCY MEDICINE

## 2022-02-16 PROCEDURE — 96375 TX/PRO/DX INJ NEW DRUG ADDON: CPT

## 2022-02-16 PROCEDURE — G1004 CDSM NDSC: HCPCS

## 2022-02-16 PROCEDURE — 83690 ASSAY OF LIPASE: CPT | Performed by: EMERGENCY MEDICINE

## 2022-02-16 PROCEDURE — 74177 CT ABD & PELVIS W/CONTRAST: CPT

## 2022-02-16 PROCEDURE — 81001 URINALYSIS AUTO W/SCOPE: CPT | Performed by: EMERGENCY MEDICINE

## 2022-02-16 PROCEDURE — 99284 EMERGENCY DEPT VISIT MOD MDM: CPT

## 2022-02-16 PROCEDURE — 81025 URINE PREGNANCY TEST: CPT | Performed by: EMERGENCY MEDICINE

## 2022-02-16 PROCEDURE — 80053 COMPREHEN METABOLIC PANEL: CPT | Performed by: EMERGENCY MEDICINE

## 2022-02-16 PROCEDURE — 36415 COLL VENOUS BLD VENIPUNCTURE: CPT | Performed by: EMERGENCY MEDICINE

## 2022-02-16 PROCEDURE — 99285 EMERGENCY DEPT VISIT HI MDM: CPT | Performed by: EMERGENCY MEDICINE

## 2022-02-16 PROCEDURE — 96361 HYDRATE IV INFUSION ADD-ON: CPT

## 2022-02-16 PROCEDURE — 96374 THER/PROPH/DIAG INJ IV PUSH: CPT

## 2022-02-16 RX ORDER — ONDANSETRON 4 MG/1
4 TABLET, ORALLY DISINTEGRATING ORAL EVERY 6 HOURS PRN
Qty: 30 TABLET | Refills: 0 | Status: SHIPPED | OUTPATIENT
Start: 2022-02-16 | End: 2022-02-22 | Stop reason: CLARIF

## 2022-02-16 RX ORDER — ONDANSETRON 2 MG/ML
4 INJECTION INTRAMUSCULAR; INTRAVENOUS ONCE
Status: COMPLETED | OUTPATIENT
Start: 2022-02-16 | End: 2022-02-16

## 2022-02-16 RX ORDER — MAGNESIUM HYDROXIDE/ALUMINUM HYDROXICE/SIMETHICONE 120; 1200; 1200 MG/30ML; MG/30ML; MG/30ML
30 SUSPENSION ORAL ONCE
Status: COMPLETED | OUTPATIENT
Start: 2022-02-16 | End: 2022-02-16

## 2022-02-16 RX ORDER — FAMOTIDINE 20 MG/1
20 TABLET, FILM COATED ORAL ONCE
Status: COMPLETED | OUTPATIENT
Start: 2022-02-16 | End: 2022-02-16

## 2022-02-16 RX ORDER — POTASSIUM CHLORIDE 20 MEQ/1
40 TABLET, EXTENDED RELEASE ORAL ONCE
Status: COMPLETED | OUTPATIENT
Start: 2022-02-16 | End: 2022-02-16

## 2022-02-16 RX ORDER — LIDOCAINE HYDROCHLORIDE 20 MG/ML
15 SOLUTION OROPHARYNGEAL ONCE
Status: COMPLETED | OUTPATIENT
Start: 2022-02-16 | End: 2022-02-16

## 2022-02-16 RX ORDER — SUCRALFATE ORAL 1 G/10ML
1 SUSPENSION ORAL 2 TIMES DAILY
Qty: 280 ML | Refills: 0 | Status: SHIPPED | OUTPATIENT
Start: 2022-02-16 | End: 2022-05-28

## 2022-02-16 RX ORDER — MORPHINE SULFATE 10 MG/ML
6 INJECTION, SOLUTION INTRAMUSCULAR; INTRAVENOUS ONCE
Status: COMPLETED | OUTPATIENT
Start: 2022-02-16 | End: 2022-02-16

## 2022-02-16 RX ADMIN — ALUMINUM HYDROXIDE, MAGNESIUM HYDROXIDE, AND SIMETHICONE 30 ML: 200; 200; 20 SUSPENSION ORAL at 15:49

## 2022-02-16 RX ADMIN — POTASSIUM CHLORIDE 40 MEQ: 1500 TABLET, EXTENDED RELEASE ORAL at 16:52

## 2022-02-16 RX ADMIN — FAMOTIDINE 20 MG: 20 TABLET ORAL at 15:48

## 2022-02-16 RX ADMIN — SODIUM CHLORIDE 1000 ML: 0.9 INJECTION, SOLUTION INTRAVENOUS at 15:45

## 2022-02-16 RX ADMIN — MORPHINE SULFATE 6 MG: 10 INJECTION INTRAVENOUS at 15:46

## 2022-02-16 RX ADMIN — LIDOCAINE HYDROCHLORIDE 15 ML: 20 SOLUTION ORAL; TOPICAL at 15:49

## 2022-02-16 RX ADMIN — ONDANSETRON 4 MG: 2 INJECTION INTRAMUSCULAR; INTRAVENOUS at 15:46

## 2022-02-16 RX ADMIN — IOHEXOL 100 ML: 350 INJECTION, SOLUTION INTRAVENOUS at 16:29

## 2022-02-16 NOTE — DISCHARGE INSTRUCTIONS
You were seen today in the Emergency Department  Please follow up with your Primary Care Provider in the next 1-2 days to recheck your symptoms and to follow up on your visit to the Emergency Department today  Please return to the Emergency Department if you have fevers, chills, chest pain, shortness of breath, are unable to eat or drink, or have any other symptoms that concern you  Please look this over and let your nurse and/or me know if you have any further questions before you leave

## 2022-02-16 NOTE — Clinical Note
Kishore Lynn was seen and treated in our emergency department on 2/16/2022  Diagnosis:     Steph    She may return on this date: 02/18/2022         If you have any questions or concerns, please don't hesitate to call        Clementina Kitchen DO    ______________________________           _______________          _______________  Hospital Representative                              Date                                Time

## 2022-02-16 NOTE — Clinical Note
Simons Luh was seen and treated in our emergency department on 2/16/2022  Diagnosis:     Steph    She may return on this date: 02/18/2022         If you have any questions or concerns, please don't hesitate to call        Kandi Trammell DO    ______________________________           _______________          _______________  Hospital Representative                              Date                                Time

## 2022-02-16 NOTE — ED PROVIDER NOTES
History  Chief Complaint   Patient presents with    Vomiting     Pt stated that she has been vomiting since Sunday  Also noted back spasms since then  History of back problems  Also having upper abdominal pain  Lorie Collazo is an 28y o  year old female with PMHx significant for asthma, PUD, who presents to the ED today with abdominal pain and vomiting for 3-4 days  She is having diffuse back spasms since she started vomiting as well  Abdominal pain is exacerbated by vomiting and relieved by nothing  The pain is epigastric, sharp in quality, does not radiate, and currently rated severe in severity  Patient also endorses constipation, lightheadedness with position changes  The patient denies fevers, chills, syncope, chest pain, shortness of breath, cough, changes with urination, vaginal discharge, pain anywhere else in body  ROS otherwise negative  Is currently on her period, which is normal in timing and quality  History provided by:  Medical records and patient   used: No    Vomiting      Prior to Admission Medications   Prescriptions Last Dose Informant Patient Reported? Taking?    Diclofenac Sodium (VOLTAREN) 1 %   No No   Sig: Apply 2 g topically 4 (four) times a day   acetaminophen (TYLENOL) 325 mg tablet   No No   Sig: Take 2 tablets (650 mg total) by mouth every 6 (six) hours as needed for mild pain   bisacodyl (DULCOLAX) 10 mg suppository   No No   Sig: Insert 1 suppository (10 mg total) into the rectum daily for 16 days   cyproheptadine hcl 2 MG/5ML oral syrup   No No   Sig: Take 10 mL (4 mg total) by mouth 3 (three) times a day as needed for allergies   diphenhydrAMINE (BENADRYL) 25 mg tablet   No No   Sig: Take 1 tablet (25 mg total) by mouth every 6 (six) hours as needed for itching, allergies or sleep   docusate sodium (COLACE) 100 mg capsule   No No   Sig: Take 1 capsule (100 mg total) by mouth 2 (two) times a day   lidocaine (LIDODERM) 5 %   No No   Sig: Apply 1 patch topically daily at bedtime Remove & Discard patch within 12 hours or as directed by MD   menthol-methyl salicylate (BENGAY) 98-28 % cream   No No   Sig: Apply topically 4 (four) times a day as needed (apply to back as needed)   methocarbamol (ROBAXIN) 500 mg tablet   No No   Sig: Take 1 tablet (500 mg total) by mouth every 6 (six) hours as needed for muscle spasms   metoclopramide (REGLAN) 5 mg tablet   No No   Sig: Take 2 tablets (10 mg total) by mouth every 6 (six) hours as needed (please cycle this with the zofran every 3 hrs)   ondansetron (ZOFRAN-ODT) 4 mg disintegrating tablet   No No   Sig: Take 1 tablet (4 mg total) by mouth every 6 (six) hours as needed for nausea or vomiting   ondansetron (ZOFRAN-ODT) 4 mg disintegrating tablet   No No   Sig: Take 1 tablet (4 mg total) by mouth every 6 (six) hours as needed for nausea or vomiting   pantoprazole (PROTONIX) 40 mg tablet   No No   Sig: Take 1 tablet (40 mg total) by mouth 2 (two) times a day   scopolamine (TRANSDERM-SCOP) 1 5 mg/3 days TD 72 hr patch   No No   Sig: Place 1 patch on the skin every third day for 15 days   sucralfate (CARAFATE) 1 g/10 mL suspension   Yes No   Sig: Take 1 g by mouth   sucralfate (CARAFATE) 1 g/10 mL suspension   No No   Sig: Take 10 mL (1 g total) by mouth 2 (two) times a day for 14 days      Facility-Administered Medications: None       Past Medical History:   Diagnosis Date    Arthritis     Asthma     History of stomach ulcers     Psychiatric disorder     anxiety       Past Surgical History:   Procedure Laterality Date    BREAST SURGERY      COSMETIC SURGERY      EGD      WISDOM TOOTH EXTRACTION         History reviewed  No pertinent family history  I have reviewed and agree with the history as documented      E-Cigarette/Vaping    E-Cigarette Use Never User      E-Cigarette/Vaping Substances    Nicotine No     THC Yes     CBD Yes     Flavoring No     Other No     Unknown No      Social History Tobacco Use    Smoking status: Never Smoker    Smokeless tobacco: Never Used   Vaping Use    Vaping Use: Never used   Substance Use Topics    Alcohol use: Not Currently    Drug use: Not Currently     Types: Marijuana        Review of Systems   Reason unable to perform ROS: please see above for ROS  All other ROS negative  Gastrointestinal: Positive for vomiting  Physical Exam  ED Triage Vitals   Temperature Pulse Respirations Blood Pressure SpO2   02/16/22 1445 02/16/22 1446 02/16/22 1446 02/16/22 1446 02/16/22 1446   99 3 °F (37 4 °C) 76 18 (!) 122/103 99 %      Temp Source Heart Rate Source Patient Position - Orthostatic VS BP Location FiO2 (%)   02/16/22 1445 02/16/22 1714 02/16/22 1714 02/16/22 1714 --   Oral Monitor Sitting Left arm       Pain Score       02/16/22 1447       8             Orthostatic Vital Signs  Vitals:    02/16/22 1446 02/16/22 1714   BP: (!) 122/103 137/77   Pulse: 76 74   Patient Position - Orthostatic VS:  Sitting       Physical Exam  Vitals and nursing note reviewed  Constitutional:       General: She is not in acute distress  Appearance: She is well-developed  She is not diaphoretic  HENT:      Head: Normocephalic and atraumatic  Eyes:      General: No scleral icterus  Conjunctiva/sclera: Conjunctivae normal    Neck:      Vascular: No JVD  Trachea: No tracheal deviation  Cardiovascular:      Rate and Rhythm: Normal rate and regular rhythm  Pulmonary:      Effort: Pulmonary effort is normal  No respiratory distress  Abdominal:      General: There is no distension  Palpations: Abdomen is soft  Tenderness: There is abdominal tenderness (epigastric)  There is no guarding or rebound  Musculoskeletal:         General: No deformity  Cervical back: Neck supple  Skin:     General: Skin is warm and dry  Neurological:      Mental Status: She is alert     Psychiatric:         Behavior: Behavior normal          ED Medications  Medications ondansetron (ZOFRAN) injection 4 mg (4 mg Intravenous Given 2/16/22 1546)   sodium chloride 0 9 % bolus 1,000 mL (0 mL Intravenous Stopped 2/16/22 1645)   morphine (PF) 10 mg/mL injection 6 mg (6 mg Intravenous Given 2/16/22 1546)   aluminum-magnesium hydroxide-simethicone (MYLANTA) oral suspension 30 mL (30 mL Oral Given 2/16/22 1549)   famotidine (PEPCID) tablet 20 mg (20 mg Oral Given 2/16/22 1548)   Lidocaine Viscous HCl (XYLOCAINE) 2 % mucosal solution 15 mL (15 mL Swish & Swallow Given 2/16/22 1549)   potassium chloride (K-DUR,KLOR-CON) CR tablet 40 mEq (40 mEq Oral Given 2/16/22 1652)   iohexol (OMNIPAQUE) 350 MG/ML injection (SINGLE-DOSE) 100 mL (100 mL Intravenous Given 2/16/22 1629)       Diagnostic Studies  Results Reviewed     Procedure Component Value Units Date/Time    Urine Microscopic [[id number]]  (Normal) Collected: 02/16/22 1715    Lab Status: Final result Specimen: Urine, Clean Catch Updated: 02/16/22 1729     RBC, UA 2-4 /hpf      WBC, UA None Seen /hpf      Epithelial Cells None Seen /hpf      Bacteria, UA None Seen /hpf      Hyaline Casts, UA None Seen /lpf     POCT pregnancy, urine [[id number]]  (Normal) Resulted: 02/16/22 1729    Lab Status: Final result Updated: 02/16/22 1729     EXT PREG TEST UR (Ref: Negative) Negative     Control Valid    UA (URINE) with reflex to Scope [[id number]] Collected: 02/16/22 1715    Lab Status: Final result Specimen: Urine, Clean Catch Updated: 02/16/22 1724     Color, UA Yellow     Clarity, UA Clear     Specific Gravity, UA <=1 005     pH, UA 7 0     Leukocytes, UA Negative     Nitrite, UA Negative     Protein, UA Negative mg/dl      Glucose, UA Negative mg/dl      Ketones, UA Negative mg/dl      Urobilinogen, UA 0 2 E U /dl      Bilirubin, UA Negative     Blood, UA Trace-Intact    Comprehensive metabolic panel [[id number]]  (Abnormal) Collected: 02/16/22 1533    Lab Status: Final result Specimen: Blood from Arm, Right Updated: 02/16/22 1610     Sodium 135 mmol/L      Potassium 3 2 mmol/L      Chloride 100 mmol/L      CO2 29 mmol/L      ANION GAP 6 mmol/L      BUN 12 mg/dL      Creatinine 0 86 mg/dL      Glucose 112 mg/dL      Calcium 10 0 mg/dL      AST 21 U/L      ALT 28 U/L      Alkaline Phosphatase 92 U/L      Total Protein 9 4 g/dL      Albumin 4 8 g/dL      Total Bilirubin 0 78 mg/dL      eGFR 90 ml/min/1 73sq m     Narrative:      National Kid[employer] guidelines for Chronic Kidney Disease (CKD):     Stage 1 with normal or high GFR (GFR > 90 mL/min/1 73 square meters)    Stage 2 Mild CKD (GFR = 60-89 mL/min/1 73 square meters)    Stage 3A Moderate CKD (GFR = 45-59 mL/min/1 73 square meters)    Stage 3B Moderate CKD (GFR = 30-44 mL/min/1 73 square meters)    Stage 4 Severe CKD (GFR = 15-29 mL/min/1 73 square meters)    Stage 5 End Stage CKD (GFR <15 mL/min/1 73 square meters)  Note: GFR calculation is accurate only with a steady state creatinine    Lipase [[id number]]  (Abnormal) Collected: 02/16/22 1533    Lab Status: Final result Specimen: Blood from Arm, Right Updated: 02/16/22 1601     Lipase 63 u/L     CBC and differential [[id number]]  (Abnormal) Collected: 02/16/22 1533    Lab Status: Final result Specimen: Blood from Arm, Right Updated: 02/16/22 1542     WBC 19 03 Thousand/uL      RBC 5 24 Million/uL      Hemoglobin 15 3 g/dL      Hematocrit 45 3 %      MCV 87 fL      MCH 29 2 pg      MCHC 33 8 g/dL      RDW 13 1 %      MPV 11 8 fL      Platelets 298 Thousands/uL      nRBC 0 /100 WBCs      Neutrophils Relative 79 %      Immat GRANS % 0 %      Lymphocytes Relative 14 %      Monocytes Relative 7 %      Eosinophils Relative 0 %      Basophils Relative 0 %      Neutrophils Absolute 14 93 Thousands/µL      Immature Grans Absolute 0 08 Thousand/uL      Lymphocytes Absolute 2 71 Thousands/µL      Monocytes Absolute 1 24 Thousand/µL      Eosinophils Absolute 0 02 Thousand/µL      Basophils Absolute 0 05 Thousands/µL                  CT abdomen pelvis with contrast   Final Result by Denisha Rothman DO (02/16 6699)   Mildly enlarged - mildly fatty liver  Suspect mild distal esophagitis  Colonic diverticulosis without evidence of diverticulitis  Workstation performed: [id number]               Procedures  Procedures      ED Course  ED Course as of 02/16/22 1854   Wed Feb 16, 2022   1544 WBC(!): 19 03                             SBIRT 20yo+      Most Recent Value   SBIRT (23 yo +)    In order to provide better care to our patients, we are screening all of our patients for alcohol and drug use  Would it be okay to ask you these screening questions? Unable to answer at this time Filed at: 02/16/2022 1515                MDM  Number of Diagnoses or Management Options  Epigastric pain  Non-intractable vomiting with nausea, unspecified vomiting type  Diagnosis management comments: No pain out of proportion to exam or worsening of pain with or shortly after eating  No peritoneal signs on exam   Patient has no pulsatile abdominal mass, known aneurysm, pulse deficit or asymmetry, tearing or ripping pain  No tenderness at McBurney's point and no positive Doshi sign, or Rovsing sign  No radiation of pain from flank to groin, CVA tenderness, urinary complains, or history of urolithiasis  No history of abdominal trauma or sickle cell disease  Patient does not have a hx of abdominal surgeries  Last colonoscopy n/a  Patient is not pregnant  Vital signs normal, no fever  Patient is able to pass flatus, and can tolerate Po  Symptoms improved at time of re-eval   Ambulatory from ED            Amount and/or Complexity of Data Reviewed  Clinical lab tests: ordered and reviewed  Review and summarize past medical records: yes    Risk of Complications, Morbidity, and/or Mortality  Presenting problems: moderate    Patient Progress  Patient progress: stable      Disposition  Final diagnoses:   Non-intractable vomiting with nausea, unspecified vomiting type   Epigastric pain     Time reflects when diagnosis was documented in both MDM as applicable and the Disposition within this note     Time User Action Codes Description Comment    2/16/2022  5:05 PM Karley Venegas Add [R11 2] Non-intractable vomiting with nausea, unspecified vomiting type     2/16/2022  5:05 PM Karley Venegas Add [R10 13] Epigastric pain     2/16/2022  5:06 PM Karley Venegas Add [R11 2] Intractable nausea and vomiting       ED Disposition     ED Disposition Condition Date/Time Comment    Discharge Stable Wed Feb 16, 2022  5:05 PM Josiane Gutierrez discharge to home/self care  Results of completed tests discussed  Return to ER precautions given, verbal and written, and questions answered satisfactorily                  Follow-up Information     Follow up With Specialties Details Why SUNITA Bhakta [address], DO Family Medicine Call in 1 day To recheck symptoms and follow up on your ER visit Edie Esposito 3  [phone]            Discharge Medication List as of 2/16/2022  5:16 PM      CONTINUE these medications which have CHANGED    Details   ondansetron (ZOFRAN-ODT) 4 mg disintegrating tablet Take 1 tablet (4 mg total) by mouth every 6 (six) hours as needed for nausea or vomiting, Starting Wed 2/16/2022, Normal      sucralfate (CARAFATE) 1 g/10 mL suspension Take 10 mL (1 g total) by mouth 2 (two) times a day for 14 days, Starting Wed 2/16/2022, Until Wed 3/2/2022, Normal         CONTINUE these medications which have NOT CHANGED    Details   acetaminophen (TYLENOL) 325 mg tablet Take 2 tablets (650 mg total) by mouth every 6 (six) hours as needed for mild pain, Starting Sat 12/5/2020, Normal      bisacodyl (DULCOLAX) 10 mg suppository Insert 1 suppository (10 mg total) into the rectum daily for 16 days, Starting Sun 1/24/2021, Until Tue 2/9/2021, Normal      cyproheptadine hcl 2 MG/5ML oral syrup Take 10 mL (4 mg total) by mouth 3 (three) times a day as needed for allergies, Starting Sat 1/23/2021, Normal      Diclofenac Sodium (VOLTAREN) 1 % Apply 2 g topically 4 (four) times a day, Starting Sat 1/23/2021, Normal      diphenhydrAMINE (BENADRYL) 25 mg tablet Take 1 tablet (25 mg total) by mouth every 6 (six) hours as needed for itching, allergies or sleep, Starting Sat 1/23/2021, Normal      docusate sodium (COLACE) 100 mg capsule Take 1 capsule (100 mg total) by mouth 2 (two) times a day, Starting Sat 1/23/2021, Normal      lidocaine (LIDODERM) 5 % Apply 1 patch topically daily at bedtime Remove & Discard patch within 12 hours or as directed by MD, Starting Sat 1/23/2021, Normal      menthol-methyl salicylate (BENGAY) 76-54 % cream Apply topically 4 (four) times a day as needed (apply to back as needed), Starting Sat 1/23/2021, Normal      methocarbamol (ROBAXIN) 500 mg tablet Take 1 tablet (500 mg total) by mouth every 6 (six) hours as needed for muscle spasms, Starting Sat 1/23/2021, Normal      metoclopramide (REGLAN) 5 mg tablet Take 2 tablets (10 mg total) by mouth every 6 (six) hours as needed (please cycle this with the zofran every 3 hrs), Starting Sat 1/23/2021, Normal      pantoprazole (PROTONIX) 40 mg tablet Take 1 tablet (40 mg total) by mouth 2 (two) times a day, Starting Sat 1/23/2021, Normal      scopolamine (TRANSDERM-SCOP) 1 5 mg/3 days TD 72 hr patch Place 1 patch on the skin every third day for 15 days, Starting Sat 1/23/2021, Until Sun 2/7/2021, Normal           No discharge procedures on file  PDMP Review       Value Time User    PDMP Reviewed  Yes 1/20/2021  2:27 PM Heather Reyes PA-C           ED Provider  Attending physically available and evaluated Mily Estrada  CHELSEA managed the patient along with the ED Attending      Electronically Signed by         Elizabeth Bautista DO  02/16/22 [id number]

## 2022-02-16 NOTE — ED ATTENDING ATTESTATION
2/16/2022  IFredrick MD, saw and evaluated the patient  I have discussed the patient with the resident/non-physician practitioner and agree with the resident's/non-physician practitioner's findings, Plan of Care, and MDM as documented in the resident's/non-physician practitioner's note, except where noted  All available labs and Radiology studies were reviewed  I was present for key portions of any procedure(s) performed by the resident/non-physician practitioner and I was immediately available to provide assistance  At this point I agree with the current assessment done in the Emergency Department  I have conducted an independent evaluation of this patient a history and physical is as follows:    OA: 29 y/o f p/w intractable nausea/vomiting since Sunday  Not tolerating PO  Now with epigastric/diffuse abdominal discomfort  Also with low back pain described as spasms since onset of sxms  + decreased BM but not eating, still urinating and denies urinary sxms  - dizziness  No cp/sob  No previous abdominal surgeries  PE, NAD but uncomfortable appearing, VSS, NC/AT, MMM, anicteric, neck supple/FROM, RR, lungs CTAB, abd soft, +BS, + ttp over epigastric region and LUQ, -r/g, - mass, - CVA ttp, - edema, - rash, intact distal pulses and capillary refill < 2 sec, AAO  A/p n/v and abd pain  eval with labs, imaging, IVF hydration, sxm control, treat accordingly  ED Course     PT with complete workup, imaging, re-eval and dispo pending at end of shift       Critical Care Time  Procedures

## 2022-02-17 ENCOUNTER — HOSPITAL ENCOUNTER (EMERGENCY)
Facility: HOSPITAL | Age: 32
Discharge: HOME | End: 2022-02-17
Attending: EMERGENCY MEDICINE
Payer: COMMERCIAL

## 2022-02-17 VITALS
RESPIRATION RATE: 18 BRPM | DIASTOLIC BLOOD PRESSURE: 63 MMHG | HEART RATE: 82 BPM | TEMPERATURE: 99 F | SYSTOLIC BLOOD PRESSURE: 115 MMHG | OXYGEN SATURATION: 97 %

## 2022-02-17 DIAGNOSIS — R11.2 NON-INTRACTABLE VOMITING WITH NAUSEA, UNSPECIFIED VOMITING TYPE: Primary | ICD-10-CM

## 2022-02-17 DIAGNOSIS — R10.84 GENERALIZED ABDOMINAL PAIN: ICD-10-CM

## 2022-02-17 LAB
ALBUMIN SERPL BCP-MCNC: 4.4 G/DL (ref 3.5–5)
ALP SERPL-CCNC: 88 U/L (ref 46–116)
ALT SERPL W P-5'-P-CCNC: 22 U/L (ref 12–78)
ANION GAP SERPL CALCULATED.3IONS-SCNC: 9 MMOL/L (ref 4–13)
AST SERPL W P-5'-P-CCNC: 10 U/L (ref 5–45)
BACTERIA UR QL AUTO: ABNORMAL /HPF
BASOPHILS # BLD AUTO: 0.08 THOUSANDS/ΜL (ref 0–0.1)
BASOPHILS NFR BLD AUTO: 1 % (ref 0–1)
BILIRUB SERPL-MCNC: 0.59 MG/DL (ref 0.2–1)
BILIRUB UR QL STRIP: ABNORMAL
BUN SERPL-MCNC: 11 MG/DL (ref 5–25)
CALCIUM SERPL-MCNC: 9.8 MG/DL (ref 8.3–10.1)
CHLORIDE SERPL-SCNC: 104 MMOL/L (ref 100–108)
CLARITY UR: ABNORMAL
CO2 SERPL-SCNC: 24 MMOL/L (ref 21–32)
COLOR UR: ABNORMAL
CREAT SERPL-MCNC: 0.83 MG/DL (ref 0.6–1.3)
EOSINOPHIL # BLD AUTO: 0.03 THOUSAND/ΜL (ref 0–0.61)
EOSINOPHIL NFR BLD AUTO: 0 % (ref 0–6)
ERYTHROCYTE [DISTWIDTH] IN BLOOD BY AUTOMATED COUNT: 12.9 % (ref 11.6–15.1)
EXT PREG TEST URINE: NEGATIVE
EXT. CONTROL ED NAV: NORMAL
GFR SERPL CREATININE-BSD FRML MDRD: 94 ML/MIN/1.73SQ M
GLUCOSE SERPL-MCNC: 109 MG/DL (ref 65–140)
GLUCOSE UR STRIP-MCNC: NEGATIVE MG/DL
HCT VFR BLD AUTO: 44.9 % (ref 34.8–46.1)
HGB BLD-MCNC: 15.2 G/DL (ref 11.5–15.4)
HGB UR QL STRIP.AUTO: ABNORMAL
HYALINE CASTS #/AREA URNS LPF: ABNORMAL /LPF
IMM GRANULOCYTES # BLD AUTO: 0.09 THOUSAND/UL (ref 0–0.2)
IMM GRANULOCYTES NFR BLD AUTO: 1 % (ref 0–2)
KETONES UR STRIP-MCNC: ABNORMAL MG/DL
LEUKOCYTE ESTERASE UR QL STRIP: ABNORMAL
LIPASE SERPL-CCNC: 144 U/L (ref 73–393)
LYMPHOCYTES # BLD AUTO: 2.98 THOUSANDS/ΜL (ref 0.6–4.47)
LYMPHOCYTES NFR BLD AUTO: 19 % (ref 14–44)
MCH RBC QN AUTO: 29.4 PG (ref 26.8–34.3)
MCHC RBC AUTO-ENTMCNC: 33.9 G/DL (ref 31.4–37.4)
MCV RBC AUTO: 87 FL (ref 82–98)
MONOCYTES # BLD AUTO: 0.78 THOUSAND/ΜL (ref 0.17–1.22)
MONOCYTES NFR BLD AUTO: 5 % (ref 4–12)
NEUTROPHILS # BLD AUTO: 11.45 THOUSANDS/ΜL (ref 1.85–7.62)
NEUTS SEG NFR BLD AUTO: 74 % (ref 43–75)
NITRITE UR QL STRIP: NEGATIVE
NON-SQ EPI CELLS URNS QL MICRO: ABNORMAL /HPF
NRBC BLD AUTO-RTO: 0 /100 WBCS
PH UR STRIP.AUTO: 7.5 [PH]
PLATELET # BLD AUTO: 298 THOUSANDS/UL (ref 149–390)
PMV BLD AUTO: 11.9 FL (ref 8.9–12.7)
POTASSIUM SERPL-SCNC: 3.3 MMOL/L (ref 3.5–5.3)
PROT SERPL-MCNC: 8.6 G/DL (ref 6.4–8.2)
PROT UR STRIP-MCNC: ABNORMAL MG/DL
RBC # BLD AUTO: 5.17 MILLION/UL (ref 3.81–5.12)
RBC #/AREA URNS AUTO: ABNORMAL /HPF
SODIUM SERPL-SCNC: 137 MMOL/L (ref 136–145)
SP GR UR STRIP.AUTO: 1.01 (ref 1–1.03)
UROBILINOGEN UR QL STRIP.AUTO: 0.2 E.U./DL
WBC # BLD AUTO: 15.41 THOUSAND/UL (ref 4.31–10.16)
WBC #/AREA URNS AUTO: ABNORMAL /HPF

## 2022-02-17 PROCEDURE — 83690 ASSAY OF LIPASE: CPT | Performed by: EMERGENCY MEDICINE

## 2022-02-17 PROCEDURE — 80053 COMPREHEN METABOLIC PANEL: CPT | Performed by: EMERGENCY MEDICINE

## 2022-02-17 PROCEDURE — 85025 COMPLETE CBC W/AUTO DIFF WBC: CPT | Performed by: EMERGENCY MEDICINE

## 2022-02-17 PROCEDURE — 99284 EMERGENCY DEPT VISIT MOD MDM: CPT | Performed by: EMERGENCY MEDICINE

## 2022-02-17 PROCEDURE — 96375 TX/PRO/DX INJ NEW DRUG ADDON: CPT

## 2022-02-17 PROCEDURE — 36415 COLL VENOUS BLD VENIPUNCTURE: CPT | Performed by: EMERGENCY MEDICINE

## 2022-02-17 PROCEDURE — 99283 EMERGENCY DEPT VISIT LOW MDM: CPT

## 2022-02-17 PROCEDURE — 96374 THER/PROPH/DIAG INJ IV PUSH: CPT

## 2022-02-17 PROCEDURE — 81001 URINALYSIS AUTO W/SCOPE: CPT | Performed by: EMERGENCY MEDICINE

## 2022-02-17 PROCEDURE — 81025 URINE PREGNANCY TEST: CPT | Performed by: EMERGENCY MEDICINE

## 2022-02-17 RX ORDER — ONDANSETRON 4 MG/1
4 TABLET, FILM COATED ORAL EVERY 6 HOURS PRN
Qty: 12 TABLET | Refills: 0 | Status: SHIPPED | OUTPATIENT
Start: 2022-02-17 | End: 2022-05-27

## 2022-02-17 RX ORDER — HALOPERIDOL 1 MG/1
1 TABLET ORAL ONCE
Status: COMPLETED | OUTPATIENT
Start: 2022-02-17 | End: 2022-02-17

## 2022-02-17 RX ORDER — MAGNESIUM HYDROXIDE/ALUMINUM HYDROXICE/SIMETHICONE 120; 1200; 1200 MG/30ML; MG/30ML; MG/30ML
30 SUSPENSION ORAL ONCE
Status: COMPLETED | OUTPATIENT
Start: 2022-02-17 | End: 2022-02-17

## 2022-02-17 RX ORDER — SUCRALFATE 1 G/1
1 TABLET ORAL 4 TIMES DAILY
Qty: 20 TABLET | Refills: 0 | Status: SHIPPED | OUTPATIENT
Start: 2022-02-17 | End: 2022-05-28

## 2022-02-17 RX ORDER — MORPHINE SULFATE 4 MG/ML
4 INJECTION, SOLUTION INTRAMUSCULAR; INTRAVENOUS ONCE
Status: COMPLETED | OUTPATIENT
Start: 2022-02-17 | End: 2022-02-17

## 2022-02-17 RX ORDER — ONDANSETRON 2 MG/ML
4 INJECTION INTRAMUSCULAR; INTRAVENOUS ONCE
Status: COMPLETED | OUTPATIENT
Start: 2022-02-17 | End: 2022-02-17

## 2022-02-17 RX ORDER — LIDOCAINE HYDROCHLORIDE 20 MG/ML
15 SOLUTION OROPHARYNGEAL ONCE
Status: COMPLETED | OUTPATIENT
Start: 2022-02-17 | End: 2022-02-17

## 2022-02-17 RX ADMIN — FAMOTIDINE 20 MG: 10 INJECTION INTRAVENOUS at 17:25

## 2022-02-17 RX ADMIN — MORPHINE SULFATE 4 MG: 4 INJECTION INTRAVENOUS at 18:17

## 2022-02-17 RX ADMIN — HALOPERIDOL 1 MG: 1 TABLET ORAL at 20:22

## 2022-02-17 RX ADMIN — LIDOCAINE HYDROCHLORIDE 15 ML: 20 SOLUTION ORAL; TOPICAL at 17:25

## 2022-02-17 RX ADMIN — ONDANSETRON 4 MG: 2 INJECTION INTRAMUSCULAR; INTRAVENOUS at 17:25

## 2022-02-17 RX ADMIN — ALUMINA, MAGNESIA, AND SIMETHICONE ORAL SUSPENSION REGULAR STRENGTH 30 ML: 1200; 1200; 120 SUSPENSION ORAL at 17:25

## 2022-02-17 NOTE — Clinical Note
Evangelista Park was seen and treated in our emergency department on 2/17/2022  No restrictions    Other - See Comments    N/A    Diagnosis: Generalized abdominal pain, nausea, vomiting    Chandler Daily  is off the rest of the shift today, may return to work on return date  She may return on this date: 02/21/2022         If you have any questions or concerns, please don't hesitate to call        Leonel Matta MD    ______________________________           _______________          _______________  STERLING CARVAJAL MARCY [facility] Representative                              Date                                Time

## 2022-02-17 NOTE — ED PROVIDER NOTES
History  Chief Complaint   Patient presents with    Vomiting     pt c/o abdominal pain and vomiting X4days  Seen yesterday for the same with no relief     27 y/o female with hx of anxiety, peptic ulcer disease 2/2 chronic NSAID use, and chronic back pain presents to the ED for evaluation of generalized abdominal pain and nausea/vomiting x 4 days  She was seen in the ER yesterday for similar symptoms and had a workup significant for elevated WBC 19 03 thousand/uL, CT A/P showing "Mildly enlarged - mildly fatty liver  Suspect mild distal esophagitis  Colonic diverticulosis without evidence of diverticulitis " Her symptoms improved after receiving Zofran, morphine, and GI cocktail and she was discharged with instructions to follow-up with PCP and Gastroenterology  Her symptoms recurred this morning and she called her primary care provider, who advised her to call Gastroenterology  She called Gastroenterology but was only able to leave a voicemail, and thus returned to the ER for evaluation  She notes that she had a similar episode of abdominal pain with persistent nausea and vomiting last year during her pregnancy, for which she had been admitted multiple times to this hospital as well as outside hospital; the source of her symptoms was not determined at that time and she did not follow-up with Gastroenterology after carrying her pregnancy to term  She denies fever, chills, cough, shortness of breath, chest pain, diarrhea, urinary symptoms, and vaginal discharge  She is currently on her menstrual cycle  Prior to Admission Medications   Prescriptions Last Dose Informant Patient Reported? Taking?    Diclofenac Sodium (VOLTAREN) 1 %   No No   Sig: Apply 2 g topically 4 (four) times a day   acetaminophen (TYLENOL) 325 mg tablet   No No   Sig: Take 2 tablets (650 mg total) by mouth every 6 (six) hours as needed for mild pain   bisacodyl (DULCOLAX) 10 mg suppository   No No   Sig: Insert 1 suppository (10 mg total) into the rectum daily for 16 days   cyproheptadine hcl 2 MG/5ML oral syrup   No No   Sig: Take 10 mL (4 mg total) by mouth 3 (three) times a day as needed for allergies   diphenhydrAMINE (BENADRYL) 25 mg tablet   No No   Sig: Take 1 tablet (25 mg total) by mouth every 6 (six) hours as needed for itching, allergies or sleep   docusate sodium (COLACE) 100 mg capsule   No No   Sig: Take 1 capsule (100 mg total) by mouth 2 (two) times a day   lidocaine (LIDODERM) 5 %   No No   Sig: Apply 1 patch topically daily at bedtime Remove & Discard patch within 12 hours or as directed by MD   menthol-methyl salicylate (BENGAY) 37-39 % cream   No No   Sig: Apply topically 4 (four) times a day as needed (apply to back as needed)   methocarbamol (ROBAXIN) 500 mg tablet   No No   Sig: Take 1 tablet (500 mg total) by mouth every 6 (six) hours as needed for muscle spasms   metoclopramide (REGLAN) 5 mg tablet   No No   Sig: Take 2 tablets (10 mg total) by mouth every 6 (six) hours as needed (please cycle this with the zofran every 3 hrs)   ondansetron (ZOFRAN-ODT) 4 mg disintegrating tablet   No No   Sig: Take 1 tablet (4 mg total) by mouth every 6 (six) hours as needed for nausea or vomiting   pantoprazole (PROTONIX) 40 mg tablet   No No   Sig: Take 1 tablet (40 mg total) by mouth 2 (two) times a day   scopolamine (TRANSDERM-SCOP) 1 5 mg/3 days TD 72 hr patch   No No   Sig: Place 1 patch on the skin every third day for 15 days   sucralfate (CARAFATE) 1 g/10 mL suspension   No No   Sig: Take 10 mL (1 g total) by mouth 2 (two) times a day for 14 days      Facility-Administered Medications: None       Past Medical History:   Diagnosis Date    Arthritis     Asthma     History of stomach ulcers     Psychiatric disorder     anxiety       Past Surgical History:   Procedure Laterality Date    BREAST SURGERY      COSMETIC SURGERY      EGD      WISDOM TOOTH EXTRACTION         History reviewed  No pertinent family history    I have reviewed and agree with the history as documented  E-Cigarette/Vaping    E-Cigarette Use Never User      E-Cigarette/Vaping Substances    Nicotine No     THC Yes     CBD Yes     Flavoring No     Other No     Unknown No      Social History     Tobacco Use    Smoking status: Never Smoker    Smokeless tobacco: Never Used   Vaping Use    Vaping Use: Never used   Substance Use Topics    Alcohol use: Not Currently    Drug use: Not Currently     Types: Marijuana        Review of Systems   Constitutional: Negative for chills and fever  HENT: Negative for congestion, rhinorrhea and sore throat  Respiratory: Negative for cough and shortness of breath  Cardiovascular: Negative for chest pain and palpitations  Gastrointestinal: Positive for abdominal pain, nausea and vomiting  Negative for diarrhea  Genitourinary: Negative for dysuria and hematuria  Musculoskeletal: Positive for back pain  Negative for neck pain  Neurological: Negative for dizziness, weakness, light-headedness, numbness and headaches  All other systems reviewed and are negative  Physical Exam  ED Triage Vitals   Temperature Pulse Respirations Blood Pressure SpO2   02/17/22 1741 02/17/22 1703 02/17/22 1703 02/17/22 1703 02/17/22 1703   99 °F (37 2 °C) (!) 122 18 164/88 97 %      Temp Source Heart Rate Source Patient Position - Orthostatic VS BP Location FiO2 (%)   02/17/22 1741 02/17/22 1703 02/17/22 1913 02/17/22 1913 --   Oral Monitor Lying Right arm       Pain Score       02/17/22 1703       10 - Worst Possible Pain             Orthostatic Vital Signs  Vitals:    02/17/22 1703 02/17/22 1815 02/17/22 1913   BP: 164/88 (!) 154/118 115/63   Pulse: (!) 122 64 82   Patient Position - Orthostatic VS:   Lying       Physical Exam  Vitals and nursing note reviewed  Constitutional:       General: She is not in acute distress  Appearance: Normal appearance  She is normal weight  She is not ill-appearing     HENT:      Head: Normocephalic and atraumatic  Right Ear: External ear normal       Left Ear: External ear normal       Nose: Nose normal  No congestion or rhinorrhea  Mouth/Throat:      Mouth: Mucous membranes are moist       Pharynx: Oropharynx is clear  No oropharyngeal exudate or posterior oropharyngeal erythema  Eyes:      Extraocular Movements: Extraocular movements intact  Conjunctiva/sclera: Conjunctivae normal       Pupils: Pupils are equal, round, and reactive to light  Cardiovascular:      Rate and Rhythm: Normal rate and regular rhythm  Pulses: Normal pulses  Heart sounds: Normal heart sounds  No murmur heard  Pulmonary:      Effort: Pulmonary effort is normal  No respiratory distress  Breath sounds: Normal breath sounds  No wheezing or rales  Abdominal:      General: Abdomen is flat  Bowel sounds are normal  There is no distension  Palpations: Abdomen is soft  Tenderness: There is abdominal tenderness  There is no right CVA tenderness, left CVA tenderness or guarding  Comments: Generalized abdominal tenderness  Abdomen is soft, no rigidity, no signs of peritonitis  Musculoskeletal:         General: No swelling or tenderness  Normal range of motion  Cervical back: Normal range of motion and neck supple  No tenderness  Skin:     General: Skin is warm and dry  Capillary Refill: Capillary refill takes less than 2 seconds  Neurological:      General: No focal deficit present  Mental Status: She is alert and oriented to person, place, and time           ED Medications  Medications   ondansetron (ZOFRAN) injection 4 mg (4 mg Intravenous Given 2/17/22 1725)   famotidine (PEPCID) injection 20 mg (20 mg Intravenous Given 2/17/22 1725)   aluminum-magnesium hydroxide-simethicone (MYLANTA) oral suspension 30 mL (30 mL Oral Given 2/17/22 1725)   Lidocaine Viscous HCl (XYLOCAINE) 2 % mucosal solution 15 mL (15 mL Swish & Swallow Given 2/17/22 1725)   morphine (PF) 4 mg/mL injection 4 mg (4 mg Intravenous Given 2/17/22 1817)   haloperidol (HALDOL) tablet 1 mg (1 mg Oral Given 2/17/22 2022)       Diagnostic Studies  Results Reviewed     Procedure Component Value Units Date/Time    Comprehensive metabolic panel [[id number]]  (Abnormal) Collected: 02/17/22 1726    Lab Status: Final result Specimen: Blood from Arm, Right Updated: 02/17/22 1847     Sodium 137 mmol/L      Potassium 3 3 mmol/L      Chloride 104 mmol/L      CO2 24 mmol/L      ANION GAP 9 mmol/L      BUN 11 mg/dL      Creatinine 0 83 mg/dL      Glucose 109 mg/dL      Calcium 9 8 mg/dL      AST 10 U/L      ALT 22 U/L      Alkaline Phosphatase 88 U/L      Total Protein 8 6 g/dL      Albumin 4 4 g/dL      Total Bilirubin 0 59 mg/dL      eGFR 94 ml/min/1 73sq m     Narrative:      [place] guidelines for Chronic Kidney Disease (CKD):     Stage 1 with normal or high GFR (GFR > 90 mL/min/1 73 square meters)    Stage 2 Mild CKD (GFR = 60-89 mL/min/1 73 square meters)    Stage 3A Moderate CKD (GFR = 45-59 mL/min/1 73 square meters)    Stage 3B Moderate CKD (GFR = 30-44 mL/min/1 73 square meters)    Stage 4 Severe CKD (GFR = 15-29 mL/min/1 73 square meters)    Stage 5 End Stage CKD (GFR <15 mL/min/1 73 square meters)  Note: GFR calculation is accurate only with a steady state creatinine    Lipase [[id number]]  (Normal) Collected: 02/17/22 1726    Lab Status: Final result Specimen: Blood from Arm, Right Updated: 02/17/22 1801     Lipase 144 u/L     Urine Microscopic [[id number]]  (Abnormal) Collected: 02/17/22 1726    Lab Status: Final result Specimen: Urine, Clean Catch Updated: 02/17/22 1747     RBC, UA Innumerable /hpf      WBC, UA 4-10 /hpf      Epithelial Cells Moderate /hpf      Bacteria, UA Occasional /hpf      Hyaline Casts, UA 5-10 /lpf     CBC and differential [[id number]]  (Abnormal) Collected: 02/17/22 1726    Lab Status: Final result Specimen: Blood from Arm, Right Updated: 02/17/22 1746     WBC 15 41 Thousand/uL      RBC 5 17 Million/uL      Hemoglobin 15 2 g/dL      Hematocrit 44 9 %      MCV 87 fL      MCH 29 4 pg      MCHC 33 9 g/dL      RDW 12 9 %      MPV 11 9 fL      Platelets 966 Thousands/uL      nRBC 0 /100 WBCs      Neutrophils Relative 74 %      Immat GRANS % 1 %      Lymphocytes Relative 19 %      Monocytes Relative 5 %      Eosinophils Relative 0 %      Basophils Relative 1 %      Neutrophils Absolute 11 45 Thousands/µL      Immature Grans Absolute 0 09 Thousand/uL      Lymphocytes Absolute 2 98 Thousands/µL      Monocytes Absolute 0 78 Thousand/µL      Eosinophils Absolute 0 03 Thousand/µL      Basophils Absolute 0 08 Thousands/µL     UA w Reflex to Microscopic w Reflex to Culture [[id number]]  (Abnormal) Collected: 02/17/22 1726    Lab Status: Final result Specimen: Urine, Clean Catch Updated: 02/17/22 1743     Color, UA Red     Clarity, UA Slightly Cloudy     Specific Gravity, UA 1 015     pH, UA 7 5     Leukocytes, UA Trace     Nitrite, UA Negative     Protein, UA 30 (1+) mg/dl      Glucose, UA Negative mg/dl      Ketones, UA 15 (1+) mg/dl      Urobilinogen, UA 0 2 E U /dl      Bilirubin, UA Interference- unable to analyze     Blood, UA Large    POCT pregnancy, urine [[id number]]  (Normal) Resulted: 02/17/22 1729    Lab Status: Final result Specimen: Urine Updated: 02/17/22 1729     EXT PREG TEST UR (Ref: Negative) negative     Control valid                 No orders to display         Procedures  Procedures      ED Course                             SBIRT 20yo+      Most Recent Value   SBIRT (24 yo +)    In order to provide better care to our patients, we are screening all of our patients for alcohol and drug use  Would it be okay to ask you these screening questions? Yes Filed at: 02/17/2022 1729   Initial Alcohol Screen: US AUDIT-C     1  How often do you have a drink containing alcohol? 0 Filed at: 02/17/2022 1729   2   How many drinks containing alcohol do you have on a typical day you are drinking? 0 Filed at: 02/17/2022 1729   3b  FEMALE Any Age, or MALE 65+: How often do you have 4 or more drinks on one occassion? 0 Filed at: 02/17/2022 1729   Audit-C Score 0 Filed at: 02/17/2022 1729   CATHY: How many times in the past year have you    Used an illegal drug or used a prescription medication for non-medical reasons? Never Filed at: 02/17/2022 1729                MDM  Number of Diagnoses or Management Options  Generalized abdominal pain  Non-intractable vomiting with nausea, unspecified vomiting type  Diagnosis management comments: This is a 27 y/o female with hx of anxiety, peptic ulcer disease 2/2 chronic NSAID use, and chronic back pain presenting for evaluation of generalized abdominal pain and nausea/vomiting x 4 days  Pt seen in the ER yesterday for similar symptoms and had a workup significant for elevated WBC 19 03 thousand/uL, CT A/P showing "Mildly enlarged - mildly fatty liver  Suspect mild distal esophagitis  Colonic diverticulosis without evidence of diverticulitis " Her symptoms improved after receiving Zofran, morphine, and GI cocktail and she was discharged with instructions to follow-up with PCP and Gastroenterology  Her symptoms recurred this morning and she called her primary care provider, who advised her to call Gastroenterology  She called Gastroenterology but was only able to leave a voicemail, and thus returned to the ER for evaluation  No other symptoms or complaints  Currently on menstrual cycle  History of prior similar abdominal pain and nausea/episodes  Mild tachycardia, vital signs otherwise normal  Generalized abdominal tenderness with no focal tenderness, no rigidity or peritonitis  Plan to repeat lab work and treat symptomatically  No focal laboratory abnormalities  Leukocytosis improving from prior    Discussed whether patient would prefer admission versus close outpatient follow-up, and the patient will for to be discharged and follow-up with GI in the outpatient setting  Discussed all findings, treatment, red flags/return precautions, and outpatient follow-up and the patient/family understands and agrees  Stable for discharge  Disposition  Final diagnoses:   Generalized abdominal pain   Non-intractable vomiting with nausea, unspecified vomiting type     Time reflects when diagnosis was documented in both MDM as applicable and the Disposition within this note     Time User Action Codes Description Comment    2/17/2022  7:42 PM Kory Robison Add [R10 84] Generalized abdominal pain     2/17/2022  7:42 PM Kory Robison Add [R11 2] Non-intractable vomiting with nausea, unspecified vomiting type     2/17/2022  7:42 PM Kory Robison Modify [R10 84] Generalized abdominal pain     2/17/2022  7:42 PM Kory Robison Modify [R11 2] Non-intractable vomiting with nausea, unspecified vomiting type       ED Disposition     ED Disposition Condition Date/Time Comment    Discharge Stable Thu Feb 17, 2022  7:42 PM Alireza Dhillon discharge to home/self care              Follow-up Information     Follow up With Specialties Details Why Contact Info Additional Information    Maddy Cobb DO Family Medicine Call in 1 day For follow up [address] [phone]       [facility] Emergency Department Emergency Medicine Go to  If symptoms worsen [address]  [address] [place] [address] 64 [facility] Emergency Department, [address] [facility] Call in 1 day For follow up [address] [facility] [address] [facility] [id number]  Mary Cruz [address] [facility], [address] [place] [address] [address],  [address], [place], [address] [place] [address], [address]          Discharge Medication List as of 2/17/2022  7:45 PM      START taking these medications    Details ondansetron (ZOFRAN) 4 mg tablet Take 1 tablet (4 mg total) by mouth every 6 (six) hours as needed for nausea or vomiting, Starting u 2/17/2022, Normal      sucralfate (CARAFATE) 1 g tablet Take 1 tablet (1 g total) by mouth 4 (four) times a day for 5 days, Starting u 2/17/2022, Until Tue 2/22/2022, Normal         CONTINUE these medications which have NOT CHANGED    Details   acetaminophen (TYLENOL) 325 mg tablet Take 2 tablets (650 mg total) by mouth every 6 (six) hours as needed for mild pain, Starting Sat 12/5/2020, Normal      bisacodyl (DULCOLAX) 10 mg suppository Insert 1 suppository (10 mg total) into the rectum daily for 16 days, Starting Sun 1/24/2021, Until Tue 2/9/2021, Normal      cyproheptadine hcl 2 MG/5ML oral syrup Take 10 mL (4 mg total) by mouth 3 (three) times a day as needed for allergies, Starting Sat 1/23/2021, Normal      Diclofenac Sodium (VOLTAREN) 1 % Apply 2 g topically 4 (four) times a day, Starting Sat 1/23/2021, Normal      diphenhydrAMINE (BENADRYL) 25 mg tablet Take 1 tablet (25 mg total) by mouth every 6 (six) hours as needed for itching, allergies or sleep, Starting Sat 1/23/2021, Normal      docusate sodium (COLACE) 100 mg capsule Take 1 capsule (100 mg total) by mouth 2 (two) times a day, Starting Sat 1/23/2021, Normal      lidocaine (LIDODERM) 5 % Apply 1 patch topically daily at bedtime Remove & Discard patch within 12 hours or as directed by MD, Starting Sat 1/23/2021, Normal      menthol-methyl salicylate (BENGAY) 19-82 % cream Apply topically 4 (four) times a day as needed (apply to back as needed), Starting Sat 1/23/2021, Normal      methocarbamol (ROBAXIN) 500 mg tablet Take 1 tablet (500 mg total) by mouth every 6 (six) hours as needed for muscle spasms, Starting Sat 1/23/2021, Normal      metoclopramide (REGLAN) 5 mg tablet Take 2 tablets (10 mg total) by mouth every 6 (six) hours as needed (please cycle this with the zofran every 3 hrs), Starting Sat 1/23/2021, Normal      ondansetron (ZOFRAN-ODT) 4 mg disintegrating tablet Take 1 tablet (4 mg total) by mouth every 6 (six) hours as needed for nausea or vomiting, Starting Wed 2/16/2022, Normal      pantoprazole (PROTONIX) 40 mg tablet Take 1 tablet (40 mg total) by mouth 2 (two) times a day, Starting Sat 1/23/2021, Normal      scopolamine (TRANSDERM-SCOP) 1 5 mg/3 days TD 72 hr patch Place 1 patch on the skin every third day for 15 days, Starting Sat 1/23/2021, Until Sun 2/7/2021, Normal      sucralfate (CARAFATE) 1 g/10 mL suspension Take 10 mL (1 g total) by mouth 2 (two) times a day for 14 days, Starting Wed 2/16/2022, Until Wed 3/2/2022, Normal           No discharge procedures on file  PDMP Review       Value Time User    PDMP Reviewed  Yes 1/20/2021  2:27 PM Vincent Gutierrez PA-C           ED Provider  Attending physically available and evaluated Chencho Phillips  CHELSEA managed the patient along with the ED Attending      Electronically Signed by         Clarnce Felty, MD  02/18/22 2954

## 2022-02-18 ENCOUNTER — HOSPITAL ENCOUNTER (EMERGENCY)
Facility: HOSPITAL | Age: 32
Discharge: HOME | End: 2022-02-19
Attending: EMERGENCY MEDICINE | Admitting: EMERGENCY MEDICINE
Payer: COMMERCIAL

## 2022-02-18 VITALS
HEART RATE: 88 BPM | RESPIRATION RATE: 20 BRPM | BODY MASS INDEX: 29.16 KG/M2 | TEMPERATURE: 99 F | WEIGHT: 175 LBS | DIASTOLIC BLOOD PRESSURE: 56 MMHG | HEIGHT: 65 IN | SYSTOLIC BLOOD PRESSURE: 94 MMHG | OXYGEN SATURATION: 95 %

## 2022-02-18 DIAGNOSIS — M54.50 LOW BACK PAIN: ICD-10-CM

## 2022-02-18 DIAGNOSIS — K29.70 GASTRITIS: ICD-10-CM

## 2022-02-18 DIAGNOSIS — R11.2 NAUSEA AND VOMITING: ICD-10-CM

## 2022-02-18 DIAGNOSIS — R10.9 ABDOMINAL PAIN: Primary | ICD-10-CM

## 2022-02-18 LAB
ALBUMIN SERPL BCP-MCNC: 4.2 G/DL (ref 3.5–5)
ALP SERPL-CCNC: 77 U/L (ref 46–116)
ALT SERPL W P-5'-P-CCNC: 30 U/L (ref 12–78)
ANION GAP SERPL CALCULATED.3IONS-SCNC: 7 MMOL/L (ref 4–13)
AST SERPL W P-5'-P-CCNC: 43 U/L (ref 5–45)
BASOPHILS # BLD AUTO: 0.06 THOUSANDS/ΜL (ref 0–0.1)
BASOPHILS NFR BLD AUTO: 1 % (ref 0–1)
BILIRUB SERPL-MCNC: 0.67 MG/DL (ref 0.2–1)
BUN SERPL-MCNC: 14 MG/DL (ref 5–25)
CALCIUM SERPL-MCNC: 9.5 MG/DL (ref 8.3–10.1)
CHLORIDE SERPL-SCNC: 99 MMOL/L (ref 100–108)
CO2 SERPL-SCNC: 28 MMOL/L (ref 21–32)
CREAT SERPL-MCNC: 0.94 MG/DL (ref 0.6–1.3)
EOSINOPHIL # BLD AUTO: 0.04 THOUSAND/ΜL (ref 0–0.61)
EOSINOPHIL NFR BLD AUTO: 0 % (ref 0–6)
ERYTHROCYTE [DISTWIDTH] IN BLOOD BY AUTOMATED COUNT: 12.7 % (ref 11.6–15.1)
GFR SERPL CREATININE-BSD FRML MDRD: 81 ML/MIN/1.73SQ M
GLUCOSE SERPL-MCNC: 101 MG/DL (ref 65–140)
HCT VFR BLD AUTO: 44.7 % (ref 34.8–46.1)
HGB BLD-MCNC: 15.4 G/DL (ref 11.5–15.4)
IMM GRANULOCYTES # BLD AUTO: 0.06 THOUSAND/UL (ref 0–0.2)
IMM GRANULOCYTES NFR BLD AUTO: 1 % (ref 0–2)
LIPASE SERPL-CCNC: 116 U/L (ref 73–393)
LYMPHOCYTES # BLD AUTO: 2.27 THOUSANDS/ΜL (ref 0.6–4.47)
LYMPHOCYTES NFR BLD AUTO: 18 % (ref 14–44)
MCH RBC QN AUTO: 29.7 PG (ref 26.8–34.3)
MCHC RBC AUTO-ENTMCNC: 34.5 G/DL (ref 31.4–37.4)
MCV RBC AUTO: 86 FL (ref 82–98)
MONOCYTES # BLD AUTO: 0.76 THOUSAND/ΜL (ref 0.17–1.22)
MONOCYTES NFR BLD AUTO: 6 % (ref 4–12)
NEUTROPHILS # BLD AUTO: 9.81 THOUSANDS/ΜL (ref 1.85–7.62)
NEUTS SEG NFR BLD AUTO: 74 % (ref 43–75)
NRBC BLD AUTO-RTO: 0 /100 WBCS
PLATELET # BLD AUTO: 287 THOUSANDS/UL (ref 149–390)
PMV BLD AUTO: 11.3 FL (ref 8.9–12.7)
POTASSIUM SERPL-SCNC: 4.1 MMOL/L (ref 3.5–5.3)
PROT SERPL-MCNC: 8.6 G/DL (ref 6.4–8.2)
RBC # BLD AUTO: 5.18 MILLION/UL (ref 3.81–5.12)
SODIUM SERPL-SCNC: 134 MMOL/L (ref 136–145)
WBC # BLD AUTO: 13 THOUSAND/UL (ref 4.31–10.16)

## 2022-02-18 PROCEDURE — 96361 HYDRATE IV INFUSION ADD-ON: CPT

## 2022-02-18 PROCEDURE — 99284 EMERGENCY DEPT VISIT MOD MDM: CPT

## 2022-02-18 PROCEDURE — 80053 COMPREHEN METABOLIC PANEL: CPT | Performed by: EMERGENCY MEDICINE

## 2022-02-18 PROCEDURE — 99285 EMERGENCY DEPT VISIT HI MDM: CPT | Performed by: EMERGENCY MEDICINE

## 2022-02-18 PROCEDURE — 96360 HYDRATION IV INFUSION INIT: CPT

## 2022-02-18 PROCEDURE — 83690 ASSAY OF LIPASE: CPT | Performed by: EMERGENCY MEDICINE

## 2022-02-18 PROCEDURE — 36415 COLL VENOUS BLD VENIPUNCTURE: CPT | Performed by: EMERGENCY MEDICINE

## 2022-02-18 PROCEDURE — 85025 COMPLETE CBC W/AUTO DIFF WBC: CPT | Performed by: EMERGENCY MEDICINE

## 2022-02-18 RX ORDER — OLANZAPINE 10 MG/1
10 TABLET, ORALLY DISINTEGRATING ORAL ONCE
Status: COMPLETED | OUTPATIENT
Start: 2022-02-18 | End: 2022-02-18

## 2022-02-18 RX ADMIN — OLANZAPINE 10 MG: 10 TABLET, ORALLY DISINTEGRATING ORAL at 20:31

## 2022-02-18 RX ADMIN — SODIUM CHLORIDE 1000 ML: 0.9 INJECTION, SOLUTION INTRAVENOUS at 20:41

## 2022-02-19 RX ORDER — OMEPRAZOLE 20 MG/1
20 CAPSULE, DELAYED RELEASE ORAL DAILY
Qty: 14 CAPSULE | Refills: 0 | Status: SHIPPED | OUTPATIENT
Start: 2022-02-19 | End: 2022-05-30

## 2022-02-19 RX ORDER — METHOCARBAMOL 500 MG/1
500 TABLET, FILM COATED ORAL 2 TIMES DAILY
Qty: 20 TABLET | Refills: 0 | Status: SHIPPED | OUTPATIENT
Start: 2022-02-19 | End: 2022-05-27

## 2022-02-19 RX ORDER — PANTOPRAZOLE SODIUM 40 MG/1
40 TABLET, DELAYED RELEASE ORAL ONCE
Status: COMPLETED | OUTPATIENT
Start: 2022-02-19 | End: 2022-02-19

## 2022-02-19 RX ORDER — METHOCARBAMOL 500 MG/1
500 TABLET, FILM COATED ORAL ONCE
Status: COMPLETED | OUTPATIENT
Start: 2022-02-19 | End: 2022-02-19

## 2022-02-19 RX ORDER — SUCRALFATE 1 G/1
1 TABLET ORAL ONCE
Status: COMPLETED | OUTPATIENT
Start: 2022-02-19 | End: 2022-02-19

## 2022-02-19 RX ADMIN — METHOCARBAMOL 500 MG: 500 TABLET ORAL at 00:41

## 2022-02-19 RX ADMIN — PANTOPRAZOLE SODIUM 40 MG: 40 TABLET, DELAYED RELEASE ORAL at 00:41

## 2022-02-19 RX ADMIN — SUCRALFATE 1 G: 1 TABLET ORAL at 00:41

## 2022-02-19 NOTE — DISCHARGE INSTRUCTIONS
Please avoid taking any nonsteroidal anti-inflammatory medications, which includes naproxen  You can use over-the-counter Lidoderm patches and Voltaren gel for back pain, in addition to a muscle relaxer, Robaxin, which I prescribed  I have also prescribed a course of omeprazole for your gastritis

## 2022-02-19 NOTE — ED PROVIDER NOTES
History  Chief Complaint   Patient presents with    Abdominal Pain     PT "I have been here a couple of times this week  I can not stop throwing up  I actually threw up before coming here  I also have back pain and I have not really had a bowel movement since like Sat "     Back Pain     27 y/o female with hx of anxiety, peptic ulcer disease, and chronic back pain presents to the ER for evaluation of epigastric pain, back pain, and N/V  The patient has been evaluated several times in the ER for these symptoms, including yesterday  Prior workups revealed mild leukocytosis, downtrending, and CT A/P showing "Mildly enlarged - mildly fatty liver  Suspect mild distal esophagitis  Colonic diverticulosis without evidence of diverticulitis " Symptoms improved with symptomatic care and she was discharged with follow-up instructions for Gastroenterology  The patient had been offered admission during prior visit yesterday, however she states that she did not feel like she needed to be admitted and needs to go home to take care of her child  Her symptoms returned shortly after leaving the ER and have not abated  No new symptoms or complaints  Prior to Admission Medications   Prescriptions Last Dose Informant Patient Reported? Taking?    Diclofenac Sodium (VOLTAREN) 1 %   No No   Sig: Apply 2 g topically 4 (four) times a day   acetaminophen (TYLENOL) 325 mg tablet   No No   Sig: Take 2 tablets (650 mg total) by mouth every 6 (six) hours as needed for mild pain   bisacodyl (DULCOLAX) 10 mg suppository   No No   Sig: Insert 1 suppository (10 mg total) into the rectum daily for 16 days   cyproheptadine hcl 2 MG/5ML oral syrup   No No   Sig: Take 10 mL (4 mg total) by mouth 3 (three) times a day as needed for allergies   diphenhydrAMINE (BENADRYL) 25 mg tablet   No No   Sig: Take 1 tablet (25 mg total) by mouth every 6 (six) hours as needed for itching, allergies or sleep   docusate sodium (COLACE) 100 mg capsule No No   Sig: Take 1 capsule (100 mg total) by mouth 2 (two) times a day   lidocaine (LIDODERM) 5 %   No No   Sig: Apply 1 patch topically daily at bedtime Remove & Discard patch within 12 hours or as directed by MD   menthol-methyl salicylate (BENGAY) 77-77 % cream   No No   Sig: Apply topically 4 (four) times a day as needed (apply to back as needed)   methocarbamol (ROBAXIN) 500 mg tablet   No No   Sig: Take 1 tablet (500 mg total) by mouth every 6 (six) hours as needed for muscle spasms   metoclopramide (REGLAN) 5 mg tablet   No No   Sig: Take 2 tablets (10 mg total) by mouth every 6 (six) hours as needed (please cycle this with the zofran every 3 hrs)   ondansetron (ZOFRAN) 4 mg tablet   No No   Sig: Take 1 tablet (4 mg total) by mouth every 6 (six) hours as needed for nausea or vomiting   pantoprazole (PROTONIX) 40 mg tablet   No No   Sig: Take 1 tablet (40 mg total) by mouth 2 (two) times a day   scopolamine (TRANSDERM-SCOP) 1 5 mg/3 days TD 72 hr patch   No No   Sig: Place 1 patch on the skin every third day for 15 days   sucralfate (CARAFATE) 1 g tablet   No No   Sig: Take 1 tablet (1 g total) by mouth 4 (four) times a day for 5 days   sucralfate (CARAFATE) 1 g/10 mL suspension   No No   Sig: Take 10 mL (1 g total) by mouth 2 (two) times a day for 14 days      Facility-Administered Medications: None       Past Medical History:   Diagnosis Date    Arthritis     Asthma     History of stomach ulcers     Psychiatric disorder     anxiety       Past Surgical History:   Procedure Laterality Date    BREAST SURGERY      COSMETIC SURGERY      EGD      WISDOM TOOTH EXTRACTION         No family history on file  I have reviewed and agree with the history as documented      E-Cigarette/Vaping    E-Cigarette Use Never User      E-Cigarette/Vaping Substances    Nicotine No     THC Yes     CBD Yes     Flavoring No     Other No     Unknown No      Social History     Tobacco Use    Smoking status: Never Smoker  Smokeless tobacco: Never Used   Vaping Use    Vaping Use: Never used   Substance Use Topics    Alcohol use: Not Currently    Drug use: Not Currently     Types: Marijuana        Review of Systems   Constitutional: Negative for chills and fever  HENT: Negative for congestion, rhinorrhea and sore throat  Respiratory: Negative for cough and shortness of breath  Cardiovascular: Negative for chest pain and palpitations  Gastrointestinal: Positive for abdominal pain, nausea and vomiting  Negative for diarrhea  Genitourinary: Negative for dysuria and hematuria  Musculoskeletal: Positive for back pain  Negative for neck pain  Neurological: Negative for dizziness, weakness, light-headedness, numbness and headaches  All other systems reviewed and are negative  Physical Exam  ED Triage Vitals [02/18/22 1948]   Temperature Pulse Respirations Blood Pressure SpO2   99 °F (37 2 °C) (!) 132 (!) 26 139/82 99 %      Temp Source Heart Rate Source Patient Position - Orthostatic VS BP Location FiO2 (%)   Oral Monitor Lying Right arm --      Pain Score       10 - Worst Possible Pain             Orthostatic Vital Signs  Vitals:    02/18/22 1948 02/18/22 2238 02/18/22 2329   BP: 139/82  94/56   Pulse: (!) 132 88 88   Patient Position - Orthostatic VS: Lying  Lying       Physical Exam  Vitals and nursing note reviewed  Constitutional:       General: She is not in acute distress  Appearance: Normal appearance  She is well-developed and normal weight  She is not ill-appearing or toxic-appearing  Comments: Appears uncomfortable due to symptoms but otherwise in no acute distress  HENT:      Head: Normocephalic and atraumatic  Right Ear: External ear normal       Left Ear: External ear normal       Nose: Nose normal  No congestion or rhinorrhea  Mouth/Throat:      Mouth: Mucous membranes are moist       Pharynx: Oropharynx is clear  No oropharyngeal exudate or posterior oropharyngeal erythema  Eyes:      Extraocular Movements: Extraocular movements intact  Conjunctiva/sclera: Conjunctivae normal       Pupils: Pupils are equal, round, and reactive to light  Cardiovascular:      Rate and Rhythm: Normal rate and regular rhythm  Pulses: Normal pulses  Heart sounds: Normal heart sounds  No murmur heard  Pulmonary:      Effort: Pulmonary effort is normal  No respiratory distress  Breath sounds: Normal breath sounds  No wheezing or rales  Abdominal:      General: Abdomen is flat  Bowel sounds are normal  There is no distension  Palpations: Abdomen is soft  Tenderness: There is abdominal tenderness in the epigastric area  There is no right CVA tenderness, left CVA tenderness or guarding  Musculoskeletal:         General: No swelling or tenderness  Normal range of motion  Cervical back: Normal range of motion and neck supple  No tenderness  Skin:     General: Skin is warm and dry  Capillary Refill: Capillary refill takes less than 2 seconds  Neurological:      General: No focal deficit present  Mental Status: She is alert and oriented to person, place, and time           ED Medications  Medications   OLANZapine (ZyPREXA ZYDIS) dispersible tablet 10 mg (10 mg Oral Given 2/18/22 2031)   sodium chloride 0 9 % bolus 1,000 mL (0 mL Intravenous Stopped 2/18/22 2329)   pantoprazole (PROTONIX) EC tablet 40 mg (40 mg Oral Given 2/19/22 0041)   methocarbamol (ROBAXIN) tablet 500 mg (500 mg Oral Given 2/19/22 0041)   sucralfate (CARAFATE) tablet 1 g (1 g Oral Given 2/19/22 0041)       Diagnostic Studies  Results Reviewed     Procedure Component Value Units Date/Time    Comprehensive metabolic panel [[id number]]  (Abnormal) Collected: 02/18/22 2042    Lab Status: Final result Specimen: Blood from Arm, Left Updated: 02/18/22 2108     Sodium 134 mmol/L      Potassium 4 1 mmol/L      Chloride 99 mmol/L      CO2 28 mmol/L      ANION GAP 7 mmol/L      BUN 14 mg/dL Creatinine 0 94 mg/dL      Glucose 101 mg/dL      Calcium 9 5 mg/dL      AST 43 U/L      ALT 30 U/L      Alkaline Phosphatase 77 U/L      Total Protein 8 6 g/dL      Albumin 4 2 g/dL      Total Bilirubin 0 67 mg/dL      eGFR 81 ml/min/1 73sq m     Narrative:      [facility] guidelines for Chronic Kidney Disease (CKD):     Stage 1 with normal or high GFR (GFR > 90 mL/min/1 73 square meters)    Stage 2 Mild CKD (GFR = 60-89 mL/min/1 73 square meters)    Stage 3A Moderate CKD (GFR = 45-59 mL/min/1 73 square meters)    Stage 3B Moderate CKD (GFR = 30-44 mL/min/1 73 square meters)    Stage 4 Severe CKD (GFR = 15-29 mL/min/1 73 square meters)    Stage 5 End Stage CKD (GFR <15 mL/min/1 73 square meters)  Note: GFR calculation is accurate only with a steady state creatinine    Lipase [[id number]]  (Normal) Collected: 02/18/22 2042    Lab Status: Final result Specimen: Blood from Arm, Left Updated: 02/18/22 2108     Lipase 116 u/L     CBC and differential [[id number]]  (Abnormal) Collected: 02/18/22 2042    Lab Status: Final result Specimen: Blood from Arm, Left Updated: 02/18/22 2056     WBC 13 00 Thousand/uL      RBC 5 18 Million/uL      Hemoglobin 15 4 g/dL      Hematocrit 44 7 %      MCV 86 fL      MCH 29 7 pg      MCHC 34 5 g/dL      RDW 12 7 %      MPV 11 3 fL      Platelets 540 Thousands/uL      nRBC 0 /100 WBCs      Neutrophils Relative 74 %      Immat GRANS % 1 %      Lymphocytes Relative 18 %      Monocytes Relative 6 %      Eosinophils Relative 0 %      Basophils Relative 1 %      Neutrophils Absolute 9 81 Thousands/µL      Immature Grans Absolute 0 06 Thousand/uL      Lymphocytes Absolute 2 27 Thousands/µL      Monocytes Absolute 0 76 Thousand/µL      Eosinophils Absolute 0 04 Thousand/µL      Basophils Absolute 0 06 Thousands/µL                  No orders to display         Procedures  Procedures      ED Course  ED Course as of 02/28/22 0627 Fri Feb 18, 2022 2106 WBC(!): 13 00 2207 Lipase: 116                                       MDM  Number of Diagnoses or Management Options  Abdominal pain  Gastritis  Low back pain  Nausea and vomiting  Diagnosis management comments: This is a 29 y/o female with hx of anxiety, peptic ulcer disease, and chronic back pain presenting for evaluation of epigastric pain, back pain, and N/V  The patient has been evaluated several times in the ER for these symptoms, including yesterday  Prior workups revealed mild leukocytosis, downtrending, and CT A/P showing "Mildly enlarged - mildly fatty liver  Suspect mild distal esophagitis  Colonic diverticulosis without evidence of diverticulitis " Symptoms improved with symptomatic care and she was discharged with follow-up instructions for Gastroenterology  The patient had been offered admission during prior visit yesterday, however she states that she did not feel like she needed to be admitted and needs to go home to take care of her child  Her symptoms returned shortly after leaving the ER and have not abated  No new symptoms or complaints  On exam the patient is afebrile, VSS, appears uncomfortable due to symptoms but otherwise in no acute distress  Patient with generalized epigastric tenderness to palpation, otherwise abdomen is soft, nondistended, no peritoneal signs  Remainder of exam is within normal limits and non-contributory  Repeat labs are unremarkable, leukocytosis is downtrending  On further evaluation, the patient notes that her peptic ulcer disease secondary to NSAIDs has caused these symptoms in the past, however she did not think that she was currently on any NSAIDs  The patient then asks "is naproxen an NSAID?" Advised the patient to discontinue naproxen use and provided the patient with prescription for omeprazole for symptoms as well as Robaxin and Voltaren gel for her back pain  Recommended GI follow-up   Discussed all findings, treatment, red flags/return precautions, and outpatient follow-up and the patient/family understands and agrees  Stable for discharge  Disposition  Final diagnoses:   Abdominal pain   Low back pain   Gastritis   Nausea and vomiting     Time reflects when diagnosis was documented in both MDM as applicable and the Disposition within this note     Time User Action Codes Description Comment    2/19/2022 12:37 AM Yvone Push Add [R10 9] Abdominal pain     2/19/2022 12:37 AM Yvone Push Add [M54 50] Low back pain     2/19/2022 12:38 AM Yvone Push Add [K29 70] Gastritis     2/19/2022 12:38 AM Yvone Push Add [R11 2] Nausea and vomiting       ED Disposition     ED Disposition Condition Date/Time Comment    Discharge Stable Sat Feb 19, 2022 12:37 AM Shanna Martines discharge to home/self care  Follow-up Information     Follow up With Specialties Details Why Contact Info Additional Information    Corrie Heller DO Family Medicine Call in 1 day For follow up [address] [facility] [address] [facility] [address] Emergency Department Emergency Medicine Go to  If symptoms worsen [address] [place] [address] Emergency Department, [address] [place] [address], [address] [facility] [id number] Program Physical Therapy Call  As needed     Lane Nar[facility]an [facility] Call in 1 day For follow up [address] [facility] [address] [place] [phone]  Mary Cruz [facility], [address] [place] [address] 20,  [address], [place], [place], [address]          Discharge Medication List as of 2/19/2022 12:43 AM      START taking these medications    Details   !!  Diclofenac Sodium (VOLTAREN) 1 % Apply 2 g topically 4 (four) times a day as needed (back pain) for up to 7 days, Starting Sat 2/19/2022, Until Sat 2/26/2022 at 2359, Normal      !! methocarbamol (ROBAXIN) 500 mg tablet Take 1 tablet (500 mg total) by mouth 2 (two) times a day, Starting Sat 2/19/2022, Normal      omeprazole (PriLOSEC) 20 mg delayed release capsule Take 1 capsule (20 mg total) by mouth daily for 14 days, Starting Sat 2/19/2022, Until Sat 3/5/2022, Normal       !! - Potential duplicate medications found  Please discuss with provider  CONTINUE these medications which have NOT CHANGED    Details   acetaminophen (TYLENOL) 325 mg tablet Take 2 tablets (650 mg total) by mouth every 6 (six) hours as needed for mild pain, Starting Sat 12/5/2020, Normal      bisacodyl (DULCOLAX) 10 mg suppository Insert 1 suppository (10 mg total) into the rectum daily for 16 days, Starting Sun 1/24/2021, Until Tue 2/9/2021, Normal      cyproheptadine hcl 2 MG/5ML oral syrup Take 10 mL (4 mg total) by mouth 3 (three) times a day as needed for allergies, Starting Sat 1/23/2021, Normal      !!  Diclofenac Sodium (VOLTAREN) 1 % Apply 2 g topically 4 (four) times a day, Starting Sat 1/23/2021, Normal      diphenhydrAMINE (BENADRYL) 25 mg tablet Take 1 tablet (25 mg total) by mouth every 6 (six) hours as needed for itching, allergies or sleep, Starting Sat 1/23/2021, Normal      docusate sodium (COLACE) 100 mg capsule Take 1 capsule (100 mg total) by mouth 2 (two) times a day, Starting Sat 1/23/2021, Normal      lidocaine (LIDODERM) 5 % Apply 1 patch topically daily at bedtime Remove & Discard patch within 12 hours or as directed by MD, Starting Sat 1/23/2021, Normal      menthol-methyl salicylate (BENGAY) 38-66 % cream Apply topically 4 (four) times a day as needed (apply to back as needed), Starting Sat 1/23/2021, Normal      !! methocarbamol (ROBAXIN) 500 mg tablet Take 1 tablet (500 mg total) by mouth every 6 (six) hours as needed for muscle spasms, Starting Sat 1/23/2021, Normal      metoclopramide (REGLAN) 5 mg tablet Take 2 tablets (10 mg total) by mouth every 6 (six) hours as needed (please cycle this with the zofran every 3 hrs), Starting Sat 1/23/2021, Normal      ondansetron (ZOFRAN) 4 mg tablet Take 1 tablet (4 mg total) by mouth every 6 (six) hours as needed for nausea or vomiting, Starting Thu 2/17/2022, Normal      pantoprazole (PROTONIX) 40 mg tablet Take 1 tablet (40 mg total) by mouth 2 (two) times a day, Starting Sat 1/23/2021, Normal      scopolamine (TRANSDERM-SCOP) 1 5 mg/3 days TD 72 hr patch Place 1 patch on the skin every third day for 15 days, Starting Sat 1/23/2021, Until Sun 2/7/2021, Normal      sucralfate (CARAFATE) 1 g tablet Take 1 tablet (1 g total) by mouth 4 (four) times a day for 5 days, Starting Thu 2/17/2022, Until Tue 2/22/2022, Normal      sucralfate (CARAFATE) 1 g/10 mL suspension Take 10 mL (1 g total) by mouth 2 (two) times a day for 14 days, Starting Wed 2/16/2022, Until Wed 3/2/2022, Normal      ondansetron (ZOFRAN-ODT) 4 mg disintegrating tablet Take 1 tablet (4 mg total) by mouth every 6 (six) hours as needed for nausea or vomiting, Starting Wed 2/16/2022, Normal       !! - Potential duplicate medications found  Please discuss with provider  No discharge procedures on file  PDMP Review       Value Time User    PDMP Reviewed  Yes 1/20/2021  2:27 PM [place] Ramírez PA-C           ED Provider  Attending physically available and evaluated Mera Hernandes  I managed the patient along with the ED Attending      Electronically Signed by         Becky Severe, MD  02/28/22 6766

## 2022-02-22 ENCOUNTER — HOSPITAL ENCOUNTER (EMERGENCY)
Facility: HOSPITAL | Age: 32
Discharge: HOME | End: 2022-02-22
Attending: EMERGENCY MEDICINE
Payer: COMMERCIAL

## 2022-02-22 ENCOUNTER — APPOINTMENT (EMERGENCY)
Dept: RADIOLOGY | Facility: HOSPITAL | Age: 32
End: 2022-02-22
Payer: COMMERCIAL

## 2022-02-22 VITALS
RESPIRATION RATE: 18 BRPM | DIASTOLIC BLOOD PRESSURE: 90 MMHG | OXYGEN SATURATION: 96 % | SYSTOLIC BLOOD PRESSURE: 135 MMHG | HEART RATE: 105 BPM | TEMPERATURE: 98.8 F

## 2022-02-22 DIAGNOSIS — R10.9 ABDOMINAL PAIN: Primary | ICD-10-CM

## 2022-02-22 DIAGNOSIS — R11.2 NAUSEA AND VOMITING: ICD-10-CM

## 2022-02-22 LAB
ALBUMIN SERPL BCP-MCNC: 4.3 G/DL (ref 3.5–5)
ALP SERPL-CCNC: 88 U/L (ref 46–116)
ALT SERPL W P-5'-P-CCNC: 40 U/L (ref 12–78)
ANION GAP SERPL CALCULATED.3IONS-SCNC: 7 MMOL/L (ref 4–13)
AST SERPL W P-5'-P-CCNC: 19 U/L (ref 5–45)
BASOPHILS # BLD AUTO: 0.06 THOUSANDS/ΜL (ref 0–0.1)
BASOPHILS NFR BLD AUTO: 0 % (ref 0–1)
BILIRUB SERPL-MCNC: 0.89 MG/DL (ref 0.2–1)
BUN SERPL-MCNC: 7 MG/DL (ref 5–25)
CALCIUM SERPL-MCNC: 9.5 MG/DL (ref 8.3–10.1)
CHLORIDE SERPL-SCNC: 100 MMOL/L (ref 100–108)
CO2 SERPL-SCNC: 26 MMOL/L (ref 21–32)
CREAT SERPL-MCNC: 0.8 MG/DL (ref 0.6–1.3)
EOSINOPHIL # BLD AUTO: 0.15 THOUSAND/ΜL (ref 0–0.61)
EOSINOPHIL NFR BLD AUTO: 1 % (ref 0–6)
ERYTHROCYTE [DISTWIDTH] IN BLOOD BY AUTOMATED COUNT: 12.7 % (ref 11.6–15.1)
GFR SERPL CREATININE-BSD FRML MDRD: 99 ML/MIN/1.73SQ M
GLUCOSE SERPL-MCNC: 121 MG/DL (ref 65–140)
HCG SERPL QL: NEGATIVE
HCT VFR BLD AUTO: 47 % (ref 34.8–46.1)
HGB BLD-MCNC: 16.3 G/DL (ref 11.5–15.4)
IMM GRANULOCYTES # BLD AUTO: 0.04 THOUSAND/UL (ref 0–0.2)
IMM GRANULOCYTES NFR BLD AUTO: 0 % (ref 0–2)
LIPASE SERPL-CCNC: 76 U/L (ref 73–393)
LYMPHOCYTES # BLD AUTO: 3.05 THOUSANDS/ΜL (ref 0.6–4.47)
LYMPHOCYTES NFR BLD AUTO: 22 % (ref 14–44)
MCH RBC QN AUTO: 29.3 PG (ref 26.8–34.3)
MCHC RBC AUTO-ENTMCNC: 34.7 G/DL (ref 31.4–37.4)
MCV RBC AUTO: 85 FL (ref 82–98)
MONOCYTES # BLD AUTO: 1.03 THOUSAND/ΜL (ref 0.17–1.22)
MONOCYTES NFR BLD AUTO: 7 % (ref 4–12)
NEUTROPHILS # BLD AUTO: 9.73 THOUSANDS/ΜL (ref 1.85–7.62)
NEUTS SEG NFR BLD AUTO: 70 % (ref 43–75)
NRBC BLD AUTO-RTO: 0 /100 WBCS
PLATELET # BLD AUTO: 310 THOUSANDS/UL (ref 149–390)
PMV BLD AUTO: 10.8 FL (ref 8.9–12.7)
POTASSIUM SERPL-SCNC: 3.1 MMOL/L (ref 3.5–5.3)
PROT SERPL-MCNC: 8.7 G/DL (ref 6.4–8.2)
RBC # BLD AUTO: 5.56 MILLION/UL (ref 3.81–5.12)
SODIUM SERPL-SCNC: 133 MMOL/L (ref 136–145)
WBC # BLD AUTO: 14.06 THOUSAND/UL (ref 4.31–10.16)

## 2022-02-22 PROCEDURE — 80053 COMPREHEN METABOLIC PANEL: CPT | Performed by: EMERGENCY MEDICINE

## 2022-02-22 PROCEDURE — 99284 EMERGENCY DEPT VISIT MOD MDM: CPT

## 2022-02-22 PROCEDURE — 84703 CHORIONIC GONADOTROPIN ASSAY: CPT | Performed by: EMERGENCY MEDICINE

## 2022-02-22 PROCEDURE — 96361 HYDRATE IV INFUSION ADD-ON: CPT

## 2022-02-22 PROCEDURE — 96374 THER/PROPH/DIAG INJ IV PUSH: CPT

## 2022-02-22 PROCEDURE — 85025 COMPLETE CBC W/AUTO DIFF WBC: CPT | Performed by: EMERGENCY MEDICINE

## 2022-02-22 PROCEDURE — G1004 CDSM NDSC: HCPCS

## 2022-02-22 PROCEDURE — 83690 ASSAY OF LIPASE: CPT | Performed by: EMERGENCY MEDICINE

## 2022-02-22 PROCEDURE — 36415 COLL VENOUS BLD VENIPUNCTURE: CPT | Performed by: EMERGENCY MEDICINE

## 2022-02-22 PROCEDURE — 74177 CT ABD & PELVIS W/CONTRAST: CPT

## 2022-02-22 PROCEDURE — 99285 EMERGENCY DEPT VISIT HI MDM: CPT | Performed by: EMERGENCY MEDICINE

## 2022-02-22 PROCEDURE — 96375 TX/PRO/DX INJ NEW DRUG ADDON: CPT

## 2022-02-22 PROCEDURE — C9113 INJ PANTOPRAZOLE SODIUM, VIA: HCPCS | Performed by: EMERGENCY MEDICINE

## 2022-02-22 RX ORDER — ONDANSETRON 4 MG/1
4 TABLET, ORALLY DISINTEGRATING ORAL EVERY 6 HOURS PRN
Qty: 20 TABLET | Refills: 0 | Status: ON HOLD | OUTPATIENT
Start: 2022-02-22 | End: 2022-05-27 | Stop reason: SDUPTHER

## 2022-02-22 RX ORDER — HYDROMORPHONE HCL/PF 1 MG/ML
1 SYRINGE (ML) INJECTION ONCE
Status: COMPLETED | OUTPATIENT
Start: 2022-02-22 | End: 2022-02-22

## 2022-02-22 RX ORDER — ONDANSETRON 4 MG/1
4 TABLET, ORALLY DISINTEGRATING ORAL EVERY 6 HOURS PRN
Qty: 20 TABLET | Refills: 0 | Status: SHIPPED | OUTPATIENT
Start: 2022-02-22 | End: 2022-02-22 | Stop reason: SDUPTHER

## 2022-02-22 RX ORDER — PANTOPRAZOLE SODIUM 40 MG/1
40 INJECTION, POWDER, FOR SOLUTION INTRAVENOUS ONCE
Status: COMPLETED | OUTPATIENT
Start: 2022-02-22 | End: 2022-02-22

## 2022-02-22 RX ORDER — ONDANSETRON 2 MG/ML
4 INJECTION INTRAMUSCULAR; INTRAVENOUS ONCE
Status: COMPLETED | OUTPATIENT
Start: 2022-02-22 | End: 2022-02-22

## 2022-02-22 RX ADMIN — ONDANSETRON 4 MG: 2 INJECTION INTRAMUSCULAR; INTRAVENOUS at 11:20

## 2022-02-22 RX ADMIN — PANTOPRAZOLE SODIUM 40 MG: 40 INJECTION, POWDER, FOR SOLUTION INTRAVENOUS at 11:30

## 2022-02-22 RX ADMIN — IOHEXOL 100 ML: 350 INJECTION, SOLUTION INTRAVENOUS at 12:02

## 2022-02-22 RX ADMIN — HYDROMORPHONE HYDROCHLORIDE 1 MG: 1 INJECTION, SOLUTION INTRAMUSCULAR; INTRAVENOUS; SUBCUTANEOUS at 11:20

## 2022-02-22 RX ADMIN — SODIUM CHLORIDE 1000 ML: 0.9 INJECTION, SOLUTION INTRAVENOUS at 11:21

## 2022-02-22 NOTE — ED ATTENDING ATTESTATION
2/17/2022  IZuleyka MD, saw and evaluated the patient  I have discussed the patient with the resident/non-physician practitioner and agree with the resident's/non-physician practitioner's findings, Plan of Care, and MDM as documented in the resident's/non-physician practitioner's note, except where noted  All available labs and Radiology studies were reviewed  I was present for key portions of any procedure(s) performed by the resident/non-physician practitioner and I was immediately available to provide assistance  At this point I agree with the current assessment done in the Emergency Department  I have conducted an independent evaluation of this patient a history and physical is as follows:    ED Course     Patient presents for evaluation due to 4 days of abdominal pain and vomiting  Patient was seen in the emergency department yesterday for same complaints  At that time, patient had an elevated white blood cell count and a CT scan which showed mild distal esophagitis  Patient was given Zofran, morphine, and a GI cocktail with improvement and she was discharged  Today, patient states that pain has returned  No additional complaints  Exam: AAOx3, mild distress due to pain, RRR, CTA, generalized abdominal tenderness  A/P:  Abdominal pain, nausea  Given elevated white blood cell count yesterday, will repeat blood work  If white count is higher, will consider repeat CT scan  Will treat symptoms and will trial Haldol for possible functional abdominal pain      Critical Care Time  Procedures

## 2022-02-22 NOTE — DISCHARGE INSTRUCTIONS
You were seen in the ED today for your symptoms  Take 1 tablet of zofran every 6 hours as needed for your nausea  Follow up with our GI doctors  Call them at the number attached here  Return to the ED if you have any concerns

## 2022-02-22 NOTE — ED PROVIDER NOTES
History  Chief Complaint   Patient presents with    Vomiting     pt has been seen many times in the last week for vomiting and green diarrhea, she states that she has called GI and they have not called her back  81-year-old female no significant past medical history that presents ED today for about pain and nausea vomiting  Patient has been seen by Department multiple visits over the last week  Patient has had diffuse a tenderness  She said that she had trouble having a bowel movement in the last couple days  Today she had multiple episodes of green watery diarrhea  She also continued to have abdominal tenderness  She was concerned and came into the ED for re-evaluation  Here in the ED she is tearful on exam   Complaining of abdominal pain all over abdomen  Nausea  No episodes of vomiting here but she says she has been vomiting at home  Unable to keep any p o  Intake  She was also be seeing gastroenterology as an outpatient over at [facility] however they have not returned her calls  Patient was requesting to be seen by our [facility] gastroenterology doctors  She was post we being worked up for gastroparesis in the past   She was also will schedule colonoscopy due to her diverticulosis however she got pregnant and therefore did not ever colonoscopy  Prior to Admission Medications   Prescriptions Last Dose Informant Patient Reported? Taking?    Diclofenac Sodium (VOLTAREN) 1 %   No No   Sig: Apply 2 g topically 4 (four) times a day   Diclofenac Sodium (VOLTAREN) 1 %   No No   Sig: Apply 2 g topically 4 (four) times a day as needed (back pain) for up to 7 days   acetaminophen (TYLENOL) 325 mg tablet   No No   Sig: Take 2 tablets (650 mg total) by mouth every 6 (six) hours as needed for mild pain   bisacodyl (DULCOLAX) 10 mg suppository   No No   Sig: Insert 1 suppository (10 mg total) into the rectum daily for 16 days   cyproheptadine hcl 2 MG/5ML oral syrup   No No   Sig: Take 10 mL (4 mg total) by mouth 3 (three) times a day as needed for allergies   diphenhydrAMINE (BENADRYL) 25 mg tablet   No No   Sig: Take 1 tablet (25 mg total) by mouth every 6 (six) hours as needed for itching, allergies or sleep   docusate sodium (COLACE) 100 mg capsule   No No   Sig: Take 1 capsule (100 mg total) by mouth 2 (two) times a day   lidocaine (LIDODERM) 5 %   No No   Sig: Apply 1 patch topically daily at bedtime Remove & Discard patch within 12 hours or as directed by MD   menthol-methyl salicylate (BENGAY) 01-59 % cream   No No   Sig: Apply topically 4 (four) times a day as needed (apply to back as needed)   methocarbamol (ROBAXIN) 500 mg tablet   No No   Sig: Take 1 tablet (500 mg total) by mouth every 6 (six) hours as needed for muscle spasms   methocarbamol (ROBAXIN) 500 mg tablet   No No   Sig: Take 1 tablet (500 mg total) by mouth 2 (two) times a day   metoclopramide (REGLAN) 5 mg tablet   No No   Sig: Take 2 tablets (10 mg total) by mouth every 6 (six) hours as needed (please cycle this with the zofran every 3 hrs)   omeprazole (PriLOSEC) 20 mg delayed release capsule   No No   Sig: Take 1 capsule (20 mg total) by mouth daily for 14 days   ondansetron (ZOFRAN) 4 mg tablet   No No   Sig: Take 1 tablet (4 mg total) by mouth every 6 (six) hours as needed for nausea or vomiting   pantoprazole (PROTONIX) 40 mg tablet   No No   Sig: Take 1 tablet (40 mg total) by mouth 2 (two) times a day   scopolamine (TRANSDERM-SCOP) 1 5 mg/3 days TD 72 hr patch   No No   Sig: Place 1 patch on the skin every third day for 15 days   sucralfate (CARAFATE) 1 g tablet   No No   Sig: Take 1 tablet (1 g total) by mouth 4 (four) times a day for 5 days   sucralfate (CARAFATE) 1 g/10 mL suspension   No No   Sig: Take 10 mL (1 g total) by mouth 2 (two) times a day for 14 days      Facility-Administered Medications: None       Past Medical History:   Diagnosis Date    Arthritis     Asthma     History of stomach ulcers     Psychiatric disorder     anxiety       Past Surgical History:   Procedure Laterality Date    BREAST SURGERY      COSMETIC SURGERY      EGD      WISDOM TOOTH EXTRACTION         History reviewed  No pertinent family history  I have reviewed and agree with the history as documented  E-Cigarette/Vaping    E-Cigarette Use Never User      E-Cigarette/Vaping Substances    Nicotine No     THC Yes     CBD Yes     Flavoring No     Other No     Unknown No      Social History     Tobacco Use    Smoking status: Never Smoker    Smokeless tobacco: Never Used   Vaping Use    Vaping Use: Never used   Substance Use Topics    Alcohol use: Not Currently    Drug use: Not Currently     Types: Marijuana        Review of Systems   Constitutional: Negative for chills and fever  HENT: Negative for hearing loss  Eyes: Negative for visual disturbance  Respiratory: Negative for shortness of breath  Cardiovascular: Negative for chest pain  Gastrointestinal: Positive for abdominal pain, diarrhea, nausea and vomiting  Negative for constipation  Genitourinary: Negative for difficulty urinating  Musculoskeletal: Negative for myalgias  Skin: Negative for color change  Neurological: Negative for dizziness and headaches  Psychiatric/Behavioral: Negative for agitation  All other systems reviewed and are negative  Physical Exam  ED Triage Vitals [02/22/22 1032]   Temperature Pulse Respirations Blood Pressure SpO2   98 8 °F (37 1 °C) (!) 116 20 144/96 96 %      Temp src Heart Rate Source Patient Position - Orthostatic VS BP Location FiO2 (%)   -- -- -- -- --      Pain Score       7             Orthostatic Vital Signs  Vitals:    02/22/22 1032 02/22/22 1232   BP: 144/96 135/90   Pulse: (!) 116 105       Physical Exam  Vitals and nursing note reviewed  Constitutional:       General: She is in acute distress  Appearance: Normal appearance  She is well-developed  She is not ill-appearing     HENT: Head: Normocephalic and atraumatic  Right Ear: External ear normal       Left Ear: External ear normal       Nose: Nose normal  No congestion  Mouth/Throat:      Mouth: Mucous membranes are moist       Pharynx: Oropharynx is clear  No oropharyngeal exudate  Eyes:      General:         Right eye: No discharge  Left eye: No discharge  Extraocular Movements: Extraocular movements intact  Conjunctiva/sclera: Conjunctivae normal       Pupils: Pupils are equal, round, and reactive to light  Cardiovascular:      Rate and Rhythm: Normal rate and regular rhythm  Heart sounds: Normal heart sounds  No murmur heard  No friction rub  No gallop  Pulmonary:      Effort: Pulmonary effort is normal  No respiratory distress  Breath sounds: Normal breath sounds  No stridor  No wheezing  Abdominal:      General: Bowel sounds are normal  There is no distension  Palpations: Abdomen is soft  Tenderness: There is abdominal tenderness  Musculoskeletal:         General: No swelling  Normal range of motion  Cervical back: Normal range of motion and neck supple  No rigidity  Skin:     General: Skin is warm and dry  Capillary Refill: Capillary refill takes less than 2 seconds  Findings: No lesion  Neurological:      General: No focal deficit present  Mental Status: She is alert and oriented to person, place, and time  Mental status is at baseline  Cranial Nerves: No cranial nerve deficit  Motor: No weakness     Psychiatric:         Mood and Affect: Mood normal          Behavior: Behavior normal          ED Medications  Medications   sodium chloride 0 9 % bolus 1,000 mL (0 mL Intravenous Stopped 2/22/22 1221)   ondansetron (ZOFRAN) injection 4 mg (4 mg Intravenous Given 2/22/22 1120)   HYDROmorphone (DILAUDID) injection 1 mg (1 mg Intravenous Given 2/22/22 1120)   pantoprazole (PROTONIX) injection 40 mg (40 mg Intravenous Given 2/22/22 1130)   iohexol (OMNIPAQUE) 350 MG/ML injection (SINGLE-DOSE) 100 mL (100 mL Intravenous Given 2/22/22 1202)       Diagnostic Studies  Results Reviewed     Procedure Component Value Units Date/Time    Pregnancy Test (HCG Qualitative) [[id number]]  (Normal) Collected: 02/22/22 1120    Lab Status: Final result Specimen: Blood from Arm, Right Updated: 02/22/22 1148     Preg, Serum Negative    Comprehensive metabolic panel [[id number]]  (Abnormal) Collected: 02/22/22 1120    Lab Status: Final result Specimen: Blood from Arm, Right Updated: 02/22/22 1147     Sodium 133 mmol/L      Potassium 3 1 mmol/L      Chloride 100 mmol/L      CO2 26 mmol/L      ANION GAP 7 mmol/L      BUN 7 mg/dL      Creatinine 0 80 mg/dL      Glucose 121 mg/dL      Calcium 9 5 mg/dL      AST 19 U/L      ALT 40 U/L      Alkaline Phosphatase 88 U/L      Total Protein 8 7 g/dL      Albumin 4 3 g/dL      Total Bilirubin 0 89 mg/dL      eGFR 99 ml/min/1 73sq m     Narrative:      [place] guidelines for Chronic Kidney Disease (CKD):     Stage 1 with normal or high GFR (GFR > 90 mL/min/1 73 square meters)    Stage 2 Mild CKD (GFR = 60-89 mL/min/1 73 square meters)    Stage 3A Moderate CKD (GFR = 45-59 mL/min/1 73 square meters)    Stage 3B Moderate CKD (GFR = 30-44 mL/min/1 73 square meters)    Stage 4 Severe CKD (GFR = 15-29 mL/min/1 73 square meters)    Stage 5 End Stage CKD (GFR <15 mL/min/1 73 square meters)  Note: GFR calculation is accurate only with a steady state creatinine    Lipase [[id number]]  (Normal) Collected: 02/22/22 1120    Lab Status: Final result Specimen: Blood from Arm, Right Updated: 02/22/22 1147     Lipase 76 u/L     CBC and differential [[id number]]  (Abnormal) Collected: 02/22/22 1120    Lab Status: Final result Specimen: Blood from Arm, Right Updated: 02/22/22 1130     WBC 14 06 Thousand/uL      RBC 5 56 Million/uL      Hemoglobin 16 3 g/dL      Hematocrit 47 0 %      MCV 85 fL      MCH 29 3 pg      MCHC 34 7 g/dL RDW 12 7 %      MPV 10 8 fL      Platelets 585 Thousands/uL      nRBC 0 /100 WBCs      Neutrophils Relative 70 %      Immat GRANS % 0 %      Lymphocytes Relative 22 %      Monocytes Relative 7 %      Eosinophils Relative 1 %      Basophils Relative 0 %      Neutrophils Absolute 9 73 Thousands/µL      Immature Grans Absolute 0 04 Thousand/uL      Lymphocytes Absolute 3 05 Thousands/µL      Monocytes Absolute 1 03 Thousand/µL      Eosinophils Absolute 0 15 Thousand/µL      Basophils Absolute 0 06 Thousands/µL                  CT abdomen pelvis with contrast   Final Result by Noemí Mccloud MD (02/22 1244)      No acute intra-abdominal abnormality  Workstation performed: [id number]               Procedures  Procedures      ED Course  ED Course as of 02/22/22 1511   Tue Feb 22, 2022   1134 Hemoglobin(!): 16 3  Likely hemoconcentrated                                       MDM  Number of Diagnoses or Management Options  Abdominal pain  Nausea and vomiting  Diagnosis management comments: 27-year-old female presents to the ED today for abdominal pain nausea vomiting  At this time last CT scan was February 16th about 5 days ago  Will re-evaluate with a CT abdomen pelvis with contrast, CBC, CMP, Lipase, IV dialudid, IV zofran  Patient reassessed  Patient pain improved  Discussed lab work with patient  Patient discharged and given referral to GI  Strict return to ER precautions given and patient was discharged home        Disposition  Final diagnoses:   Abdominal pain   Nausea and vomiting     Time reflects when diagnosis was documented in both MDM as applicable and the Disposition within this note     Time User Action Codes Description Comment    2/22/2022  1:21 PM Nathaniel Barrera Add [R10 9] Abdominal pain     2/22/2022  1:22 PM Nathaniel Barrera Add [R11 2] Nausea and vomiting       ED Disposition     ED Disposition Condition Date/Time Comment    Discharge Stable Tue Feb 22, 2022  1:21 PM Claretha Call [address] discharge to home/self care  Follow-up Information     Follow up With Specialties Details Why Contact Info Additional Information    Ashley Hodge DO Family Medicine Schedule an appointment as soon as possible for a visit in 2 days  [facility] [address]       [facility] [address] Gastroenterology   [address] [address] [phone] [facility] [address], [address], [address], [place], [address]    [address] [facility] Emergency Department Emergency Medicine Go to  If symptoms worsen, As needed [address] Emergency Department, [address] [facility], [address], [place], [address]   [phone]          Discharge Medication List as of 2/22/2022  1:24 PM      START taking these medications    Details   !! ondansetron (Zofran ODT) 4 mg disintegrating tablet Take 1 tablet (4 mg total) by mouth every 6 (six) hours as needed for nausea or vomiting, Starting Tue 2/22/2022, Normal       !! - Potential duplicate medications found  Please discuss with provider  CONTINUE these medications which have NOT CHANGED    Details   acetaminophen (TYLENOL) 325 mg tablet Take 2 tablets (650 mg total) by mouth every 6 (six) hours as needed for mild pain, Starting Sat 12/5/2020, Normal      bisacodyl (DULCOLAX) 10 mg suppository Insert 1 suppository (10 mg total) into the rectum daily for 16 days, Starting Sun 1/24/2021, Until Tue 2/9/2021, Normal      cyproheptadine hcl 2 MG/5ML oral syrup Take 10 mL (4 mg total) by mouth 3 (three) times a day as needed for allergies, Starting Sat 1/23/2021, Normal      !! Diclofenac Sodium (VOLTAREN) 1 % Apply 2 g topically 4 (four) times a day, Starting Sat 1/23/2021, Normal      !!  Diclofenac Sodium (VOLTAREN) 1 % Apply 2 g topically 4 (four) times a day as needed (back pain) for up to 7 days, Starting Sat 2/19/2022, Until Sat 2/26/2022 at 2359, Normal      diphenhydrAMINE (BENADRYL) 25 mg tablet Take 1 tablet (25 mg total) by mouth every 6 (six) hours as needed for itching, allergies or sleep, Starting Sat 1/23/2021, Normal      docusate sodium (COLACE) 100 mg capsule Take 1 capsule (100 mg total) by mouth 2 (two) times a day, Starting Sat 1/23/2021, Normal      lidocaine (LIDODERM) 5 % Apply 1 patch topically daily at bedtime Remove & Discard patch within 12 hours or as directed by MD, Starting Sat 1/23/2021, Normal      menthol-methyl salicylate (BENGAY) 70-82 % cream Apply topically 4 (four) times a day as needed (apply to back as needed), Starting Sat 1/23/2021, Normal      !! methocarbamol (ROBAXIN) 500 mg tablet Take 1 tablet (500 mg total) by mouth every 6 (six) hours as needed for muscle spasms, Starting Sat 1/23/2021, Normal      !! methocarbamol (ROBAXIN) 500 mg tablet Take 1 tablet (500 mg total) by mouth 2 (two) times a day, Starting Sat 2/19/2022, Normal      metoclopramide (REGLAN) 5 mg tablet Take 2 tablets (10 mg total) by mouth every 6 (six) hours as needed (please cycle this with the zofran every 3 hrs), Starting Sat 1/23/2021, Normal      omeprazole (PriLOSEC) 20 mg delayed release capsule Take 1 capsule (20 mg total) by mouth daily for 14 days, Starting Sat 2/19/2022, Until Sat 3/5/2022, Normal      ondansetron (ZOFRAN) 4 mg tablet Take 1 tablet (4 mg total) by mouth every 6 (six) hours as needed for nausea or vomiting, Starting u 2/17/2022, Normal      pantoprazole (PROTONIX) 40 mg tablet Take 1 tablet (40 mg total) by mouth 2 (two) times a day, Starting Sat 1/23/2021, Normal      scopolamine (TRANSDERM-SCOP) 1 5 mg/3 days TD 72 hr patch Place 1 patch on the skin every third day for 15 days, Starting Sat 1/23/2021, Until Sun 2/7/2021, Normal      sucralfate (CARAFATE) 1 g tablet Take 1 tablet (1 g total) by mouth 4 (four) times a day for 5 days, Starting Thu 2/17/2022, Until Tue 2/22/2022, Normal      sucralfate (CARAFATE) 1 g/10 mL suspension Take 10 mL (1 g total) by mouth 2 (two) times a day for 14 days, Starting Wed 2/16/2022, Until Wed 3/2/2022, Normal      !! ondansetron (ZOFRAN-ODT) 4 mg disintegrating tablet Take 1 tablet (4 mg total) by mouth every 6 (six) hours as needed for nausea or vomiting, Starting Wed 2/16/2022, Normal       !! - Potential duplicate medications found  Please discuss with provider  PDMP Review       Value Time User    PDMP Reviewed  Yes 1/20/2021  2:27 PM Amari Osorio PA-C           ED Provider  Attending physically available and evaluated Krystin Brookseker  I managed the patient along with the ED Attending      Electronically Signed by         Ginger Giron MD  02/22/22 5599

## 2022-02-22 NOTE — ED ATTENDING ATTESTATION
2/22/2022  I, Lul Noel MD, saw and evaluated the patient  I have discussed the patient with the resident/non-physician practitioner and agree with the resident's/non-physician practitioner's findings, Plan of Care, and MDM as documented in the resident's/non-physician practitioner's note, except where noted  All available labs and Radiology studies were reviewed  I was present for key portions of any procedure(s) performed by the resident/non-physician practitioner and I was immediately available to provide assistance  At this point I agree with the current assessment done in the Emergency Department  I have conducted an independent evaluation of this patient a history and physical is as follows:    ED Course         Critical Care Time  Procedures    [id number] [address] [place] yo female with hx of diverticulosis, having vomiting and constipation, since 2/16, came to er multiple times and discharged home  Pt here now for diarrhea  Pt with hx of gastric ulcers  No fever, no cp, no sob, pt with chills, diaphoresis  Vss, afebrile, tachy, lungs cta, rrr, abdomen soft tender diffusely, no rebound, some guarding noted  Labs, urine, ct a/p, ivf, pain meds, antiemetics

## 2022-02-25 NOTE — ED ATTENDING ATTESTATION
2/18/2022  IGabe MD, saw and evaluated the patient  I have discussed the patient with the resident/non-physician practitioner and agree with the resident's/non-physician practitioner's findings, Plan of Care, and MDM as documented in the resident's/non-physician practitioner's note, except where noted  All available labs and Radiology studies were reviewed  I was present for key portions of any procedure(s) performed by the resident/non-physician practitioner and I was immediately available to provide assistance  At this point I agree with the current assessment done in the Emergency Department  I have conducted an independent evaluation of this patient a history and physical is as follows:    ED Course     Patient presents for evaluation due to persistent abdominal pain and vomiting  Patient was seen in the emergency department the last 2 days for same complaint  Additionally, patient is also reporting some low back pain radiating in to her upper leg  She states this pain is typical for her back pain that she has had in the past   Patient's workup yesterday showed an improving white blood cell count from the day before  Patient initially had some improvement with Haldol but states that pain recurred  Exam: AAOx3, mild distress due to pain, RRR, CTA, generalized abdominal tenderness, no motor/sensory deficits  A/P:  Abdominal pain, back pain  Will recheck blood work to ensure that white blood cell count has continued to decrease  Will treat with Zyprexa  After further discussion with patient, she admits to using naproxen excessively due to pain  She did not realize that this was an NSAID  Patient has history of ulcer secondary to excessive Motrin use in the past   Will treat with Robaxin, Carafate, Protonix, and recommend use of Lidoderm patch  Patient does not want narcotics      Critical Care Time  Procedures

## 2022-05-24 ENCOUNTER — APPOINTMENT (EMERGENCY)
Dept: RADIOLOGY | Facility: HOSPITAL | Age: 32
DRG: 566 | End: 2022-05-24
Payer: COMMERCIAL

## 2022-05-24 ENCOUNTER — HOSPITAL ENCOUNTER (INPATIENT)
Facility: HOSPITAL | Age: 32
LOS: 1 days | Discharge: LEFT BEFORE DISCHARGE | DRG: 566 | End: 2022-05-27
Attending: EMERGENCY MEDICINE | Admitting: INTERNAL MEDICINE
Payer: COMMERCIAL

## 2022-05-24 DIAGNOSIS — E34.9 ELEVATED SERUM HCG: ICD-10-CM

## 2022-05-24 DIAGNOSIS — R11.10 INTRACTABLE VOMITING: ICD-10-CM

## 2022-05-24 DIAGNOSIS — F32.A DEPRESSION: ICD-10-CM

## 2022-05-24 DIAGNOSIS — O21.9 NAUSEA AND VOMITING DURING PREGNANCY: ICD-10-CM

## 2022-05-24 DIAGNOSIS — E43 SEVERE PROTEIN-CALORIE MALNUTRITION (HCC): ICD-10-CM

## 2022-05-24 DIAGNOSIS — D72.829 LEUKOCYTOSIS: ICD-10-CM

## 2022-05-24 DIAGNOSIS — E87.6 HYPOKALEMIA DUE TO EXCESSIVE GASTROINTESTINAL LOSS OF POTASSIUM: ICD-10-CM

## 2022-05-24 DIAGNOSIS — E34.9 ELEVATED SERUM HUMAN CHORIONIC GONADOTROPIN (HCG) LEVEL: ICD-10-CM

## 2022-05-24 DIAGNOSIS — R11.2 NAUSEA AND VOMITING: ICD-10-CM

## 2022-05-24 DIAGNOSIS — M54.50 CHRONIC BILATERAL LOW BACK PAIN WITHOUT SCIATICA: ICD-10-CM

## 2022-05-24 DIAGNOSIS — R17 ELEVATED BILIRUBIN: ICD-10-CM

## 2022-05-24 DIAGNOSIS — O36.80X0 PREGNANCY OF UNKNOWN ANATOMIC LOCATION: Primary | ICD-10-CM

## 2022-05-24 DIAGNOSIS — G89.29 CHRONIC BILATERAL LOW BACK PAIN WITHOUT SCIATICA: ICD-10-CM

## 2022-05-24 PROBLEM — Z09 NEED FOR CASE MANAGEMENT FOLLOW-UP: Status: ACTIVE | Noted: 2022-05-24

## 2022-05-24 PROBLEM — Z79.899 MEDICAL MARIJUANA USE: Status: ACTIVE | Noted: 2021-01-17

## 2022-05-24 PROBLEM — R79.89 ELEVATED SERUM HCG: Status: ACTIVE | Noted: 2022-05-24

## 2022-05-24 LAB
ALBUMIN SERPL BCP-MCNC: 4 G/DL (ref 3.5–5)
ALP SERPL-CCNC: 96 U/L (ref 46–116)
ALT SERPL W P-5'-P-CCNC: 21 U/L (ref 12–78)
ANION GAP SERPL CALCULATED.3IONS-SCNC: 7 MMOL/L (ref 4–13)
AST SERPL W P-5'-P-CCNC: 10 U/L (ref 5–45)
B-HCG SERPL-ACNC: 44 MIU/ML
BASOPHILS # BLD AUTO: 0.03 THOUSANDS/ΜL (ref 0–0.1)
BASOPHILS NFR BLD AUTO: 0 % (ref 0–1)
BILIRUB SERPL-MCNC: 1.75 MG/DL (ref 0.2–1)
BUN SERPL-MCNC: 11 MG/DL (ref 5–25)
CALCIUM SERPL-MCNC: 9.8 MG/DL (ref 8.3–10.1)
CHLORIDE SERPL-SCNC: 105 MMOL/L (ref 100–108)
CO2 SERPL-SCNC: 25 MMOL/L (ref 21–32)
CREAT SERPL-MCNC: 0.87 MG/DL (ref 0.6–1.3)
EOSINOPHIL # BLD AUTO: 0.01 THOUSAND/ΜL (ref 0–0.61)
EOSINOPHIL NFR BLD AUTO: 0 % (ref 0–6)
ERYTHROCYTE [DISTWIDTH] IN BLOOD BY AUTOMATED COUNT: 13.2 % (ref 11.6–15.1)
GFR SERPL CREATININE-BSD FRML MDRD: 89 ML/MIN/1.73SQ M
GLUCOSE SERPL-MCNC: 116 MG/DL (ref 65–140)
HCT VFR BLD AUTO: 41.8 % (ref 34.8–46.1)
HGB BLD-MCNC: 14.5 G/DL (ref 11.5–15.4)
IMM GRANULOCYTES # BLD AUTO: 0.09 THOUSAND/UL (ref 0–0.2)
IMM GRANULOCYTES NFR BLD AUTO: 1 % (ref 0–2)
LIPASE SERPL-CCNC: 50 U/L (ref 73–393)
LYMPHOCYTES # BLD AUTO: 2.2 THOUSANDS/ΜL (ref 0.6–4.47)
LYMPHOCYTES NFR BLD AUTO: 13 % (ref 14–44)
MCH RBC QN AUTO: 29.9 PG (ref 26.8–34.3)
MCHC RBC AUTO-ENTMCNC: 34.7 G/DL (ref 31.4–37.4)
MCV RBC AUTO: 86 FL (ref 82–98)
MONOCYTES # BLD AUTO: 0.76 THOUSAND/ΜL (ref 0.17–1.22)
MONOCYTES NFR BLD AUTO: 4 % (ref 4–12)
NEUTROPHILS # BLD AUTO: 14.38 THOUSANDS/ΜL (ref 1.85–7.62)
NEUTS SEG NFR BLD AUTO: 82 % (ref 43–75)
NRBC BLD AUTO-RTO: 0 /100 WBCS
PLATELET # BLD AUTO: 246 THOUSANDS/UL (ref 149–390)
PMV BLD AUTO: 11.6 FL (ref 8.9–12.7)
POTASSIUM SERPL-SCNC: 3.2 MMOL/L (ref 3.5–5.3)
PROT SERPL-MCNC: 8.1 G/DL (ref 6.4–8.2)
RBC # BLD AUTO: 4.85 MILLION/UL (ref 3.81–5.12)
SODIUM SERPL-SCNC: 137 MMOL/L (ref 136–145)
WBC # BLD AUTO: 17.47 THOUSAND/UL (ref 4.31–10.16)

## 2022-05-24 PROCEDURE — 96374 THER/PROPH/DIAG INJ IV PUSH: CPT

## 2022-05-24 PROCEDURE — 85025 COMPLETE CBC W/AUTO DIFF WBC: CPT | Performed by: PHYSICIAN ASSISTANT

## 2022-05-24 PROCEDURE — 96361 HYDRATE IV INFUSION ADD-ON: CPT

## 2022-05-24 PROCEDURE — 76815 OB US LIMITED FETUS(S): CPT

## 2022-05-24 PROCEDURE — 84702 CHORIONIC GONADOTROPIN TEST: CPT | Performed by: PHYSICIAN ASSISTANT

## 2022-05-24 PROCEDURE — 76705 ECHO EXAM OF ABDOMEN: CPT

## 2022-05-24 PROCEDURE — 36415 COLL VENOUS BLD VENIPUNCTURE: CPT | Performed by: PHYSICIAN ASSISTANT

## 2022-05-24 PROCEDURE — 96375 TX/PRO/DX INJ NEW DRUG ADDON: CPT

## 2022-05-24 PROCEDURE — 99220 PR INITIAL OBSERVATION CARE/DAY 70 MINUTES: CPT | Performed by: INTERNAL MEDICINE

## 2022-05-24 PROCEDURE — 99285 EMERGENCY DEPT VISIT HI MDM: CPT | Performed by: PHYSICIAN ASSISTANT

## 2022-05-24 PROCEDURE — 83690 ASSAY OF LIPASE: CPT | Performed by: PHYSICIAN ASSISTANT

## 2022-05-24 PROCEDURE — 80053 COMPREHEN METABOLIC PANEL: CPT | Performed by: PHYSICIAN ASSISTANT

## 2022-05-24 PROCEDURE — 99285 EMERGENCY DEPT VISIT HI MDM: CPT

## 2022-05-24 RX ORDER — METOCLOPRAMIDE HYDROCHLORIDE 5 MG/ML
10 INJECTION INTRAMUSCULAR; INTRAVENOUS EVERY 6 HOURS PRN
Status: DISCONTINUED | OUTPATIENT
Start: 2022-05-24 | End: 2022-05-25 | Stop reason: SDUPTHER

## 2022-05-24 RX ORDER — OXYCODONE HYDROCHLORIDE 5 MG/1
5 TABLET ORAL EVERY 6 HOURS PRN
Status: DISCONTINUED | OUTPATIENT
Start: 2022-05-24 | End: 2022-05-25

## 2022-05-24 RX ORDER — KETOROLAC TROMETHAMINE 30 MG/ML
15 INJECTION, SOLUTION INTRAMUSCULAR; INTRAVENOUS ONCE
Status: COMPLETED | OUTPATIENT
Start: 2022-05-24 | End: 2022-05-24

## 2022-05-24 RX ORDER — LIDOCAINE 50 MG/G
2 PATCH TOPICAL
Status: DISCONTINUED | OUTPATIENT
Start: 2022-05-24 | End: 2022-05-27 | Stop reason: HOSPADM

## 2022-05-24 RX ORDER — FENTANYL CITRATE 50 UG/ML
25 INJECTION, SOLUTION INTRAMUSCULAR; INTRAVENOUS ONCE
Status: COMPLETED | OUTPATIENT
Start: 2022-05-24 | End: 2022-05-24

## 2022-05-24 RX ORDER — PROMETHAZINE HYDROCHLORIDE 25 MG/ML
25 INJECTION, SOLUTION INTRAMUSCULAR; INTRAVENOUS ONCE
Status: COMPLETED | OUTPATIENT
Start: 2022-05-24 | End: 2022-05-24

## 2022-05-24 RX ORDER — METHOCARBAMOL 500 MG/1
500 TABLET, FILM COATED ORAL EVERY 6 HOURS PRN
Status: DISCONTINUED | OUTPATIENT
Start: 2022-05-24 | End: 2022-05-24

## 2022-05-24 RX ORDER — LIDOCAINE 50 MG/G
1 PATCH TOPICAL ONCE
Status: DISCONTINUED | OUTPATIENT
Start: 2022-05-24 | End: 2022-05-24

## 2022-05-24 RX ORDER — DOCUSATE SODIUM 100 MG/1
100 CAPSULE, LIQUID FILLED ORAL 2 TIMES DAILY
Status: DISCONTINUED | OUTPATIENT
Start: 2022-05-24 | End: 2022-05-25

## 2022-05-24 RX ORDER — PANTOPRAZOLE SODIUM 20 MG/1
20 TABLET, DELAYED RELEASE ORAL
Status: DISCONTINUED | OUTPATIENT
Start: 2022-05-25 | End: 2022-05-25

## 2022-05-24 RX ORDER — POTASSIUM CHLORIDE 20 MEQ/1
20 TABLET, EXTENDED RELEASE ORAL ONCE
Status: DISCONTINUED | OUTPATIENT
Start: 2022-05-24 | End: 2022-05-27 | Stop reason: HOSPADM

## 2022-05-24 RX ORDER — MAGNESIUM HYDROXIDE/ALUMINUM HYDROXICE/SIMETHICONE 120; 1200; 1200 MG/30ML; MG/30ML; MG/30ML
30 SUSPENSION ORAL EVERY 6 HOURS PRN
Status: DISCONTINUED | OUTPATIENT
Start: 2022-05-24 | End: 2022-05-27 | Stop reason: HOSPADM

## 2022-05-24 RX ORDER — FAMOTIDINE 10 MG/ML
20 INJECTION, SOLUTION INTRAVENOUS ONCE
Status: DISCONTINUED | OUTPATIENT
Start: 2022-05-24 | End: 2022-05-25

## 2022-05-24 RX ORDER — CYPROHEPTADINE HYDROCHLORIDE 2 MG/5ML
4 SOLUTION ORAL 3 TIMES DAILY PRN
Status: DISCONTINUED | OUTPATIENT
Start: 2022-05-24 | End: 2022-05-27 | Stop reason: HOSPADM

## 2022-05-24 RX ORDER — MUSCLE RUB CREAM 100; 150 MG/G; MG/G
CREAM TOPICAL 4 TIMES DAILY
Status: DISCONTINUED | OUTPATIENT
Start: 2022-05-24 | End: 2022-05-27 | Stop reason: HOSPADM

## 2022-05-24 RX ORDER — METOCLOPRAMIDE HYDROCHLORIDE 5 MG/ML
10 INJECTION INTRAMUSCULAR; INTRAVENOUS ONCE
Status: COMPLETED | OUTPATIENT
Start: 2022-05-24 | End: 2022-05-24

## 2022-05-24 RX ORDER — DIAZEPAM 5 MG/1
5 TABLET ORAL ONCE
Status: COMPLETED | OUTPATIENT
Start: 2022-05-24 | End: 2022-05-24

## 2022-05-24 RX ORDER — SODIUM CHLORIDE 9 MG/ML
100 INJECTION, SOLUTION INTRAVENOUS CONTINUOUS
Status: DISCONTINUED | OUTPATIENT
Start: 2022-05-24 | End: 2022-05-25

## 2022-05-24 RX ORDER — ACETAMINOPHEN 325 MG/1
975 TABLET ORAL EVERY 8 HOURS SCHEDULED
Status: DISCONTINUED | OUTPATIENT
Start: 2022-05-24 | End: 2022-05-25

## 2022-05-24 RX ORDER — SUCRALFATE 1 G/1
1 TABLET ORAL ONCE
Status: DISCONTINUED | OUTPATIENT
Start: 2022-05-24 | End: 2022-05-25

## 2022-05-24 RX ORDER — PANTOPRAZOLE SODIUM 40 MG/1
40 TABLET, DELAYED RELEASE ORAL 2 TIMES DAILY
Status: DISCONTINUED | OUTPATIENT
Start: 2022-05-24 | End: 2022-05-24

## 2022-05-24 RX ORDER — PREDNISONE 20 MG/1
60 TABLET ORAL ONCE
Status: COMPLETED | OUTPATIENT
Start: 2022-05-24 | End: 2022-05-24

## 2022-05-24 RX ORDER — ONDANSETRON 2 MG/ML
4 INJECTION INTRAMUSCULAR; INTRAVENOUS ONCE
Status: COMPLETED | OUTPATIENT
Start: 2022-05-24 | End: 2022-05-24

## 2022-05-24 RX ORDER — DIPHENHYDRAMINE HCL 25 MG
25 TABLET ORAL EVERY 6 HOURS PRN
Status: DISCONTINUED | OUTPATIENT
Start: 2022-05-24 | End: 2022-05-26

## 2022-05-24 RX ORDER — METHOCARBAMOL 500 MG/1
500 TABLET, FILM COATED ORAL 2 TIMES DAILY
Status: DISCONTINUED | OUTPATIENT
Start: 2022-05-24 | End: 2022-05-24

## 2022-05-24 RX ORDER — BISACODYL 10 MG
10 SUPPOSITORY, RECTAL RECTAL DAILY PRN
Status: DISCONTINUED | OUTPATIENT
Start: 2022-05-24 | End: 2022-05-27 | Stop reason: HOSPADM

## 2022-05-24 RX ORDER — METOCLOPRAMIDE 10 MG/1
10 TABLET ORAL EVERY 6 HOURS PRN
Status: DISCONTINUED | OUTPATIENT
Start: 2022-05-24 | End: 2022-05-25

## 2022-05-24 RX ORDER — DOCUSATE SODIUM 100 MG/1
100 CAPSULE, LIQUID FILLED ORAL 2 TIMES DAILY
Status: DISCONTINUED | OUTPATIENT
Start: 2022-05-24 | End: 2022-05-24

## 2022-05-24 RX ORDER — LANOLIN ALCOHOL/MO/W.PET/CERES
3 CREAM (GRAM) TOPICAL
Status: DISCONTINUED | OUTPATIENT
Start: 2022-05-24 | End: 2022-05-27 | Stop reason: HOSPADM

## 2022-05-24 RX ORDER — METHOCARBAMOL 750 MG/1
750 TABLET, FILM COATED ORAL EVERY 6 HOURS SCHEDULED
Status: DISCONTINUED | OUTPATIENT
Start: 2022-05-24 | End: 2022-05-27 | Stop reason: HOSPADM

## 2022-05-24 RX ORDER — ONDANSETRON 2 MG/ML
4 INJECTION INTRAMUSCULAR; INTRAVENOUS EVERY 6 HOURS PRN
Status: DISCONTINUED | OUTPATIENT
Start: 2022-05-24 | End: 2022-05-27

## 2022-05-24 RX ORDER — POLYETHYLENE GLYCOL 3350 17 G/17G
17 POWDER, FOR SOLUTION ORAL DAILY
Status: DISCONTINUED | OUTPATIENT
Start: 2022-05-25 | End: 2022-05-25

## 2022-05-24 RX ORDER — METOCLOPRAMIDE HYDROCHLORIDE 5 MG/ML
5 INJECTION INTRAMUSCULAR; INTRAVENOUS ONCE
Status: DISCONTINUED | OUTPATIENT
Start: 2022-05-24 | End: 2022-05-25

## 2022-05-24 RX ADMIN — METOCLOPRAMIDE HYDROCHLORIDE 10 MG: 5 INJECTION INTRAMUSCULAR; INTRAVENOUS at 10:22

## 2022-05-24 RX ADMIN — PREDNISONE 60 MG: 20 TABLET ORAL at 08:53

## 2022-05-24 RX ADMIN — SODIUM CHLORIDE 1000 ML: 0.9 INJECTION, SOLUTION INTRAVENOUS at 08:54

## 2022-05-24 RX ADMIN — ONDANSETRON 4 MG: 2 INJECTION INTRAMUSCULAR; INTRAVENOUS at 22:32

## 2022-05-24 RX ADMIN — KETOROLAC TROMETHAMINE 15 MG: 30 INJECTION, SOLUTION INTRAMUSCULAR at 08:54

## 2022-05-24 RX ADMIN — FENTANYL CITRATE 25 MCG: 0.05 INJECTION, SOLUTION INTRAMUSCULAR; INTRAVENOUS at 17:38

## 2022-05-24 RX ADMIN — METOCLOPRAMIDE HYDROCHLORIDE 10 MG: 5 INJECTION INTRAMUSCULAR; INTRAVENOUS at 19:22

## 2022-05-24 RX ADMIN — LIDOCAINE 5% 2 PATCH: 700 PATCH TOPICAL at 21:02

## 2022-05-24 RX ADMIN — MENTHOL, UNSPECIFIED FORM AND METHYL SALICYLATE: 10; 150 CREAM TOPICAL at 21:02

## 2022-05-24 RX ADMIN — SODIUM CHLORIDE 100 ML/HR: 0.9 INJECTION, SOLUTION INTRAVENOUS at 19:21

## 2022-05-24 RX ADMIN — ONDANSETRON 4 MG: 2 INJECTION INTRAMUSCULAR; INTRAVENOUS at 08:53

## 2022-05-24 RX ADMIN — DIAZEPAM 5 MG: 5 TABLET ORAL at 08:53

## 2022-05-24 RX ADMIN — MORPHINE SULFATE 2 MG: 2 INJECTION, SOLUTION INTRAMUSCULAR; INTRAVENOUS at 19:21

## 2022-05-24 RX ADMIN — LIDOCAINE 5% 1 PATCH: 700 PATCH TOPICAL at 08:54

## 2022-05-24 RX ADMIN — PROMETHAZINE HYDROCHLORIDE 25 MG: 25 INJECTION INTRAMUSCULAR; INTRAVENOUS at 23:57

## 2022-05-24 RX ADMIN — DICLOFENAC SODIUM 4 G: 10 GEL TOPICAL at 21:02

## 2022-05-24 NOTE — ED NOTES
Called Lab about CBC that had not resulted yet, the  looked around and found the tube and is now running it        Hortencia Valladares RN  05/24/22 9163

## 2022-05-24 NOTE — ASSESSMENT & PLAN NOTE
Reports acutely worsening chronic lower back pain  Pain is 10/10 intensity  Requesting opioid analgesics  Opioid analgesics, [place], lidocaine patch  Heating pad  Supportive care

## 2022-05-24 NOTE — ED NOTES
Pt continues to vomit a this time will inform PA Devin Hamman and will not give anything by mouth at this time       King Robert RN  05/24/22 2185

## 2022-05-24 NOTE — H&P
[address] 1990, 28 y o  female MRN: [id number]  Unit/Bed#: [facility] Encounter: [id number]  Primary Care Provider: Juli Villar DO   Date and time admitted to hospital: 5/24/2022  8:04 AM    * Intractable nausea and vomiting  Assessment & Plan  Intractable nausea vomiting since 3-4 days  Reports inability to keep anything down  Previously admitted with similar symptoms  History of marijuana use noted  Clear liquid diet for now  Will provide antiemetics, IV fluids  Supportive care      Acute on chronic bilateral low back pain without sciatica  Assessment & Plan  Reports acutely worsening chronic lower back pain  Pain is 10/10 intensity  Requesting opioid analgesics  Opioid analgesics, Chivo-Wilcox, lidocaine patch  Heating pad  Supportive care    Need for case management follow-up  Assessment & Plan  Patient with similar and frequent presentations with intractable nausea vomiting and low back pain  Will request case management to follow-up for high utilizer plan if applicable    Elevated serum hCG  Assessment & Plan  Elevated serum hCG  Transvaginal ultrasound in the ER report noted - no intrauterine or ectopic pregnancy identified  Outpatient follow-up recommended    Medical marijuana use  Assessment & Plan  Reports use of medical marijuana        VTE Pharmacologic Prophylaxis: VTE Score: 2 Low Risk (Score 0-2) - Encourage Ambulation  Code Status: Level 1 - Full Code   Discussion with family: Discussed with the patient, offered to call family patient declined  Reports she is keeping her family updated  Anticipated Length of Stay: Patient will be admitted on an observation basis with an anticipated length of stay of less than 2 midnights secondary to Intractable nausea vomiting low back pain for management      Chief Complaint:     Intractable nausea vomiting  Low back pain    History of Present Illness:  Sepideh Hankins is a 28 y o  female with a PMH of chronic low back pain, history of duodenal ulcer, history of marijuana use, history of intractable nausea and vomiting who presents with intractable nausea and vomiting and low back pain  Patient reports he has chronic back pain and follows with physiatry and Pain service  She is receiving medical marijuana for back pain which uses 3 times weekly  She reports medical marijuana user some relief from a low back pain  She reports she was offered opioid analgesics by her for back pain by IR outpatient pain management physician however patient declined given she has 2 young kids at home  This episode of low back pain started on Saturday  She reports her symptoms started with intractable nausea and vomiting  Her symptoms of intractable nausea vomiting more spontaneous in the onset, she reports multiple episodes of watery and bilious vomiting  She reports poor p o  Intake  She has not been able to take anything down since yesterday or so  She has upper abdominal pain  She reports 1 episodes of diarrhea  She denies hematemesis melena hematochezia  Given severe intractable nausea this has triggered her low back pain  She presents to the ER for further management  In the ER she received analgesics as well as a dose of prednisone  Given her intractable symptoms patient is being admitted at the request of ER physician  In the ER she was noted to have elevated HCG, she reports she is due for her periods next couple of days and is not sure if she is pregnant  At the time of my interview she reports she has 10/10 pain lower back, denies radiation of pain elsewhere  No pain radiating lower extremities  Pain is aggravated by movement  She reports she is now unable to move in bed  She is agreeable to opioid analgesics while admitted, and is requesting analgesics for quick relief  Patient previously admitted for similar symptoms  Her prior hospitalizations reviewed on epic      Review of Systems:  Review of Systems   All other systems reviewed and are negative  Past Medical and Surgical History:   Past Medical History:   Diagnosis Date    Arthritis     Asthma     History of stomach ulcers     Psychiatric disorder     anxiety       Past Surgical History:   Procedure Laterality Date    BREAST SURGERY      COSMETIC SURGERY      EGD      WISDOM TOOTH EXTRACTION         Meds/Allergies:  Prior to Admission medications    Medication Sig Start Date End Date Taking?  Authorizing Provider   acetaminophen (TYLENOL) 325 mg tablet Take 2 tablets (650 mg total) by mouth every 6 (six) hours as needed for mild pain 12/5/20   Gavino Gaines MD   bisacodyl (DULCOLAX) 10 mg suppository Insert 1 suppository (10 mg total) into the rectum daily for 16 days 1/24/21 2/9/21  TIFFANY Zhu Cea   cyproheptadine hcl 2 MG/5ML oral syrup Take 10 mL (4 mg total) by mouth 3 (three) times a day as needed for allergies 1/23/21   TIFFANY Zhu Cea   Diclofenac Sodium (VOLTAREN) 1 % Apply 2 g topically 4 (four) times a day 1/23/21   TIFFANY Zhu Cea   Diclofenac Sodium (VOLTAREN) 1 % Apply 2 g topically 4 (four) times a day as needed (back pain) for up to 7 days 2/19/22 2/26/22  Renard Fitzgerald MD   diphenhydrAMINE (BENADRYL) 25 mg tablet Take 1 tablet (25 mg total) by mouth every 6 (six) hours as needed for itching, allergies or sleep 1/23/21   TIFFANY Zhu Cea   docusate sodium (COLACE) 100 mg capsule Take 1 capsule (100 mg total) by mouth 2 (two) times a day 1/23/21   TIFFANY Zhu Cea   lidocaine (LIDODERM) 5 % Apply 1 patch topically daily at bedtime Remove & Discard patch within 12 hours or as directed by MD 1/23/21   TIFFANY Zhu Cea   menthol-methyl salicylate (BENGAY) 93-80 % cream Apply topically 4 (four) times a day as needed (apply to back as needed) 1/23/21   TIFFANY Zhu Cea   methocarbamol (ROBAXIN) 500 mg tablet Take 1 tablet (500 mg total) by mouth every 6 (six) hours as needed for muscle spasms 1/23/21   TIFFANY Clifford   methocarbamol (ROBAXIN) 500 mg tablet Take 1 tablet (500 mg total) by mouth 2 (two) times a day 2/19/22   Nakul Swanson MD   metoclopramide (REGLAN) 5 mg tablet Take 2 tablets (10 mg total) by mouth every 6 (six) hours as needed (please cycle this with the zofran every 3 hrs) 1/23/21   TIFFANY Clifford   omeprazole (PriLOSEC) 20 mg delayed release capsule Take 1 capsule (20 mg total) by mouth daily for 14 days 2/19/22 3/5/22  Nakul Swanson MD   ondansetron (Zofran ODT) 4 mg disintegrating tablet Take 1 tablet (4 mg total) by mouth every 6 (six) hours as needed for nausea or vomiting 2/22/22   Nano Holcomb MD   ondansetron (ZOFRAN) 4 mg tablet Take 1 tablet (4 mg total) by mouth every 6 (six) hours as needed for nausea or vomiting 2/17/22   Nakul Swanson MD   pantoprazole (PROTONIX) 40 mg tablet Take 1 tablet (40 mg total) by mouth 2 (two) times a day 1/23/21   TIFFANY Clifford   scopolamine (TRANSDERM-SCOP) 1 5 mg/3 days TD 72 hr patch Place 1 patch on the skin every third day for 15 days 1/23/21 2/7/21  TIFFANY Clifford   sucralfate (CARAFATE) 1 g tablet Take 1 tablet (1 g total) by mouth 4 (four) times a day for 5 days 2/17/22 2/22/22  Nakul Swanson MD   sucralfate (CARAFATE) 1 g/10 mL suspension Take 10 mL (1 g total) by mouth 2 (two) times a day for 14 days 2/16/22 3/2/22  Nazia Snell DO     I have reviewed home medications with patient personally      Allergies: No Known Allergies    Social History:  Marital Status: Single   Occupation:  [employer]   Patient Pre-hospital Living Situation: Home  Patient Pre-hospital Level of Mobility: walks  Patient Pre-hospital Diet Restrictions: no  Substance Use History:   Social History     Substance and Sexual Activity   Alcohol Use Not Currently     Social History     Tobacco Use   Smoking Status Never Smoker   Smokeless Tobacco Never Used     Social History     Substance and Sexual Activity   Drug Use Not Currently    Types: Marijuana       Family History:  History reviewed  No pertinent family history  Physical Exam:     Vitals:   Blood Pressure: 130/66 (05/24/22 1815)  Pulse: (!) 52 (05/24/22 1815)  Temperature: 97 8 °F (36 6 °C) (05/24/22 0805)  Temp Source: Tympanic (05/24/22 0805)  Respirations: 18 (05/24/22 1815)  SpO2: 98 % (05/24/22 1815)    Physical Exam     Patient appears to be in distress due to low back pain  She is able to provide history however  Neck supple  Mucous members are moist  EOMI  Lungs diminished breath sounds bilaterally  No additional sounds appreciable  Heart sounds S1 and S2 noted  No murmurs appreciable  Abdomen soft   Mild epigastric tenderness  Awake alert obeys simple commands  Low back tenderness  Paraspinal spasm noted  No pedal edema  No rash        Additional Data:     Lab Results:  Results from last 7 days   Lab Units 05/24/22  0856   WBC Thousand/uL 17 47*   HEMOGLOBIN g/dL 14 5   HEMATOCRIT % 41 8   PLATELETS Thousands/uL 246   NEUTROS PCT % 82*   LYMPHS PCT % 13*   MONOS PCT % 4   EOS PCT % 0     Results from last 7 days   Lab Units 05/24/22  0856   SODIUM mmol/L 137   POTASSIUM mmol/L 3 2*   CHLORIDE mmol/L 105   CO2 mmol/L 25   BUN mg/dL 11   CREATININE mg/dL 0 87   ANION GAP mmol/L 7   CALCIUM mg/dL 9 8   ALBUMIN g/dL 4 0   TOTAL BILIRUBIN mg/dL 1 75*   ALK PHOS U/L 96   ALT U/L 21   AST U/L 10   GLUCOSE RANDOM mg/dL 116                       Imaging: Reviewed radiology reports from this admission including: ultrasound(s)  US OB pregnancy limited with transvaginal   Final Result by Emmie Mendez MD (05/24 1610)      An intrauterine or ectopic pregnancy is not identified  Workstation performed: [id number]         US right upper quadrant   Final Result by Lupe Reed MD (05/24 8023)      Mild hepatic steatosis  No evidence of cholelithiasis, sonographic Doshi's sign, or biliary ductal dilatation              Workstation performed: [id number]             EKG and Other Studies Reviewed on Admission:   · EKG: Sinus rhythm on telemetry  ** Please Note: This note has been constructed using a voice recognition system   **

## 2022-05-24 NOTE — ASSESSMENT & PLAN NOTE
Intractable nausea vomiting since 3-4 days  Reports inability to keep anything down  Previously admitted with similar symptoms  History of marijuana use noted  Clear liquid diet for now  Will provide antiemetics, IV fluids  Supportive care

## 2022-05-24 NOTE — ASSESSMENT & PLAN NOTE
Patient with similar and frequent presentations with intractable nausea vomiting and low back pain  Will request case management to follow-up for high utilizer plan if applicable

## 2022-05-24 NOTE — ASSESSMENT & PLAN NOTE
Elevated serum hCG  Transvaginal ultrasound in the ER report noted - no intrauterine or ectopic pregnancy identified  Outpatient follow-up recommended
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
## 2022-05-25 ENCOUNTER — APPOINTMENT (OUTPATIENT)
Dept: RADIOLOGY | Facility: HOSPITAL | Age: 32
DRG: 566 | End: 2022-05-25
Payer: COMMERCIAL

## 2022-05-25 PROBLEM — E87.6 HYPOKALEMIA: Status: ACTIVE | Noted: 2022-05-25

## 2022-05-25 PROBLEM — D72.829 LEUKOCYTOSIS: Status: ACTIVE | Noted: 2022-05-25

## 2022-05-25 LAB
ALBUMIN SERPL BCP-MCNC: 3.8 G/DL (ref 3.5–5)
ALP SERPL-CCNC: 79 U/L (ref 46–116)
ALT SERPL W P-5'-P-CCNC: 21 U/L (ref 12–78)
ANION GAP SERPL CALCULATED.3IONS-SCNC: 6 MMOL/L (ref 4–13)
AST SERPL W P-5'-P-CCNC: 16 U/L (ref 5–45)
ATRIAL RATE: 59 BPM
BASOPHILS # BLD AUTO: 0.05 THOUSANDS/ΜL (ref 0–0.1)
BASOPHILS NFR BLD AUTO: 0 % (ref 0–1)
BILIRUB SERPL-MCNC: 1.75 MG/DL (ref 0.2–1)
BUN SERPL-MCNC: 12 MG/DL (ref 5–25)
CALCIUM SERPL-MCNC: 8.9 MG/DL (ref 8.3–10.1)
CHLORIDE SERPL-SCNC: 104 MMOL/L (ref 100–108)
CO2 SERPL-SCNC: 24 MMOL/L (ref 21–32)
CREAT SERPL-MCNC: 0.68 MG/DL (ref 0.6–1.3)
EOSINOPHIL # BLD AUTO: 0.01 THOUSAND/ΜL (ref 0–0.61)
EOSINOPHIL NFR BLD AUTO: 0 % (ref 0–6)
ERYTHROCYTE [DISTWIDTH] IN BLOOD BY AUTOMATED COUNT: 13.2 % (ref 11.6–15.1)
GFR SERPL CREATININE-BSD FRML MDRD: 117 ML/MIN/1.73SQ M
GLUCOSE P FAST SERPL-MCNC: 110 MG/DL (ref 65–99)
GLUCOSE SERPL-MCNC: 110 MG/DL (ref 65–140)
HCT VFR BLD AUTO: 40.3 % (ref 34.8–46.1)
HGB BLD-MCNC: 13.7 G/DL (ref 11.5–15.4)
IMM GRANULOCYTES # BLD AUTO: 0.08 THOUSAND/UL (ref 0–0.2)
IMM GRANULOCYTES NFR BLD AUTO: 1 % (ref 0–2)
LYMPHOCYTES # BLD AUTO: 2.18 THOUSANDS/ΜL (ref 0.6–4.47)
LYMPHOCYTES NFR BLD AUTO: 14 % (ref 14–44)
MCH RBC QN AUTO: 29.7 PG (ref 26.8–34.3)
MCHC RBC AUTO-ENTMCNC: 34 G/DL (ref 31.4–37.4)
MCV RBC AUTO: 87 FL (ref 82–98)
MONOCYTES # BLD AUTO: 0.82 THOUSAND/ΜL (ref 0.17–1.22)
MONOCYTES NFR BLD AUTO: 5 % (ref 4–12)
NEUTROPHILS # BLD AUTO: 12.19 THOUSANDS/ΜL (ref 1.85–7.62)
NEUTS SEG NFR BLD AUTO: 80 % (ref 43–75)
NRBC BLD AUTO-RTO: 0 /100 WBCS
P AXIS: 53 DEGREES
PLATELET # BLD AUTO: 230 THOUSANDS/UL (ref 149–390)
PMV BLD AUTO: 11.4 FL (ref 8.9–12.7)
POTASSIUM SERPL-SCNC: 2.9 MMOL/L (ref 3.5–5.3)
PR INTERVAL: 122 MS
PROT SERPL-MCNC: 7.3 G/DL (ref 6.4–8.2)
QRS AXIS: 76 DEGREES
QRSD INTERVAL: 82 MS
QT INTERVAL: 468 MS
QTC INTERVAL: 463 MS
RBC # BLD AUTO: 4.62 MILLION/UL (ref 3.81–5.12)
SODIUM SERPL-SCNC: 134 MMOL/L (ref 136–145)
T WAVE AXIS: 61 DEGREES
VENTRICULAR RATE: 59 BPM
WBC # BLD AUTO: 15.33 THOUSAND/UL (ref 4.31–10.16)

## 2022-05-25 PROCEDURE — 99225 PR SBSQ OBSERVATION CARE/DAY 25 MINUTES: CPT | Performed by: PHYSICIAN ASSISTANT

## 2022-05-25 PROCEDURE — 72131 CT LUMBAR SPINE W/O DYE: CPT

## 2022-05-25 PROCEDURE — 93005 ELECTROCARDIOGRAM TRACING: CPT

## 2022-05-25 PROCEDURE — C9113 INJ PANTOPRAZOLE SODIUM, VIA: HCPCS | Performed by: PHYSICIAN ASSISTANT

## 2022-05-25 PROCEDURE — 93010 ELECTROCARDIOGRAM REPORT: CPT | Performed by: INTERNAL MEDICINE

## 2022-05-25 PROCEDURE — 85025 COMPLETE CBC W/AUTO DIFF WBC: CPT | Performed by: PHYSICIAN ASSISTANT

## 2022-05-25 PROCEDURE — 80053 COMPREHEN METABOLIC PANEL: CPT | Performed by: PHYSICIAN ASSISTANT

## 2022-05-25 PROCEDURE — G1004 CDSM NDSC: HCPCS

## 2022-05-25 PROCEDURE — 99244 OFF/OP CNSLTJ NEW/EST MOD 40: CPT | Performed by: INTERNAL MEDICINE

## 2022-05-25 RX ORDER — KETOROLAC TROMETHAMINE 30 MG/ML
30 INJECTION, SOLUTION INTRAMUSCULAR; INTRAVENOUS EVERY 6 HOURS PRN
Status: DISPENSED | OUTPATIENT
Start: 2022-05-25 | End: 2022-05-26

## 2022-05-25 RX ORDER — METOCLOPRAMIDE HYDROCHLORIDE 5 MG/ML
10 INJECTION INTRAMUSCULAR; INTRAVENOUS EVERY 6 HOURS SCHEDULED
Status: DISCONTINUED | OUTPATIENT
Start: 2022-05-25 | End: 2022-05-27 | Stop reason: HOSPADM

## 2022-05-25 RX ORDER — METOCLOPRAMIDE HYDROCHLORIDE 5 MG/ML
10 INJECTION INTRAMUSCULAR; INTRAVENOUS EVERY 6 HOURS PRN
Status: DISCONTINUED | OUTPATIENT
Start: 2022-05-25 | End: 2022-05-25

## 2022-05-25 RX ORDER — METOCLOPRAMIDE 10 MG/1
10 TABLET ORAL
Status: DISCONTINUED | OUTPATIENT
Start: 2022-05-25 | End: 2022-05-25

## 2022-05-25 RX ORDER — FAMOTIDINE 10 MG/ML
20 INJECTION, SOLUTION INTRAVENOUS EVERY 12 HOURS SCHEDULED
Status: DISCONTINUED | OUTPATIENT
Start: 2022-05-25 | End: 2022-05-25

## 2022-05-25 RX ORDER — PANTOPRAZOLE SODIUM 40 MG/1
40 INJECTION, POWDER, FOR SOLUTION INTRAVENOUS EVERY 12 HOURS SCHEDULED
Status: DISCONTINUED | OUTPATIENT
Start: 2022-05-25 | End: 2022-05-27 | Stop reason: HOSPADM

## 2022-05-25 RX ORDER — PANTOPRAZOLE SODIUM 40 MG/1
40 TABLET, DELAYED RELEASE ORAL
Status: DISCONTINUED | OUTPATIENT
Start: 2022-05-26 | End: 2022-05-25

## 2022-05-25 RX ORDER — ACETAMINOPHEN 325 MG/1
975 TABLET ORAL EVERY 6 HOURS SCHEDULED
Status: DISCONTINUED | OUTPATIENT
Start: 2022-05-25 | End: 2022-05-27 | Stop reason: HOSPADM

## 2022-05-25 RX ORDER — POTASSIUM CHLORIDE 14.9 MG/ML
20 INJECTION INTRAVENOUS
Status: COMPLETED | OUTPATIENT
Start: 2022-05-25 | End: 2022-05-26

## 2022-05-25 RX ORDER — FAMOTIDINE 10 MG/ML
20 INJECTION, SOLUTION INTRAVENOUS
Status: DISCONTINUED | OUTPATIENT
Start: 2022-05-26 | End: 2022-05-25

## 2022-05-25 RX ORDER — FAMOTIDINE 10 MG/ML
20 INJECTION, SOLUTION INTRAVENOUS
Status: DISCONTINUED | OUTPATIENT
Start: 2022-05-25 | End: 2022-05-27 | Stop reason: HOSPADM

## 2022-05-25 RX ORDER — SUCRALFATE 1 G/1
1 TABLET ORAL
Status: DISCONTINUED | OUTPATIENT
Start: 2022-05-25 | End: 2022-05-27 | Stop reason: HOSPADM

## 2022-05-25 RX ORDER — POTASSIUM CHLORIDE AND SODIUM CHLORIDE 900; 300 MG/100ML; MG/100ML
125 INJECTION, SOLUTION INTRAVENOUS CONTINUOUS
Status: DISCONTINUED | OUTPATIENT
Start: 2022-05-25 | End: 2022-05-27 | Stop reason: HOSPADM

## 2022-05-25 RX ORDER — GABAPENTIN 100 MG/1
100 CAPSULE ORAL 3 TIMES DAILY
Status: DISCONTINUED | OUTPATIENT
Start: 2022-05-25 | End: 2022-05-27 | Stop reason: HOSPADM

## 2022-05-25 RX ADMIN — Medication 3 MG: at 21:17

## 2022-05-25 RX ADMIN — POTASSIUM CHLORIDE 20 MEQ: 14.9 INJECTION, SOLUTION INTRAVENOUS at 18:51

## 2022-05-25 RX ADMIN — DICLOFENAC SODIUM 4 G: 10 GEL TOPICAL at 14:58

## 2022-05-25 RX ADMIN — TRIMETHOBENZAMIDE HYDROCHLORIDE 200 MG: 100 INJECTION INTRAMUSCULAR at 15:46

## 2022-05-25 RX ADMIN — FAMOTIDINE 20 MG: 10 INJECTION INTRAVENOUS at 21:16

## 2022-05-25 RX ADMIN — GABAPENTIN 100 MG: 100 CAPSULE ORAL at 17:33

## 2022-05-25 RX ADMIN — DICLOFENAC SODIUM 4 G: 10 GEL TOPICAL at 08:33

## 2022-05-25 RX ADMIN — PANTOPRAZOLE SODIUM 40 MG: 40 INJECTION, POWDER, FOR SOLUTION INTRAVENOUS at 21:17

## 2022-05-25 RX ADMIN — MENTHOL, UNSPECIFIED FORM AND METHYL SALICYLATE: 10; 150 CREAM TOPICAL at 08:33

## 2022-05-25 RX ADMIN — PANTOPRAZOLE SODIUM 20 MG: 20 TABLET, DELAYED RELEASE ORAL at 05:30

## 2022-05-25 RX ADMIN — PANTOPRAZOLE SODIUM 40 MG: 40 INJECTION, POWDER, FOR SOLUTION INTRAVENOUS at 10:07

## 2022-05-25 RX ADMIN — GABAPENTIN 100 MG: 100 CAPSULE ORAL at 21:17

## 2022-05-25 RX ADMIN — SUCRALFATE 1 G: 1 TABLET ORAL at 17:33

## 2022-05-25 RX ADMIN — SODIUM CHLORIDE 100 ML/HR: 0.9 INJECTION, SOLUTION INTRAVENOUS at 05:29

## 2022-05-25 RX ADMIN — ONDANSETRON 4 MG: 2 INJECTION INTRAMUSCULAR; INTRAVENOUS at 08:33

## 2022-05-25 RX ADMIN — DICLOFENAC SODIUM 4 G: 10 GEL TOPICAL at 21:17

## 2022-05-25 RX ADMIN — ONDANSETRON 4 MG: 2 INJECTION INTRAMUSCULAR; INTRAVENOUS at 21:26

## 2022-05-25 RX ADMIN — MORPHINE SULFATE 2 MG: 2 INJECTION, SOLUTION INTRAMUSCULAR; INTRAVENOUS at 05:45

## 2022-05-25 RX ADMIN — MORPHINE SULFATE 2 MG: 2 INJECTION, SOLUTION INTRAMUSCULAR; INTRAVENOUS at 00:01

## 2022-05-25 RX ADMIN — LIDOCAINE 5% 2 PATCH: 700 PATCH TOPICAL at 21:17

## 2022-05-25 RX ADMIN — METOCLOPRAMIDE HYDROCHLORIDE 10 MG: 5 INJECTION INTRAMUSCULAR; INTRAVENOUS at 17:33

## 2022-05-25 RX ADMIN — METHOCARBAMOL TABLETS 750 MG: 750 TABLET, COATED ORAL at 17:33

## 2022-05-25 RX ADMIN — ACETAMINOPHEN 975 MG: 325 TABLET, FILM COATED ORAL at 17:33

## 2022-05-25 RX ADMIN — MORPHINE SULFATE 2 MG: 2 INJECTION, SOLUTION INTRAMUSCULAR; INTRAVENOUS at 19:49

## 2022-05-25 RX ADMIN — POTASSIUM CHLORIDE 20 MEQ: 14.9 INJECTION, SOLUTION INTRAVENOUS at 16:26

## 2022-05-25 RX ADMIN — SUCRALFATE 1 G: 1 TABLET ORAL at 21:17

## 2022-05-25 RX ADMIN — POTASSIUM CHLORIDE AND SODIUM CHLORIDE 75 ML/HR: 900; 300 INJECTION, SOLUTION INTRAVENOUS at 16:26

## 2022-05-25 RX ADMIN — METOCLOPRAMIDE HYDROCHLORIDE 10 MG: 5 INJECTION INTRAMUSCULAR; INTRAVENOUS at 05:43

## 2022-05-25 RX ADMIN — KETOROLAC TROMETHAMINE 30 MG: 30 INJECTION, SOLUTION INTRAMUSCULAR at 15:43

## 2022-05-25 RX ADMIN — METOCLOPRAMIDE HYDROCHLORIDE 10 MG: 5 INJECTION INTRAMUSCULAR; INTRAVENOUS at 13:19

## 2022-05-25 NOTE — PHYSICAL THERAPY NOTE
Physical Therapy Screen    Patient's Name: Donna Pulse       05/25/22 8952   PT Last Visit   PT Visit Date 05/25/22   Note Type   Note type Screen       Orders received, chart reviewed  Pt admit with intractable nausea and vomiting  Spoke with pt who reports she has been ambulating around room without assistance  Denies any concerns about returning home upon d/c  Reports having social support and an accessible home environment  No acute PT needs identified, will D/C acute PT order  Thank you       Heriberto Norris, PT, DPT

## 2022-05-25 NOTE — ASSESSMENT & PLAN NOTE
· White count on admission yesterday significantly elevated at 17 47  No fever or other infectious symptomatology    Perhaps this is stress response but should be repeated today

## 2022-05-25 NOTE — ASSESSMENT & PLAN NOTE
· Patient presented with intractable nausea/vomiting x 3-4 days  She has had several prior presentations for same  Reports inability to keep anything down  · History of chronic marijuana use, this could be cannabis hyperemesis syndrome/cyclic vomiting  · 4/8128 EGD= mild esophagitis, H pylori negative  CT A/P 2/22 unremarkable, not repeated on admission but ultrasound was done and unremarkable  No evidence that a gastric emptying study was done  · Recommend downgrading from regular to clear liquid diet, advanced as tolerated  · Will consult GI to see if additional intervention will be recommended by their team   I highly recommend she follow-up with GI after discharge  · Continue supportive IV fluids, change IV Reglan to be given around the clock with IV Zofran as needed as well  Would discontinue narcotics, which are not indicated and likely to further exacerbate the problem  For stomach discomfort would on IV Protonix and IV Pepcid plus carafate until she is tolerating food better and can be converted to p o

## 2022-05-25 NOTE — PROGRESS NOTES
[facility]  Progress Note - Jaiden Gonsales 1990, 28 y o  female MRN: [id number]  Unit/Bed#: -01 Encounter: [id number]  Primary Care Provider: Agueda Sagastume DO   Date and time admitted to hospital: 5/24/2022  8:04 AM    Addendum, reviewed CT LS spine-patient with mild disc herniation on LEFT at L3/L4  This does not match pattern of pain which was described as more RIGHT sided, into buttock  Would hold off on steroids  * Intractable nausea and vomiting  Assessment & Plan  · Patient presented with intractable nausea/vomiting x 3-4 days  She has had several prior presentations for same  Reports inability to keep anything down  · History of chronic marijuana use, this could be cannabis hyperemesis syndrome/cyclic vomiting  · 0/1707 EGD= mild esophagitis  CT A/P 2/22 unremarkable, not repeated on admission but ultrasound was done and unremarkable  No evidence that a gastric emptying study was done  · Recommend downgrading from regular to clear liquid diet, advanced as tolerated  · Will consult GI to see if additional intervention will be recommended by their team   I highly recommend she follow-up with GI after discharge  · Continue supportive IV fluids, change IV Reglan to be given around the clock with IV Zofran as needed as well  Would discontinue narcotics, which are not indicated and likely to further exacerbate the problem  For stomach discomfort would on IV Protonix and IV Pepcid plus carafate until she is tolerating food better and can be converted to p o  Acute on chronic bilateral low back pain without sciatica  Assessment & Plan  · Reports acutely worsening chronic lower back pain without any known history of obvious trauma/obvious reason  · No recent imaging on file  Lumbar spine MRI from 2020 showed mild L4-L5 degenerative disease  Will get CT LS spine to start  · Per review of PDMP was not being prescribed narcotics    Would avoid them at this time in favor of a more appropriate multimodal pain regimen to include initiation of gabapentin, continuation a topical agents and Robaxin, heat and stretching  Would avoid NSAIDs in the context of active nausea/some GI symptoms  Would consider Medrol Dosepak      Hypokalemia  Assessment & Plan  · Recheck today to determine if additional repletion is needed    Elevated serum hCG  Assessment & Plan  · Mildly elevated serum hCG  · Transvaginal ultrasound in the ER report noted - no intrauterine or ectopic pregnancy identified  · Outpatient follow-up recommended    Leukocytosis  Assessment & Plan  · White count on admission yesterday significantly elevated at 17 47  No fever or other infectious symptomatology  Perhaps this is stress response but should be repeated today    Medical marijuana use  Assessment & Plan  · Reports use of medical marijuana      VTE Pharmacologic Prophylaxis: OOB, low score    Patient Centered Rounds: spoke with RN    Discussions with Specialists or Other Care Team Provider: GI    Education and Discussions with Family / Patient:     Time Spent for Care: 30 minutes  More than 50% of total time spent on counseling and coordination of care as described above  Current Length of Stay: 0 day(s)    Current Patient Status: Observation   Certification Statement: The patient, admitted on an observation basis, will now require > 2 midnight hospital stay due to ongoing intractable symptoms    Discharge Plan: hopefully tomorrow    Code Status: Level 1 - Full Code      Subjective:   Patient was initially seen resting comfortably in bed when I entered  She reported to me that she has had episodes of nausea and vomiting all night long  She states she has not had a bowel movement since Monday but does not feel constipated    When I inquired about whether she has been followed by GI in the past ever she says she saw [facility] but was told she needs to pay her bill before she can return for follow-up  A short time later patient started yelling out "ow ow ow" and became tearful stating that her back hurts so much and reporting radiation into her buttocks on the right  Objective:     Vitals:   Temp (24hrs), Av °F (37 2 °C), Min:98 7 °F (37 1 °C), Max:99 4 °F (37 4 °C)    Temp:  [98 7 °F (37 1 °C)-99 4 °F (37 4 °C)] 98 7 °F (37 1 °C)  HR:  [] 58  Resp:  [17-20] 17  BP: (124-146)/(66-87) 131/66  SpO2:  [98 %-100 %] 100 %  Body mass index is 28 29 kg/m²  Input and Output Summary (last 24 hours): Intake/Output Summary (Last 24 hours) at 2022 0930  Last data filed at 2022 1905  Gross per 24 hour   Intake 1000 ml   Output 1 ml   Net 999 ml       Physical Exam:     Physical Exam  Vitals reviewed  Constitutional:       Appearance: She is obese  She is not ill-appearing, toxic-appearing or diaphoretic  Eyes:      General: No scleral icterus  Right eye: No discharge  Left eye: No discharge  Conjunctiva/sclera: Conjunctivae normal    Cardiovascular:      Rate and Rhythm: Normal rate and regular rhythm  Heart sounds: No murmur heard  Pulmonary:      Effort: Pulmonary effort is normal  No respiratory distress  Breath sounds: Normal breath sounds  No stridor  No wheezing, rhonchi or rales  Abdominal:      General: There is no distension  Palpations: Abdomen is soft  Tenderness: There is no abdominal tenderness  There is no guarding  Comments: Abdomen was soft and she had not TTP noted   Musculoskeletal:      Right lower leg: No edema  Left lower leg: No edema  Skin:     General: Skin is warm and dry  Coloration: Skin is not jaundiced or pale  Findings: No bruising, erythema, lesion or rash  Neurological:      Mental Status: She is alert        Comments: Awake and alert   Psychiatric:      Comments: Tearful anxious         Additional Data:     Labs:    Results from last 7 days   Lab Units 22  0856   WBC Thousand/uL 17 47*   HEMOGLOBIN g/dL 14 5   HEMATOCRIT % 41 8   PLATELETS Thousands/uL 246   NEUTROS PCT % 82*   LYMPHS PCT % 13*   MONOS PCT % 4   EOS PCT % 0     Results from last 7 days   Lab Units 05/24/22  0856   SODIUM mmol/L 137   POTASSIUM mmol/L 3 2*   CHLORIDE mmol/L 105   CO2 mmol/L 25   BUN mg/dL 11   CREATININE mg/dL 0 87   ANION GAP mmol/L 7   CALCIUM mg/dL 9 8   ALBUMIN g/dL 4 0   TOTAL BILIRUBIN mg/dL 1 75*   ALK PHOS U/L 96   ALT U/L 21   AST U/L 10   GLUCOSE RANDOM mg/dL 116                       * I Have Reviewed All Lab Data Listed Above  * Additional Pertinent Lab Tests Reviewed:  Roland [facility] Admission Reviewed    Imaging:    Imaging Reports Reviewed Today Include: prior imaging  Imaging Personally Reviewed by Myself Includes:      Recent Cultures (last 7 days):           Last 24 Hours Medication List:   Current Facility-Administered Medications   Medication Dose Route Frequency Provider Last Rate    acetaminophen  975 mg Oral Q6H [facility] Lisa Recinos PA-C      aluminum-magnesium hydroxide-simethicone  30 mL Oral Q6H PRN Collette Hire, MD      bisacodyl  10 mg Rectal Daily PRN Collette Hire, MD      cyproheptadine hcl  4 mg Oral TID PRN Collette Hire, MD      Diclofenac Sodium  4 g Topical 4x Daily Collette Hire, MD      diphenhydrAMINE  25 mg Oral Q6H PRN Collette Hire, MD      docusate sodium  100 mg Oral BID Collette Hire, MD      famotidine  20 mg Intravenous Q12H [facility] Lisa Recinos PA-C      gabapentin  100 mg Oral TID Lisa Recinos PA-C      lidocaine  2 patch Topical HS Collette Hire, MD      melatonin  3 mg Oral HS Carol Ruth PA-C      menthol-methyl salicylate   Apply externally 4x Daily Collette Hire, MD      methocarbamol  750 mg Oral Q6H 900 Eduardo Clay MD      metoclopramide  10 mg Oral 4x Daily (AC & HS) Lisa Recinos PA-C      ondansetron  4 mg Intravenous Q6H PRN Layla Lencho Ibarra MD      pantoprazole  40 mg Intravenous Q12H [facility] Lisa Recinos PA-C      polyethylene glycol  17 g Oral Daily Naveen Bundy MD      potassium chloride  20 mEq Oral Once Naveen Bundy MD      sodium chloride  100 mL/hr Intravenous Continuous Naveen Bundy  mL/hr (05/25/22 0529)    sucralfate  1 g Oral 4x Daily (AC & HS) Sarahy Nelson PA-C          Today, Patient Was Seen By: Sarahy Nelson PA-C    ** Please Note: Dictation voice to text software may have been used in the creation of this document   **

## 2022-05-25 NOTE — UTILIZATION REVIEW
Initial Clinical Review  Observation on 05/24 @ 1711 upgraded to Inpatient on 05/26 @ 1453  Pt requiring continued stay d/t pain management, advancing diet    Admission: Date/Time/Statement:   Admission Orders (From admission, onward)     Ordered        05/26/22 1453  Inpatient Admission  Once            05/24/22 1711  Place in Observation  Once                      Orders Placed This Encounter   Procedures    Inpatient Admission     Standing Status:   Standing     Number of Occurrences:   1     Order Specific Question:   Level of Care     Answer:   Med Surg [16]     Order Specific Question:   Estimated length of stay     Answer:   More than 2 Midnights     Order Specific Question:   Certification     Answer:   I certify that inpatient services are medically necessary for this patient for a duration of greater than two midnights  See H&P and MD Progress Notes for additional information about the patient's course of treatment  ED Arrival Information     Expected   -    Arrival   5/24/2022 07:57    Acuity   Urgent            Means of arrival   Walk-In    Escorted by   Self    Service   Hospitalist    Admission type   Urgent            Arrival complaint   back pain;vomiting           Chief Complaint   Patient presents with    Back Pain     Pt reports "my back pain started up again on Saturday, I get my nerves burned in my back and I messaged my back dr but now I started vomiting and I can't stop"    Vomiting       Initial Presentation: 28 y o  female with PMH of chronic back pain, hx duodenal ulcer, hx marijuana use presented to the ED from home with intractable nausea and vomiting and low back pain  Pt has chronic back pain and follows pain services and physiatry  She received medical marijuana for back pain and uses 3x/wk with some relief  Reports back pain was triggered d/t multiple episodes of inrtactable nausea and vomiting that started on Sunday  Reports abdominal pain and diarrhea x 1   Reports poor po intake and has no intake since yesterday  In the ED, reports 10/10 lower back pain, non radiating, aggravated by movement  Labs with elevated hCG  Transvaginal ultrasound with no intrauterine or ectopic pregnancy identified/  Given 1L IVF bolus, Toradol x 1, Valium x 1, Zofran x 1, Reglain x 1 and Fentanyl x 1  Admit as observation level of care for intractable n/v, acute on chronic low back pain, elevated hCG: Clear liquid diet  antiemetics, IV fluids  Opioid analgesics, Chivo-Wilcox, lidocaine patch  Heating pad    05/25  IM Notes: Pt reports n/v overnight  No BM since admission  Currently having severe lower back pain that radiates to her right buttocks  CLD, advance as tolerated  Pain control mgt, cont IVF, IV Reglan, IV Zofran, dc narcotics, IV Protonix, IV pepcid, Carafate  CT LS spine ordered  Start Gabapentin, cont Robaxin, heat and stretching  Avoid NSAIDS  Recheck potassium today and replete as needed  WBC elevated yesterday possibly stress response, rpt today  GI Consult: Intractable N/V likely 2/2 cyclic vomiting syndrome vs gastroparesis, Mild esophagitis, Medical marijuana use for chronic back pain: Plan decrease Reglan to 10 mg t i d ,Tigan p r n  q 6h 2nd choice, Zofran 4 mg q 6h, replace electrolytes, monitor QT interval, continue Protonix b i d , change Pepcid to bedtime  can switch to oral formulation once tolerating diet  small frequent meal, low-fiber/low fat diet, hydration     05/26 Day   IM Notes: Pt reports she is doing a little better after hot shower and heating pad but still actively vomiting and barely tolerating sips of liquids  Still having back pain but able to ambulate  Reports not sleeping well and has no BM since admission  CT LS spine "Small LEFT-sided disc herniation at L3-4  No significant canal stenosis or foraminal narrowing " K-2 9 yesterday, repleted, 3 6 today  Cont to monitor  WBC trending down  On CLD, advance as tolerated  Cont IVF   Continue current regimen: zofran, reglan, tigan, protonix, PPI  Add scopalamine  Add IV valium to manage anxiety component  Cont multimodal pain regimen  If unable to tolerate po, can use IV toradol       Date 2/27 Day 2    ED Triage Vitals   Temperature Pulse Respirations Blood Pressure SpO2   05/24/22 0805 05/24/22 0807 05/24/22 0807 05/24/22 0807 05/24/22 0807   97 8 °F (36 6 °C) (!) 122 (!) 24 123/76 97 %      Temp Source Heart Rate Source Patient Position - Orthostatic VS BP Location FiO2 (%)   05/24/22 0805 05/24/22 0807 05/24/22 0807 05/24/22 0807 --   Tympanic Monitor Lying Right arm       Pain Score       05/24/22 0807       10 - Worst Possible Pain          Wt Readings from Last 1 Encounters:   05/24/22 77 1 kg (170 lb)     Additional Vital Signs:   Date/Time Temp Pulse Resp BP MAP (mmHg) SpO2 O2 Device Patient Position - Orthostatic VS   05/26/22 1538 -- -- -- -- -- -- None (Room air) --   05/26/22 11:07:33 98 9 °F (37 2 °C) 83 17 147/90 109 97 % None (Room air) Lying   05/26/22 0900 -- -- -- -- -- 90 % None (Room air) --   05/26/22 07:46:42 -- 60 18 123/81 95 90 % None (Room air) --   05/25/22 21:55:31 98 6 °F (37 °C) 72 16 113/68 83 97 % None (Room air) Lying   05/25/22 19:45:48 -- -- -- 111/69 83 -- -- --   05/25/22 14:51:14 98 8 °F (37 1 °C) 66 19 150/96 114 99 % None (Room air) Sitting       Date/Time Temp Pulse Resp BP MAP (mmHg) SpO2 O2 Device Patient Position - Orthostatic VS   05/25/22 07:18:51 98 7 °F (37 1 °C) 58 17 131/66 88 100 % None (Room air) Lying   05/24/22 23:21:07 98 9 °F (37 2 °C) 91 -- 145/87 106 98 % -- --   05/24/22 1921 98 9 °F (37 2 °C) 82 17 -- -- 98 % -- --   05/24/22 18:44:51 99 4 °F (37 4 °C) 72 18 146/79 101 99 % None (Room air) Lying   05/24/22 1815 -- 52 Abnormal  18 130/66 -- 98 % None (Room air) --   05/24/22 1400 -- 106 Abnormal  20 128/68 -- 100 % -- --   05/24/22 1200 -- 98 18 124/76 -- 98 % -- --   05/24/22 1000 -- 104 18 126/78 -- 100 % -- --   05/24/22 0807 -- 122 Abnormal  24 Abnormal  123/76 -- 97 % None (Room air) Lying       Pertinent Labs/Diagnostic Test Results:   CT spine lumbar wo contrast   Final Result by Emmanuel Patton DO (05/25 1328)      Small left-sided disc herniation at L3-4  No significant canal stenosis or foraminal narrowing  Workstation performed: [id number]         [address] OB pregnancy limited with transvaginal   Final Result by Alexandria Pizarro MD (05/24 1610)      An intrauterine or ectopic pregnancy is not identified  Workstation performed: [id number]         US right upper quadrant   Final Result by Valerie Dominguez MD (05/24 6859)      Mild hepatic steatosis  No evidence of cholelithiasis, sonographic Doshi's sign, or biliary ductal dilatation              Workstation performed: [id number]           05/25 EKG result: Sinus bradycardia with marked sinus arrhythmia      Results from last 7 days   Lab Units 05/25/22  1024 05/24/22  0856   WBC Thousand/uL 15 33* 17 47*   HEMOGLOBIN g/dL 13 7 14 5   HEMATOCRIT % 40 3 41 8   PLATELETS Thousands/uL 230 246   NEUTROS ABS Thousands/µL 12 19* 14 38*         Results from last 7 days   Lab Units 05/26/22  0750 05/26/22  0636 05/25/22  1024 05/24/22  0856   SODIUM mmol/L 136  --  134* 137   POTASSIUM mmol/L 3 6  --  2 9* 3 2*   CHLORIDE mmol/L 109*  --  104 105   CO2 mmol/L 23  --  24 25   ANION GAP mmol/L 4  --  6 7   BUN mg/dL 9  --  12 11   CREATININE mg/dL 0 77  --  0 68 0 87   EGFR ml/min/1 73sq m 103  --  117 89   CALCIUM mg/dL 9 2  --  8 9 9 8   MAGNESIUM mg/dL  --  2 1  --   --      Results from last 7 days   Lab Units 05/25/22  1024 05/24/22  0856   AST U/L 16 10   ALT U/L 21 21   ALK PHOS U/L 79 96   TOTAL PROTEIN g/dL 7 3 8 1   ALBUMIN g/dL 3 8 4 0   TOTAL BILIRUBIN mg/dL 1 75* 1 75*         Results from last 7 days   Lab Units 05/26/22  0750 05/25/22  1024 05/24/22  0856   GLUCOSE RANDOM mg/dL 103 110 116       Results from last 7 days   Lab Units 05/24/22  0856   LIPASE u/L 50*     ED Treatment: Medication Administration from 05/24/2022 0757 to 05/24/2022 1826       Date/Time Order Dose Route Action     05/24/2022 1022 sodium chloride 0 9 % bolus 1,000 mL 0 mL Intravenous Stopped     05/24/2022 0854 sodium chloride 0 9 % bolus 1,000 mL 1,000 mL Intravenous New Bag     05/24/2022 0854 ketorolac (TORADOL) injection 15 mg 15 mg Intravenous Given     05/24/2022 0853 diazepam (VALIUM) tablet 5 mg 5 mg Oral Given     05/24/2022 0853 ondansetron (ZOFRAN) injection 4 mg 4 mg Intravenous Given     05/24/2022 0854 lidocaine (LIDODERM) 5 % patch 1 patch 1 patch Topical Medication Applied     05/24/2022 0853 predniSONE tablet 60 mg 60 mg Oral Given     05/24/2022 1022 metoclopramide (REGLAN) injection 10 mg 10 mg Intravenous Given     05/24/2022 1738 fentanyl citrate (PF) 100 MCG/2ML 25 mcg 25 mcg Intravenous Given        Past Medical History:   Diagnosis Date    Arthritis     Asthma     History of stomach ulcers     Psychiatric disorder     anxiety     Present on Admission:   Acute on chronic bilateral low back pain without sciatica      Admitting Diagnosis: Leukocytosis [D72 829]  Vomiting [R11 10]  Intractable vomiting [R11 10]  Elevated bilirubin [R17]  Elevated serum human chorionic gonadotropin (hCG) level [E34 9]  Age/Sex: 28 y o  female  Admission Orders:  24 Hr telemetry Monitoring  NPO    Scheduled Medications:  acetaminophen, 975 mg, Oral, Q8H KACI  Diclofenac Sodium, 4 g, Topical, 4x Daily  docusate sodium, 100 mg, Oral, BID  Famotidine (PF), 20 mg, Intravenous, Once  lidocaine, 2 patch, Topical, HS  melatonin, 3 mg, Oral, HS  menthol-methyl salicylate, , Apply externally, 4x Daily  methocarbamol, 750 mg, Oral, Q6H KACI  metoclopramide, 5 mg, Intravenous, Once  pantoprazole, 20 mg, Oral, Early Morning  polyethylene glycol, 17 g, Oral, Daily  potassium chloride, 20 mEq, Oral, Once  sucralfate, 1 g, Oral, Once  potassium chloride 20 mEq IVPB (premix) q3h IV    Continuous IV Infusions:  sodium chloride 0 9 % with KCl 40 mEq/L infusion (premix)  Rate: 125 mL/hr Dose: 125 mL/hr  Freq: Continuous Route: IV  Last Dose: 125 mL/hr (05/26/22 1610)  Start: 05/25/22 1515  sodium chloride 0 9 % infusion  Rate: 100 mL/hr Dose: 100 mL/hr  Freq: Continuous Route: IV  Indications of Use: IV Hydration  Last Dose: Stopped (05/25/22 1600)  Start: 05/24/22 1800 End: 05/25/22 1510    PRN Meds:  aluminum-magnesium hydroxide-simethicone, 30 mL, Oral, Q6H PRN  bisacodyl, 10 mg, Rectal, Daily PRN  cyproheptadine hcl, 4 mg, Oral, TID PRN  diphenhydrAMINE, 25 mg, Oral, Q6H PRN  metoclopramide, 10 mg, Oral, Q6H PRN 05/24 x 1, 05/25 x 2  morphine injection, 2 mg, Intravenous, Q4H PRN 05/24 x 1, 05/25 x 2  ondansetron, 4 mg, Intravenous, Q6H PRN 05/24 x 1, 05/25 x 2, 05/26 x 3  oxyCODONE, 5 mg, Oral, Q6H PRN 05/25 x 1  ketorolac (TORADOL) injection 30 mg q6h IV prn 05/25 x 1, 05/26 x 2  trimethobenzamide (TIGAN) IM injection 200 mg q6h IM prn 05/25 x 1, 05/26 x 1  diazepam (VALIUM) injection 2 5 mg q6h IV prn 05/26 x 1        IP CONSULT TO CASE MANAGEMENT  IP CONSULT TO GASTROENTEROLOGY    Network Utilization Review Department  ATTENTION: Please call with any questions or concerns to [phone] and carefully listen to the prompts so that you are directed to the right person  All voicemails are confidential   Nohelia Wagner all requests for admission clinical reviews, approved or denied determinations and any other requests to dedicated fax number below belonging to the campus where the patient is receiving treatment   List of dedicated fax numbers for the Facilities:  [address] DENIALS (Administrative/Medical Necessity) [phone]   [address] (Maternity/NICU/Pediatrics) [phone]   [address] [place] [address] [address] [place] [address]  [address] [place] [address] Crossing   Nicole Eaton [address] [place] [address] [id number] [place] [address] [phone] [facility] 951 [phone]

## 2022-05-25 NOTE — CONSULTS
Consult Service: Gastroenterology      PATIENT INFORMATION      Abel Walton 28 y o  female MRN: [id number]  Unit/Bed#: -01 Encounter: [id number]  PCP: Nichole Shane DO  Date of Admission:  5/24/2022  Date of Consultation: 05/25/22  Requesting Physician: Kely Daley MD       ASSESSMENTS & PLAN     71-year-old F with PMH of sciatica presented with persistent N/V, poor p o  intake  Multiple admissions with similar complaints  Family h/o gastroparesis  Concern for cyclic vomiting syndrome given use of medical marijuana  Prior endoscopy screening in 2021 January showed mild esophagitis otherwise unremarkable  Recent imaging with CT AP was unremarkable  1  Intractable N/V likely 2/2 cyclic vomiting syndrome vs gastroparesis  2  Mild esophagitis  3  Medical marijuana use for chronic back pain    Plan:-  -decrease Reglan to 10 mg t i d   -Tigan p r n  q 6h 2nd choice  -Zofran 4 mg q 6h  -replace electrolytes  -monitor QT interval  -continue Protonix b i d , change Pepcid to bedtime  -can switch to oral formulation once tolerating diet   -small frequent meal, low-fiber/low fat diet  -encourage hydration  -encouraged to avoid marijuana use  -avoid opioids, NSAIDs  -will need GI follow-up outpatient and gastric emptying study to rule out gastroparesis    Thank you for the consultation  Will continue to follow  REASON FOR CONSULTATION      Intractable N/V      HISTORY OF PRESENT ILLNESS      Abel Walton is a 28 y o  female with PMH of anxiety, PUD presented with intractable N/V  Had similar complaints and was seen in ED multiple times  States N/V persistent since last 5 years and has been intermittent  Resolved shin w/o any intervention after few days  Also reported to have chronic watery diarrhea alternating with constipation  Feels better after defecation  H/o anxiety  No prior H/o IBS  No recent weight loss, hematochezia, melena, hematemesis    Prior EGD with mild esophagitis otherwise unremarkable findings  Was recommended to outpatient gastric emptying study which she never did due to insurance problem  Currently denies any fever  Decreased p o  intake  Generalized weakness  Back spasms going on  Also stated using marijuana for back pain  States her symptoms started around 5 years ago and she started using marijuana chest 2 years ago  Does have family H/o gastroparesis  REVIEW OF SYSTEMS     A thorough 12-point review of systems has been conducted  Pertinent positives and negatives are mentioned in the history of present illness  PAST MEDICAL & SURGICAL HISTORY      Past Medical History:   Diagnosis Date    Arthritis     Asthma     History of stomach ulcers     Psychiatric disorder     anxiety       Past Surgical History:   Procedure Laterality Date    BREAST SURGERY      COSMETIC SURGERY      EGD      WISDOM TOOTH EXTRACTION           MEDICATIONS & ALLERGIES       Medications:   Prior to Admission medications    Medication Sig Start Date End Date Taking?  Authorizing Provider   acetaminophen (TYLENOL) 325 mg tablet Take 2 tablets (650 mg total) by mouth every 6 (six) hours as needed for mild pain 12/5/20  Yes Dieudonne Santos MD   methocarbamol (ROBAXIN) 500 mg tablet Take 1 tablet (500 mg total) by mouth 2 (two) times a day 2/19/22  Yes Edouard Cruz MD   bisacodyl (DULCOLAX) 10 mg suppository Insert 1 suppository (10 mg total) into the rectum daily for 16 days 1/24/21 2/9/21  TIFFANY Santos   cyproheptadine hcl 2 MG/5ML oral syrup Take 10 mL (4 mg total) by mouth 3 (three) times a day as needed for allergies  Patient not taking: Reported on 5/24/2022 1/23/21   TIFFANY Santos   Diclofenac Sodium (VOLTAREN) 1 % Apply 2 g topically 4 (four) times a day  Patient not taking: Reported on 5/24/2022 1/23/21   TIFFANY Santos   Diclofenac Sodium (VOLTAREN) 1 % Apply 2 g topically 4 (four) times a day as needed (back pain) for up to 7 days 2/19/22 2/26/22  Edouard Cruz MD   diphenhydrAMINE (BENADRYL) 25 mg tablet Take 1 tablet (25 mg total) by mouth every 6 (six) hours as needed for itching, allergies or sleep  Patient not taking: Reported on 5/24/2022 1/23/21   TIFFANY Santos   docusate sodium (COLACE) 100 mg capsule Take 1 capsule (100 mg total) by mouth 2 (two) times a day  Patient not taking: Reported on 5/24/2022 1/23/21   TIFFANY Santos   lidocaine (LIDODERM) 5 % Apply 1 patch topically daily at bedtime Remove & Discard patch within 12 hours or as directed by MD  Patient not taking: Reported on 5/24/2022 1/23/21   TIFFANY Santos   menthol-methyl salicylate (BENGAY) 28-08 % cream Apply topically 4 (four) times a day as needed (apply to back as needed) 1/23/21   TIFFANY Santos   methocarbamol (ROBAXIN) 500 mg tablet Take 1 tablet (500 mg total) by mouth every 6 (six) hours as needed for muscle spasms  Patient not taking: Reported on 5/24/2022 1/23/21   TIFFANY Santos   metoclopramide (REGLAN) 5 mg tablet Take 2 tablets (10 mg total) by mouth every 6 (six) hours as needed (please cycle this with the zofran every 3 hrs)  Patient not taking: Reported on 5/24/2022 1/23/21   TIFFANY Santos   omeprazole (PriLOSEC) 20 mg delayed release capsule Take 1 capsule (20 mg total) by mouth daily for 14 days 2/19/22 3/5/22  Edouard Cruz MD   ondansetron (Zofran ODT) 4 mg disintegrating tablet Take 1 tablet (4 mg total) by mouth every 6 (six) hours as needed for nausea or vomiting  Patient not taking: Reported on 5/24/2022 2/22/22   Donnie Padron MD   [facility]ron [facility]) 4 mg tablet Take 1 tablet (4 mg total) by mouth every 6 (six) hours as needed for nausea or vomiting  Patient not taking: Reported on 5/24/2022 2/17/22   [place] MD Anthony   pantoprazole (PROTONIX) 40 mg tablet Take 1 tablet (40 mg total) by mouth 2 (two) times a day  Patient not taking: Reported on 5/24/2022 1/23/21   TIFFANY Santos   scopautumn (TRANSDERM-SCOP) 1 5 mg/3 days TD 72 hr patch Place 1 patch on the skin every third day for 15 days 1/23/21 2/7/21  TIFFANY Jean Baptiste   sucralfate (CARAFATE) 1 g tablet Take 1 tablet (1 g total) by mouth 4 (four) times a day for 5 days 2/17/22 2/22/22  Mary Grace Mejia MD   sucralfate (CARAFATE) 1 g/10 mL suspension Take 10 mL (1 g total) by mouth 2 (two) times a day for 14 days 2/16/22 3/2/22  Mu Garnica DO       Allergies: No Known Allergies      SOCIAL HISTORY      Marital Status: Single    Substance Use History:   Social History     Substance and Sexual Activity   Alcohol Use Not Currently     Social History     Tobacco Use   Smoking Status Never Smoker   Smokeless Tobacco Never Used     Social History     Substance and Sexual Activity   Drug Use Not Currently    Types: Marijuana         FAMILY HISTORY      Non-Contributory      PHYSICAL EXAM     Vitals:   Blood Pressure: 131/66 (05/25/22 0718)  Pulse: 58 (05/25/22 0718)  Temperature: 98 7 °F (37 1 °C) (05/25/22 0718)  Temp Source: Oral (05/25/22 0718)  Respirations: 17 (05/25/22 0718)  Height: 5' 5" (165 1 cm) (05/24/22 1921)  Weight - Scale: 77 1 kg (170 lb) (05/24/22 1921)  SpO2: 100 % (05/25/22 0718)    Physical Exam:   GENERAL: NAD  HEENT:  NC/AT, no scleral icterus  CARDIAC:  RRR, +S1/S2  PULMONARY:  CTA B/L, no wheezing/rales/rhonci, non-labored breathing  ABDOMEN:  Soft, NT/ND, +BS, no rebound/guarding/rigidity  Extremities:  No edema, cyanosis, or clubbing  NEUROLOGIC:  Alert  SKIN:  No rashes or erythema      ADDITIONAL DATA     Lab Results:     Results from last 7 days   Lab Units 05/24/22  0856   WBC Thousand/uL 17 47*   HEMOGLOBIN g/dL 14 5   HEMATOCRIT % 41 8   PLATELETS Thousands/uL 246   NEUTROS PCT % 82*   LYMPHS PCT % 13*   MONOS PCT % 4   EOS PCT % 0     Results from last 7 days   Lab Units 05/24/22  0856   POTASSIUM mmol/L 3 2*   CHLORIDE mmol/L 105   CO2 mmol/L 25   BUN mg/dL 11   CREATININE mg/dL 0 87   CALCIUM mg/dL 9 8   ALK PHOS U/L 96   ALT U/L 21   AST U/L 10           Imaging:    US right upper quadrant    Result Date: 5/24/2022  Narrative: RIGHT UPPER QUADRANT ULTRASOUND INDICATION:     RUQ pain, elevated Bili  COMPARISON:  Correlation is made with the prior CT study dated 2/22/2022  TECHNIQUE:   Real-time ultrasound of the right upper quadrant was performed with a curvilinear transducer with both volumetric sweeps and still imaging techniques  FINDINGS: PANCREAS:  Visualized portions of the pancreas are within normal limits  AORTA AND IVC:  Visualized portions are normal for patient age  LIVER: Size:  Normal in size  The liver measures 17 1 cm in the midclavicular line  Contour:  Surface contour is smooth  Parenchyma:  Mildly increased echogenicity suggestive of mild fatty infiltration  No liver mass identified  Limited imaging of the main portal vein shows it to be patent and hepatopetal   BILIARY: Fluid-filled without evidence of shadowing gallstones  No wall thickening or pericholecystic fluid  No sonographic Doshi's sign  No intrahepatic biliary dilatation  CBD measures 4 0 mm  No visualized choledocholithiasis  KIDNEY: Right kidney measures 11 9 x 5 1 x 5 1 cm  Volume 162 1 mL Kidney within normal limits  ASCITES:   None  Impression: Mild hepatic steatosis  No evidence of cholelithiasis, sonographic Doshi's sign, or biliary ductal dilatation  Workstation performed: [id number]     US OB pregnancy limited with transvaginal    Result Date: 5/24/2022  Narrative: FIRST TRIMESTER OBSTETRIC ULTRASOUND, COMPLETE INDICATION:    The LMP is 4/24/2022     COMPARISON: None  TECHNIQUE:   Transabdominal ultrasound of the pelvis was performed  Additional transvaginal imaging was then performed to better assess the gestation, myometrial/endometrial architecture and ovarian parenchymal detail  The study includes volumetric sweeps and traditional still imaging technique  FINDINGS: No intrauterine or ectopic pregnancy is not identified  Endometrial thickness of 14 mm  UTERUS/ADNEXA: The uterus and ovaries are within normal limits  The cervix remains closed  Physiologic quantity of free fluid  Impression: An intrauterine or ectopic pregnancy is not identified  Workstation performed: [id number]       EKG, Pathology, and Other Studies Reviewed on Admission:   · EKG: Reviewed      Counseling / Coordination of Care Time: 30 total mins spent n consult  Greater than 50% of total time spent on patient counseling and coordination of care     ** Please Note: This note is constructed using a voice recognition dictation system   **

## 2022-05-25 NOTE — CASE MANAGEMENT
Case Management Assessment & Discharge Planning Note    Patient name Jaiden Enamorado /[birth year] MRN [id number]  : 1990 Date 2022       Current Admission Date: 2022  Current Admission Diagnosis:Intractable nausea and vomiting   Patient Active Problem List    Diagnosis Date Noted    Hypokalemia 2022    Leukocytosis 2022    Elevated serum hCG 2022    Need for case management follow-up 2022    Depression with suicidal ideation 2021    Severe protein-calorie malnutrition ([address] ) 2021    Acute on chronic bilateral low back pain without sciatica 2021    Medical marijuana use 2021    Pregnancy 2021    History of duodenal ulcer 2021    9 weeks gestation of pregnancy 2020    Chronic bilateral low back pain without sciatica 10/13/2020    Intractable nausea and vomiting 10/11/2020    Mild protein-calorie malnutrition ([facility] [address] ) 2020    Lumbar spondylolysis 2020    Multiple gastric ulcers 2020    Vitamin D deficiency 2018      LOS (days): 0  Geometric Mean LOS (GMLOS) (days):   Days to GMLOS:     OBJECTIVE:              Current admission status: Observation       Preferred Pharmacy:   [address] [place] [address] [address] [place] [address]  Phone: [phone] Fax: [address], [address] [place] [id number]  Phone: [phone] Fax: [phone]    Primary Care Provider: Agueda Sagastume DO    Primary Insurance: Louis Ojeda  Secondary Insurance:     ASSESSMENT:  June 26 Proxies    There are no active Health Care Proxies on file              Obs Notice Signed: 22           DISCHARGE DETAILS:    Discharge planning discussed with[de-identified] Patient  Freedom of Choice: Yes  Comments - Freedom of Choice: Patient stated she is independent with ADLs and does not anticipate any needs at home when discharged  Were Treatment Team discharge recommendations reviewed with patient/caregiver?: Yes  Did patient/caregiver verbalize understanding of patient care needs?: Yes  Were patient/caregiver advised of the risks associated with not following Treatment Team discharge recommendations?: Yes    Email sent to high utilizer team to evaluate patient due to multiple ER visits since February for similar complains

## 2022-05-25 NOTE — PLAN OF CARE
Problem: Potential for Falls  Goal: Patient will remain free of falls  Description: INTERVENTIONS:  - Educate patient/family on patient safety including physical limitations  - Instruct patient to call for assistance with activity   - Consult OT/PT to assist with strengthening/mobility   - Keep Call bell within reach  - Keep bed low and locked with side rails adjusted as appropriate  - Keep care items and personal belongings within reach  - Initiate and maintain comfort rounds  - Make Fall Risk Sign visible to staff  - Offer Toileting every  Hours, in advance of need  - Initiate/Maintain alarm  - Obtain necessary fall risk management equipment:   - Apply yellow socks and bracelet for high fall risk patients  - Consider moving patient to room near nurses station  Outcome: Progressing     Problem: PAIN - ADULT  Goal: Verbalizes/displays adequate comfort level or baseline comfort level  Description: Interventions:  - Encourage patient to monitor pain and request assistance  - Assess pain using appropriate pain scale  - Administer analgesics based on type and severity of pain and evaluate response  - Implement non-pharmacological measures as appropriate and evaluate response  - Consider cultural and social influences on pain and pain management  - Notify physician/advanced practitioner if interventions unsuccessful or patient reports new pain  Outcome: Progressing     Problem: INFECTION - ADULT  Goal: Absence or prevention of progression during hospitalization  Description: INTERVENTIONS:  - Assess and monitor for signs and symptoms of infection  - Monitor lab/diagnostic results  - Monitor all insertion sites, i e  indwelling lines, tubes, and drains  - Monitor endotracheal if appropriate and nasal secretions for changes in amount and color  - [place] appropriate cooling/warming therapies per order  - Administer medications as ordered  - Instruct and encourage patient and family to use good hand hygiene technique  - Identify and instruct in appropriate isolation precautions for identified infection/condition  Outcome: Progressing  Goal: Absence of fever/infection during neutropenic period  Description: INTERVENTIONS:  - Monitor WBC    Outcome: Progressing     Problem: SAFETY ADULT  Goal: Patient will remain free of falls  Description: INTERVENTIONS:  - Educate patient/family on patient safety including physical limitations  - Instruct patient to call for assistance with activity   - Consult OT/PT to assist with strengthening/mobility   - Keep Call bell within reach  - Keep bed low and locked with side rails adjusted as appropriate  - Keep care items and personal belongings within reach  - Initiate and maintain comfort rounds  - Make Fall Risk Sign visible to staff  - Offer Toileting every  Hours, in advance of need  - Initiate/Maintain alarm  - Obtain necessary fall risk management equipment:   - Apply yellow socks and bracelet for high fall risk patients  - Consider moving patient to room near nurses station  Outcome: Progressing  Goal: Maintain or return to baseline ADL function  Description: INTERVENTIONS:  -  Assess patient's ability to carry out ADLs; assess patient's baseline for ADL function and identify physical deficits which impact ability to perform ADLs (bathing, care of mouth/teeth, toileting, grooming, dressing, etc )  - Assess/evaluate cause of self-care deficits   - Assess range of motion  - Assess patient's mobility; develop plan if impaired  - Assess patient's need for assistive devices and provide as appropriate  - Encourage maximum independence but intervene and supervise when necessary  - Involve family in performance of ADLs  - Assess for home care needs following discharge   - Consider OT consult to assist with ADL evaluation and planning for discharge  - Provide patient education as appropriate  Outcome: Progressing  Goal: Maintains/Returns to pre admission functional level  Description: INTERVENTIONS:  - Perform BMAT or MOVE assessment daily    - Set and communicate daily mobility goal to care team and patient/family/caregiver  - Collaborate with rehabilitation services on mobility goals if consulted  - Perform Range of Motion  times a day  - Reposition patient every  hours  - Dangle patient  times a day  - Stand patient  times a day  - Ambulate patient times a day  - Out of bed to chair  times a day   - Out of bed for meal times a day  - Out of bed for toileting  - Record patient progress and toleration of activity level   Outcome: Progressing     Problem: DISCHARGE PLANNING  Goal: Discharge to home or other facility with appropriate resources  Description: INTERVENTIONS:  - Identify barriers to discharge w/patient and caregiver  - Arrange for needed discharge resources and transportation as appropriate  - Identify discharge learning needs (meds, wound care, etc )  - Arrange for interpretive services to assist at discharge as needed  - Refer to Case Management Department for coordinating discharge planning if the patient needs post-hospital services based on physician/advanced practitioner order or complex needs related to functional status, cognitive ability, or social support system  Outcome: Progressing     Problem: Knowledge Deficit  Goal: Patient/family/caregiver demonstrates understanding of disease process, treatment plan, medications, and discharge instructions  Description: Complete learning assessment and assess knowledge base  Interventions:  - Provide teaching at level of understanding  - Provide teaching via preferred learning methods  Outcome: Progressing     Problem: Nutrition/Hydration-ADULT  Goal: Nutrient/Hydration intake appropriate for improving, restoring or maintaining nutritional needs  Description: Monitor and assess patient's nutrition/hydration status for malnutrition  Collaborate with interdisciplinary team and initiate plan and interventions as ordered    Monitor patient's weight and dietary intake as ordered or per policy  Utilize nutrition screening tool and intervene as necessary  Determine patient's food preferences and provide high-protein, high-caloric foods as appropriate       INTERVENTIONS:  - Monitor oral intake, urinary output, labs, and treatment plans  - Assess nutrition and hydration status and recommend course of action  - Evaluate amount of meals eaten  - Assist patient with eating if necessary   - Allow adequate time for meals  - Recommend/ encourage appropriate diets, oral nutritional supplements, and vitamin/mineral supplements  - Order, calculate, and assess calorie counts as needed  - Recommend, monitor, and adjust tube feedings and TPN/PPN based on assessed needs  - Assess need for intravenous fluids  - Provide specific nutrition/hydration education as appropriate  - Include patient/family/caregiver in decisions related to nutrition  Outcome: Progressing

## 2022-05-25 NOTE — ASSESSMENT & PLAN NOTE
· Reports acutely worsening chronic lower back pain without any known history of obvious trauma/obvious reason  · No recent imaging on file  Lumbar spine MRI from 2020 showed mild L4-L5 degenerative disease  Will get CT LS spine to start  · Per review of PDMP was not being prescribed narcotics  Would avoid them at this time in favor of a more appropriate multimodal pain regimen to include initiation of gabapentin, continuation a topical agents and Robaxin, heat and stretching  Would avoid NSAIDs in the context of active nausea/some GI symptoms    Would consider Medrol Dosepak

## 2022-05-25 NOTE — ASSESSMENT & PLAN NOTE
· Mildly elevated serum hCG  · Transvaginal ultrasound in the ER report noted - no intrauterine or ectopic pregnancy identified  · Outpatient follow-up recommended

## 2022-05-26 LAB
ANION GAP SERPL CALCULATED.3IONS-SCNC: 4 MMOL/L (ref 4–13)
BUN SERPL-MCNC: 9 MG/DL (ref 5–25)
CALCIUM SERPL-MCNC: 9.2 MG/DL (ref 8.3–10.1)
CHLORIDE SERPL-SCNC: 109 MMOL/L (ref 100–108)
CO2 SERPL-SCNC: 23 MMOL/L (ref 21–32)
CREAT SERPL-MCNC: 0.77 MG/DL (ref 0.6–1.3)
EST. AVERAGE GLUCOSE BLD GHB EST-MCNC: 97 MG/DL
GFR SERPL CREATININE-BSD FRML MDRD: 103 ML/MIN/1.73SQ M
GLUCOSE P FAST SERPL-MCNC: 103 MG/DL (ref 65–99)
GLUCOSE SERPL-MCNC: 103 MG/DL (ref 65–140)
HBA1C MFR BLD: 5 %
MAGNESIUM SERPL-MCNC: 2.1 MG/DL (ref 1.6–2.6)
POTASSIUM SERPL-SCNC: 3.6 MMOL/L (ref 3.5–5.3)
SODIUM SERPL-SCNC: 136 MMOL/L (ref 136–145)

## 2022-05-26 PROCEDURE — 83036 HEMOGLOBIN GLYCOSYLATED A1C: CPT | Performed by: INTERNAL MEDICINE

## 2022-05-26 PROCEDURE — 83735 ASSAY OF MAGNESIUM: CPT | Performed by: PHYSICIAN ASSISTANT

## 2022-05-26 PROCEDURE — 99232 SBSQ HOSP IP/OBS MODERATE 35: CPT | Performed by: PHYSICIAN ASSISTANT

## 2022-05-26 PROCEDURE — C9113 INJ PANTOPRAZOLE SODIUM, VIA: HCPCS | Performed by: PHYSICIAN ASSISTANT

## 2022-05-26 PROCEDURE — 99232 SBSQ HOSP IP/OBS MODERATE 35: CPT | Performed by: INTERNAL MEDICINE

## 2022-05-26 PROCEDURE — 80048 BASIC METABOLIC PNL TOTAL CA: CPT | Performed by: PHYSICIAN ASSISTANT

## 2022-05-26 RX ORDER — DIPHENHYDRAMINE HCL 25 MG
50 TABLET ORAL ONCE
Status: DISCONTINUED | OUTPATIENT
Start: 2022-05-26 | End: 2022-05-26

## 2022-05-26 RX ORDER — DIAZEPAM 5 MG/ML
2.5 INJECTION, SOLUTION INTRAMUSCULAR; INTRAVENOUS EVERY 6 HOURS PRN
Status: DISCONTINUED | OUTPATIENT
Start: 2022-05-26 | End: 2022-05-27 | Stop reason: HOSPADM

## 2022-05-26 RX ORDER — DIPHENHYDRAMINE HCL 25 MG
25 TABLET ORAL EVERY 8 HOURS
Status: DISCONTINUED | OUTPATIENT
Start: 2022-05-26 | End: 2022-05-27

## 2022-05-26 RX ORDER — SCOLOPAMINE TRANSDERMAL SYSTEM 1 MG/1
1 PATCH, EXTENDED RELEASE TRANSDERMAL
Status: DISCONTINUED | OUTPATIENT
Start: 2022-05-26 | End: 2022-05-27

## 2022-05-26 RX ORDER — AMITRIPTYLINE HYDROCHLORIDE 25 MG/1
25 TABLET, FILM COATED ORAL
Status: DISCONTINUED | OUTPATIENT
Start: 2022-05-26 | End: 2022-05-27

## 2022-05-26 RX ADMIN — ONDANSETRON 4 MG: 2 INJECTION INTRAMUSCULAR; INTRAVENOUS at 22:59

## 2022-05-26 RX ADMIN — METOCLOPRAMIDE HYDROCHLORIDE 10 MG: 5 INJECTION INTRAMUSCULAR; INTRAVENOUS at 11:27

## 2022-05-26 RX ADMIN — ONDANSETRON 4 MG: 2 INJECTION INTRAMUSCULAR; INTRAVENOUS at 03:32

## 2022-05-26 RX ADMIN — TRIMETHOBENZAMIDE HYDROCHLORIDE 200 MG: 100 INJECTION INTRAMUSCULAR at 16:20

## 2022-05-26 RX ADMIN — KETOROLAC TROMETHAMINE 30 MG: 30 INJECTION, SOLUTION INTRAMUSCULAR at 08:11

## 2022-05-26 RX ADMIN — PANTOPRAZOLE SODIUM 40 MG: 40 INJECTION, POWDER, FOR SOLUTION INTRAVENOUS at 09:32

## 2022-05-26 RX ADMIN — TRIMETHOBENZAMIDE HYDROCHLORIDE 200 MG: 100 INJECTION INTRAMUSCULAR at 04:54

## 2022-05-26 RX ADMIN — METOCLOPRAMIDE HYDROCHLORIDE 10 MG: 5 INJECTION INTRAMUSCULAR; INTRAVENOUS at 00:08

## 2022-05-26 RX ADMIN — FAMOTIDINE 20 MG: 10 INJECTION INTRAVENOUS at 22:30

## 2022-05-26 RX ADMIN — METOCLOPRAMIDE HYDROCHLORIDE 10 MG: 5 INJECTION INTRAMUSCULAR; INTRAVENOUS at 18:15

## 2022-05-26 RX ADMIN — METOCLOPRAMIDE HYDROCHLORIDE 10 MG: 5 INJECTION INTRAMUSCULAR; INTRAVENOUS at 05:00

## 2022-05-26 RX ADMIN — DICLOFENAC SODIUM 4 G: 10 GEL TOPICAL at 18:15

## 2022-05-26 RX ADMIN — DIAZEPAM 2.5 MG: 10 INJECTION, SOLUTION INTRAMUSCULAR; INTRAVENOUS at 18:15

## 2022-05-26 RX ADMIN — DICLOFENAC SODIUM 4 G: 10 GEL TOPICAL at 09:32

## 2022-05-26 RX ADMIN — KETOROLAC TROMETHAMINE 30 MG: 30 INJECTION, SOLUTION INTRAMUSCULAR at 16:20

## 2022-05-26 RX ADMIN — POTASSIUM CHLORIDE AND SODIUM CHLORIDE 75 ML/HR: 900; 300 INJECTION, SOLUTION INTRAVENOUS at 02:35

## 2022-05-26 RX ADMIN — PANTOPRAZOLE SODIUM 40 MG: 40 INJECTION, POWDER, FOR SOLUTION INTRAVENOUS at 23:06

## 2022-05-26 RX ADMIN — POTASSIUM CHLORIDE AND SODIUM CHLORIDE 125 ML/HR: 900; 300 INJECTION, SOLUTION INTRAVENOUS at 23:06

## 2022-05-26 RX ADMIN — DICLOFENAC SODIUM 4 G: 10 GEL TOPICAL at 11:12

## 2022-05-26 RX ADMIN — ONDANSETRON 4 MG: 2 INJECTION INTRAMUSCULAR; INTRAVENOUS at 14:22

## 2022-05-26 RX ADMIN — SCOLOPAMINE TRANSDERMAL SYSTEM 1 PATCH: 1 PATCH, EXTENDED RELEASE TRANSDERMAL at 13:05

## 2022-05-26 RX ADMIN — DIAZEPAM 2.5 MG: 10 INJECTION, SOLUTION INTRAMUSCULAR; INTRAVENOUS at 11:27

## 2022-05-26 RX ADMIN — ONDANSETRON 4 MG: 2 INJECTION INTRAMUSCULAR; INTRAVENOUS at 09:25

## 2022-05-26 RX ADMIN — POTASSIUM CHLORIDE AND SODIUM CHLORIDE 125 ML/HR: 900; 300 INJECTION, SOLUTION INTRAVENOUS at 13:50

## 2022-05-26 NOTE — ASSESSMENT & PLAN NOTE
· White count on admission significantly elevated at 17 47  No fever or other infectious symptomatology    Perhaps this is stress response, trended down--recheck in am

## 2022-05-26 NOTE — PROGRESS NOTES
Gastroenterology Progress Note      PATIENT INFORMATION      Patient: Usha Singletary 28 y o  female   MRN: [id number]  PCP: Brandon Cummings DO  Unit/Bed#: -01 Encounter: [id number]  Date Of Visit: 22  Current Length of Stay: 0 day(s)     ASSESSMENTS & PLAN      78-year-old F with PMH of sciatica presented with persistent N/V, poor p o  intake  Multiple admissions with similar complaints  Family h/o gastroparesis  Concern for cyclic vomiting syndrome given use of medical marijuana  Prior endoscopy screening in 2021 showed mild esophagitis otherwise unremarkable  Recent imaging with CT AP was unremarkable      1  Intractable N/V likely 2/2 cyclic vomiting syndrome vs gastroparesis  2  Mild esophagitis  3  Medical marijuana use for chronic back pain     Plan:-  -continue Reglan 10 mg t i d , Tigan q 6h p r n , Zofran q 6h p r n   -monitor electrolytes and replace as needed  -monitor QT interval  -start scopolamine patch, elavil hs, benadryl q8h  -continue Protonix b i d , Pepcid at bedtime  -can switch to oral formulation once tolerating diet   -small frequent meal, low-fiber/low fat diet  -continue hydration as per primary team  -encouraged to avoid marijuana use  -avoid opioids, NSAIDs  -will need GI follow-up outpatient and gastric emptying study to rule out gastroparesis     Thank you for the consultation  Will continue to follow  SUBJECTIVE     Pt was seen and examined  Pt was lying in bed with some distress  Continues to have N/V overnight cannot tolerate p o  diet or p o  medication  Denies any fever, abdominal pain  No hematemesis, melena  OBJECTIVE     Vitals:   Temp (24hrs), Av 7 °F (37 1 °C), Min:98 6 °F (37 °C), Max:98 8 °F (37 1 °C)    Temp:  [98 6 °F (37 °C)-98 8 °F (37 1 °C)] 98 6 °F (37 °C)  HR:  [60-72] 60  Resp:  [16-19] 18  BP: (111-150)/(68-96) 123/81  SpO2:  [90 %-99 %] 90 %  Body mass index is 28 29 kg/m²       Input and Output Summary (last  hours): Intake/Output Summary (Last 24 hours) at 5/26/2022 1355  Last data filed at 5/26/2022 0900  Gross per 24 hour   Intake 2099 17 ml   Output 700 ml   Net 1399 17 ml       Physical Exam:   GENERAL: NAD  HEENT:  NC/AT, no scleral icterus  CARDIAC:  RRR, +S1/S2  PULMONARY:  non-labored breathing  ABDOMEN:  Soft, NT/ND, +BS, no rebound/guarding/rigidity  Extremities:  No edema, cyanosis, or clubbing  NEUROLOGIC:  Alert  SKIN:  No rashes or erythema        ADDITIONAL DATA     Labs & Recent Cultures:     Results from last 7 days   Lab Units 05/25/22  1024   WBC Thousand/uL 15 33*   HEMOGLOBIN g/dL 13 7   HEMATOCRIT % 40 3   PLATELETS Thousands/uL 230   NEUTROS PCT % 80*   LYMPHS PCT % 14   MONOS PCT % 5   EOS PCT % 0     Results from last 7 days   Lab Units 05/26/22  0750 05/25/22  1024   POTASSIUM mmol/L 3 6 2 9*   CHLORIDE mmol/L 109* 104   CO2 mmol/L 23 24   BUN mg/dL 9 12   CREATININE mg/dL 0 77 0 68   CALCIUM mg/dL 9 2 8 9   ALK PHOS U/L  --  79   ALT U/L  --  21   AST U/L  --  16                     Nutrition:  Diet Clear Liquid  Radiology Results:   CT spine lumbar wo contrast   Final Result by Gene Lequita Prader, DO (05/25 1328)      Small left-sided disc herniation at L3-4  No significant canal stenosis or foraminal narrowing  Workstation performed: [id number]         [address] OB pregnancy limited with transvaginal   Final Result by Valentina Acevedo MD (05/24 1610)      An intrauterine or ectopic pregnancy is not identified  Workstation performed: [id number]         US right upper quadrant   Final Result by Dimitrios Zimmer MD (05/24 4308)      Mild hepatic steatosis  No evidence of cholelithiasis, sonographic Doshi's sign, or biliary ductal dilatation              Workstation performed: [id number]           Scheduled Medications:  acetaminophen, 975 mg, Q6H KACI  Diclofenac Sodium, 4 g, 4x Daily  famotidine, 20 mg, Q24H KACI  gabapentin, 100 mg, TID  lidocaine, 2 patch, HS  melatonin, 3 mg, HS  menthol-methyl salicylate, , 4x Daily  methocarbamol, 750 mg, Q6H KACI  metoclopramide, 10 mg, Q6H KACI  pantoprazole, 40 mg, Q12H [address]  potassium chloride, 20 mEq, Once  sucralfate, 1 g, 4x Daily (AC & HS)        PRN MEDS:  aluminum-magnesium hydroxide-simethicone, 30 mL, Q6H PRN  bisacodyl, 10 mg, Daily PRN  cyproheptadine hcl, 4 mg, TID PRN  diphenhydrAMINE, 25 mg, Q6H PRN  ketorolac, 30 mg, Q6H PRN  ondansetron, 4 mg, Q6H PRN  trimethobenzamide, 200 mg, Q6H PRN        Last 24 Hours Medication List:   Current Facility-Administered Medications   Medication Dose Route Frequency Provider Last Rate    acetaminophen  975 mg Oral Q6H [address] Lisa Recinos PA-C      aluminum-magnesium hydroxide-simethicone  30 mL Oral Q6H PRN Josafat Swann MD      bisacodyl  10 mg Rectal Daily PRN Josafat Swann MD      cyproheptadine hcl  4 mg Oral TID PRN Josafat Swann MD      Diclofenac Sodium  4 g Topical 4x Daily Josafat Swann MD      diphenhydrAMINE  25 mg Oral Q6H PRN Josafat Swann MD      famotidine  20 mg Intravenous Q24H [address] Rishi Copeland MD      gabapentin  100 mg Oral TID Yris Estrada PA-C      ketorolac  30 mg Intravenous Q6H PRN Lisa Recinos PA-C      lidocaine  2 patch Topical HS Josafat Swann MD      melatonin  3 mg Oral HS Carol Lal PA-C      menthol-methyl salicylate   Apply externally 4x Daily Josafat Swann MD      methocarbamol  750 mg Oral Q6H [address] Josafat Swann MD      metoclopramide  10 mg Intravenous Q6H [address] Lisa Recinos PA-C      ondansetron  4 mg Intravenous Q6H PRN Josafat Swann MD      pantoprazole  40 mg Intravenous Q12H [address] Lisa Recinos PA-C      potassium chloride  20 mEq Oral Once Josafat Swann MD      sodium chloride 0 9 % with KCl 40 mEq/L  75 mL/hr Intravenous Continuous Lisa Recinos PA-C 75 mL/hr (05/26/22 0235)    sucralfate  1 g Oral 4x Daily (AC & HS) Lisa ELMIRA Recinos      trimethobenzamide  200 mg Intramuscular Q6H PRN Cory Moya DO            Time Spent for Care: 30 mins spent in total   More than 50% of total time spent on counseling and coordination of care as described above  Current Length of Stay: 0 day(s)      Code Status: Level 1 - Full Code          ** Please Note: This note is constructed using a voice recognition dictation system   **

## 2022-05-26 NOTE — PROGRESS NOTES
[address] [facility]  Progress Note - Laurena Pulse 1990, 28 y o  female MRN: [id number]  Unit/Bed#: -Ramon Encounter: [id number]  Primary Care Provider: Karyle Olp, DO   Date and time admitted to hospital: 5/24/2022  8:04 AM    * Intractable nausea and vomiting  Assessment & Plan  · Patient presented with intractable nausea/vomiting x 3-4 days  She has had several prior presentations for same  Reports inability to keep anything down  · History of chronic marijuana use, this could be cannabis hyperemesis syndrome/cyclic vomiting  · 4/8945 EGD= mild esophagitis, H pylori negative  CT A/P 2/22 unremarkable, not repeated on admission but ultrasound was done and unremarkable  No evidence that a gastric emptying study was done  · Clear liquid diet, advance as tolerated but not ready yet due to persistent vomiting  · Appreciate GI consult  I highly recommend she follow-up with GI after discharge  · Continue current regimen: zofran, reglan, tigan, protonix, PPI  Add scopalamine  Add IV valium to manage anxiety component    Acute on chronic bilateral low back pain without sciatica  Assessment & Plan  · Reports acutely worsening chronic lower back pain, radiating into RIGHT buttock without any known history of obvious trauma/obvious reason  · No recent imaging on file  Lumbar spine MRI from 2020 showed mild L4-L5 degenerative disease  CT LS spine "Small LEFT-sided disc herniation at L3-4  No significant canal stenosis or foraminal narrowing"  · Per review of PDMP was not being prescribed narcotics (had tylenol with codeine 3/2022)  No active indication for narcotics  · Recommend multimodal pain regimen to include initiation of gabapentin, topical agents and Robaxin, heat and stretching  Can use IV toradol if unable to tolerate PO  Would also suggest IV valium today prn muscle spasms      Hypokalemia  Assessment & Plan  · K dropped to 2 9 due to GI losses   Repleted, continue KCL in IVF--continue to follow    Elevated serum hCG  Assessment & Plan  · Mildly elevated serum hCG, recheck in am  · Transvaginal ultrasound in the ER report noted - no intrauterine or ectopic pregnancy identified  · Outpatient follow-up recommended    Leukocytosis  Assessment & Plan  · White count on admission significantly elevated at 17 47  No fever or other infectious symptomatology  Perhaps this is stress response, trended down--recheck in am    Medical marijuana use  Assessment & Plan  · Reports use of medical marijuana        VTE Pharmacologic Prophylaxis: VTE Score: 2 ambulate    Patient Centered Rounds: spoke with RN  Discussions with Specialists or Other Care Team Provider: GI, case mgmt    Education and Discussions with Family / Patient:     Time Spent for Care: 30 minutes  More than 50% of total time spent on counseling and coordination of care as described above  Current Length of Stay: 0 day(s)  Current Patient Status: Observation   Certification Statement: The patient, admitted on an observation basis, will now require > 2 midnight hospital stay due to ongoing inability to tolerate PO  Discharge Plan: Anticipate discharge in 24-48 hrs to home  Code Status: Level 1 - Full Code    Subjective:   RN reported patient was doing a little better after taking a hot shower and using heating pad but was still actively vomiting still this morning and barely tolerating sips of liquids  She is still reporting back pain but is noted to be ambulating and is without any new neurologic deficits  She has not been sleeping well at all  She ahs not had any bowel movements since admission    Objective:     Vitals:   Temp (24hrs), Av 8 °F (37 1 °C), Min:98 6 °F (37 °C), Max:98 9 °F (37 2 °C)    Temp:  [98 6 °F (37 °C)-98 9 °F (37 2 °C)] 98 9 °F (37 2 °C)  HR:  [60-83] 83  Resp:  [16-19] 17  BP: (111-150)/(68-96) 147/90  SpO2:  [90 %-99 %] 97 %  Body mass index is 28 29 kg/m²       Input and Output Summary (last 24 hours): Intake/Output Summary (Last 24 hours) at 5/26/2022 1346  Last data filed at 5/26/2022 0900  Gross per 24 hour   Intake 2099 17 ml   Output 700 ml   Net 1399 17 ml       Physical Exam:   Physical Exam  Vitals reviewed  Constitutional:       General: She is not in acute distress  Appearance: She is obese  She is not ill-appearing, toxic-appearing or diaphoretic  HENT:      Head: Normocephalic  Eyes:      General: No scleral icterus  Right eye: No discharge  Left eye: No discharge  Conjunctiva/sclera: Conjunctivae normal    Cardiovascular:      Rate and Rhythm: Normal rate and regular rhythm  Heart sounds: No murmur heard  Pulmonary:      Effort: No respiratory distress  Breath sounds: No stridor  No wheezing, rhonchi or rales  Abdominal:      General: Bowel sounds are normal  There is no distension  Palpations: Abdomen is soft  There is no mass  Tenderness: There is no abdominal tenderness  There is no guarding or rebound  Musculoskeletal:         General: No swelling, tenderness, deformity or signs of injury  Right lower leg: No edema  Left lower leg: No edema  Skin:     General: Skin is warm and dry  Coloration: Skin is not jaundiced or pale  Findings: No bruising, erythema, lesion or rash  Neurological:      General: No focal deficit present  Mental Status: She is alert  Mental status is at baseline        Comments: Awake alert interactive no confusion          Additional Data:     Labs:  Results from last 7 days   Lab Units 05/25/22  1024   WBC Thousand/uL 15 33*   HEMOGLOBIN g/dL 13 7   HEMATOCRIT % 40 3   PLATELETS Thousands/uL 230   NEUTROS PCT % 80*   LYMPHS PCT % 14   MONOS PCT % 5   EOS PCT % 0     Results from last 7 days   Lab Units 05/26/22  0750 05/25/22  1024   SODIUM mmol/L 136 134*   POTASSIUM mmol/L 3 6 2 9*   CHLORIDE mmol/L 109* 104   CO2 mmol/L 23 24   BUN mg/dL 9 12   CREATININE mg/dL 0  77 0 68   ANION GAP mmol/L 4 6   CALCIUM mg/dL 9 2 8 9   ALBUMIN g/dL  --  3 8   TOTAL BILIRUBIN mg/dL  --  1 75*   ALK PHOS U/L  --  79   ALT U/L  --  21   AST U/L  --  16   GLUCOSE RANDOM mg/dL 103 110             Results from last 7 days   Lab Units 05/26/22  0636   HEMOGLOBIN A1C % 5 0           Lines/Drains:  Invasive Devices  Report    Peripheral Intravenous Line  Duration           Peripheral IV 05/24/22 Left Forearm 2 days              Telemetry:  Telemetry Orders (From admission, onward)             24 Hour Telemetry Monitoring  Continuous x 24 Hours (Telem)        Expiring   References:    Telemetry Guidelines   Question:  Reason for 24 Hour Telemetry  Answer:  Metabolic/Electrolyte Disturbance with High Probability of Dysrhythmia (K level <3 or >6, or KCL infusion >=10mEq/hr)                 Telemetry Reviewed: Normal Sinus Rhythm  Indication for Continued Telemetry Use: Arrthymias requiring medical therapy; QTc monitoring         Imaging:    Recent Cultures (last 7 days):         Last 24 Hours Medication List:   Current Facility-Administered Medications   Medication Dose Route Frequency Provider Last Rate    acetaminophen  975 mg Oral Q6H [facility] Lisa Recinos PA-C      aluminum-magnesium hydroxide-simethicone  30 mL Oral Q6H PRN Freddy Benitez MD      bisacodyl  10 mg Rectal Daily PRN Freddy Benitez MD      cyproheptadine hcl  4 mg Oral TID PRN Freddy Benitez MD      diazepam  2 5 mg Intravenous Q6H PRN Lisa Recinos PA-C      Diclofenac Sodium  4 g Topical 4x Daily Freddy Benitez MD      diphenhydrAMINE  50 mg Oral Once Belen Mccrary MD      famotidine  20 mg Intravenous Q24H [facility] Rey Eli MD      gabapentin  100 mg Oral TID Lisa Recinos PA-C      ketorolac  30 mg Intravenous Q6H PRN Lisa Recinos PA-C      lidocaine  2 patch Topical HS Freddy Benitez MD      melatonin  3 mg Oral HS Carol Sal PA-C      menthol-methyl salicylate Apply externally 4x Daily Moose Tilley MD      methocarbamol  750 mg Oral Q6H Марина Littlejohn MD      metoclopramide  10 mg Intravenous Q6H [address] Lisa Recinos PA-C      ondansetron  4 mg Intravenous Q6H PRN Moose Tilley MD      pantoprazole  40 mg Intravenous Q12H [address] Lisa Recinos PA-C      potassium chloride  20 mEq Oral Once Moose Tilley MD      scopolamine  1 patch Transdermal Q72H Belen Mccrary MD      sodium chloride 0 9 % with KCl 40 mEq/L  125 mL/hr Intravenous Continuous Belen Mccrary  mL/hr (05/26/22 1111)    sucralfate  1 g Oral 4x Daily (AC & HS) Lisa Recinos PA-C      trimethobenzamide  200 mg Intramuscular Q6H PRN Antoinette Sandoval,           Today, Patient Was Seen By: Heidi Silva PA-C    **Please Note: This note may have been constructed using a voice recognition system  **

## 2022-05-26 NOTE — ED CARE HANDOFF
Emergency Department Sign Out Note        Sign out and transfer of care from [place], [place]  See Separate Emergency Department note  The patient, Zoe Richard, was evaluated by the previous provider for vomiting and back pain      Workup Completed:  Results Reviewed     Procedure Component Value Units Date/Time    hCG, quantitative [[id number]]  (Abnormal) Collected: 05/24/22 0856    Lab Status: Final result Specimen: Blood from Arm, Left Updated: 05/24/22 1227     HCG, Quant 44 mIU/mL     Narrative:       Expected Ranges:     Approximate               Approximate HCG  Gestation age          Concentration ( mIU/mL)  _____________          ______________________   Aruna Hunting                      HCG values  0 2-1                       5-50  1-2                           2-3                         100-5000  3-4                         500-32928  4-5                         1000-91961  5-6                         [id number]  6-8                         [id number]  8-12                        [id number]      Lipase [[id number]]  (Abnormal) Collected: 05/24/22 0856    Lab Status: Final result Specimen: Blood from Arm, Left Updated: 05/24/22 1227     Lipase 50 u/L     CBC and differential [[id number]]  (Abnormal) Collected: 05/24/22 0856    Lab Status: Final result Specimen: Blood from Arm, Left Updated: 05/24/22 1126     WBC 17 47 Thousand/uL      RBC 4 85 Million/uL      Hemoglobin 14 5 g/dL      Hematocrit 41 8 %      MCV 86 fL      MCH 29 9 pg      MCHC 34 7 g/dL      RDW 13 2 %      MPV 11 6 fL      Platelets 566 Thousands/uL      nRBC 0 /100 WBCs      Neutrophils Relative 82 %      Immat GRANS % 1 %      Lymphocytes Relative 13 %      Monocytes Relative 4 %      Eosinophils Relative 0 %      Basophils Relative 0 %      Neutrophils Absolute 14 38 Thousands/µL      Immature Grans Absolute 0 09 Thousand/uL      Lymphocytes Absolute 2 20 Thousands/µL      Monocytes Absolute 0 76 Thousand/µL Eosinophils Absolute 0 01 Thousand/µL      Basophils Absolute 0 03 Thousands/µL     Comprehensive metabolic panel [[id number]]  (Abnormal) Collected: 05/24/22 0856    Lab Status: Final result Specimen: Blood from Arm, Left Updated: 05/24/22 0931     Sodium 137 mmol/L      Potassium 3 2 mmol/L      Chloride 105 mmol/L      CO2 25 mmol/L      ANION GAP 7 mmol/L      BUN 11 mg/dL      Creatinine 0 87 mg/dL      Glucose 116 mg/dL      Calcium 9 8 mg/dL      AST 10 U/L      ALT 21 U/L      Alkaline Phosphatase 96 U/L      Total Protein 8 1 g/dL      Albumin 4 0 g/dL      Total Bilirubin 1 75 mg/dL      eGFR 89 ml/min/1 73sq m     Narrative:      [place] guidelines for Chronic Kidney Disease (CKD):     Stage 1 with normal or high GFR (GFR > 90 mL/min/1 73 square meters)    Stage 2 Mild CKD (GFR = 60-89 mL/min/1 73 square meters)    Stage 3A Moderate CKD (GFR = 45-59 mL/min/1 73 square meters)    Stage 3B Moderate CKD (GFR = 30-44 mL/min/1 73 square meters)    Stage 4 Severe CKD (GFR = 15-29 mL/min/1 73 square meters)    Stage 5 End Stage CKD (GFR <15 mL/min/1 73 square meters)  Note: GFR calculation is accurate only with a steady state creatinine    POCT pregnancy, urine [[id number]]     Lab Status: No result             ED Course / Workup Pending (followup):  HCG, CT and re-evaluation                                ED Course as of 05/26/22 1622   Tue May 24, 2022   1222 Lipase   1224 Blood Pressure: 126/78   1224 Pulse: 104   1224 Respirations: 18   1224 SpO2: 100 %   1224 WBC(!): 17 47   1224 TOTAL BILIRUBIN(!): 1 75   1224 Creatinine: 0 87   1300 HCG QUANTITATIVE(!): 44  Patient notified of results  Discussed with patient will obtain u/s to rule out ectopic however likely will not see anything in uterus at this HCG level   Patient states that with her last 2 pregnancies she was admitted to the hospital for months for intractable vomiting   1624 US right upper quadrant  IMPRESSION:     Mild hepatic steatosis      No evidence of cholelithiasis, sonographic Doshi's sign, or biliary ductal dilatation  [address] pregnancy limited with transvaginal  IMPRESSION:     An intrauterine or ectopic pregnancy is not identified  [phone] Patient still noting significant nausea  Discussed option of   1643 Per lab, ABO/Rh hemolyzed, need redraw   [id number] Patient with persistent vomiting  Will admit     1704 SLIM tiger texted   61 [phone] Patient crying and very uncomfortable from back pain in ER  Patient declined tylenol and states it does not work  Reviewed with attending, recommend 25mcg fentanyl     Procedures  MDM     Patient HCG came back mildly elevated  Reviewed with patient  U/s did not show and IUP or evidence of ectopic  Reviewed with patient and discussed she will need to follow up for HCG trending  Patient states that with her prior two pregnancies she had hyperemesis and was hospitalized with a feeding tube  PAtient with persistent vomiting despite multiple medications in the ER  Disposition  Final diagnoses:   Intractable vomiting   Elevated serum human chorionic gonadotropin (hCG) level   Elevated bilirubin   Leukocytosis     Time reflects when diagnosis was documented in both MDM as applicable and the Disposition within this note     Time User Action Codes Description Comment    5/24/2022  5:04 PM Mckenna Pry Add [R11 10] Intractable vomiting     5/24/2022  5:09 PM Mckenna Pry Add [E34 9] Elevated serum human chorionic gonadotropin (hCG) level     5/24/2022  5:10 PM Mckenna Pry Add [R17] Elevated bilirubin     5/24/2022  5:10 PM Mckenna Pry Add [E46 715] Leukocytosis       ED Disposition     ED Disposition   Admit    Condition   Stable    Date/Time   Tue May 24, 2022  5:11 PM    Comment   Case was discussed with [facility] and the patient's admission status was agreed to be Admission Status: observation status to the service of Dr Carolyne Ferris              Follow-up Information    None Current Discharge Medication List      CONTINUE these medications which have NOT CHANGED    Details   acetaminophen (TYLENOL) 325 mg tablet Take 2 tablets (650 mg total) by mouth every 6 (six) hours as needed for mild pain  Qty: 60 tablet, Refills: 0    Associated Diagnoses: Lumbar spondylolysis      !! methocarbamol (ROBAXIN) 500 mg tablet Take 1 tablet (500 mg total) by mouth 2 (two) times a day  Qty: 20 tablet, Refills: 0    Associated Diagnoses: Low back pain      bisacodyl (DULCOLAX) 10 mg suppository Insert 1 suppository (10 mg total) into the rectum daily for 16 days  Qty: 16 suppository, Refills: 0    Associated Diagnoses: Hypokalemia due to excessive gastrointestinal loss of potassium; Nausea and vomiting during pregnancy; Severe protein-calorie malnutrition (HCC)      cyproheptadine hcl 2 MG/5ML oral syrup Take 10 mL (4 mg total) by mouth 3 (three) times a day as needed for allergies  Qty: 473 mL, Refills: 1    Associated Diagnoses: Hypokalemia due to excessive gastrointestinal loss of potassium; Nausea and vomiting during pregnancy; Severe protein-calorie malnutrition (HCC)      Diclofenac Sodium (VOLTAREN) 1 % Apply 2 g topically 4 (four) times a day  Qty: 1 Tube, Refills: 1    Associated Diagnoses: Hypokalemia due to excessive gastrointestinal loss of potassium; Nausea and vomiting during pregnancy; Severe protein-calorie malnutrition (HCC)      diphenhydrAMINE (BENADRYL) 25 mg tablet Take 1 tablet (25 mg total) by mouth every 6 (six) hours as needed for itching, allergies or sleep  Qty: 30 tablet, Refills: 0    Associated Diagnoses: Hypokalemia due to excessive gastrointestinal loss of potassium; Nausea and vomiting during pregnancy;  Severe protein-calorie malnutrition (HCC)      docusate sodium (COLACE) 100 mg capsule Take 1 capsule (100 mg total) by mouth 2 (two) times a day  Qty: 30 capsule, Refills: 0    Associated Diagnoses: Hypokalemia due to excessive gastrointestinal loss of potassium; Nausea and vomiting during pregnancy; Severe protein-calorie malnutrition (HCC)      lidocaine (LIDODERM) 5 % Apply 1 patch topically daily at bedtime Remove & Discard patch within 12 hours or as directed by MD  Qty: 7 patch, Refills: 0    Associated Diagnoses: Hypokalemia due to excessive gastrointestinal loss of potassium; Nausea and vomiting during pregnancy; Severe protein-calorie malnutrition (HCC)      menthol-methyl salicylate (BENGAY) 32-16 % cream Apply topically 4 (four) times a day as needed (apply to back as needed)  Qty: 1 Tube, Refills: 0    Associated Diagnoses: Hypokalemia due to excessive gastrointestinal loss of potassium; Nausea and vomiting during pregnancy; Severe protein-calorie malnutrition ([address] )      ! ! methocarbamol (ROBAXIN) 500 mg tablet Take 1 tablet (500 mg total) by mouth every 6 (six) hours as needed for muscle spasms  Qty: 30 tablet, Refills: 0    Associated Diagnoses: Hypokalemia due to excessive gastrointestinal loss of potassium; Nausea and vomiting during pregnancy; Severe protein-calorie malnutrition (HCC)      metoclopramide (REGLAN) 5 mg tablet Take 2 tablets (10 mg total) by mouth every 6 (six) hours as needed (please cycle this with the zofran every 3 hrs)  Qty: 30 tablet, Refills: 0    Associated Diagnoses: Hypokalemia due to excessive gastrointestinal loss of potassium; Nausea and vomiting during pregnancy;  Severe protein-calorie malnutrition (HCC)      omeprazole (PriLOSEC) 20 mg delayed release capsule Take 1 capsule (20 mg total) by mouth daily for 14 days  Qty: 14 capsule, Refills: 0    Associated Diagnoses: Abdominal pain; Gastritis      ondansetron (Zofran ODT) 4 mg disintegrating tablet Take 1 tablet (4 mg total) by mouth every 6 (six) hours as needed for nausea or vomiting  Qty: 20 tablet, Refills: 0    Associated Diagnoses: Nausea and vomiting      ondansetron (ZOFRAN) 4 mg tablet Take 1 tablet (4 mg total) by mouth every 6 (six) hours as needed for nausea or vomiting  Qty: 12 tablet, Refills: 0    Associated Diagnoses: Non-intractable vomiting with nausea, unspecified vomiting type      pantoprazole (PROTONIX) 40 mg tablet Take 1 tablet (40 mg total) by mouth 2 (two) times a day  Qty: 60 tablet, Refills: 1    Associated Diagnoses: Hypokalemia due to excessive gastrointestinal loss of potassium; Nausea and vomiting during pregnancy; Severe protein-calorie malnutrition (HCC)      scopolamine (TRANSDERM-SCOP) 1 5 mg/3 days TD 72 hr patch Place 1 patch on the skin every third day for 15 days  Qty: 5 patch, Refills: 0    Associated Diagnoses: Hypokalemia due to excessive gastrointestinal loss of potassium; Nausea and vomiting during pregnancy; Severe protein-calorie malnutrition (HCC)      sucralfate (CARAFATE) 1 g tablet Take 1 tablet (1 g total) by mouth 4 (four) times a day for 5 days  Qty: 20 tablet, Refills: 0    Associated Diagnoses: Generalized abdominal pain; Non-intractable vomiting with nausea, unspecified vomiting type      sucralfate (CARAFATE) 1 g/10 mL suspension Take 10 mL (1 g total) by mouth 2 (two) times a day for 14 days  Qty: 280 mL, Refills: 0    Associated Diagnoses: Epigastric pain       !! - Potential duplicate medications found  Please discuss with provider  No discharge procedures on file         ED Provider  Electronically Signed by     Fermin Palacios PA-C  05/26/22 6482

## 2022-05-26 NOTE — ASSESSMENT & PLAN NOTE
· Reports acutely worsening chronic lower back pain, radiating into RIGHT buttock without any known history of obvious trauma/obvious reason  · No recent imaging on file  Lumbar spine MRI from 2020 showed mild L4-L5 degenerative disease  CT LS spine "Small LEFT-sided disc herniation at L3-4  No significant canal stenosis or foraminal narrowing"  · Per review of PDMP was not being prescribed narcotics (had tylenol with codeine 3/2022)  No active indication for narcotics  · Recommend multimodal pain regimen to include initiation of gabapentin, topical agents and Robaxin, heat and stretching  Can use IV toradol if unable to tolerate PO   Would also suggest IV valium today prn muscle spasms

## 2022-05-26 NOTE — ASSESSMENT & PLAN NOTE
· Patient presented with intractable nausea/vomiting x 3-4 days  She has had several prior presentations for same  Reports inability to keep anything down  · History of chronic marijuana use, this could be cannabis hyperemesis syndrome/cyclic vomiting  · 7/5088 EGD= mild esophagitis, H pylori negative  CT A/P 2/22 unremarkable, not repeated on admission but ultrasound was done and unremarkable  No evidence that a gastric emptying study was done  · Clear liquid diet, advance as tolerated but not ready yet due to persistent vomiting  · Appreciate GI consult  I highly recommend she follow-up with GI after discharge  · Continue current regimen: zofran, reglan, tigan, protonix, PPI  Add scopalamine   Add IV valium to manage anxiety component

## 2022-05-26 NOTE — NURSING NOTE
Patient having periodical vomiting since this author's arrival at 0300  Patient given PRN zofran and PRN Tigan as ordered as well as scheduled Reglan  Patient teary and stating "I just can't keep throwing up like this" at this time and stating she is very tired but cannot sleep because of the nausea and vomiting  Patient unable to tolerate PO medications at this time  Rubin Aguirre made aware

## 2022-05-26 NOTE — ASSESSMENT & PLAN NOTE
· Mildly elevated serum hCG, recheck in am  · Transvaginal ultrasound in the ER report noted - no intrauterine or ectopic pregnancy identified  · Outpatient follow-up recommended

## 2022-05-26 NOTE — PROGRESS NOTES
This RN contacted Yajaira Pan with [facility], as patient having episode of extreme back pain exacerbated by intermittent nausea/vomiting  Per provider, okay to give PRN morphine injection at this time  Patient continues to have episodes of periodical vomiting  Patient not able to tolerate PO items at this time

## 2022-05-27 VITALS
BODY MASS INDEX: 28.32 KG/M2 | SYSTOLIC BLOOD PRESSURE: 138 MMHG | DIASTOLIC BLOOD PRESSURE: 96 MMHG | HEART RATE: 94 BPM | RESPIRATION RATE: 19 BRPM | OXYGEN SATURATION: 97 % | HEIGHT: 65 IN | TEMPERATURE: 98.9 F | WEIGHT: 170 LBS

## 2022-05-27 LAB
ALBUMIN SERPL BCP-MCNC: 3.6 G/DL (ref 3.5–5)
ALP SERPL-CCNC: 80 U/L (ref 46–116)
ALT SERPL W P-5'-P-CCNC: 28 U/L (ref 12–78)
ANION GAP SERPL CALCULATED.3IONS-SCNC: 4 MMOL/L (ref 4–13)
AST SERPL W P-5'-P-CCNC: 23 U/L (ref 5–45)
B-HCG SERPL-ACNC: 104 MIU/ML
BASOPHILS # BLD AUTO: 0.05 THOUSANDS/ΜL (ref 0–0.1)
BASOPHILS NFR BLD AUTO: 0 % (ref 0–1)
BILIRUB SERPL-MCNC: 0.69 MG/DL (ref 0.2–1)
BUN SERPL-MCNC: 8 MG/DL (ref 5–25)
CALCIUM SERPL-MCNC: 9.2 MG/DL (ref 8.3–10.1)
CHLORIDE SERPL-SCNC: 109 MMOL/L (ref 100–108)
CO2 SERPL-SCNC: 22 MMOL/L (ref 21–32)
CREAT SERPL-MCNC: 0.68 MG/DL (ref 0.6–1.3)
EOSINOPHIL # BLD AUTO: 0.06 THOUSAND/ΜL (ref 0–0.61)
EOSINOPHIL NFR BLD AUTO: 1 % (ref 0–6)
ERYTHROCYTE [DISTWIDTH] IN BLOOD BY AUTOMATED COUNT: 12.8 % (ref 11.6–15.1)
GFR SERPL CREATININE-BSD FRML MDRD: 117 ML/MIN/1.73SQ M
GLUCOSE SERPL-MCNC: 98 MG/DL (ref 65–140)
HCG SERPL QL: POSITIVE
HCT VFR BLD AUTO: 41.3 % (ref 34.8–46.1)
HGB BLD-MCNC: 14.1 G/DL (ref 11.5–15.4)
IMM GRANULOCYTES # BLD AUTO: 0.05 THOUSAND/UL (ref 0–0.2)
IMM GRANULOCYTES NFR BLD AUTO: 0 % (ref 0–2)
LYMPHOCYTES # BLD AUTO: 2.5 THOUSANDS/ΜL (ref 0.6–4.47)
LYMPHOCYTES NFR BLD AUTO: 21 % (ref 14–44)
MCH RBC QN AUTO: 29.3 PG (ref 26.8–34.3)
MCHC RBC AUTO-ENTMCNC: 34.1 G/DL (ref 31.4–37.4)
MCV RBC AUTO: 86 FL (ref 82–98)
MONOCYTES # BLD AUTO: 0.68 THOUSAND/ΜL (ref 0.17–1.22)
MONOCYTES NFR BLD AUTO: 6 % (ref 4–12)
NEUTROPHILS # BLD AUTO: 8.67 THOUSANDS/ΜL (ref 1.85–7.62)
NEUTS SEG NFR BLD AUTO: 72 % (ref 43–75)
NRBC BLD AUTO-RTO: 0 /100 WBCS
PLATELET # BLD AUTO: 221 THOUSANDS/UL (ref 149–390)
PMV BLD AUTO: 11.5 FL (ref 8.9–12.7)
POTASSIUM SERPL-SCNC: 4 MMOL/L (ref 3.5–5.3)
PROT SERPL-MCNC: 7.2 G/DL (ref 6.4–8.2)
RBC # BLD AUTO: 4.82 MILLION/UL (ref 3.81–5.12)
SODIUM SERPL-SCNC: 135 MMOL/L (ref 136–145)
WBC # BLD AUTO: 12.01 THOUSAND/UL (ref 4.31–10.16)

## 2022-05-27 PROCEDURE — 85025 COMPLETE CBC W/AUTO DIFF WBC: CPT | Performed by: PHYSICIAN ASSISTANT

## 2022-05-27 PROCEDURE — NC001 PR NO CHARGE: Performed by: OBSTETRICS & GYNECOLOGY

## 2022-05-27 PROCEDURE — 99239 HOSP IP/OBS DSCHRG MGMT >30: CPT | Performed by: PHYSICIAN ASSISTANT

## 2022-05-27 PROCEDURE — NC001 PR NO CHARGE: Performed by: INTERNAL MEDICINE

## 2022-05-27 PROCEDURE — 84702 CHORIONIC GONADOTROPIN TEST: CPT | Performed by: PHYSICIAN ASSISTANT

## 2022-05-27 PROCEDURE — NC001 PR NO CHARGE: Performed by: PHYSICIAN ASSISTANT

## 2022-05-27 PROCEDURE — 93005 ELECTROCARDIOGRAM TRACING: CPT

## 2022-05-27 PROCEDURE — 84703 CHORIONIC GONADOTROPIN ASSAY: CPT | Performed by: PHYSICIAN ASSISTANT

## 2022-05-27 PROCEDURE — 99254 IP/OBS CNSLTJ NEW/EST MOD 60: CPT | Performed by: PSYCHIATRY & NEUROLOGY

## 2022-05-27 PROCEDURE — C9113 INJ PANTOPRAZOLE SODIUM, VIA: HCPCS | Performed by: PHYSICIAN ASSISTANT

## 2022-05-27 PROCEDURE — 80053 COMPREHEN METABOLIC PANEL: CPT | Performed by: PHYSICIAN ASSISTANT

## 2022-05-27 RX ORDER — METOCLOPRAMIDE 5 MG/1
10 TABLET ORAL 4 TIMES DAILY
Qty: 30 TABLET | Refills: 0 | Status: ON HOLD | OUTPATIENT
Start: 2022-05-27 | End: 2022-05-30 | Stop reason: SDUPTHER

## 2022-05-27 RX ORDER — LIDOCAINE 50 MG/G
1 PATCH TOPICAL
Qty: 30 PATCH | Refills: 0 | Status: SHIPPED | OUTPATIENT
Start: 2022-05-27 | End: 2022-05-30

## 2022-05-27 RX ORDER — ONDANSETRON 2 MG/ML
4 INJECTION INTRAMUSCULAR; INTRAVENOUS EVERY 4 HOURS PRN
Status: DISCONTINUED | OUTPATIENT
Start: 2022-05-27 | End: 2022-05-27 | Stop reason: HOSPADM

## 2022-05-27 RX ORDER — ONDANSETRON 4 MG/1
4 TABLET, ORALLY DISINTEGRATING ORAL EVERY 6 HOURS PRN
Qty: 20 TABLET | Refills: 0 | Status: SHIPPED | OUTPATIENT
Start: 2022-05-27 | End: 2022-05-30

## 2022-05-27 RX ORDER — ONDANSETRON 4 MG/1
4 TABLET, ORALLY DISINTEGRATING ORAL EVERY 6 HOURS PRN
Qty: 20 TABLET | Refills: 0 | Status: SHIPPED | OUTPATIENT
Start: 2022-05-27 | End: 2022-05-28

## 2022-05-27 RX ORDER — GABAPENTIN 100 MG/1
100 CAPSULE ORAL 3 TIMES DAILY
Qty: 90 CAPSULE | Refills: 0 | Status: SHIPPED | OUTPATIENT
Start: 2022-05-27 | End: 2022-05-30

## 2022-05-27 RX ORDER — KETOROLAC TROMETHAMINE 30 MG/ML
30 INJECTION, SOLUTION INTRAMUSCULAR; INTRAVENOUS EVERY 6 HOURS PRN
Status: DISCONTINUED | OUTPATIENT
Start: 2022-05-27 | End: 2022-05-27 | Stop reason: HOSPADM

## 2022-05-27 RX ORDER — KETOROLAC TROMETHAMINE 30 MG/ML
15 INJECTION, SOLUTION INTRAMUSCULAR; INTRAVENOUS EVERY 6 HOURS PRN
Status: DISCONTINUED | OUTPATIENT
Start: 2022-05-27 | End: 2022-05-27 | Stop reason: HOSPADM

## 2022-05-27 RX ORDER — DIAZEPAM 2 MG/1
2 TABLET ORAL EVERY 6 HOURS PRN
Qty: 20 TABLET | Refills: 0 | Status: SHIPPED | OUTPATIENT
Start: 2022-05-27 | End: 2022-05-30

## 2022-05-27 RX ADMIN — TRIMETHOBENZAMIDE HYDROCHLORIDE 200 MG: 100 INJECTION INTRAMUSCULAR at 00:05

## 2022-05-27 RX ADMIN — PANTOPRAZOLE SODIUM 40 MG: 40 INJECTION, POWDER, FOR SOLUTION INTRAVENOUS at 08:09

## 2022-05-27 RX ADMIN — ONDANSETRON 4 MG: 2 INJECTION INTRAMUSCULAR; INTRAVENOUS at 08:09

## 2022-05-27 RX ADMIN — POTASSIUM CHLORIDE AND SODIUM CHLORIDE 125 ML/HR: 900; 300 INJECTION, SOLUTION INTRAVENOUS at 08:40

## 2022-05-27 RX ADMIN — KETOROLAC TROMETHAMINE 30 MG: 30 INJECTION, SOLUTION INTRAMUSCULAR at 08:09

## 2022-05-27 RX ADMIN — METOCLOPRAMIDE HYDROCHLORIDE 10 MG: 5 INJECTION INTRAMUSCULAR; INTRAVENOUS at 00:09

## 2022-05-27 RX ADMIN — DIAZEPAM 2.5 MG: 10 INJECTION, SOLUTION INTRAMUSCULAR; INTRAVENOUS at 04:12

## 2022-05-27 RX ADMIN — METOCLOPRAMIDE HYDROCHLORIDE 10 MG: 5 INJECTION INTRAMUSCULAR; INTRAVENOUS at 05:49

## 2022-05-27 RX ADMIN — METOCLOPRAMIDE HYDROCHLORIDE 10 MG: 5 INJECTION INTRAMUSCULAR; INTRAVENOUS at 11:25

## 2022-05-27 NOTE — PROGRESS NOTES
[facility]  Progress Note - Kadni Mediate 1990, 28 y o  female MRN: [id number]  Unit/Bed#: -01 Encounter: [id number]  Primary Care Provider: Julito Contreras DO   Date and time admitted to hospital: 2022  8:04 AM    * Intractable nausea and vomiting  Assessment & Plan  Patient presented with intractable nausea/vomiting x 3-4 days with inability to tolerate PO intake  She has had several prior presentations for same  · History of chronic marijuana use, this could be cannabis hyperemesis syndrome/cyclic vomiting  · 6191 EGD showed mild esophagitis, H pylori negative  · CT A/P  unremarkable, not repeated on admission but ultrasound was done and unremarkable  · No evidence that a gastric emptying study was done  · Appreciate GI consult and recommendations  · IV Protonix 40 mg q12h and IV Pepcid 20 mg daily  · Added Elavil qhs on  but did not receive   · Current antiemetic regimen: Zofran 4 mg q6h prn, IV reglan 10 mg q6h schedueld, IM Tigan 200 mg q6h prn   · Increase frequency of Zofran to q4h today   · Clear liquid diet, advance as tolerated but not ready yet due to persistent vomiting  · Concern for active pregnancy as etiology, appreciate GYN inputs, see plan below    Elevated serum hCG  Assessment & Plan  · Mildly elevated serum qualitative hCG at 44  Qualitative hCG today positive  Repeat qualitative HCG pending at this time  · Patient states with 2 prior pregnancies she had intractable nausea/vomiting requiring prolonged hospitalizations and feeding tube  · Transvaginal ultrasound in the ER report noted - no intrauterine or ectopic pregnancy identified  · Will appreciate gynecology consult and recommendations  · If confirmed positive pregnancy, patient requesting   Depression with suicidal ideation  Assessment & Plan  Today, patient admits to feeling depressed and guilty for being away from her children    She expressed passive suicidal thoughts such as I do not want to be here anymore and I feel like a waste of space  · Appreciate psychiatry evaluation, patient in agreement    Acute on chronic bilateral low back pain without sciatica  Assessment & Plan  · Reports acutely worsening chronic lower back pain, radiating into RIGHT buttock without any known history of obvious trauma/obvious reason  · Lumbar spine MRI from 2020 showed mild L4-L5 degenerative disease  · CT LS spine this admission: "Small LEFT-sided disc herniation at L3-4  No significant canal stenosis or foraminal narrowing"  · Suspect acute exacerbation of chronic back pain due to persistent nausea and vomiting, also with possible pregnancy contributing  · Per review of PDMP was not being prescribed narcotics (had tylenol with codeine 3/2022)  No active indication for narcotics  · Continue with multimodal pain regimen however noted that patient unable to tolerate p o  Medications:  · Tylenol 650 mg q 6 hours scheduled, Gabapentin 100 mg t i d , Robaxin 650 mg q 6 hours scheduled, topical Lidoderm and Chivo-Wilcox  · IV Toradol 15-30 mg q 6 hours p r n  Moderate/severe pain, IV Valium q 8 hours p r n  Spasms  · Consider initiation of IV steroids    Hypokalemia  Assessment & Plan  · K dropped to 2 9 due to GI losses  Repleted and improved, continue KCL in IVF--continue to follow    Leukocytosis  Assessment & Plan  · White count on admission significantly elevated at 17 47  No fever or other infectious symptomatology  Perhaps this is stress response  · Monitor CBC, down-trending     Medical marijuana use  Assessment & Plan  · Reports use of medical marijuana  · Concern for contribution to cyclical vomiting syndrome   · GI recommend avoiding     VTE Pharmacologic Prophylaxis: VTE Score: 2 Low Risk (Score 0-2) - Encourage Ambulation  Patient Centered Rounds: I performed bedside rounds with nursing staff today    Discussions with Specialists or Other Care Team Provider: primary RN, GI, GYN, Psych, CM     Education and Discussions with Family / Patient: Patient declined call to   Time Spent for Care: 30 minutes  More than 50% of total time spent on counseling and coordination of care as described above  Current Length of Stay: 1 day(s)  Current Patient Status: Inpatient   Certification Statement: The patient will continue to require additional inpatient hospital stay due to Pending gyn evaluation, Psychiatry evaluation, clinical/symptomatic improvement  Discharge Plan: Anticipate discharge in 48-72 hrs to home  Code Status: Level 1 - Full Code    Subjective:   Patient tearful upon entering the room  She tells me she feels guilty for being in the hospital with her young children at home with their father who works 12 hour days and this is making her depressed, "which I shouldn't be telling you"  She tells me I do not want to be here anymore and when I asked her to clarify if she meant in "here" as the hospital she shrugged her shoulders  She tells me I feel like a waste of space and a bad mother  She tells me she is together with her partner whom she has 2 children with a daughter who is 9 and a son who was 6month-old  She tells me with her prior pregnancies she has required admission for several months and feeding tube due to intractable nausea and vomiting  She tells me she does not want to do this again, she tells me she does not want to have to leave her children home  She tells me her and her partner have been having arguments because he is home taking care of their children alone  Advised patient plan will be to have gynecology evaluate and confirm pregnancy  She tells me if she is pregnant that she does not want to keep it, and her partner is in support of this decision  She tells me that my mind isn't right and she is in agreement with psychiatric evaluation as well      In regards to her back pain, patient reports it is intense  She admits that nausea and vomiting is exacerbating pain and causing spasms  She reports history of having nerves print in her back  Objective:     Vitals:   Temp (24hrs), Av 9 °F (37 2 °C), Min:98 9 °F (37 2 °C), Max:98 9 °F (37 2 °C)    Temp:  [98 9 °F (37 2 °C)] 98 9 °F (37 2 °C)  HR:  [83-94] 94  Resp:  [17-19] 19  BP: (110-147)/(81-96) 138/96  SpO2:  [90 %-97 %] 97 %  Body mass index is 28 29 kg/m²  Input and Output Summary (last 24 hours): Intake/Output Summary (Last 24 hours) at 2022 0853  Last data filed at 2022 1924  Gross per 24 hour   Intake 1455 ml   Output 150 ml   Net 1305 ml       Physical Exam: deferred in depth physical exam given emotional distress at this time  Physical Exam  Vitals and nursing note reviewed  Constitutional:       General: She is in acute distress (emotional)  Comments: Tearful    Neurological:      Mental Status: She is alert  Psychiatric:         Mood and Affect: Mood is anxious and depressed  Behavior: Behavior is cooperative  Additional Data:     Labs:  Results from last 7 days   Lab Units 22  0540   WBC Thousand/uL 12 01*   HEMOGLOBIN g/dL 14 1   HEMATOCRIT % 41 3   PLATELETS Thousands/uL 221   NEUTROS PCT % 72   LYMPHS PCT % 21   MONOS PCT % 6   EOS PCT % 1     Results from last 7 days   Lab Units 22  0540   SODIUM mmol/L 135*   POTASSIUM mmol/L 4 0   CHLORIDE mmol/L 109*   CO2 mmol/L 22   BUN mg/dL 8   CREATININE mg/dL 0 68   ANION GAP mmol/L 4   CALCIUM mg/dL 9 2   ALBUMIN g/dL 3 6   TOTAL BILIRUBIN mg/dL 0 69   ALK PHOS U/L 80   ALT U/L 28   AST U/L 23   GLUCOSE RANDOM mg/dL 98             Results from last 7 days   Lab Units 22  0636   HEMOGLOBIN A1C % 5 0           Lines/Drains:  Invasive Devices  Report    Peripheral Intravenous Line  Duration           Peripheral IV 22 Left Forearm 3 days                      Imaging: No pertinent imaging reviewed      Recent Cultures (last 7 days): Last 24 Hours Medication List:   Current Facility-Administered Medications   Medication Dose Route Frequency Provider Last Rate    acetaminophen  975 mg Oral Q6H [facility] Lisa Recinos PA-C      aluminum-magnesium hydroxide-simethicone  30 mL Oral Q6H PRN Genny Frankel MD      bisacodyl  10 mg Rectal Daily PRN Genny Frankel MD      cyproheptadine hcl  4 mg Oral TID PRN Genny Frankel MD      diazepam  2 5 mg Intravenous Q6H PRN Lisa Recions PA-C      Diclofenac Sodium  4 g Topical 4x Daily Genny Frankel MD      famotidine  20 mg Intravenous Q24H [facility] Alvino Collins MD      gabapentin  100 mg Oral TID Power Garrard, ELMIRA      ketorolac  15 mg Intravenous Q6H PRN Luz Maria E Held, ELMIRA      ketorolac  30 mg Intravenous Q6H PRN Luz Maria E Held, ELMIRA      lidocaine  2 patch Topical HS Genny Frankel MD      melatonin  3 mg Oral HS Carol Narayanan, ELMIRA      menthol-methyl salicylate   Apply externally 4x Daily Genny Frankel MD      methocarbamol  750 mg Oral Q6H Texie Soulier, MD      metoclopramide  10 mg Intravenous Q6H [facility] Lisa Recinos PA-C      ondansetron  4 mg Intravenous Q4H PRN Luz Maria E Held, ELMIRA      pantoprazole  40 mg Intravenous Q12H [facility] Lisa Recinos PA-C      potassium chloride  20 mEq Oral Once Genny Frankel MD      scopolamine  1 patch Transdermal Q72H Belen Mccrary MD      sodium chloride 0 9 % with KCl 40 mEq/L  125 mL/hr Intravenous Continuous Belen Mccrary  mL/hr (05/27/22 0840)    sucralfate  1 g Oral 4x Daily (AC & HS) Lisa Recinos PA-C      trimethobenzamide  200 mg Intramuscular Q6H PRN Chelsea Patino DO          Today, Patient Was Seen By: Tony Araujo PA-C    **Please Note: This note may have been constructed using a voice recognition system  **

## 2022-05-27 NOTE — CONSULTS
Consultation - [facility] 28 y o  female MRN: [id number]  Unit/Bed#: -01 Encounter: [id number]      Chief Complaint: "I am in pain, I and vomiting everything"    History of Present Illness   Physician Requesting Consult: Adis Borja MD  Reason for Consult / Principal Problem: Depression, suicidal ideation  Patient admitted with intractable nausea and vomiting  Suspected to be pregnant awaiting GYN evaluation, if so she is requesting   Diagnosis : depression    Keerthi Jolley is a 28 y o  female with a history of intractable nausea and vomiting chronic bilateral lower back pain without sciatica presents with intractable nausea and vomiting for the last 4 days, inability to keep anything down when in  the hospital she find out she was pregnant  She states that she has 2 children's, she has a history of hyperemesis gravidarum and states that the last pregnancy was very difficult because she passed most of the time in the hospital and she needed a feeding tube  At this moment she does not want to keep this pregnancy  She states that her back pain also is very high, she use marijuana 3 times a week and states that she get very angry with the gastroenterologist who told her that "she need to stop smoking because this is the reason for her nausea and vomiting"  She states that she needs several studies done  She states that she feels overwhelmed because she has 2 children's that need her and her  call her constantly because the kids are asking where is she?    She states that she had not been able to sleep in the hospital due to the bed and been vomiting the whole night  She states her back pain is very bad and she wants to take a shower because when she take a shower she feels a little better    When I asked about suicidal ideation she states that she does not have any suicidal thoughts, plans or intent, she states that she had 2 children that need her and she wants to take care of them  She states that she does not want to be here in the hospital she did not mean in the word  She emphatically denies any suicidal ideation plan or intent  She does not have any psychotic symptoms or manic episode  Psychiatric Review Of Systems:  sleep: yes  appetite changes: yes  weight changes: yes  energy/anergy: yes  interest/pleasure/anhedonia: no  somatic symptoms: no  anxiety/panic: yes  damián: no  guilty/hopeless:  No  self injurious behavior/risky behavior: no    Historical Information   Past Psychiatric History:   She denies any inpatient psych admission, no psychiatric treatment  Currently in treatment with none  Past Suicide attempts: none  Past Violent behavior: none  Past Psychiatric medication trial: none    Substance Abuse History:  She denies any alcohol or drug use she have medical marijuana 3 times a week for back pain    I have assessed this patient for substance use within the past 12 months     History of IP/OP rehabilitation program: none  Smoking history:  Never smoker  Family Psychiatric History:   She denies any family history mental illness, substance abuse or suicidal attempt    Social History  Education: college graduate  Learning Disabilities: none  Marital history: co-habitating  Living arrangement, social support: She live with her significant other  2 children  Occupational History: unemployed  Functioning Relationships: good support system    Other Pertinent History: No legal or  history    Traumatic History:   Abuse: Denies any  Other Traumatic Events: None    Past Medical History:   Diagnosis Date    Arthritis     Asthma     History of stomach ulcers     Psychiatric disorder     anxiety       Medical Review Of Systems:  Review of Systems - Negative except nausea, vomiting, back pain, poor sleep, anxiety, all other systems reviewed were negative    Meds/Allergies   current meds:   Current Facility-Administered Medications   Medication Dose Route Frequency    acetaminophen (TYLENOL) tablet 975 mg  975 mg Oral Q6H [address]    aluminum-magnesium hydroxide-simethicone (MYLANTA) oral suspension 30 mL  30 mL Oral Q6H PRN    bisacodyl (DULCOLAX) rectal suppository 10 mg  10 mg Rectal Daily PRN    cyproheptadine hcl 2 mg/5 mL oral syrup 4 mg  4 mg Oral TID PRN    diazepam (VALIUM) injection 2 5 mg  2 5 mg Intravenous Q6H PRN    Diclofenac Sodium (VOLTAREN) 1 % topical gel 4 g  4 g Topical 4x Daily    Famotidine (PF) (PEPCID) injection 20 mg  20 mg Intravenous Q24H KACI    gabapentin (NEURONTIN) capsule 100 mg  100 mg Oral TID    ketorolac (TORADOL) injection 15 mg  15 mg Intravenous Q6H PRN    ketorolac (TORADOL) injection 30 mg  30 mg Intravenous Q6H PRN    lidocaine (LIDODERM) 5 % patch 2 patch  2 patch Topical HS    melatonin tablet 3 mg  3 mg Oral HS    menthol-methyl salicylate (BENGAY) 07-25 % cream   Apply externally 4x Daily    methocarbamol (ROBAXIN) tablet 750 mg  750 mg Oral Q6H KACI    metoclopramide (REGLAN) injection 10 mg  10 mg Intravenous Q6H [address]    ondansetron (ZOFRAN) injection 4 mg  4 mg Intravenous Q4H PRN    pantoprazole (PROTONIX) injection 40 mg  40 mg Intravenous Q12H [address]    potassium chloride (K-DUR,KLOR-CON) CR tablet 20 mEq  20 mEq Oral Once    scopolamine (TRANSDERM-SCOP) 1 mg/3 days TD 72 hr patch 1 patch  1 patch Transdermal Q72H    sodium chloride 0 9 % with KCl 40 mEq/L infusion (premix)  125 mL/hr Intravenous Continuous    sucralfate (CARAFATE) tablet 1 g  1 g Oral 4x Daily (AC & HS)    trimethobenzamide (TIGAN) IM injection 200 mg  200 mg Intramuscular Q6H PRN     No Known Allergies    Objective   Vital signs in last 24 hours:  Temp:  [98 9 °F (37 2 °C)] 98 9 °F (37 2 °C)  HR:  [83-94] 94  Resp:  [17-19] 19  BP: (110-147)/(81-96) 138/96      Intake/Output Summary (Last 24 hours) at 5/27/2022 1038  Last data filed at 5/26/2022 1924  Gross per 24 hour   Intake 1225 ml   Output 150 ml   Net 1075 ml       Mental Status Evaluation:  Appearance:  age appropriate   Behavior:  Cooperative   Speech:  normal pitch and normal volume   Mood:  anxious   Affect:  mood-congruent   Language: naming objects and repeating phrases   Thought Process:  goal directed   Associations: intact associations   Thought Content:  normal   Perceptual Disturbances: None   Risk Potential: Suicidal Ideations none, Homicidal Ideations none and Potential for Aggression No   Sensorium:  person, place, time/date and situation   Memory:  recent and remote memory grossly intact   Cognition:  recent and remote memory grossly intact   Consciousness:  alert and awake    Attention: attention span and concentration were age appropriate   Intellect: within normal limits   Fund of Knowledge: awareness of current events: Fair, past history: Fair and vocabulary: Fair   Insight:  fair   Judgment: fair   Muscle Strength and Tone: Within normal limits   Gait/Station: normal gait/station and normal balance   Motor Activity: no abnormal movements     Lab Results:    I have personally reviewed all pertinent laboratory/tests results  Labs in last 72 hours:   Recent Labs     05/26/22  0750 05/27/22  0540   WBC  --  12 01*   RBC  --  4 82   HGB  --  14 1   HCT  --  41 3   PLT  --  221   RDW  --  12 8   NEUTROABS  --  8 67*   SODIUM 136 135*   K 3 6 4 0   * 109*   CO2 23 22   BUN 9 8   CREATININE 0 77 0 68   GLUC 103 98   GLUF 103*  --    CALCIUM 9 2 9 2   AST  --  23   ALT  --  28   ALKPHOS  --  80   TP  --  7 2   ALB  --  3 6   TBILI  --  0 69   PREGSERUM  --  Positive*   HCGQUANT  --  104*       Code Status: )Level 1 - Full Code    Assessment/Plan     Assessment:  Wan Teresa is a 28 y o  female with a history of intractable nausea and vomiting, chronic bilateral lower pain without sciatica present to the hospital with intractable nausea and vomiting for the last 4 days, inability to keep anything down when in the hospital she found out that she was pregnant  This time she does not want to keep this pregnancy because she had a very difficult time the last time she was prednisone been in the hospital for long periods of time and needing a feeding tube due to her vomiting and nausea    During my interview patient denies that she had any suicidal or homicidal thoughts plans or intent, she does not have any active psychotic symptoms or manic episode  Diagnosis:  Adjustment disorder with anxious mood  Plan:   Continue medical management  No other recommendation at this time  Patient preferred to leave AMA  Discussed with primary team  I will sign off  Risks, benefits and possible side effects of Medications:   No medication given      Umu Ayala MD

## 2022-05-27 NOTE — ASSESSMENT & PLAN NOTE
· White count on admission significantly elevated at 17 47  No fever or other infectious symptomatology  Perhaps this is stress response    · Monitor CBC, down-trending

## 2022-05-27 NOTE — ASSESSMENT & PLAN NOTE
Today, patient admits to feeling depressed and guilty for being away from her children    She expressed passive suicidal thoughts such as I do not want to be here anymore and I feel like a waste of space  · Appreciate psychiatry evaluation, patient in agreement  · Patient declined thoughts of self-harm or suicidal ideation to Psychiatry, they have cleared her for discharge home

## 2022-05-27 NOTE — DISCHARGE SUMMARY
[facility]  Discharge- Nury Hanson 1990, [place] y o  female MRN: [id number]  Unit/Bed#: -01 Encounter: [id number]  Primary Care Provider: Shraddha Cochran DO   Date and time admitted to hospital: 5/24/2022  8:04 AM    * Intractable nausea and vomiting  Assessment & Plan  Patient presented with intractable nausea/vomiting x 3-4 days with inability to tolerate PO intake  She has had several prior presentations for same  · History of chronic marijuana use, this could be cannabis hyperemesis syndrome/cyclic vomiting  · 5/0481 EGD showed mild esophagitis, H pylori negative  · CT A/P 2/22 unremarkable, not repeated on admission but ultrasound was done and unremarkable  · No evidence that a gastric emptying study was done  · Appreciate GI consult and recommendations  · IV Protonix 40 mg q12h and IV Pepcid 20 mg daily  · Added Elavil qhs on 5/26 but did not receive   · Current antiemetic regimen: Zofran 4 mg q6h prn, IV reglan 10 mg q6h schedueld, IM Tigan 200 mg q6h prn   · Increase frequency of Zofran to q4h today   · Clear liquid diet, advance as tolerated but not ready yet due to persistent vomiting  · Given active pregnancy, most likely etiology to be hyperemesis gravidarum, appreciate GYN inputs, see plan below    Patient elected to sign out AMA  As per my discussion with gyn, there is no contraindication to any antiemetics at this time as patient is planning to electively terminate her pregnancy  She was discharged with new prescriptions for PO Zofran p r n  and Reglan 4 times daily  She was advised to resume p o  Protonix/Pepcid  Elavil and Benadryl discontinued  Elevated serum hCG  Assessment & Plan  · Mildly elevated serum qualitative hCG at 44  Qualitative hCG today positive  Repeat qualitative HCG pending at this time    · Patient states with 2 prior pregnancies she had intractable nausea/vomiting requiring prolonged hospitalizations and feeding tube  · Transvaginal ultrasound in the ER report noted - no intrauterine or ectopic pregnancy identified  · Will appreciate gynecology consult and recommendations  · Given current b-hCG levels below discriminatory zone of 2080-0176, would not yet expect to see imaging findings that would confirm early IUP  Recommend repeat ultrasound in 1-2 weeks  · Information for [facility] and [facility] provided to patient to arrange for elective termination    Depression with suicidal ideation  Assessment & Plan  Today, patient admits to feeling depressed and guilty for being away from her children  She expressed passive suicidal thoughts such as I do not want to be here anymore and I feel like a waste of space  · Appreciate psychiatry evaluation, patient in agreement  · Patient declined thoughts of self-harm or suicidal ideation to Psychiatry, they have cleared her for discharge home    Acute on chronic bilateral low back pain without sciatica  Assessment & Plan  · Reports acutely worsening chronic lower back pain, radiating into RIGHT buttock without any known history of obvious trauma/obvious reason  · Lumbar spine MRI from 2020 showed mild L4-L5 degenerative disease  · CT LS spine this admission: "Small LEFT-sided disc herniation at L3-4  No significant canal stenosis or foraminal narrowing"  · Suspect acute exacerbation of chronic back pain due to persistent nausea and vomiting, also with possible pregnancy contributing  · Per review of PDMP was not being prescribed narcotics (had tylenol with codeine 3/2022)  No active indication for narcotics  · Continue with multimodal pain regimen however noted that patient unable to tolerate p o  Medications:  · Tylenol 650 mg q 6 hours scheduled, Gabapentin 100 mg t i d , Robaxin 650 mg q 6 hours scheduled, topical Lidoderm and Chivo-Wilcox  · IV Toradol 15-30 mg q 6 hours p r n  Moderate/severe pain, IV Valium q 8 hours p r n  Spasms  · Consider initiation of IV steroids    Patient elected to sign out AMA  She was provided prescription for Valium 2 mg p o  q 8 hours p r n  back spasms as well as gabapentin 100 mg t i d  and new prescriptions for topical lidocaine patch/Voltaren gel  She was advised to schedule follow-up with her outpatient pain provider  Hypokalemia  Assessment & Plan  · K dropped to 2 9 due to GI losses  Repleted and improved, K+ 4 0 today  · Patient elected to sign out AMA  Ordered for repeat BMP in 3 days to reassess    Leukocytosis  Assessment & Plan  · White count on admission significantly elevated at 17 47  No fever or other infectious symptomatology  Perhaps this is stress response  · Monitor CBC, down-trending     Medical marijuana use  Assessment & Plan  · Reports use of medical marijuana  · Concern for contribution to cyclical vomiting syndrome   · GI / GYN recommend avoiding on discharge     Medical Problems             Resolved Problems  Date Reviewed: 5/27/2022   None               Discharging Physician / Practitioner: Sophia Garcia PA-C  PCP: Corey Mariscal DO  Admission Date:   Admission Orders (From admission, onward)     Ordered        05/26/22 1453  Inpatient Admission  Once            05/24/22 1711  Place in Observation  Once                      Discharge Date: 05/27/22    Consultations During Hospital Stay:  · GI  · OBGYN    Procedures Performed:   · None    Significant Findings / Test Results:   · Outlined above    Incidental Findings:   · Positive hCG     Test Results Pending at Discharge (will require follow up):    · None     Outpatient Tests Requested:  · BMP in 3 days  · Follow-up with OBGYN and GI    Complications:  Patient signed out [place]    Reason for Admission:  Intractable nausea and vomiting    Hospital Course:   Priscilla Amaya is a 28 y o  female patient who originally presented to the hospital on 5/24/2022 due to intractable nausea and vomiting and inability to tolerate p o  intake  Patient was seen in consultation by GI and initially felt symptoms related to cyclical vomiting syndrome/cannabis hyperemesis  She was started on antiemetic regimen without significant improvement  She was unable to tolerate p o  intake during her hospitalization  CT scan was unremarkable however hCG was slightly elevated and up trending on repeat check  Patient has history of hyperemesis gravidarum requiring prolonged hospitalization in feeding to with her prior pregnancy  This is felt to be etiology again at this time  Patient had expressed desire to have pregnancy terminated, her partner is in agreement with this decision  She was seen in consultation by OBGYN  Due to being so early in pregnancy, unable to confirm intrauterine pregnancy at this time and therefore unable to perform  at this time  They have advised patient to have repeat ultrasound done in 1-2 weeks time and further elective planning of termination thereafter  Patient was given resources to follow up with OBGYN in the outpatient setting  Patient had expressed feeling depressed and passive suicidal thoughts as outlined above  She was seen in consultation by Psychiatry and completely denied these thoughts at that time  They have cleared the patient for discharge home at this time without initiation of any psychiatric medications  At the time of discharge, patient still with persistent nausea and vomiting and inability to tolerate p o  intake  However, she has elected to sign out AMA  She was discharged with prescriptions for Zofran and Reglan  She was also discharged with a prescription for Valium and gabapentin to help with her back pain/spasms  She was advised to schedule follow-up appoint with her outpatient pain provider as well as GI and OBGYN      Risks of leaving AMA were discussed in depth with patient including persistent nausea and vomiting, leading to acute kidney injury as well as potentially electrolyte abnormalities leading to cardiac arrhythmias and possible cardiac arrest/death  Patient expressed understanding and signed AMA form which has been scanned into her chart  Please see above list of diagnoses and related plan for additional information  Condition at Discharge: stable    Discharge Day Visit / Exam:   * Please refer to separate progress note for these details *    Discussion with Family: Patient declined call to   Discharge instructions/Information to patient and family:   See after visit summary for information provided to patient and family  Provisions for Follow-Up Care:  See after visit summary for information related to follow-up care and any pertinent home health orders  Disposition:   Home    Planned Readmission: no     Discharge Statement:  I spent 60 minutes discharging the patient  This time was spent on the day of discharge  I had direct contact with the patient on the day of discharge  Greater than 50% of the total time was spent examining patient, answering all patient questions, arranging and discussing plan of care with patient as well as directly providing post-discharge instructions  Additional time then spent on discharge activities  Discharge Medications:  See after visit summary for reconciled discharge medications provided to patient and/or family        **Please Note: This note may have been constructed using a voice recognition system**

## 2022-05-27 NOTE — ASSESSMENT & PLAN NOTE
· Mildly elevated serum qualitative hCG at 44  Qualitative hCG today positive  Repeat qualitative HCG pending at this time  · Patient states with 2 prior pregnancies she had intractable nausea/vomiting requiring prolonged hospitalizations and feeding tube  · Transvaginal ultrasound in the ER report noted - no intrauterine or ectopic pregnancy identified  · Will appreciate gynecology consult and recommendations  · Given current b-hCG levels below discriminatory zone of [id number], would not yet expect to see imaging findings that would confirm early IUP  Recommend repeat ultrasound in 1-2 weeks     · Information for [facility] and [facility] provided to patient to arrange for elective termination

## 2022-05-27 NOTE — ASSESSMENT & PLAN NOTE
· Reports acutely worsening chronic lower back pain, radiating into RIGHT buttock without any known history of obvious trauma/obvious reason  · Lumbar spine MRI from 2020 showed mild L4-L5 degenerative disease  · CT LS spine this admission: "Small LEFT-sided disc herniation at L3-4  No significant canal stenosis or foraminal narrowing"  · Suspect acute exacerbation of chronic back pain due to persistent nausea and vomiting, also with possible pregnancy contributing  · Per review of PDMP was not being prescribed narcotics (had tylenol with codeine 3/2022)  No active indication for narcotics  · Continue with multimodal pain regimen however noted that patient unable to tolerate p o  Medications:  · Tylenol 650 mg q 6 hours scheduled, Gabapentin 100 mg t i d , Robaxin 650 mg q 6 hours scheduled, topical Lidoderm and Chivo-Wilcox  · IV Toradol 15-30 mg q 6 hours p r n  Moderate/severe pain, IV Valium q 8 hours p r n  Spasms  · Consider initiation of IV steroids    Patient elected to sign out AMA  She was provided prescription for Valium 2 mg p o  q 8 hours p r n  back spasms as well as gabapentin 100 mg t i d  and new prescriptions for topical lidocaine patch/Voltaren gel  She was advised to schedule follow-up with her outpatient pain provider

## 2022-05-27 NOTE — ASSESSMENT & PLAN NOTE
· Reports use of medical marijuana  · Concern for contribution to cyclical vomiting syndrome   · GI recommend avoiding

## 2022-05-27 NOTE — PROGRESS NOTES
Gastroenterology Progress Note      PATIENT INFORMATION      Patient: Madi Carver 28 y o  female   MRN: [id number]  PCP: Theresa Munoz DO  Unit/Bed#: -01 Encounter: [id number]  Date Of Visit: 22  Current Length of Stay: 1 day(s)     ASSESSMENTS & PLAN      28-year-old F with PMH of sciatica presented with persistent N/V, poor oral intake  Multiple admissions regarding similar complaints  Does have family H/o gastroparesis  There was concern for cyclic vomiting syndrome initially due to her medical marijuana use  Prior endoscopy in 2021 showed mild esophagitis otherwise was unremarkable  Recent imaging with CT AP was unremarkable  Also Pt currently found to have trending up hCG, LMP 4 weeks ago however currently not in periods  During her prior 2 pregnancies she was admitted for hyperemesis gravidarum and needed feeding tube suggesting her N/V likely may be related to pregnancy     1  Persistent N/V likely 2/2 hyperemesis gravidarum also cyclic vomiting syndrome might be added component  2  Mild esophagitis  3  Medical marijuana use for chronic back pain     Plan:-  -consider OBGYN consult as Pt prefers   -avoid any teratogenic drugs or radiation exposure  -continue Reglan 10 mg t i d , Tigan q 6h p r n , Zofran q 6h p r n   -can consider adding DICLEGIS but unsure weather it will be available in our pharmacy  -can also add Benadryl IV vs oral 25 mg q 8h   -monitor electrolytes and replace as needed  -monitor QT interval  -will discontinue amitriptyline, scopolamine  -continue Protonix b i d , Pepcid at bedtime  -can switch to oral formulation once tolerating diet   -continue hydration as per primary team  -encouraged to avoid marijuana use  -avoid opioids, NSAIDs  -will need GI follow-up and gastric emptying study once everything stabilizes which can be done outpatient      Thank you for the consultation  Will sign off  Reconsult as needed      SUBJECTIVE Pt was seen and examined  Currently to have +ve pregnancy test   Does have a H/o hyperemesis gravidarum during previous 2 pregnancies in her 3rd and 4th week  Found to have elevated serum HCG level  States  to be done and gyn consult to be placed  Currently still has persistent N/V unable to tolerate oral diet likely 2/2 hyperemesis gravidarum  No diarrhea currently  OBJECTIVE     Vitals:   Temp (24hrs), Av 9 °F (37 2 °C), Min:98 9 °F (37 2 °C), Max:98 9 °F (37 2 °C)    Temp:  [98 9 °F (37 2 °C)] 98 9 °F (37 2 °C)  HR:  [83-94] 94  Resp:  [17-19] 19  BP: (110-147)/(81-96) 138/96  SpO2:  [97 %] 97 %  Body mass index is 28 29 kg/m²  Input and Output Summary (last 24 hours): Intake/Output Summary (Last 24 hours) at 2022 0931  Last data filed at 2022 1924  Gross per 24 hour   Intake 1225 ml   Output 150 ml   Net 1075 ml       Physical Exam:   GENERAL: NAD  HEENT:  NC/AT, no scleral icterus  CARDIAC:  RRR, +S1/S2  PULMONARY:  non-labored breathing  ABDOMEN:  Soft, NT/ND, +BS, no rebound/guarding/rigidity  Extremities:  No edema, cyanosis, or clubbing  NEUROLOGIC:  Alert  SKIN:  No rashes or erythema        ADDITIONAL DATA     Labs & Recent Cultures:     Results from last 7 days   Lab Units 22  0540   WBC Thousand/uL 12 01*   HEMOGLOBIN g/dL 14 1   HEMATOCRIT % 41 3   PLATELETS Thousands/uL 221   NEUTROS PCT % 72   LYMPHS PCT % 21   MONOS PCT % 6   EOS PCT % 1     Results from last 7 days   Lab Units 22  0540   POTASSIUM mmol/L 4 0   CHLORIDE mmol/L 109*   CO2 mmol/L 22   BUN mg/dL 8   CREATININE mg/dL 0 68   CALCIUM mg/dL 9 2   ALK PHOS U/L 80   ALT U/L 28   AST U/L 23                     Nutrition:  Diet Clear Liquid  Radiology Results:   CT spine lumbar wo contrast   Final Result by Gene Lequita Prader, DO ( [id number])      Small left-sided disc herniation at L3-4  No significant canal stenosis or foraminal narrowing           Workstation performed: [id number]         US OB pregnancy limited with transvaginal   Final Result by Arley Cook MD (05/24 1610)      An intrauterine or ectopic pregnancy is not identified  Workstation performed: [id number]         US right upper quadrant   Final Result by Mario Landis MD (05/24 7109)      Mild hepatic steatosis  No evidence of cholelithiasis, sonographic Doshi's sign, or biliary ductal dilatation              Workstation performed: [id number]           Scheduled Medications:  acetaminophen, 975 mg, Q6H KACI  Diclofenac Sodium, 4 g, 4x Daily  famotidine, 20 mg, Q24H KACI  gabapentin, 100 mg, TID  lidocaine, 2 patch, HS  melatonin, 3 mg, HS  menthol-methyl salicylate, , 4x Daily  methocarbamol, 750 mg, Q6H KACI  metoclopramide, 10 mg, Q6H KACI  pantoprazole, 40 mg, Q12H [address]  potassium chloride, 20 mEq, Once  scopolamine, 1 patch, Q72H  sucralfate, 1 g, 4x Daily (AC & HS)        PRN MEDS:  aluminum-magnesium hydroxide-simethicone, 30 mL, Q6H PRN  bisacodyl, 10 mg, Daily PRN  cyproheptadine hcl, 4 mg, TID PRN  diazepam, 2 5 mg, Q6H PRN  ketorolac, 15 mg, Q6H PRN  ketorolac, 30 mg, Q6H PRN  ondansetron, 4 mg, Q4H PRN  trimethobenzamide, 200 mg, Q6H PRN        Last 24 Hours Medication List:   Current Facility-Administered Medications   Medication Dose Route Frequency Provider Last Rate    acetaminophen  975 mg Oral Q6H [address] Lisa Recinos PA-C      aluminum-magnesium hydroxide-simethicone  30 mL Oral Q6H PRN Zayda Finch MD      bisacodyl  10 mg Rectal Daily PRN Zayda Finch MD      cyproheptadine hcl  4 mg Oral TID PRN Zayda Finch MD      diazepam  2 5 mg Intravenous Q6H PRN Lisa Recinos PA-C      Diclofenac Sodium  4 g Topical 4x Daily Zayda Finch MD      famotidine  20 mg Intravenous Q24H [address] Yany Arreola MD      gabapentin  100 mg Oral TID Benedetta Kind, PA-C      ketorolac  15 mg Intravenous Q6H PRN Luz Maria Oliva PA-C      ketojooac  30 mg Intravenous Q6H PRN Luz Maria E Held, ELMIRA      lidocaine  2 patch Topical HS Nicola Mederos MD      melatonin  3 mg Oral HS Verwinstonzofia Cummings PA-C      menthol-methyl salicylate   Apply externally 4x Daily Nicola Mederos MD      methocarbamol  750 mg Oral Q6H [address] Nicola Mederos MD      metoclopramide  10 mg Intravenous Q6H [address] Lisa Recinos PA-C      ondansetron  4 mg Intravenous Q4H PRN Luz Maria E HeldELMIRA      pantoprazole  40 mg Intravenous Q12H [address] Lisa Recinos PA-C      potassium chloride  20 mEq Oral Once Nicola Mederos MD      scopolamine  1 patch Transdermal Q72H Belen Mccrary MD      sodium chloride 0 9 % with KCl 40 mEq/L  125 mL/hr Intravenous Continuous Belen Mccrary  mL/hr (05/27/22 0840)    sucralfate  1 g Oral 4x Daily (AC & HS) Lisa Recinos PA-C      trimethobenzamide  200 mg Intramuscular Q6H PRN Susan Laboy DO            Time Spent for Care: 30 mins spent in total   More than 50% of total time spent on counseling and coordination of care as described above  Current Length of Stay: 1 day(s)      Code Status: Level 1 - Full Code          ** Please Note: This note is constructed using a voice recognition dictation system   **

## 2022-05-27 NOTE — ASSESSMENT & PLAN NOTE
Today, patient admits to feeling depressed and guilty for being away from her children    She expressed passive suicidal thoughts such as I do not want to be here anymore and I feel like a waste of space  · Appreciate psychiatry evaluation, patient in agreement

## 2022-05-27 NOTE — ASSESSMENT & PLAN NOTE
· Mildly elevated serum qualitative hCG at 44  Qualitative hCG today positive  Repeat qualitative HCG pending at this time  · Patient states with 2 prior pregnancies she had intractable nausea/vomiting requiring prolonged hospitalizations and feeding tube  · Transvaginal ultrasound in the ER report noted - no intrauterine or ectopic pregnancy identified  · Will appreciate gynecology consult and recommendations  · If confirmed positive pregnancy, patient requesting

## 2022-05-27 NOTE — ASSESSMENT & PLAN NOTE
Patient presented with intractable nausea/vomiting x 3-4 days with inability to tolerate PO intake  She has had several prior presentations for same  · History of chronic marijuana use, this could be cannabis hyperemesis syndrome/cyclic vomiting  · 8/9239 EGD showed mild esophagitis, H pylori negative  · CT A/P 2/22 unremarkable, not repeated on admission but ultrasound was done and unremarkable    · No evidence that a gastric emptying study was done  · Appreciate GI consult and recommendations  · IV Protonix 40 mg q12h and IV Pepcid 20 mg daily  · Added Elavil qhs on 5/26 but did not receive   · Current antiemetic regimen: Zofran 4 mg q6h prn, IV reglan 10 mg q6h schedueld, IM Tigan 200 mg q6h prn   · Increase frequency of Zofran to q4h today   · Clear liquid diet, advance as tolerated but not ready yet due to persistent vomiting  · Concern for active pregnancy as etiology, appreciate GYN inputs, see plan below

## 2022-05-27 NOTE — ASSESSMENT & PLAN NOTE
· White count on admission significantly elevated at 17 47  No fever or other infectious symptomatology  Perhaps this is stress response    · Monitor CBC, down-trending LACERATION

## 2022-05-27 NOTE — ASSESSMENT & PLAN NOTE
· K dropped to 2 9 due to GI losses   Repleted and improved, continue KCL in IVF--continue to follow

## 2022-05-27 NOTE — UTILIZATION REVIEW
Continued Stay Review    Date:                            Current Patient Class: inpatient Current Level of Care: med surg     HPI:31 y o  female initially admitted on 22 for intractable nausea and vomiting    Assessment/Plan:   Behavioral health consult completed  Ob gyn consult completed  Treated with iv fluids, iv pepcid, iv reglan and iv protonix  Consult behavioral health  28 y o  female with a history of intractable nausea and vomiting, chronic bilateral lower pain without sciatica present to the hospital with intractable nausea and vomiting for the last 4 days, inability to keep anything down when in the hospital she found out that she was pregnant  This time she does not want to keep this pregnancy because she had a very difficult time the last time she was prednisone been in the hospital for long periods of time and needing a feeding tube due to her vomiting and nausea  During my interview patient denies that she had any suicidal or homicidal thoughts plans or intent, she does not have any active psychotic symptoms or manic episode    Diagnosis:  Adjustment disorder with anxious mood  Continue medical management    Consult OB GYN  29 yo  at approximately 4w5d, based off LMP, with nausea and vomiting in the setting of pregnancy on unknown location   1  Pregnancy at 4w5d  - b-hCG 44 --> 108  - Hgb 14 1, Rh positive  - Pelvic ultrasound without current evidence of IUP or ectopic pregnancy  - reviewed results with patient  Patient repeated stated this is not an intended or desired pregnancy as she has had significant pregnancy complications secondary to hyperemesis in the past  Reviewed with patient that intrauterine gestation needs to visualized in order to proceed with termination  Given current b-hCG levels below discriminatory zone of 1595-4403, would not yet expect to see imaging findings that would confirm early IUP  Recommend repeat ultrasound in 1-2 weeks     - Elective terminations are not available at this hospital, however, information for [facility] and [facility] provided to patient  - Recommend follow-up in office following termination for establishment of gyn care and contraception discussion   Patient had expressed interested in learning more about permanent sterilization vs LARCs    2  Nausea and vomiting  - Given patient's pregnancy is not desired and she plans for termination, patient can continue current regimen without restrictions to treat her symptoms  - Replete electrolytes prn, hypokalemia has improved  - Patient currently using medical marijuana 2-3/week, less likely cyclic vomiting syndrome, although this may exacerbate symptoms as well as her chronic GI history     Discharged AMA    Vital Signs:       Vitals:    05/26/22 0900 05/26/22 1107 05/26/22 2142 05/27/22 0327   BP:  147/90 110/81 138/96   BP Location:  Right arm  Right arm   Pulse:  83  94   Resp:  17  19   Temp:  98 9 °F (37 2 °C)  98 9 °F (37 2 °C)   TempSrc:  Oral  Oral   SpO2: 90% 97%  97%   Weight:       Height:           Pertinent Labs/Diagnostic Results:       Results from last 7 days   Lab Units 05/27/22  0540 05/25/22  1024 05/24/22  0856   WBC Thousand/uL 12 01* 15 33* 17 47*   HEMOGLOBIN g/dL 14 1 13 7 14 5   HEMATOCRIT % 41 3 40 3 41 8   PLATELETS Thousands/uL 221 230 246   NEUTROS ABS Thousands/µL 8 67* 12 19* 14 38*         Results from last 7 days   Lab Units 05/27/22  0540 05/26/22  0750 05/26/22  0636 05/25/22  1024 05/24/22  0856   SODIUM mmol/L 135* 136  --  134* 137   POTASSIUM mmol/L 4 0 3 6  --  2 9* 3 2*   CHLORIDE mmol/L 109* 109*  --  104 105   CO2 mmol/L 22 23  --  24 25   ANION GAP mmol/L 4 4  --  6 7   BUN mg/dL 8 9  --  12 11   CREATININE mg/dL 0 68 0 77  --  0 68 0 87   EGFR ml/min/1 73sq m 117 103  --  117 89   CALCIUM mg/dL 9 2 9 2  --  8 9 9 8   MAGNESIUM mg/dL  --   --  2 1  --   --      Results from last 7 days   Lab Units 05/27/22  0540 05/25/22  1024 05/24/22  0856   AST U/L 23 16 10   ALT U/L 28 21 21   ALK PHOS U/L 80 79 96   TOTAL PROTEIN g/dL 7 2 7 3 8 1   ALBUMIN g/dL 3 6 3 8 4 0   TOTAL BILIRUBIN mg/dL 0 69 1 75* 1 75*         Results from last 7 days   Lab Units 05/27/22  0540 05/26/22  0750 05/25/22  1024 05/24/22  0856   GLUCOSE RANDOM mg/dL 98 103 110 116         Results from last 7 days   Lab Units 05/26/22  0636   HEMOGLOBIN A1C % 5 0   EAG mg/dl 97       Results from last 7 days   Lab Units 05/24/22  0856   LIPASE u/L 50*         Scheduled Medications:  acetaminophen, 975 mg, Oral, Q6H KACI  Diclofenac Sodium, 4 g, Topical, 4x Daily  famotidine, 20 mg, Intravenous, Q24H KACI  gabapentin, 100 mg, Oral, TID  lidocaine, 2 patch, Topical, HS  melatonin, 3 mg, Oral, HS  menthol-methyl salicylate, , Apply externally, 4x Daily  methocarbamol, 750 mg, Oral, Q6H KACI  metoclopramide, 10 mg, Intravenous, Q6H KACI  pantoprazole, 40 mg, Intravenous, Q12H [facility]  potassium chloride, 20 mEq, Oral, Once  sucralfate, 1 g, Oral, 4x Daily (AC & HS)      Continuous IV Infusions:  sodium chloride 0 9 % with KCl 40 mEq/L, 125 mL/hr, Intravenous, Continuous      PRN Meds:  aluminum-magnesium hydroxide-simethicone, 30 mL, Oral, Q6H PRN  bisacodyl, 10 mg, Rectal, Daily PRN  cyproheptadine hcl, 4 mg, Oral, TID PRN  diazepam, 2 5 mg, Intravenous, Q6H PRN  ketorolac, 15 mg, Intravenous, Q6H PRN  ketorolac, 30 mg, Intravenous, Q6H PRN  ondansetron, 4 mg, Intravenous, Q4H PRN  trimethobenzamide, 200 mg, Intramuscular, Q6H PRN        Discharge Plan:  Discharge 5- 27-22     Network Utilization Review Department  ATTENTION: Please call with any questions or concerns to [phone] and carefully listen to the prompts so that you are directed to the right person   All voicemails are confidential   Karolyn Micheline all requests for admission clinical reviews, approved or denied determinations and any other requests to dedicated fax number below belonging to the campus where the patient is receiving treatment   List of dedicated fax numbers for the Facilities:  [address] [place] [address] DENIALS (Administrative/Medical Necessity) [phone]   [address] (Maternity/NICU/Pediatrics) [phone]   [address] [place] [address]  [address] [facility] [place] [phone] [id number] [place] [address] [address] [facility] [phone]
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
## 2022-05-27 NOTE — ASSESSMENT & PLAN NOTE
· Reports acutely worsening chronic lower back pain, radiating into RIGHT buttock without any known history of obvious trauma/obvious reason  · Lumbar spine MRI from 2020 showed mild L4-L5 degenerative disease  · CT LS spine this admission: "Small LEFT-sided disc herniation at L3-4  No significant canal stenosis or foraminal narrowing"  · Suspect acute exacerbation of chronic back pain due to persistent nausea and vomiting, also with possible pregnancy contributing  · Per review of PDMP was not being prescribed narcotics (had tylenol with codeine 3/2022)  No active indication for narcotics  · Continue with multimodal pain regimen however noted that patient unable to tolerate p o  Medications:  · Tylenol 650 mg q 6 hours scheduled, Gabapentin 100 mg t i d , Robaxin 650 mg q 6 hours scheduled, topical Lidoderm and Chivo-Wilcox  · IV Toradol 15-30 mg q 6 hours p r n  Moderate/severe pain, IV Valium q 8 hours p r n   Spasms  · Consider initiation of IV steroids

## 2022-05-27 NOTE — ASSESSMENT & PLAN NOTE
· Reports use of medical marijuana  · Concern for contribution to cyclical vomiting syndrome   · GI / GYN recommend avoiding on discharge

## 2022-05-27 NOTE — ASSESSMENT & PLAN NOTE
Patient presented with intractable nausea/vomiting x 3-4 days with inability to tolerate PO intake  She has had several prior presentations for same  · History of chronic marijuana use, this could be cannabis hyperemesis syndrome/cyclic vomiting  · 4/3114 EGD showed mild esophagitis, H pylori negative  · CT A/P 2/22 unremarkable, not repeated on admission but ultrasound was done and unremarkable  · No evidence that a gastric emptying study was done  · Appreciate GI consult and recommendations  · IV Protonix 40 mg q12h and IV Pepcid 20 mg daily  · Added Elavil qhs on 5/26 but did not receive   · Current antiemetic regimen: Zofran 4 mg q6h prn, IV reglan 10 mg q6h schedueld, IM Tigan 200 mg q6h prn   · Increase frequency of Zofran to q4h today   · Clear liquid diet, advance as tolerated but not ready yet due to persistent vomiting  · Given active pregnancy, most likely etiology to be hyperemesis gravidarum, appreciate GYN inputs, see plan below    Patient elected to sign out AMA  As per my discussion with gyn, there is no contraindication to any antiemetics at this time as patient is planning to electively terminate her pregnancy  She was discharged with new prescriptions for PO Zofran p r n  and Reglan 4 times daily  She was advised to resume p o  Protonix/Pepcid  Elavil and Benadryl discontinued

## 2022-05-27 NOTE — ASSESSMENT & PLAN NOTE
· K dropped to 2 9 due to GI losses  Repleted and improved, K+ 4 0 today  · Patient elected to sign out AMA    Ordered for repeat BMP in 3 days to reassess

## 2022-05-28 ENCOUNTER — HOSPITAL ENCOUNTER (OUTPATIENT)
Facility: HOSPITAL | Age: 32
Setting detail: OBSERVATION
Discharge: HOME | End: 2022-05-30
Attending: EMERGENCY MEDICINE | Admitting: INTERNAL MEDICINE
Payer: COMMERCIAL

## 2022-05-28 DIAGNOSIS — O21.9 NAUSEA AND VOMITING IN PREGNANCY PRIOR TO 22 WEEKS GESTATION: ICD-10-CM

## 2022-05-28 DIAGNOSIS — E43 SEVERE PROTEIN-CALORIE MALNUTRITION (HCC): ICD-10-CM

## 2022-05-28 DIAGNOSIS — M54.9 BACK PAIN: ICD-10-CM

## 2022-05-28 DIAGNOSIS — Z34.90 EARLY STAGE OF PREGNANCY: ICD-10-CM

## 2022-05-28 DIAGNOSIS — Z79.899 MEDICAL MARIJUANA USE: ICD-10-CM

## 2022-05-28 DIAGNOSIS — O21.9 NAUSEA AND VOMITING DURING PREGNANCY: ICD-10-CM

## 2022-05-28 DIAGNOSIS — E87.6 HYPOKALEMIA DUE TO EXCESSIVE GASTROINTESTINAL LOSS OF POTASSIUM: ICD-10-CM

## 2022-05-28 DIAGNOSIS — R11.10 VOMITING: Primary | ICD-10-CM

## 2022-05-28 DIAGNOSIS — Z87.11 HISTORY OF GASTRIC ULCER: ICD-10-CM

## 2022-05-28 DIAGNOSIS — E34.9 ELEVATED SERUM HCG: ICD-10-CM

## 2022-05-28 DIAGNOSIS — R11.2 INTRACTABLE NAUSEA AND VOMITING: ICD-10-CM

## 2022-05-28 LAB
ALBUMIN SERPL BCP-MCNC: 4.3 G/DL (ref 3.5–5)
ALP SERPL-CCNC: 95 U/L (ref 46–116)
ALT SERPL W P-5'-P-CCNC: 39 U/L (ref 12–78)
ANION GAP SERPL CALCULATED.3IONS-SCNC: 10 MMOL/L (ref 4–13)
AST SERPL W P-5'-P-CCNC: 21 U/L (ref 5–45)
BASOPHILS # BLD AUTO: 0.06 THOUSANDS/ΜL (ref 0–0.1)
BASOPHILS NFR BLD AUTO: 0 % (ref 0–1)
BILIRUB SERPL-MCNC: 1.09 MG/DL (ref 0.2–1)
BUN SERPL-MCNC: 12 MG/DL (ref 5–25)
CALCIUM SERPL-MCNC: 9.7 MG/DL (ref 8.3–10.1)
CHLORIDE SERPL-SCNC: 101 MMOL/L (ref 100–108)
CO2 SERPL-SCNC: 23 MMOL/L (ref 21–32)
CREAT SERPL-MCNC: 1 MG/DL (ref 0.6–1.3)
EOSINOPHIL # BLD AUTO: 0.13 THOUSAND/ΜL (ref 0–0.61)
EOSINOPHIL NFR BLD AUTO: 1 % (ref 0–6)
ERYTHROCYTE [DISTWIDTH] IN BLOOD BY AUTOMATED COUNT: 12.7 % (ref 11.6–15.1)
GFR SERPL CREATININE-BSD FRML MDRD: 75 ML/MIN/1.73SQ M
GLUCOSE SERPL-MCNC: 114 MG/DL (ref 65–140)
HCT VFR BLD AUTO: 45.9 % (ref 34.8–46.1)
HGB BLD-MCNC: 16.1 G/DL (ref 11.5–15.4)
IMM GRANULOCYTES # BLD AUTO: 0.06 THOUSAND/UL (ref 0–0.2)
IMM GRANULOCYTES NFR BLD AUTO: 0 % (ref 0–2)
LIPASE SERPL-CCNC: 70 U/L (ref 73–393)
LYMPHOCYTES # BLD AUTO: 3.8 THOUSANDS/ΜL (ref 0.6–4.47)
LYMPHOCYTES NFR BLD AUTO: 20 % (ref 14–44)
MCH RBC QN AUTO: 29.3 PG (ref 26.8–34.3)
MCHC RBC AUTO-ENTMCNC: 35.1 G/DL (ref 31.4–37.4)
MCV RBC AUTO: 84 FL (ref 82–98)
MONOCYTES # BLD AUTO: 1.19 THOUSAND/ΜL (ref 0.17–1.22)
MONOCYTES NFR BLD AUTO: 6 % (ref 4–12)
NEUTROPHILS # BLD AUTO: 13.95 THOUSANDS/ΜL (ref 1.85–7.62)
NEUTS SEG NFR BLD AUTO: 73 % (ref 43–75)
NRBC BLD AUTO-RTO: 0 /100 WBCS
PLATELET # BLD AUTO: 317 THOUSANDS/UL (ref 149–390)
PMV BLD AUTO: 11 FL (ref 8.9–12.7)
POTASSIUM SERPL-SCNC: 3.8 MMOL/L (ref 3.5–5.3)
PROT SERPL-MCNC: 8.6 G/DL (ref 6.4–8.2)
RBC # BLD AUTO: 5.5 MILLION/UL (ref 3.81–5.12)
SODIUM SERPL-SCNC: 134 MMOL/L (ref 136–145)
T4 FREE SERPL-MCNC: 1.5 NG/DL (ref 0.76–1.46)
TSH SERPL DL<=0.05 MIU/L-ACNC: 0.44 UIU/ML (ref 0.45–4.5)
WBC # BLD AUTO: 19.19 THOUSAND/UL (ref 4.31–10.16)

## 2022-05-28 PROCEDURE — 85025 COMPLETE CBC W/AUTO DIFF WBC: CPT

## 2022-05-28 PROCEDURE — C9113 INJ PANTOPRAZOLE SODIUM, VIA: HCPCS | Performed by: INTERNAL MEDICINE

## 2022-05-28 PROCEDURE — 99285 EMERGENCY DEPT VISIT HI MDM: CPT | Performed by: EMERGENCY MEDICINE

## 2022-05-28 PROCEDURE — 96375 TX/PRO/DX INJ NEW DRUG ADDON: CPT

## 2022-05-28 PROCEDURE — 80053 COMPREHEN METABOLIC PANEL: CPT

## 2022-05-28 PROCEDURE — 20552 NJX 1/MLT TRIGGER POINT 1/2: CPT | Performed by: EMERGENCY MEDICINE

## 2022-05-28 PROCEDURE — 96361 HYDRATE IV INFUSION ADD-ON: CPT

## 2022-05-28 PROCEDURE — 99220 PR INITIAL OBSERVATION CARE/DAY 70 MINUTES: CPT | Performed by: INTERNAL MEDICINE

## 2022-05-28 PROCEDURE — 96366 THER/PROPH/DIAG IV INF ADDON: CPT

## 2022-05-28 PROCEDURE — 36415 COLL VENOUS BLD VENIPUNCTURE: CPT

## 2022-05-28 PROCEDURE — 96365 THER/PROPH/DIAG IV INF INIT: CPT

## 2022-05-28 PROCEDURE — 99284 EMERGENCY DEPT VISIT MOD MDM: CPT

## 2022-05-28 PROCEDURE — 83690 ASSAY OF LIPASE: CPT

## 2022-05-28 PROCEDURE — 84443 ASSAY THYROID STIM HORMONE: CPT | Performed by: INTERNAL MEDICINE

## 2022-05-28 PROCEDURE — 84439 ASSAY OF FREE THYROXINE: CPT | Performed by: INTERNAL MEDICINE

## 2022-05-28 RX ORDER — PANTOPRAZOLE SODIUM 40 MG/1
40 INJECTION, POWDER, FOR SOLUTION INTRAVENOUS EVERY 12 HOURS SCHEDULED
Status: DISCONTINUED | OUTPATIENT
Start: 2022-05-28 | End: 2022-05-30 | Stop reason: HOSPADM

## 2022-05-28 RX ORDER — BISACODYL 10 MG
10 SUPPOSITORY, RECTAL RECTAL DAILY
Status: DISCONTINUED | OUTPATIENT
Start: 2022-05-29 | End: 2022-05-28

## 2022-05-28 RX ORDER — DEXTROSE AND SODIUM CHLORIDE 5; .9 G/100ML; G/100ML
100 INJECTION, SOLUTION INTRAVENOUS CONTINUOUS
Status: DISCONTINUED | OUTPATIENT
Start: 2022-05-28 | End: 2022-05-30 | Stop reason: HOSPADM

## 2022-05-28 RX ORDER — PANTOPRAZOLE SODIUM 40 MG/1
40 TABLET, DELAYED RELEASE ORAL
Status: DISCONTINUED | OUTPATIENT
Start: 2022-05-29 | End: 2022-05-28

## 2022-05-28 RX ORDER — MAGNESIUM SULFATE HEPTAHYDRATE 40 MG/ML
2 INJECTION, SOLUTION INTRAVENOUS ONCE
Status: COMPLETED | OUTPATIENT
Start: 2022-05-28 | End: 2022-05-28

## 2022-05-28 RX ORDER — GABAPENTIN 100 MG/1
100 CAPSULE ORAL 3 TIMES DAILY
Status: DISCONTINUED | OUTPATIENT
Start: 2022-05-28 | End: 2022-05-28

## 2022-05-28 RX ORDER — ONDANSETRON 2 MG/ML
4 INJECTION INTRAMUSCULAR; INTRAVENOUS ONCE
Status: COMPLETED | OUTPATIENT
Start: 2022-05-28 | End: 2022-05-28

## 2022-05-28 RX ORDER — LIDOCAINE HYDROCHLORIDE 10 MG/ML
10 INJECTION, SOLUTION EPIDURAL; INFILTRATION; INTRACAUDAL; PERINEURAL ONCE
Status: COMPLETED | OUTPATIENT
Start: 2022-05-28 | End: 2022-05-28

## 2022-05-28 RX ORDER — METOCLOPRAMIDE 10 MG/1
10 TABLET ORAL 4 TIMES DAILY
Status: DISCONTINUED | OUTPATIENT
Start: 2022-05-28 | End: 2022-05-28

## 2022-05-28 RX ORDER — ONDANSETRON 2 MG/ML
4 INJECTION INTRAMUSCULAR; INTRAVENOUS EVERY 8 HOURS PRN
Status: DISCONTINUED | OUTPATIENT
Start: 2022-05-28 | End: 2022-05-28

## 2022-05-28 RX ORDER — DIAZEPAM 5 MG/ML
5 INJECTION, SOLUTION INTRAMUSCULAR; INTRAVENOUS ONCE
Status: COMPLETED | OUTPATIENT
Start: 2022-05-28 | End: 2022-05-28

## 2022-05-28 RX ADMIN — PANTOPRAZOLE SODIUM 40 MG: 40 INJECTION, POWDER, FOR SOLUTION INTRAVENOUS at 22:42

## 2022-05-28 RX ADMIN — METOCLOPRAMIDE 10 MG: 10 TABLET ORAL at 21:53

## 2022-05-28 RX ADMIN — GABAPENTIN 100 MG: 100 CAPSULE ORAL at 21:53

## 2022-05-28 RX ADMIN — LIDOCAINE HYDROCHLORIDE 10 ML: 10 INJECTION, SOLUTION EPIDURAL; INFILTRATION; INTRACAUDAL at 17:35

## 2022-05-28 RX ADMIN — TRIMETHOBENZAMIDE HYDROCHLORIDE 200 MG: 100 INJECTION INTRAMUSCULAR at 22:44

## 2022-05-28 RX ADMIN — DIAZEPAM 5 MG: 10 INJECTION, SOLUTION INTRAMUSCULAR; INTRAVENOUS at 17:27

## 2022-05-28 RX ADMIN — SODIUM CHLORIDE 1000 ML: 0.9 INJECTION, SOLUTION INTRAVENOUS at 19:53

## 2022-05-28 RX ADMIN — MAGNESIUM SULFATE HEPTAHYDRATE 2 G: 40 INJECTION, SOLUTION INTRAVENOUS at 17:35

## 2022-05-28 RX ADMIN — ONDANSETRON 4 MG: 2 INJECTION INTRAMUSCULAR; INTRAVENOUS at 17:24

## 2022-05-28 RX ADMIN — DEXTROSE AND SODIUM CHLORIDE 100 ML/HR: 5; .9 INJECTION, SOLUTION INTRAVENOUS at 17:28

## 2022-05-28 NOTE — ED PROVIDER NOTES
History  Chief Complaint   Patient presents with    Vomiting     Vomiting in pregnancy     36-year-old female past medical history of chronic lower back pain, anxiety, PUD, presents to the ED with intractable nausea and vomiting since this morning  Patient's symptoms began over 1 week ago  She originally presented to this ED on 5/24 with nausea and vomiting  She was given numerous rounds of antiemetics and fluids without relief  She was notably hypokalemic to 2 9  She was admitted to the hospital from 5/24 to 5/27 when she signed out AMA  Patient states that the medications she was given in the hospital mildly controlled her symptoms however she continued with low back pain and vomiting so she decided to leave  She was discharged with oral antiemetics, which she has attempted to take this morning but has not been able to keep anything down  On initial presentation to the ED she was also noted to have a positive urine pregnancy test   Her beta hCG was 44 and increased to 104 2 days later  She had a transvaginal ultrasound performed which did not identify an intrauterine pregnancy or an ectopic pregnancy and she was told to follow-up with an OBGYN outpatient  Patient to apparently plans to terminate this pregnancy, and has resources for follow-up  Patient has had numerous episodes for same presentation of intractable nausea and vomiting in the past, and this has been thought to be due to cyclic vomiting syndrome verses cannabinoid hyperemesis versus gastroparesis  She has been told to follow up outpatient with GI for potential gastric emptying study but has not been able to perform this due to insurance issues  Patient also notes history of hyperemesis at gravidarum with her last pregnancy  She has an 6month-old son at home and with this pregnancy  She required hospitalization for 4 months due to weight loss and dehydration    Currently, patient denies any fever chills currently, no diarrhea or constipation, no chest pain or shortness of breath  She complains of mild epigastric tenderness and severe right lower back pain radiating into her buttocks  During hospitalization, she also had a CT L-spine performed which showed small left-sided L3-L4 disc herniation however this was did not explain her back pain which is right-sided  Prior to Admission Medications   Prescriptions Last Dose Informant Patient Reported? Taking?    Diclofenac Sodium (VOLTAREN) 1 %   No Yes   Sig: Apply 2 g topically 4 (four) times a day as needed (back pain) for up to 7 days   acetaminophen (TYLENOL) 325 mg tablet   No Yes   Sig: Take 2 tablets (650 mg total) by mouth every 6 (six) hours as needed for mild pain   bisacodyl (DULCOLAX) 10 mg suppository   No Yes   Sig: Insert 1 suppository (10 mg total) into the rectum daily for 16 days   diazepam (VALIUM) 2 mg tablet   No Yes   Sig: Take 1 tablet (2 mg total) by mouth every 6 (six) hours as needed for muscle spasms for up to 10 days   gabapentin (NEURONTIN) 100 mg capsule   No Yes   Sig: Take 1 capsule (100 mg total) by mouth 3 (three) times a day   lidocaine (LIDODERM) 5 %   No Yes   Sig: Apply 1 patch topically daily at bedtime Remove & Discard patch within 12 hours or as directed by MD   metoclopramide (REGLAN) 5 mg tablet   No Yes   Sig: Take 2 tablets (10 mg total) by mouth 4 (four) times a day   omeprazole (PriLOSEC) 20 mg delayed release capsule   No Yes   Sig: Take 1 capsule (20 mg total) by mouth daily for 14 days   ondansetron (Zofran ODT) 4 mg disintegrating tablet   No Yes   Sig: Take 1 tablet (4 mg total) by mouth every 6 (six) hours as needed for nausea or vomiting      Facility-Administered Medications: None       Past Medical History:   Diagnosis Date    Arthritis     Asthma     History of stomach ulcers     Psychiatric disorder     anxiety       Past Surgical History:   Procedure Laterality Date    BREAST SURGERY      COSMETIC SURGERY      EGD      WISDOM TOOTH EXTRACTION         History reviewed  No pertinent family history  I have reviewed and agree with the history as documented  E-Cigarette/Vaping    E-Cigarette Use Never User      E-Cigarette/Vaping Substances    Nicotine No     THC Yes     CBD Yes     Flavoring No     Other No     Unknown No      Social History     Tobacco Use    Smoking status: Never Smoker    Smokeless tobacco: Never Used   Vaping Use    Vaping Use: Never used   Substance Use Topics    Alcohol use: Not Currently    Drug use: Not Currently     Types: Marijuana        Review of Systems   Constitutional: Negative for chills and fever  HENT: Negative for ear pain and sore throat  Eyes: Negative for pain and visual disturbance  Respiratory: Negative for cough and shortness of breath  Cardiovascular: Negative for chest pain and palpitations  Gastrointestinal: Positive for nausea and vomiting  Negative for abdominal pain  Genitourinary: Negative for dysuria and hematuria  Musculoskeletal: Positive for back pain  Negative for arthralgias  Skin: Negative for color change and rash  Neurological: Negative for seizures and syncope  All other systems reviewed and are negative  Physical Exam  ED Triage Vitals   Temperature Pulse Respirations Blood Pressure SpO2   05/28/22 1523 05/28/22 1523 05/28/22 1523 05/28/22 1523 05/28/22 1523   98 3 °F (36 8 °C) (!) 150 16 127/90 96 %      Temp src Heart Rate Source Patient Position - Orthostatic VS BP Location FiO2 (%)   -- 05/28/22 1730 05/28/22 1730 05/28/22 1730 --    Monitor Sitting Left arm       Pain Score       05/28/22 1523       8             Orthostatic Vital Signs  Vitals:    05/28/22 1523 05/28/22 1730 05/28/22 1800   BP: 127/90 131/78 122/79   Pulse: (!) 150 (!) 114 (!) 120   Patient Position - Orthostatic VS:  Sitting Sitting       Physical Exam  Vitals and nursing note reviewed     Constitutional:       General: She is in acute distress (Secondary to intractable nausea and vomiting)  Appearance: She is well-developed  She is ill-appearing  She is not toxic-appearing or diaphoretic  HENT:      Head: Normocephalic and atraumatic  Mouth/Throat:      Mouth: Mucous membranes are moist       Pharynx: Oropharynx is clear  Eyes:      Conjunctiva/sclera: Conjunctivae normal       Pupils: Pupils are equal, round, and reactive to light  Cardiovascular:      Rate and Rhythm: Normal rate and regular rhythm  Heart sounds: No murmur heard  Pulmonary:      Effort: Pulmonary effort is normal  No respiratory distress  Breath sounds: Normal breath sounds  Abdominal:      Palpations: Abdomen is soft  Tenderness: There is abdominal tenderness in the epigastric area  There is no right CVA tenderness or left CVA tenderness  Musculoskeletal:      Cervical back: Neck supple  Lumbar back: Spasms present  Back:       Comments: Right lumbar paraspinal muscle spasm  No bony tenderness  Skin:     General: Skin is warm and dry  Neurological:      Mental Status: She is alert           ED Medications  Medications   dextrose 5 % and sodium chloride 0 9 % infusion (100 mL/hr Intravenous New Bag 5/28/22 1728)   sodium chloride 0 9 % bolus 1,000 mL (1,000 mL Intravenous New Bag 5/28/22 1953)   ondansetron (ZOFRAN) injection 4 mg (4 mg Intravenous Given 5/28/22 1724)   diazepam (VALIUM) injection 5 mg (5 mg Intravenous Given 5/28/22 1727)   magnesium sulfate 2 g/50 mL IVPB (premix) 2 g (0 g Intravenous Stopped 5/28/22 1924)   lidocaine (PF) (XYLOCAINE-MPF) 1 % injection 10 mL (10 mL Infiltration Given by Other 5/28/22 1735)       Diagnostic Studies  Results Reviewed     Procedure Component Value Units Date/Time    Comprehensive metabolic panel [[id number]]  (Abnormal) Collected: 05/28/22 1725    Lab Status: Final result Specimen: Blood from Arm, Right Updated: 05/28/22 1815     Sodium 134 mmol/L      Potassium 3 8 mmol/L      Chloride 101 mmol/L CO2 23 mmol/L      ANION GAP 10 mmol/L      BUN 12 mg/dL      Creatinine 1 00 mg/dL      Glucose 114 mg/dL      Calcium 9 7 mg/dL      AST 21 U/L      ALT 39 U/L      Alkaline Phosphatase 95 U/L      Total Protein 8 6 g/dL      Albumin 4 3 g/dL      Total Bilirubin 1 09 mg/dL      eGFR 75 ml/min/1 73sq m     Narrative:      National Kid[employer] guidelines for Chronic Kidney Disease (CKD):     Stage 1 with normal or high GFR (GFR > 90 mL/min/1 73 square meters)    Stage 2 Mild CKD (GFR = 60-89 mL/min/1 73 square meters)    Stage 3A Moderate CKD (GFR = 45-59 mL/min/1 73 square meters)    Stage 3B Moderate CKD (GFR = 30-44 mL/min/1 73 square meters)    Stage 4 Severe CKD (GFR = 15-29 mL/min/1 73 square meters)    Stage 5 End Stage CKD (GFR <15 mL/min/1 73 square meters)  Note: GFR calculation is accurate only with a steady state creatinine    Lipase [[id number]]  (Abnormal) Collected: 05/28/22 1725    Lab Status: Final result Specimen: Blood from Arm, Right Updated: 05/28/22 1810     Lipase 70 u/L     CBC and differential [[id number]]  (Abnormal) Collected: 05/28/22 1725    Lab Status: Final result Specimen: Blood from Arm, Right Updated: 05/28/22 1733     WBC 19 19 Thousand/uL      RBC 5 50 Million/uL      Hemoglobin 16 1 g/dL      Hematocrit 45 9 %      MCV 84 fL      MCH 29 3 pg      MCHC 35 1 g/dL      RDW 12 7 %      MPV 11 0 fL      Platelets 694 Thousands/uL      nRBC 0 /100 WBCs      Neutrophils Relative 73 %      Immat GRANS % 0 %      Lymphocytes Relative 20 %      Monocytes Relative 6 %      Eosinophils Relative 1 %      Basophils Relative 0 %      Neutrophils Absolute 13 95 Thousands/µL      Immature Grans Absolute 0 06 Thousand/uL      Lymphocytes Absolute 3 80 Thousands/µL      Monocytes Absolute 1 19 Thousand/µL      Eosinophils Absolute 0 13 Thousand/µL      Basophils Absolute 0 06 Thousands/µL                  No orders to display         Procedures  Trigger Injection 1 or 2 muscles    Date/Time: 5/28/2022 6:08 PM  Performed by: Triston Hinton MD  Authorized by: Triston Hinton MD     Patient location:  ED and bedside  Consent:     Consent obtained:  Verbal    Consent given by:  Patient  Universal protocol:     Procedure explained and questions answered to patient or proxy's satisfaction: yes      Relevant documents present and verified: yes      Test results available and properly labeled: yes      Radiology Images displayed and confirmed  If images not available, report reviewed : yes      Required blood products, implants, devices, and special equipment available: yes      Site marked: yes      Time out called: yes      Patient identity confirmed:  Verbally with patient  Location:     Body area:  Lower back    Injection Trigger Points:  3  Pre-procedure details:     Skin preparation:  Antiseptic wash  Skin anesthesia:     Skin anesthesia method:  None  Procedure details:     Needle gauge:  25 G    Anesthetic injected:  Lidocaine 1% w/o epi    Steroid injected:  None    Additive injected:  None    Injection procedure:  Anatomic landmarks identified, introduced needle, negative aspiration for blood and incremental injection  Post-procedure details:     Dressing:  None    Outcome:  Pain relieved    Patient tolerance of procedure: Tolerated well, no immediate complications          ED Course  ED Course as of 05/28/22 2023   Sat May 28, 2022   1921 Contacted [facility] for admission  2003 Contacted [facility] for admission once more  MDM  Number of Diagnoses or Management Options  Back pain  Nausea and vomiting in pregnancy prior to 22 weeks gestation  Vomiting  Diagnosis management comments: 78-year-old female past medical history of chronic lower back pain, anxiety, PUD, presents to the ED with intractable nausea and vomiting since this morning  DDx: hyperemesis gravidarum, cyclic vomiting syndrome, cannabinoid hyperemesis       Patient recently hospitalized for same, left AMA  Will evaluate and replete electrolytes as necessary  Will treat symptomatically with fluids, Zofran, Valium, and IV magnesium for muscle spasms and prior hypokalemia  Patient re-evaluated after symptomatic treatment, feeling much better  Trigger point injection done for right-sided lower back pain and muscle spasm with immediate and significant relief of pain  Patient was feeling better p o  Challenge attempted however patient failed with continued vomiting  He discussed with inpatient medicine, patient admitted to observation for further management of her intractable nausea and vomiting  Disposition  Final diagnoses:   Vomiting   Back pain   Nausea and vomiting in pregnancy prior to 22 weeks gestation     Time reflects when diagnosis was documented in both MDM as applicable and the Disposition within this note     Time User Action Codes Description Comment    5/28/2022  8:06 PM Desirae Punter [R11 10] Vomiting     5/28/2022  8:07 PM [place] Gutting Add [M54 9] Back pain     5/28/2022  8:07 PM Jared Gutting Add [O21 9] Nausea and vomiting in pregnancy prior to 22 weeks gestation       ED Disposition     ED Disposition   Admit    Condition   Stable    Date/Time   Sat May 28, 2022  8:10 PM    Comment   Case was discussed with Dr Marbin Berkowitz and the patient's admission status was agreed to be Admission Status: inpatient status to the service of Dr Marbin Berkowitz  Follow-up Information    None         Patient's Medications   Discharge Prescriptions    No medications on file     No discharge procedures on file  PDMP Review       Value Time User    PDMP Reviewed  Yes 1/20/2021  2:27 PM David Rivera PA-C           ED Provider  Attending physically available and evaluated Jaiden Gonsales I managed the patient along with the ED Attending      Electronically Signed by         Luis Enrique Jain MD  05/28/22 2023

## 2022-05-29 PROBLEM — M54.9 BACK PAIN: Status: ACTIVE | Noted: 2022-05-29

## 2022-05-29 PROBLEM — R19.7 DIARRHEA: Status: ACTIVE | Noted: 2022-05-29

## 2022-05-29 PROBLEM — Z87.11 HISTORY OF GASTRIC ULCER: Status: ACTIVE | Noted: 2020-07-19

## 2022-05-29 LAB
ATRIAL RATE: 78 BPM
BASOPHILS # BLD AUTO: 0.07 THOUSANDS/ΜL (ref 0–0.1)
BASOPHILS NFR BLD AUTO: 1 % (ref 0–1)
EOSINOPHIL # BLD AUTO: 0.22 THOUSAND/ΜL (ref 0–0.61)
EOSINOPHIL NFR BLD AUTO: 2 % (ref 0–6)
ERYTHROCYTE [DISTWIDTH] IN BLOOD BY AUTOMATED COUNT: 12.9 % (ref 11.6–15.1)
GLUCOSE SERPL-MCNC: 106 MG/DL (ref 65–140)
GLUCOSE SERPL-MCNC: 121 MG/DL (ref 65–140)
GLUCOSE SERPL-MCNC: 159 MG/DL (ref 65–140)
HCT VFR BLD AUTO: 40 % (ref 34.8–46.1)
HGB BLD-MCNC: 13.9 G/DL (ref 11.5–15.4)
IMM GRANULOCYTES # BLD AUTO: 0.07 THOUSAND/UL (ref 0–0.2)
IMM GRANULOCYTES NFR BLD AUTO: 1 % (ref 0–2)
LYMPHOCYTES # BLD AUTO: 3.97 THOUSANDS/ΜL (ref 0.6–4.47)
LYMPHOCYTES NFR BLD AUTO: 29 % (ref 14–44)
MCH RBC QN AUTO: 30.3 PG (ref 26.8–34.3)
MCHC RBC AUTO-ENTMCNC: 34.8 G/DL (ref 31.4–37.4)
MCV RBC AUTO: 87 FL (ref 82–98)
MONOCYTES # BLD AUTO: 0.94 THOUSAND/ΜL (ref 0.17–1.22)
MONOCYTES NFR BLD AUTO: 7 % (ref 4–12)
NEUTROPHILS # BLD AUTO: 8.34 THOUSANDS/ΜL (ref 1.85–7.62)
NEUTS SEG NFR BLD AUTO: 60 % (ref 43–75)
NRBC BLD AUTO-RTO: 0 /100 WBCS
P AXIS: 72 DEGREES
PLATELET # BLD AUTO: 231 THOUSANDS/UL (ref 149–390)
PMV BLD AUTO: 10.9 FL (ref 8.9–12.7)
PR INTERVAL: 136 MS
QRS AXIS: 31 DEGREES
QRSD INTERVAL: 74 MS
QT INTERVAL: 398 MS
QTC INTERVAL: 453 MS
RBC # BLD AUTO: 4.59 MILLION/UL (ref 3.81–5.12)
T WAVE AXIS: 38 DEGREES
VENTRICULAR RATE: 78 BPM
WBC # BLD AUTO: 13.61 THOUSAND/UL (ref 4.31–10.16)

## 2022-05-29 PROCEDURE — 82948 REAGENT STRIP/BLOOD GLUCOSE: CPT

## 2022-05-29 PROCEDURE — C9113 INJ PANTOPRAZOLE SODIUM, VIA: HCPCS | Performed by: INTERNAL MEDICINE

## 2022-05-29 PROCEDURE — 99242 OFF/OP CONSLTJ NEW/EST SF 20: CPT | Performed by: STUDENT IN AN ORGANIZED HEALTH CARE EDUCATION/TRAINING PROGRAM

## 2022-05-29 PROCEDURE — 99225 PR SBSQ OBSERVATION CARE/DAY 25 MINUTES: CPT | Performed by: INTERNAL MEDICINE

## 2022-05-29 PROCEDURE — 93005 ELECTROCARDIOGRAM TRACING: CPT

## 2022-05-29 PROCEDURE — 93010 ELECTROCARDIOGRAM REPORT: CPT | Performed by: INTERNAL MEDICINE

## 2022-05-29 PROCEDURE — 85025 COMPLETE CBC W/AUTO DIFF WBC: CPT | Performed by: INTERNAL MEDICINE

## 2022-05-29 RX ORDER — ACETAMINOPHEN 325 MG/1
650 TABLET ORAL EVERY 6 HOURS PRN
Status: DISCONTINUED | OUTPATIENT
Start: 2022-05-29 | End: 2022-05-30 | Stop reason: HOSPADM

## 2022-05-29 RX ORDER — KETOROLAC TROMETHAMINE 30 MG/ML
15 INJECTION, SOLUTION INTRAMUSCULAR; INTRAVENOUS EVERY 6 HOURS PRN
Status: DISCONTINUED | OUTPATIENT
Start: 2022-05-29 | End: 2022-05-29

## 2022-05-29 RX ORDER — KETOROLAC TROMETHAMINE 30 MG/ML
15 INJECTION, SOLUTION INTRAMUSCULAR; INTRAVENOUS EVERY 6 HOURS PRN
Status: DISCONTINUED | OUTPATIENT
Start: 2022-05-29 | End: 2022-05-30

## 2022-05-29 RX ORDER — DIPHENHYDRAMINE HYDROCHLORIDE 50 MG/ML
12.5 INJECTION INTRAMUSCULAR; INTRAVENOUS EVERY 8 HOURS
Status: DISCONTINUED | OUTPATIENT
Start: 2022-05-29 | End: 2022-05-30 | Stop reason: HOSPADM

## 2022-05-29 RX ORDER — METOCLOPRAMIDE HYDROCHLORIDE 5 MG/ML
10 INJECTION INTRAMUSCULAR; INTRAVENOUS ONCE
Status: COMPLETED | OUTPATIENT
Start: 2022-05-29 | End: 2022-05-29

## 2022-05-29 RX ORDER — METOCLOPRAMIDE HYDROCHLORIDE 5 MG/ML
10 INJECTION INTRAMUSCULAR; INTRAVENOUS EVERY 8 HOURS
Status: DISCONTINUED | OUTPATIENT
Start: 2022-05-29 | End: 2022-05-30 | Stop reason: HOSPADM

## 2022-05-29 RX ORDER — FAMOTIDINE 10 MG/ML
20 INJECTION, SOLUTION INTRAVENOUS
Status: DISCONTINUED | OUTPATIENT
Start: 2022-05-29 | End: 2022-05-30 | Stop reason: HOSPADM

## 2022-05-29 RX ORDER — KETOROLAC TROMETHAMINE 30 MG/ML
15 INJECTION, SOLUTION INTRAMUSCULAR; INTRAVENOUS ONCE
Status: COMPLETED | OUTPATIENT
Start: 2022-05-29 | End: 2022-05-29

## 2022-05-29 RX ORDER — DIPHENHYDRAMINE HYDROCHLORIDE 50 MG/ML
25 INJECTION INTRAMUSCULAR; INTRAVENOUS EVERY 8 HOURS
Status: DISCONTINUED | OUTPATIENT
Start: 2022-05-29 | End: 2022-05-29

## 2022-05-29 RX ADMIN — METOCLOPRAMIDE HYDROCHLORIDE 10 MG: 5 INJECTION INTRAMUSCULAR; INTRAVENOUS at 12:26

## 2022-05-29 RX ADMIN — TRIMETHOBENZAMIDE HYDROCHLORIDE 200 MG: 100 INJECTION INTRAMUSCULAR at 05:18

## 2022-05-29 RX ADMIN — METOCLOPRAMIDE HYDROCHLORIDE 10 MG: 5 INJECTION INTRAMUSCULAR; INTRAVENOUS at 20:41

## 2022-05-29 RX ADMIN — KETOROLAC TROMETHAMINE 15 MG: 30 INJECTION, SOLUTION INTRAMUSCULAR at 08:31

## 2022-05-29 RX ADMIN — PANTOPRAZOLE SODIUM 40 MG: 40 INJECTION, POWDER, FOR SOLUTION INTRAVENOUS at 20:41

## 2022-05-29 RX ADMIN — DIPHENHYDRAMINE HYDROCHLORIDE 12.5 MG: 50 INJECTION, SOLUTION INTRAMUSCULAR; INTRAVENOUS at 19:03

## 2022-05-29 RX ADMIN — KETOROLAC TROMETHAMINE 15 MG: 30 INJECTION, SOLUTION INTRAMUSCULAR at 18:58

## 2022-05-29 RX ADMIN — FAMOTIDINE 20 MG: 10 INJECTION INTRAVENOUS at 22:55

## 2022-05-29 RX ADMIN — PANTOPRAZOLE SODIUM 40 MG: 40 INJECTION, POWDER, FOR SOLUTION INTRAVENOUS at 08:32

## 2022-05-29 RX ADMIN — METOCLOPRAMIDE HYDROCHLORIDE 10 MG: 5 INJECTION INTRAMUSCULAR; INTRAVENOUS at 09:08

## 2022-05-29 NOTE — ED ATTENDING ATTESTATION
5/28/2022  Douglas TRAN DO, saw and evaluated the patient  I have discussed the patient with the resident/non-physician practitioner and agree with the resident's/non-physician practitioner's findings, Plan of Care, and MDM as documented in the resident's/non-physician practitioner's note, except where noted  All available labs and Radiology studies were reviewed  I was present for key portions of any procedure(s) performed by the resident/non-physician practitioner and I was immediately available to provide assistance  At this point I agree with the current assessment done in the Emergency Department  I have conducted an independent evaluation of this patient a history and physical is as follows:     81-year-old female presents for nausea vomiting  Recently found out she was pregnant  Onset was one week ago  Also has low back pain is cute on chronic  She was admitted on the 24th of this month and sign out AMA yesterday because she was not getting better  She had similar symptoms  She has no vaginal bleeding or other associated symptoms  On exam the patient looks miserable, vomiting and dry heaving, states that she has spasm in her low back  She is tachycardic  She however has wet mucous membranes    Plan is labs to rule out metabolic derangement trigger point injections for low back pain reassess for disposition    ED Course         Critical Care Time  Procedures

## 2022-05-29 NOTE — CONSULTS
OBGYN Consultation Note     Reason for Consult: "Pregnancy with intractable nausea and vomiting"    History of Present Illness: 27 yo  5+0 by LMP with nausea and vomiting  She reports discharge from the hospital Friday after 4 day admission for the same  She picked up her home meds and was unable to keep them or any water/food down  She had a dizzy/black out episode and her boyfriend brought her back  She is currently feeling better and tolerating some of the clears on her tray without nausea or vomiting  Denies abdominal cramping or bleeding  No other pregnancy concerns  The patient has a history of gastrointestinal complaints  She has a history of peptic ulcer disease  She has previously seen gastroenterology for issues outside of pregnancy  Prior to her second pregnancy she was scheduled to undergo testing for her GI concerns but this was postponed due to becoming pregnant and she had not yet re-established prior to this pregnancy  She has chronic low back pain and has a medical marijuana card, she reports using marijuana several times a week  The patient was previously seen by OBGYN /  During her  visit she reported a history of two prior term uncomplicated   Her prior pregnancies were complicated by hyperemesis  She had multiple admissions in those pregnancies and required a feeding tube in the second pregnancy  This was not a planned or desired pregnancy  Her and her partner planned for vasectomy for contraception prior to this pregnancy  She is considering termination due to prior difficult pregnancies with hyperemesis  She reports today she is certain she desires termination  She has the [facility] information to schedule  Considering sterilization for herself       PNC: Has not yet established      OB History    Para Term  AB Living   2             SAB IAB Ectopic Multiple Live Births                  # Outcome Date GA Lbr Aftab/2nd Weight Sex Delivery Anes PTL Lv   2 Current            1                 GYN Hx:  History of abnormal pap and LEEP  Last pap abnormal, overdue for follow up    Past Medical History:   Diagnosis Date    Arthritis     Asthma     History of stomach ulcers     Psychiatric disorder     anxiety       Past Surgical History:   Procedure Laterality Date    BREAST SURGERY      COSMETIC SURGERY      EGD      WISDOM TOOTH EXTRACTION         Social History     Socioeconomic History    Marital status: Single     Spouse name: Not on file    Number of children: Not on file    Years of education: Not on file    Highest education level: Not on file   Occupational History    Not on file   Tobacco Use    Smoking status: Never Smoker    Smokeless tobacco: Never Used   Vaping Use    Vaping Use: Never used   Substance and Sexual Activity    Alcohol use: Not Currently    Drug use: Not Currently     Types: Marijuana    Sexual activity: Yes     Partners: Male     Birth control/protection: None   Other Topics Concern    Not on file   Social History Narrative    Not on file     Social Determinants of Health     Financial Resource Strain: Not on file   Food Insecurity: Not on file   Transportation Needs: Not on file   Physical Activity: Not on file   Stress: Not on file   Social Connections: Not on file   Intimate Partner Violence: Not on file   Housing Stability: Not on file       No Known Allergies    Medications Prior to Admission   Medication Sig Dispense Refill Last Dose    acetaminophen (TYLENOL) 325 mg tablet Take 2 tablets (650 mg total) by mouth every 6 (six) hours as needed for mild pain 60 tablet 0 2022 at Unknown time    bisacodyl (DULCOLAX) 10 mg suppository Insert 1 suppository (10 mg total) into the rectum daily for 16 days 16 suppository 0     Diclofenac Sodium (VOLTAREN) 1 % Apply 2 g topically 4 (four) times a day as needed (back pain) for up to 7 days 50 g 0     lidocaine (LIDODERM) 5 % Apply 1 patch topically daily at bedtime Remove & Discard patch within 12 hours or as directed by MD 30 patch 0 5/28/2022 at Unknown time    omeprazole (PriLOSEC) 20 mg delayed release capsule Take 1 capsule (20 mg total) by mouth daily for 14 days 14 capsule 0     diazepam (VALIUM) 2 mg tablet Take 1 tablet (2 mg total) by mouth every 6 (six) hours as needed for muscle spasms for up to 10 days (Patient not taking: Reported on 5/29/2022) 20 tablet 0 Not Taking at Unknown time    gabapentin (NEURONTIN) 100 mg capsule Take 1 capsule (100 mg total) by mouth 3 (three) times a day (Patient not taking: Reported on 5/29/2022) 90 capsule 0 Not Taking at Unknown time    metoclopramide (REGLAN) 5 mg tablet Take 2 tablets (10 mg total) by mouth 4 (four) times a day (Patient not taking: Reported on 5/29/2022) 30 tablet 0 Not Taking at Unknown time    ondansetron (Zofran ODT) 4 mg disintegrating tablet Take 1 tablet (4 mg total) by mouth every 6 (six) hours as needed for nausea or vomiting (Patient not taking: Reported on 5/29/2022) 20 tablet 0 Not Taking at Unknown time       Objective:      Physical Exam:  Vitals:    05/28/22 1800 05/28/22 2155 05/29/22 0014 05/29/22 0635   BP: 122/79 130/82 123/84 124/85   BP Location: Left arm      Pulse: (!) 120 (!) 112 91 101   Resp: 18 18     Temp:   98 4 °F (36 9 °C) 99 4 °F (37 4 °C)   SpO2: 97% 98% 98% 97%     General: well appearing, conversant, sitting up in bed  Abdomen: obese, soft, nontender  Extremities: extremities normal, atraumatic, no cyanosis or edema  Neurologic: grossly intact  Psychiatric: normal mood, behavior, speech, dress, and thought processes      Labs:    Recent Results (from the past 24 hour(s))   CBC and differential    Collection Time: 05/28/22  5:25 PM   Result Value Ref Range    WBC 19 19 (H) 4 31 - 10 16 Thousand/uL    RBC 5 50 (H) 3 81 - 5 12 Million/uL    Hemoglobin 16 1 (H) 11 5 - 15 4 g/dL    Hematocrit 45 9 34 8 - 46 1 %    MCV 84 82 - 98 fL    MCH 29 3 26 8 - 34 3 pg    MCHC 35 1 31 4 - 37 4 g/dL    RDW 12 7 11 6 - 15 1 %    MPV 11 0 8 9 - 12 7 fL    Platelets 947 818 - 202 Thousands/uL    nRBC 0 /100 WBCs    Neutrophils Relative 73 43 - 75 %    Immat GRANS % 0 0 - 2 %    Lymphocytes Relative 20 14 - 44 %    Monocytes Relative 6 4 - 12 %    Eosinophils Relative 1 0 - 6 %    Basophils Relative 0 0 - 1 %    Neutrophils Absolute 13 95 (H) 1 85 - 7 62 Thousands/µL    Immature Grans Absolute 0 06 0 00 - 0 20 Thousand/uL    Lymphocytes Absolute 3 80 0 60 - 4 47 Thousands/µL    Monocytes Absolute 1 19 0 17 - 1 22 Thousand/µL    Eosinophils Absolute 0 13 0 00 - 0 61 Thousand/µL    Basophils Absolute 0 06 0 00 - 0 10 Thousands/µL   Comprehensive metabolic panel    Collection Time: 05/28/22  5:25 PM   Result Value Ref Range    Sodium 134 (L) 136 - 145 mmol/L    Potassium 3 8 3 5 - 5 3 mmol/L    Chloride 101 100 - 108 mmol/L    CO2 23 21 - 32 mmol/L    ANION GAP 10 4 - 13 mmol/L    BUN 12 5 - 25 mg/dL    Creatinine 1 00 0 60 - 1 30 mg/dL    Glucose 114 65 - 140 mg/dL    Calcium 9 7 8 3 - 10 1 mg/dL    AST 21 5 - 45 U/L    ALT 39 12 - 78 U/L    Alkaline Phosphatase 95 46 - 116 U/L    Total Protein 8 6 (H) 6 4 - 8 2 g/dL    Albumin 4 3 3 5 - 5 0 g/dL    Total Bilirubin 1 09 (H) 0 20 - 1 00 mg/dL    eGFR 75 ml/min/1 73sq m   Lipase    Collection Time: 05/28/22  5:25 PM   Result Value Ref Range    Lipase 70 (L) 73 - 393 u/L   TSH, 3rd generation with Free T4 reflex    Collection Time: 05/28/22  5:25 PM   Result Value Ref Range    TSH 3RD GENERATON 0 442 (L) 0 450 - 4 500 uIU/mL   T4, free    Collection Time: 05/28/22  5:25 PM   Result Value Ref Range    Free T4 1 50 (H) 0 76 - 1 46 ng/dL   CBC and differential    Collection Time: 05/29/22  4:57 AM   Result Value Ref Range    WBC 13 61 (H) 4 31 - 10 16 Thousand/uL    RBC 4 59 3 81 - 5 12 Million/uL    Hemoglobin 13 9 11 5 - 15 4 g/dL    Hematocrit 40 0 34 8 - 46 1 %    MCV 87 82 - 98 fL    MCH 30 3 26 8 - 34 3 pg    MCHC 34 8 31 4 - 37 4 g/dL    RDW 12 9 11 6 - 15 1 %    MPV 10 9 8 9 - 12 7 fL    Platelets 972 883 - 577 Thousands/uL    nRBC 0 /100 WBCs    Neutrophils Relative 60 43 - 75 %    Immat GRANS % 1 0 - 2 %    Lymphocytes Relative 29 14 - 44 %    Monocytes Relative 7 4 - 12 %    Eosinophils Relative 2 0 - 6 %    Basophils Relative 1 0 - 1 %    Neutrophils Absolute 8 34 (H) 1 85 - 7 62 Thousands/µL    Immature Grans Absolute 0 07 0 00 - 0 20 Thousand/uL    Lymphocytes Absolute 3 97 0 60 - 4 47 Thousands/µL    Monocytes Absolute 0 94 0 17 - 1 22 Thousand/µL    Eosinophils Absolute 0 22 0 00 - 0 61 Thousand/µL    Basophils Absolute 0 07 0 00 - 0 10 Thousands/µL   Fingerstick Glucose (POCT)    Collection Time: 22  5:49 AM   Result Value Ref Range    POC Glucose 121 65 - 140 mg/dl         Imagin22 Pelvic Ultrasound  FINDINGS:     No intrauterine or ectopic pregnancy is not identified      Endometrial thickness of 14 mm      UTERUS/ADNEXA:   The uterus and ovaries are within normal limits  The cervix remains closed  Physiologic quantity of free fluid      IMPRESSION:     An intrauterine or ectopic pregnancy is not identified  Assessment and Plan  This is a 28 y o   at 5+0 by LMP consultation for nausea and vomiting in pregnancy      Early pregnancy  -quant  44,  104, greater than 48 hours but likely appropriate rise, repeat today pending  -US  without IUP or ectopic, not unexpected given very early gestation and well below the discriminatory zone  -will need repeat US likely week of  based on hormone rise extrapolation  -patient reiterated plan of pregnancy termination due to complications and we reviewed her resources for this, may follow up with us week of  or call [facility]    Nausea and vomiting  - Daily thiamine (100 mg) supplementation along with folic acid supplementation  - Recommendations for treatment of nausea and vomiting in the setting of early pregnancy include   Pyridoxine 25 mg TID, doxylamine 12 5 mg TID (may use diphenhydramine 12 5 IV TID when not tolerating PO   Reglan 10 mg Q8H IV (can transition to oral once able to tolerate)   Promethazine 12 5 mg Q6H IV (can transition to oral or rectal- would send with rectal so she can use when not tolerating other oral meds)   If the above scheduled medications do not relieve symptoms may also add Ondansetron 8 mg Q12H IV (can transition to ODT or oral when able)  - Given her history of PUD daily protonix or BID pepcid are fine to continue  - Baseline QTC and intermittent monitoring should be considered given antiemetic properties  - NPO with IVF is recommended until patient is requesting trial of PO intake, at that time would allow clear liquid or low residue diet based on patient preferences  - Patient counseled on expectations for PO intake and smart dietary choices  - Discussed the use of THC in pregnancy and possible resulting cyclic vomiting as well as unknown safety    History of abnormal pap  - requires outpatient gyn follow up     Dispo: per primary team    Given improvement in symptoms will sign off  Please reach back out to [facility] GYN-Obstetrics role for further concerns or recommendations if symptoms regress       Thuy Putnam MD

## 2022-05-29 NOTE — ASSESSMENT & PLAN NOTE
· HCG - 44 (5/24) -- 104 (5/27)  · Pelvic US on 5/24 - intrauterine or ectopic pregnancy note seen  · OB recommends repeat US in the week of 6/6 as above  · Thiamine, folic acid  · Nausea/vomiting meds per OB recommendations

## 2022-05-29 NOTE — ASSESSMENT & PLAN NOTE
Patient with recurrent history of nausea and vomiting with prior workup  Patient was unable to follow-up with GI due to financial constraints  Zofran ineffective in ED for symptomatic control; started IM Tigan  Started IV Protonix and Clear liquid diet for PO challenge  Ordered UDS to r/o cannabinoid hyperemesis syndrome  GI consulted for further management

## 2022-05-29 NOTE — PLAN OF CARE
She has intractable N/V and right sided back pain, the plan is to control her pain medically and rest and activity as tolerated

## 2022-05-29 NOTE — ASSESSMENT & PLAN NOTE
· Recent admission from 5/24/22 to 5/27/22 with intractable nausea and vomiting  Found to have elevated HCG and nausea and vomiting felt to be due to hyperemesis gravidarum   Left AMA on 5/27/22  · Patient was unable to follow-up with GI due to financial constraints  · Presented to the ED on 5/28 with recurrence of nausea and vomiting since this morning  · Regimen ordered per OB recommendation and last GI recommendation during recent admission  · Patient wants termination of pregnancy - OB plan for repeat US in week of 6/6  based on hormone rise and termination of pregnancy subsequently based on results of US  · Discussed with OB - input appreciated  · Discussed with GI - treatment per OB recommendations

## 2022-05-29 NOTE — ASSESSMENT & PLAN NOTE
Worsening  Unknown etiology and chronic  Unable to perform CT C/A/P due to patient's pregnancy status  Patient denies constitutional symptoms consistent with possible infectious etiology including UTI  Ordered Urinalysis with microscopic assessment  Continue to monitor daily CBC and fever curve

## 2022-05-29 NOTE — ASSESSMENT & PLAN NOTE
· H/o rhizotomies and trigger point injections by her pain specialist  · Pain currently exacerbated in the setting of intractable nausea and vomiting  · Tylenol prn due to early pregnancy

## 2022-05-29 NOTE — UTILIZATION REVIEW
Initial Clinical Review    Admission: Date/Time/Statement:   Admission Orders (From admission, onward)     Ordered        05/28/22 2013  Place in Observation  Once                      Orders Placed This Encounter   Procedures    Place in Observation     Standing Status:   Standing     Number of Occurrences:   1     Order Specific Question:   Level of Care     Answer:   Med Surg [16]     ED Arrival Information     Expected   -    Arrival   5/28/2022 15:16    Acuity   Urgent            Means of arrival   Walk-In    Escorted by   Self    Service   Hospitalist    Admission type   Urgent            Arrival complaint   vomiting           Chief Complaint   Patient presents with    Vomiting     Vomiting in pregnancy     Initial Presentation: 28 y o  female presents to the ED from home with c/o intractable N/V, back pain  [facility] Recently signed out AMA for admission for N/V and hypokalemia  PMH: recurrent nausea and vomiting, hyperemesis gravidarum, PUD, chronic lower back pain, anxiety, and chronic leukocytosis  In the ED, pt had increasing Beta HCG but no signs of IUP on transvaginal US done on previous admission  She was treated with IV fluids, IV antiemetics, Valium, Reglan, Tigan, Protonix  She has leukocytosis  She is admitted to OBSERVATION status with Intractable N/V - IM Tigan, IV fluids, IV Protonix, clear liquid diet, UDS, GI consult  Leukocytosis - UA  Pregnancy - elevated HCG  Date: 5/29:  Continues on IV fluids, using IM Tigan      5/29 OB GYN Consult - [id number] 5+0 by LMP with nausea and vomiting  She reports discharge from the hospital Friday after 4 day admission for the same  Could not keep meds or any water/food down  She had a dizzy/black out episode and her boyfriend brought her back  She is currently feeling better and tolerating some of the clears on her tray without nausea or vomiting  No c/o abd pain  Has h/o GI complaints  Uses Medical [facility] several times a week for chronic LBP    Pt is considering termination as this was unplanned and d/t hyperemesis complication  Had prior feeding tube with previous pregnancy  She is certain she wants termination and considering sterilization for herself        ED Triage Vitals   Temperature Pulse Respirations Blood Pressure SpO2   05/28/22 1523 05/28/22 1523 05/28/22 1523 05/28/22 1523 05/28/22 1523   98 3 °F (36 8 °C) (!) 150 16 127/90 96 %      Temp src Heart Rate Source Patient Position - Orthostatic VS BP Location FiO2 (%)   -- 05/28/22 1730 05/28/22 1730 05/28/22 1730 --    Monitor Sitting Left arm       Pain Score       05/28/22 1523       8          Wt Readings from Last 1 Encounters:   05/24/22 77 1 kg (170 lb)     Additional Vital Signs:   05/29/22 06:35:24 99 4 °F (37 4 °C) 101 -- 124/85 98 97 % -- --   05/29/22 00:14:17 98 4 °F (36 9 °C) 91 -- 123/84 97 98 % -- --   05/28/22 2155 -- 112 Abnormal  18 130/82 -- 98 % None (Room air) --   05/28/22 1800 -- 120 Abnormal  18 122/79 97 97 % None (Room air) Sitting   05/28/22 1730 -- 114 Abnormal  16 131/78 -- 97 % None (Room air) Sitting       Pertinent Labs/Diagnostic Test Results:       Results from last 7 days   Lab Units 05/29/22  0457 05/28/22  1725 05/27/22  0540 05/25/22  1024 05/24/22  0856   WBC Thousand/uL 13 61* 19 19* 12 01* 15 33* 17 47*   HEMOGLOBIN g/dL 13 9 16 1* 14 1 13 7 14 5   HEMATOCRIT % 40 0 45 9 41 3 40 3 41 8   PLATELETS Thousands/uL 231 317 221 230 246   NEUTROS ABS Thousands/µL 8 34* 13 95* 8 67* 12 19* 14 38*         Results from last 7 days   Lab Units 05/28/22  1725 05/27/22  0540 05/26/22  0750 05/26/22  0636 05/25/22  1024 05/24/22  0856   SODIUM mmol/L 134* 135* 136  --  134* 137   POTASSIUM mmol/L 3 8 4 0 3 6  --  2 9* 3 2*   CHLORIDE mmol/L 101 109* 109*  --  104 105   CO2 mmol/L 23 22 23  --  24 25   ANION GAP mmol/L 10 4 4  --  6 7   BUN mg/dL 12 8 9  --  12 11   CREATININE mg/dL 1 00 0 68 0 77  --  0 68 0 87   EGFR ml/min/1 73sq m 75 117 103  --  117 89   CALCIUM mg/dL 9 7 9 2 9 2  --  8 9 9 8   MAGNESIUM mg/dL  --   --   --  2 1  --   --      Results from last 7 days   Lab Units 05/28/22  1725 05/27/22  0540 05/25/22  1024 05/24/22  0856   AST U/L 21 23 16 10   ALT U/L 39 28 21 21   ALK PHOS U/L 95 80 79 96   TOTAL PROTEIN g/dL 8 6* 7 2 7 3 8 1   ALBUMIN g/dL 4 3 3 6 3 8 4 0   TOTAL BILIRUBIN mg/dL 1 09* 0 69 1 75* 1 75*     Results from last 7 days   Lab Units 05/29/22  0549   POC GLUCOSE mg/dl 121     Results from last 7 days   Lab Units 05/28/22  1725 05/27/22  0540 05/26/22  0750 05/25/22  1024 05/24/22  0856   GLUCOSE RANDOM mg/dL 114 98 103 110 116         Results from last 7 days   Lab Units 05/26/22  0636   HEMOGLOBIN A1C % 5 0   EAG mg/dl 97     Results from last 7 days   Lab Units 05/28/22  1725   TSH 3RD GENERATON uIU/mL 0 442*     Results from last 7 days   Lab Units 05/28/22  1725 05/24/22  0856   LIPASE u/L 70* 50*     ED Treatment:   Medication Administration from 05/28/2022 1516 to 05/28/2022 2353    Date/Time Order Dose Route Action   05/28/2022 1724 ondansetron (ZOFRAN) injection 4 mg 4 mg Intravenous Given   05/28/2022 1728 dextrose 5 % and sodium chloride 0 9 % infusion 100 mL/hr Intravenous New Bag   05/28/2022 1727 diazepam (VALIUM) injection 5 mg 5 mg Intravenous Given   05/28/2022 1735 magnesium sulfate 2 g/50 mL IVPB (premix) 2 g 2 g Intravenous New Bag   05/28/2022 1735 lidocaine (PF) (XYLOCAINE-MPF) 1 % injection 10 mL 10 mL Infiltration Given by Other   05/28/2022 1953 sodium chloride 0 9 % bolus 1,000 mL 1,000 mL Intravenous New Bag   05/28/2022 2153 gabapentin (NEURONTIN) capsule 100 mg 100 mg Oral Given   05/28/2022 2153 metoclopramide (REGLAN) tablet 10 mg 10 mg Oral Given   05/28/2022 2244 trimethobenzamide (TIGAN) IM injection 200 mg 200 mg Intramuscular Given   05/28/2022 2242 pantoprazole (PROTONIX) injection 40 mg 40 mg Intravenous Given        Past Medical History:   Diagnosis Date    Arthritis     Asthma     History of stomach ulcers     Psychiatric disorder     anxiety     Present on Admission:   Lumbar spondylolysis   Multiple gastric ulcers   Intractable nausea and vomiting   Leukocytosis      Admitting Diagnosis: Back pain [M54 9]  Vomiting [R11 10]  Nausea and vomiting in pregnancy prior to 22 weeks gestation [O21 9]  Age/Sex: 28 y o  female  Admission Orders:  Scheduled Medications:  metoclopramide, 10 mg, Intravenous, Once  pantoprazole, 40 mg, Intravenous, Q12H [facility]      Continuous IV Infusions:  dextrose 5 % and sodium chloride 0 9 %, 100 mL/hr, Intravenous, Continuous      PRN Meds:  trimethobenzamide, 200 mg, Intramuscular, Q6H PRN - x 1 5/28, 5/29    UDS  HCg  Diet clear liquids    Network Utilization Review Department  ATTENTION: Please call with any questions or concerns to [phone] and carefully listen to the prompts so that you are directed to the right person  All voicemails are confidential   Nelli Saunders all requests for admission clinical reviews, approved or denied determinations and any other requests to dedicated fax number below belonging to the campus where the patient is receiving treatment   List of dedicated fax numbers for the Facilities:  [address] DENIALS (Administrative/Medical Necessity) [phone]   [address] (Maternity/NICU/Pediatrics) [phone]   [address]  [phone]   [address] [address] [place] [address] [address] [facility] [facility] [phone]

## 2022-05-29 NOTE — H&P
[address] 1990, 28 y o  female MRN: [id number]  Unit/Bed#: ED 25 Encounter: [id number]  Primary Care Provider: Pia Corbin DO   Date and time admitted to hospital: 5/28/2022  4:53 PM    * Intractable nausea and vomiting  Assessment & Plan  Patient with recurrent history of nausea and vomiting with prior workup  Patient was unable to follow-up with GI due to financial constraints  Zofran ineffective in ED for symptomatic control; started IM Tigan  Started IV Protonix and Clear liquid diet for PO challenge  Ordered UDS to r/o cannabinoid hyperemesis syndrome  GI consulted for further management    Leukocytosis  Assessment & Plan  Worsening  Unknown etiology and chronic  Unable to perform CT C/A/P due to patient's pregnancy status  Patient denies constitutional symptoms consistent with possible infectious etiology including UTI  Ordered Urinalysis with microscopic assessment  Continue to monitor daily CBC and fever curve    Pregnancy  Assessment & Plan  Elevated hCG quantitative noted in ED      Multiple gastric ulcers  Assessment & Plan  See plan below    Lumbar spondylolysis  Assessment & Plan  Chronic and stable  Continue outpatient management        VTE Pharmacologic Prophylaxis: VTE Score: 1 Low Risk (Score 0-2) - Encourage Ambulation  Code Status: Level 1 - Full Code   Discussion with family: Patient declined call to   Anticipated Length of Stay: Patient will be admitted on an observation basis with an anticipated length of stay of less than 2 midnights secondary to Intractable nausea and vomiting  Total Time for Visit, including Counseling / Coordination of Care: 30 minutes Greater than 50% of this total time spent on direct patient counseling and coordination of care      Chief Complaint:  Nausea and vomiting    History of Present Illness:  Pauline Daniels is a 28 y o  female with a PMH of recurrent nausea and vomiting, hyperemesis gravidarum, PUD, chronic lower back pain, anxiety, and chronic leukocytosis who presents with intractable nausea and vomiting  The patient was in her usual state of health until 1 week ago when she presented to [facility] ED on 05/24 with nausea and vomiting  She was found to be hypokalemic 2 9 and given fluids and antiemetics without resolution of symptoms  Following admission, the patient signed out [facility] on 05/27 since she continue to have lower back pain and vomiting  On the morning of presentation, she was unable to keep down her oral antiemetics and decided to come back to the ED for further management  ED course: Patient is hemodynamically stable on room air and intermittently bradycardic  Significant labs include increased beta hCG which has doubled in value over the past 2 days  Transvaginal ultrasound did not identify an intrauterine pregnancy or ectopic pregnancy with recommendation for obstetric appointment in outpatient setting  She continues to demonstrate vomiting and states that she will terminate this pregnancy since she had similar symptoms with the pregnancy of her 2nd child  Accompanying symptoms include feeling hot and cold following her vomiting episodes  Review of Systems:  Review of Systems   All other systems reviewed and are negative  Past Medical and Surgical History:   Past Medical History:   Diagnosis Date    Arthritis     Asthma     History of stomach ulcers     Psychiatric disorder     anxiety       Past Surgical History:   Procedure Laterality Date    BREAST SURGERY      COSMETIC SURGERY      EGD      WISDOM TOOTH EXTRACTION         Meds/Allergies:  Prior to Admission medications    Medication Sig Start Date End Date Taking?  Authorizing Provider   acetaminophen (TYLENOL) 325 mg tablet Take 2 tablets (650 mg total) by mouth every 6 (six) hours as needed for mild pain 12/5/20  Yes Gaurav Rutherford MD   bisacodyl (DULCOLAX) 10 mg suppository Insert 1 suppository (10 mg total) into the rectum daily for 16 days 1/24/21 5/28/22 Yes TIFFANY Erazo   diazepam (VALIUM) 2 mg tablet Take 1 tablet (2 mg total) by mouth every 6 (six) hours as needed for muscle spasms for up to 10 days 5/27/22 6/6/22 Yes Luz Maria Oliva PA-C   Diclofenac Sodium (VOLTAREN) 1 % Apply 2 g topically 4 (four) times a day as needed (back pain) for up to 7 days 2/19/22 5/28/22 Yes Amalia Tyler MD   gabapentin (NEURONTIN) 100 mg capsule Take 1 capsule (100 mg total) by mouth 3 (three) times a day 5/27/22 6/26/22 Yes Luz Maria Oliva PA-C   lidocaine (LIDODERM) 5 % Apply 1 patch topically daily at bedtime Remove & Discard patch within 12 hours or as directed by MD 5/27/22 6/26/22 Yes Luz Maria Oliva PA-C   metoclopramide (REGLAN) 5 mg tablet Take 2 tablets (10 mg total) by mouth 4 (four) times a day 5/27/22  Yes Luz Maria Oliva PA-C   omeprazole (PriLOSEC) 20 mg delayed release capsule Take 1 capsule (20 mg total) by mouth daily for 14 days 2/19/22 5/28/22 Yes Amalia Tyler MD   ondansetron (Zofran ODT) 4 mg disintegrating tablet Take 1 tablet (4 mg total) by mouth every 6 (six) hours as needed for nausea or vomiting 5/27/22  Yes Luz Maria Oliva PA-C   cyproheptadine hcl 2 MG/5ML oral syrup Take 10 mL (4 mg total) by mouth 3 (three) times a day as needed for allergies  Patient not taking: Reported on 5/24/2022 1/23/21 5/28/22  TIFFANY Erazo   Diclofenac Sodium (VOLTAREN) 1 % Apply 2 g topically 4 (four) times a day  Patient not taking: Reported on 5/24/2022 1/23/21 5/28/22  TIFFANY Erazo   docusate sodium (COLACE) 100 mg capsule Take 1 capsule (100 mg total) by mouth 2 (two) times a day  Patient not taking: Reported on 5/24/2022 1/23/21 5/28/22  TIFFANY Erazo   menthol-methyl salicylate (BENGAY) 85-20 % cream Apply topically 4 (four) times a day as needed (apply to back as needed) 1/23/21 5/28/22  TIFFANY Erazo   ondansetron (Zofran ODT) 4 mg disintegrating tablet Take 1 tablet (4 mg total) by mouth every 6 (six) hours as needed for nausea or vomiting 5/27/22 5/28/22  Luz Maria Oliva PA-C   pantoprazole (PROTONIX) 40 mg tablet Take 1 tablet (40 mg total) by mouth 2 (two) times a day  Patient not taking: No sig reported 1/23/21 5/28/22  TIFFANY Randall   sucralfate (CARAFATE) 1 g tablet Take 1 tablet (1 g total) by mouth 4 (four) times a day for 5 days 2/17/22 5/28/22  Shelbi Lama MD   sucralfate (CARAFATE) 1 g/10 mL suspension Take 10 mL (1 g total) by mouth 2 (two) times a day for 14 days 2/16/22 5/28/22  Garcia Vela DO     I have reviewed home medications using recent Epic encounter  Allergies: No Known Allergies    Social History:  Marital Status: Single   Patient Pre-hospital Living Situation: Apartment, With other family member: Boyfriend and 2 children  Patient Pre-hospital Level of Mobility: walks  Patient Pre-hospital Diet Restrictions: None  Substance Use History:   Social History     Substance and Sexual Activity   Alcohol Use Not Currently     Social History     Tobacco Use   Smoking Status Never Smoker   Smokeless Tobacco Never Used     Social History     Substance and Sexual Activity   Drug Use Not Currently    Types: Marijuana       Family History:  Unknown    Physical Exam:     Vitals:   Blood Pressure: 130/82 (05/28/22 2155)  Pulse: (!) 112 (05/28/22 2155)  Temperature: 98 3 °F (36 8 °C) (05/28/22 1523)  Respirations: 18 (05/28/22 2155)  SpO2: 98 % (05/28/22 2155)    Physical Exam  Vitals and nursing note reviewed  Constitutional:       General: She is not in acute distress  Appearance: She is normal weight  She is ill-appearing  She is not toxic-appearing  Comments: Patient is sitting upright with sick back full of liquid vomitus   HENT:      Head: Normocephalic and atraumatic        Right Ear: External ear normal       Left Ear: External ear normal       Nose: Nose normal       Mouth/Throat:      Mouth: Mucous membranes are moist    Eyes: Extraocular Movements: Extraocular movements intact  Conjunctiva/sclera: Conjunctivae normal    Cardiovascular:      Rate and Rhythm: Regular rhythm  Tachycardia present  Heart sounds: No murmur heard  No friction rub  Pulmonary:      Effort: Pulmonary effort is normal  No respiratory distress  Breath sounds: Normal breath sounds  No wheezing  Abdominal:      General: Bowel sounds are normal  There is no distension  Palpations: Abdomen is soft  Tenderness: There is no abdominal tenderness  Musculoskeletal:         General: No signs of injury  Cervical back: Normal range of motion  Right lower leg: No edema  Left lower leg: No edema  Skin:     General: Skin is warm  Coloration: Skin is not jaundiced  Findings: No bruising or lesion  Neurological:      General: No focal deficit present  Mental Status: She is alert and oriented to person, place, and time  Mental status is at baseline  Psychiatric:         Mood and Affect: Mood normal          Behavior: Behavior normal          Thought Content:  Thought content normal           Additional Data:     Lab Results:  Results from last 7 days   Lab Units 05/28/22  1725   WBC Thousand/uL 19 19*   HEMOGLOBIN g/dL 16 1*   HEMATOCRIT % 45 9   PLATELETS Thousands/uL 317   NEUTROS PCT % 73   LYMPHS PCT % 20   MONOS PCT % 6   EOS PCT % 1     Results from last 7 days   Lab Units 05/28/22  1725   SODIUM mmol/L 134*   POTASSIUM mmol/L 3 8   CHLORIDE mmol/L 101   CO2 mmol/L 23   BUN mg/dL 12   CREATININE mg/dL 1 00   ANION GAP mmol/L 10   CALCIUM mg/dL 9 7   ALBUMIN g/dL 4 3   TOTAL BILIRUBIN mg/dL 1 09*   ALK PHOS U/L 95   ALT U/L 39   AST U/L 21   GLUCOSE RANDOM mg/dL 114             Results from last 7 days   Lab Units 05/26/22  0636   HEMOGLOBIN A1C % 5 0           Imaging: Reviewed radiology reports from this admission including:  CT lumbar spine  No orders to display       EKG and Other Studies Reviewed on Admission:   · EKG: Sinus Bradycardia  HR 59     ** Please Note: This note has been constructed using a voice recognition system   **

## 2022-05-29 NOTE — PROGRESS NOTES
[facility]  Progress Note - Pauline Daniels 1990, 28 y o  female MRN: [id number]  Unit/Bed#: -Ramon Encounter: [id number]  Primary Care Provider: Pia Corbin DO   Date and time admitted to hospital: 5/28/2022  4:53 PM    * Intractable nausea and vomiting  Assessment & Plan  · Recent admission from 5/24/22 to 5/27/22 with intractable nausea and vomiting  Found to have elevated HCG and nausea and vomiting felt to be due to hyperemesis gravidarum  Left AMA on 5/27/22  · Patient was unable to follow-up with GI due to financial constraints  · Presented to the ED on 5/28 with recurrence of nausea and vomiting since this morning  · Regimen ordered per OB recommendation and last GI recommendation during recent admission  · Patient wants termination of pregnancy - OB plan for repeat US in week of 6/6  based on hormone rise and termination of pregnancy subsequently based on results of US  · Discussed with OB - input appreciated  · Discussed with GI - treatment per OB recommendations      Early stage of pregnancy  Assessment & Plan  · HCG - 44 (5/24) -- 104 (5/27)  · Pelvic US on 5/24 - intrauterine or ectopic pregnancy note seen  · OB recommends repeat US in the week of 6/6 as above  · Thiamine, folic acid  · Nausea/vomiting meds per OB recommendations      History of gastric ulcer  Assessment & Plan  · Protonix 40 mg IV q 12h and Famotidine 20 mg hs ordered per last GI recommendation    Leukocytosis  Assessment & Plan  · Likely reactive  · Improved    Back pain  Assessment & Plan  · H/o rhizotomies and trigger point injections by her pain specialist  · Pain currently exacerbated in the setting of intractable nausea and vomiting  · Tylenol prn due to early pregnancy    Diarrhea  Assessment & Plan  · Check C  Diff, enteric panel  · Ct IVF's for hydration    VTE Pharmacologic Prophylaxis: VTE Score: 1 Low Risk (Score 0-2) - Encourage Ambulation      Patient Centered Rounds: Discussed with RN  Discussions with Specialists or Other Care Team Provider: Discussed with OB    Education and Discussions with Family / Patient: Patient declined call to   Time Spent for Care: 20 minutes  More than 50% of total time spent on counseling and coordination of care as described above  Current Length of Stay: 0 day(s)  Current Patient Status: Observation   Certification Statement: needs continued stay for intractable nausea and vomiting    Code Status: Level 1 - Full Code    Subjective: Intermittent vomiting and watery diarrhea  Complains of exacerbation of low back pain  Objective:     Vitals:   Temp (24hrs), Av 8 °F (37 1 °C), Min:98 4 °F (36 9 °C), Max:99 4 °F (37 4 °C)    Temp:  [98 4 °F (36 9 °C)-99 4 °F (37 4 °C)] 98 5 °F (36 9 °C)  HR:  [] 112  Resp:  [18] 18  BP: (123-147)/() 147/101  SpO2:  [97 %-98 %] 98 %     Physical Exam:   Physical Exam  Vitals reviewed  HENT:      Head: Normocephalic  Nose: Nose normal       Mouth/Throat:      Mouth: Mucous membranes are moist    Eyes:      Extraocular Movements: Extraocular movements intact  Cardiovascular:      Rate and Rhythm: Normal rate and regular rhythm  Pulmonary:      Effort: Pulmonary effort is normal  No respiratory distress  Breath sounds: Normal breath sounds  No wheezing  Abdominal:      General: Bowel sounds are normal  There is no distension  Palpations: Abdomen is soft  Tenderness: There is no abdominal tenderness  Musculoskeletal:         General: No swelling  Cervical back: Neck supple  Skin:     General: Skin is warm and dry  Neurological:      General: No focal deficit present  Mental Status: She is alert and oriented to person, place, and time     Psychiatric:         Mood and Affect: Mood normal           Additional Data:     Labs:  Results from last 7 days   Lab Units 22  0457   WBC Thousand/uL 13 61*   HEMOGLOBIN g/dL 13 9 HEMATOCRIT % 40 0   PLATELETS Thousands/uL 231   NEUTROS PCT % 60   LYMPHS PCT % 29   MONOS PCT % 7   EOS PCT % 2     Results from last 7 days   Lab Units 05/28/22  1725   SODIUM mmol/L 134*   POTASSIUM mmol/L 3 8   CHLORIDE mmol/L 101   CO2 mmol/L 23   BUN mg/dL 12   CREATININE mg/dL 1 00   ANION GAP mmol/L 10   CALCIUM mg/dL 9 7   ALBUMIN g/dL 4 3   TOTAL BILIRUBIN mg/dL 1 09*   ALK PHOS U/L 95   ALT U/L 39   AST U/L 21   GLUCOSE RANDOM mg/dL 114         Results from last 7 days   Lab Units 05/29/22  1614 05/29/22  1124 05/29/22  0549   POC GLUCOSE mg/dl 106 159* 121     Results from last 7 days   Lab Units 05/26/22  0636   HEMOGLOBIN A1C % 5 0           Lines/Drains:  Invasive Devices  Report    Peripheral Intravenous Line  Duration           Peripheral IV 05/28/22 Right;Ventral (anterior) Forearm 1 day                  Last 24 Hours Medication List:   Current Facility-Administered Medications   Medication Dose Route Frequency Provider Last Rate    acetaminophen  650 mg Oral Q6H PRN hTeo Manning MD      dextrose 5 % and sodium chloride 0 9 %  100 mL/hr Intravenous Continuous Nancy Mendez  mL/hr (05/28/22 1728)    diphenhydrAMINE  12 5 mg Intravenous Q8H Theo Manning MD      Famotidine (PF)  20 mg Intravenous HS Theo Manning MD      folic acid 1 mg, thiamine 100 mg in 0 9% sodium chloride 100 mL IVPB   Intravenous Daily Theo Manning MD      ketorolac  15 mg Intravenous Q6H PRN Dina Davis MD      metoclopramide  10 mg Intravenous Kamryn Jean MD      pantoprazole  40 mg Intravenous Q12H Gen Bennett MD      trimethobenzamide  200 mg Intramuscular Q6H PRN Nancy Mendez MD          Today, Patient Was Seen By: Theo Manning MD    **Please Note: This note may have been constructed using a voice recognition system  **

## 2022-05-30 VITALS
RESPIRATION RATE: 18 BRPM | DIASTOLIC BLOOD PRESSURE: 98 MMHG | TEMPERATURE: 98.6 F | OXYGEN SATURATION: 94 % | SYSTOLIC BLOOD PRESSURE: 138 MMHG | HEART RATE: 109 BPM

## 2022-05-30 LAB
AMORPH URATE CRY URNS QL MICRO: ABNORMAL
AMPHETAMINES SERPL QL SCN: NEGATIVE
ANION GAP SERPL CALCULATED.3IONS-SCNC: 4 MMOL/L (ref 4–13)
BACTERIA UR QL AUTO: ABNORMAL /HPF
BARBITURATES UR QL: NEGATIVE
BENZODIAZ UR QL: POSITIVE
BILIRUB UR QL STRIP: NEGATIVE
BUN SERPL-MCNC: 5 MG/DL (ref 5–25)
CALCIUM SERPL-MCNC: 8.9 MG/DL (ref 8.3–10.1)
CHLORIDE SERPL-SCNC: 109 MMOL/L (ref 100–108)
CLARITY UR: ABNORMAL
CO2 SERPL-SCNC: 26 MMOL/L (ref 21–32)
COCAINE UR QL: NEGATIVE
COLOR UR: YELLOW
CREAT SERPL-MCNC: 0.69 MG/DL (ref 0.6–1.3)
ERYTHROCYTE [DISTWIDTH] IN BLOOD BY AUTOMATED COUNT: 12.9 % (ref 11.6–15.1)
GFR SERPL CREATININE-BSD FRML MDRD: 117 ML/MIN/1.73SQ M
GLUCOSE SERPL-MCNC: 107 MG/DL (ref 65–140)
GLUCOSE UR STRIP-MCNC: NEGATIVE MG/DL
HCT VFR BLD AUTO: 38.1 % (ref 34.8–46.1)
HGB BLD-MCNC: 13.1 G/DL (ref 11.5–15.4)
HGB UR QL STRIP.AUTO: NEGATIVE
KETONES UR STRIP-MCNC: NEGATIVE MG/DL
LEUKOCYTE ESTERASE UR QL STRIP: NEGATIVE
MAGNESIUM SERPL-MCNC: 2.4 MG/DL (ref 1.6–2.6)
MCH RBC QN AUTO: 29.5 PG (ref 26.8–34.3)
MCHC RBC AUTO-ENTMCNC: 34.4 G/DL (ref 31.4–37.4)
MCV RBC AUTO: 86 FL (ref 82–98)
METHADONE UR QL: NEGATIVE
MUCOUS THREADS UR QL AUTO: ABNORMAL
NITRITE UR QL STRIP: NEGATIVE
NON-SQ EPI CELLS URNS QL MICRO: ABNORMAL /HPF
OPIATES UR QL SCN: NEGATIVE
OXYCODONE+OXYMORPHONE UR QL SCN: NEGATIVE
PCP UR QL: NEGATIVE
PH UR STRIP.AUTO: 7 [PH]
PHOSPHATE SERPL-MCNC: 3.4 MG/DL (ref 2.7–4.5)
PLATELET # BLD AUTO: 214 THOUSANDS/UL (ref 149–390)
PMV BLD AUTO: 10.9 FL (ref 8.9–12.7)
POTASSIUM SERPL-SCNC: 3.1 MMOL/L (ref 3.5–5.3)
PROT UR STRIP-MCNC: NEGATIVE MG/DL
RBC # BLD AUTO: 4.44 MILLION/UL (ref 3.81–5.12)
RBC #/AREA URNS AUTO: ABNORMAL /HPF
SODIUM SERPL-SCNC: 139 MMOL/L (ref 136–145)
SP GR UR STRIP.AUTO: 1.01 (ref 1–1.03)
THC UR QL: POSITIVE
UROBILINOGEN UR STRIP-ACNC: 3 MG/DL
WBC # BLD AUTO: 10.4 THOUSAND/UL (ref 4.31–10.16)
WBC #/AREA URNS AUTO: ABNORMAL /HPF

## 2022-05-30 PROCEDURE — C9113 INJ PANTOPRAZOLE SODIUM, VIA: HCPCS | Performed by: INTERNAL MEDICINE

## 2022-05-30 PROCEDURE — 80048 BASIC METABOLIC PNL TOTAL CA: CPT | Performed by: INTERNAL MEDICINE

## 2022-05-30 PROCEDURE — 81001 URINALYSIS AUTO W/SCOPE: CPT | Performed by: INTERNAL MEDICINE

## 2022-05-30 PROCEDURE — 84100 ASSAY OF PHOSPHORUS: CPT | Performed by: INTERNAL MEDICINE

## 2022-05-30 PROCEDURE — 80307 DRUG TEST PRSMV CHEM ANLYZR: CPT | Performed by: INTERNAL MEDICINE

## 2022-05-30 PROCEDURE — 83735 ASSAY OF MAGNESIUM: CPT | Performed by: INTERNAL MEDICINE

## 2022-05-30 PROCEDURE — 99217 PR OBSERVATION CARE DISCHARGE MANAGEMENT: CPT | Performed by: INTERNAL MEDICINE

## 2022-05-30 PROCEDURE — 85027 COMPLETE CBC AUTOMATED: CPT | Performed by: INTERNAL MEDICINE

## 2022-05-30 RX ORDER — LANOLIN ALCOHOL/MO/W.PET/CERES
100 CREAM (GRAM) TOPICAL DAILY
Qty: 30 TABLET | Refills: 0 | Status: SHIPPED | OUTPATIENT
Start: 2022-05-30

## 2022-05-30 RX ORDER — PANTOPRAZOLE SODIUM 40 MG/1
40 TABLET, DELAYED RELEASE ORAL 2 TIMES DAILY
Qty: 28 TABLET | Refills: 0 | Status: SHIPPED | OUTPATIENT
Start: 2022-05-30 | End: 2022-06-13

## 2022-05-30 RX ORDER — FOLIC ACID 1 MG/1
1 TABLET ORAL DAILY
Qty: 30 TABLET | Refills: 0 | OUTPATIENT
Start: 2022-05-30 | End: 2022-06-03

## 2022-05-30 RX ORDER — METOCLOPRAMIDE 5 MG/1
10 TABLET ORAL EVERY 8 HOURS PRN
Qty: 30 TABLET | Refills: 0
Start: 2022-05-30

## 2022-05-30 RX ORDER — DIPHENHYDRAMINE HYDROCHLORIDE 25 MG/1
25 CAPSULE ORAL 3 TIMES DAILY
Qty: 90 TABLET | Refills: 0 | Status: SHIPPED | OUTPATIENT
Start: 2022-05-30

## 2022-05-30 RX ORDER — FAMOTIDINE 20 MG/1
20 TABLET, FILM COATED ORAL
Qty: 14 TABLET | Refills: 0 | Status: SHIPPED | OUTPATIENT
Start: 2022-05-30 | End: 2022-06-13

## 2022-05-30 RX ADMIN — PANTOPRAZOLE SODIUM 40 MG: 40 INJECTION, POWDER, FOR SOLUTION INTRAVENOUS at 08:22

## 2022-05-30 RX ADMIN — DIPHENHYDRAMINE HYDROCHLORIDE 12.5 MG: 50 INJECTION, SOLUTION INTRAMUSCULAR; INTRAVENOUS at 02:19

## 2022-05-30 RX ADMIN — KETOROLAC TROMETHAMINE 15 MG: 30 INJECTION, SOLUTION INTRAMUSCULAR at 00:38

## 2022-05-30 RX ADMIN — METOCLOPRAMIDE HYDROCHLORIDE 10 MG: 5 INJECTION INTRAMUSCULAR; INTRAVENOUS at 02:19

## 2022-05-30 RX ADMIN — TRIMETHOBENZAMIDE HYDROCHLORIDE 200 MG: 100 INJECTION INTRAMUSCULAR at 00:02

## 2022-05-30 RX ADMIN — DIPHENHYDRAMINE HYDROCHLORIDE 12.5 MG: 50 INJECTION, SOLUTION INTRAMUSCULAR; INTRAVENOUS at 10:50

## 2022-05-30 RX ADMIN — METOCLOPRAMIDE HYDROCHLORIDE 10 MG: 5 INJECTION INTRAMUSCULAR; INTRAVENOUS at 10:50

## 2022-05-30 RX ADMIN — FOLIC ACID: 5 INJECTION, SOLUTION INTRAMUSCULAR; INTRAVENOUS; SUBCUTANEOUS at 08:22

## 2022-05-30 NOTE — NURSING NOTE
Patient stable for discharge home  Instructions reviewed and all questions answered  IV removed and pressure dressing applied

## 2022-05-30 NOTE — UTILIZATION REVIEW
Continued Stay Review    Date: 5/30/22                         Current Patient Class: OBS  Current Level of Care: MED-SURG    HPI:31 y o  female initially admitted OBS on [email or web address] D/T intractable N/V, back pain  Shanna Bush Recently signed out AMA for admission for N/V and hypokalemia  · Assessment/Plan: Intermittent vomiting and watery diarrhea  Complains of exacerbation of low back pain  Patient wants termination of pregnancy - OB plan for repeat US in week of 6/6  Thiamine, folic acid  IVF        Vital Signs:   05/30/22 07:38:36 98 6 °F (37 °C) 109 Abnormal  -- 138/98 111 94 % -- --   05/30/22 0018 -- -- -- -- -- 98 % None (Room air) --   05/29/22 15:41:04 98 5 °F (36 9 °C) 112 Abnormal  -- 147/101 Abnormal  116 98 % -- --   05/29/22 0800 -- -- -- -- -- 97 % -- --   05/29/22 06:35:24 99 4 °F (37 4 °C) 101 -- 124/85 98 97 % -- --         Pertinent Labs/Diagnostic Results:       Results from last 7 days   Lab Units 05/30/22  0623 05/29/22  0457 05/28/22  1725 05/27/22  0540 05/25/22  1024   WBC Thousand/uL 10 40* 13 61* 19 19* 12 01* 15 33*   HEMOGLOBIN g/dL 13 1 13 9 16 1* 14 1 13 7   HEMATOCRIT % 38 1 40 0 45 9 41 3 40 3   PLATELETS Thousands/uL 214 231 317 221 230   NEUTROS ABS Thousands/µL  --  8 34* 13 95* 8 67* 12 19*         Results from last 7 days   Lab Units 05/30/22  0623 05/28/22  1725 05/27/22  0540 05/26/22  0750 05/26/22  0636 05/25/22  1024   SODIUM mmol/L 139 134* 135* 136  --  134*   POTASSIUM mmol/L 3 1* 3 8 4 0 3 6  --  2 9*   CHLORIDE mmol/L 109* 101 109* 109*  --  104   CO2 mmol/L 26 23 22 23  --  24   ANION GAP mmol/L 4 10 4 4  --  6   BUN mg/dL 5 12 8 9  --  12   CREATININE mg/dL 0 69 1 00 0 68 0 77  --  0 68   EGFR ml/min/1 73sq m 117 75 117 103  --  117   CALCIUM mg/dL 8 9 9 7 9 2 9 2  --  8 9   MAGNESIUM mg/dL 2 4  --   --   --  2 1  --    PHOSPHORUS mg/dL 3 4  --   --   --   --   --      Results from last 7 days   Lab Units 05/28/22  1725 05/27/22  0540 05/25/22  1024 05/24/22  0856   AST U/L 21 23 16 10   ALT U/L 39 28 21 21   ALK PHOS U/L 95 80 79 96   TOTAL PROTEIN g/dL 8 6* 7 2 7 3 8 1   ALBUMIN g/dL 4 3 3 6 3 8 4 0   TOTAL BILIRUBIN mg/dL 1 09* 0 69 1 75* 1 75*     Results from last 7 days   Lab Units 05/29/22  1614 05/29/22  1124 05/29/22  0549   POC GLUCOSE mg/dl 106 159* 121     Results from last 7 days   Lab Units 05/30/22  0623 05/28/22  1725 05/27/22  0540 05/26/22  0750 05/25/22  1024 05/24/22  0856   GLUCOSE RANDOM mg/dL 107 114 98 103 110 116         Results from last 7 days   Lab Units 05/26/22  0636   HEMOGLOBIN A1C % 5 0   EAG mg/dl 97       Results from last 7 days   Lab Units 05/28/22  1725   TSH 3RD GENERATON uIU/mL 0 442*       Results from last 7 days   Lab Units 05/28/22  1725 05/24/22  0856   LIPASE u/L 70* 50*       Results from last 7 days   Lab Units 05/30/22  0046   CLARITY UA  Turbid   COLOR UA  Yellow   SPEC GRAV UA  1 010   PH UA  7 0   GLUCOSE UA mg/dl Negative   KETONES UA mg/dl Negative   BLOOD UA  Negative   PROTEIN UA mg/dl Negative   NITRITE UA  Negative   BILIRUBIN UA  Negative   UROBILINOGEN UA (BE) mg/dl 3 0*   LEUKOCYTES UA  Negative   WBC UA /hpf 1-2   RBC UA /hpf None Seen   BACTERIA UA /hpf Moderate*   EPITHELIAL CELLS WET PREP /hpf Occasional   MUCUS THREADS  Occasional*             Results from last 7 days   Lab Units 05/30/22  0046   AMPH/METH  Negative   BARBITURATE UR  Negative   BENZODIAZEPINE UR  Positive*   COCAINE UR  Negative   METHADONE URINE  Negative   OPIATE UR  Negative   PCP UR  Negative   THC UR  Positive*         Medications:   Scheduled Medications:  diphenhydrAMINE, 12 5 mg, Intravenous, Q8H  Famotidine (PF), 20 mg, Intravenous, HS  folic acid 1 mg, thiamine 100 mg in 0 9% sodium chloride 100 mL IVPB, , Intravenous, Daily  metoclopramide, 10 mg, Intravenous, Q8H  pantoprazole, 40 mg, Intravenous, Q12H KACI      Continuous IV Infusions:  dextrose 5 % and sodium chloride 0 9 %, 100 mL/hr, Intravenous, Continuous      PRN Meds:  acetaminophen, 650 mg, Oral, Q6H PRN  ketorolac, 15 mg, Intravenous, Q6H PRN  5/29 x 1, 5/30 x 1  trimethobenzamide, 200 mg, Intramuscular, Q6H PRN 5/29 x 1, 5/30 x 1        Discharge Plan: D    Network Utilization Review Department  ATTENTION: Please call with any questions or concerns to [phone] and carefully listen to the prompts so that you are directed to the right person  All voicemails are confidential   [place] all requests for admission clinical reviews, approved or denied determinations and any other requests to dedicated fax number below belonging to the campus where the patient is receiving treatment   List of dedicated fax numbers for the Facilities:  [address] DENIALS (Administrative/Medical Necessity) [phone]   [address] (Maternity/NICU/Pediatrics) [phone]   [address] [place] [address]  [address] [id number] [place] [address] [address] [facility] [place] [address] [phone]

## 2022-05-30 NOTE — PLAN OF CARE
Problem: PAIN - ADULT  Goal: Verbalizes/displays adequate comfort level or baseline comfort level  Description: Interventions:  - Encourage patient to monitor pain and request assistance  - Assess pain using appropriate pain scale  - Administer analgesics based on type and severity of pain and evaluate response  - Implement non-pharmacological measures as appropriate and evaluate response  - Consider cultural and social influences on pain and pain management  - Notify physician/advanced practitioner if interventions unsuccessful or patient reports new pain  Outcome: Progressing     Problem: INFECTION - ADULT  Goal: Absence or prevention of progression during hospitalization  Description: INTERVENTIONS:  - Assess and monitor for signs and symptoms of infection  - Monitor lab/diagnostic results  - Monitor all insertion sites, i e  indwelling lines, tubes, and drains  - Monitor endotracheal if appropriate and nasal secretions for changes in amount and color  - [place] appropriate cooling/warming therapies per order  - Administer medications as ordered  - Instruct and encourage patient and family to use good hand hygiene technique  - Identify and instruct in appropriate isolation precautions for identified infection/condition  Outcome: Progressing  Goal: Absence of fever/infection during neutropenic period  Description: INTERVENTIONS:  - Monitor WBC    Outcome: Progressing     Problem: SAFETY ADULT  Goal: Patient will remain free of falls  Description: INTERVENTIONS:  - Educate patient/family on patient safety including physical limitations  - Instruct patient to call for assistance with activity   - Consult OT/PT to assist with strengthening/mobility   - Keep Call bell within reach  - Keep bed low and locked with side rails adjusted as appropriate  - Keep care items and personal belongings within reach  - Initiate and maintain comfort rounds  - Make Fall Risk Sign visible to staff  - Offer Toileting every Hours, in advance of need  - Initiate/Maintain   alarm  - Obtain necessary fall risk management equipment:       - Apply yellow socks and bracelet for high fall risk patients  - Consider moving patient to room near nurses station  Outcome: Progressing  Goal: Maintain or return to baseline ADL function  Description: INTERVENTIONS:  -  Assess patient's ability to carry out ADLs; assess patient's baseline for ADL function and identify physical deficits which impact ability to perform ADLs (bathing, care of mouth/teeth, toileting, grooming, dressing, etc )  - Assess/evaluate cause of self-care deficits   - Assess range of motion  - Assess patient's mobility; develop plan if impaired  - Assess patient's need for assistive devices and provide as appropriate  - Encourage maximum independence but intervene and supervise when necessary  - Involve family in performance of ADLs  - Assess for home care needs following discharge   - Consider OT consult to assist with ADL evaluation and planning for discharge  - Provide patient education as appropriate  Outcome: Progressing  Goal: Maintains/Returns to pre admission functional level  Description: INTERVENTIONS:  - Perform BMAT or MOVE assessment daily    - Set and communicate daily mobility goal to care team and patient/family/caregiver  - Collaborate with rehabilitation services on mobility goals if consulted  - Perform Range of Motion    times a day  - Reposition patient every      hours    - Dangle patient        times a day  - Stand patient    times a day  - Ambulate patient      times a day  - Out of bed to chair    times a day   - Out of bed for meals    times a day  - Out of bed for toileting  - Record patient progress and toleration of activity level   Outcome: Progressing     Problem: DISCHARGE PLANNING  Goal: Discharge to home or other facility with appropriate resources  Description: INTERVENTIONS:  - Identify barriers to discharge w/patient and caregiver  - Arrange for needed discharge resources and transportation as appropriate  - Identify discharge learning needs (meds, wound care, etc )  - Arrange for interpretive services to assist at discharge as needed  - Refer to Case Management Department for coordinating discharge planning if the patient needs post-hospital services based on physician/advanced practitioner order or complex needs related to functional status, cognitive ability, or social support system  Outcome: Progressing     Problem: Knowledge Deficit  Goal: Patient/family/caregiver demonstrates understanding of disease process, treatment plan, medications, and discharge instructions  Description: Complete learning assessment and assess knowledge base    Interventions:  - Provide teaching at level of understanding  - Provide teaching via preferred learning methods  Outcome: Progressing     Problem: Potential for Falls  Goal: Patient will remain free of falls  Description: INTERVENTIONS:  - Educate patient/family on patient safety including physical limitations  - Instruct patient to call for assistance with activity   - Consult OT/PT to assist with strengthening/mobility   - Keep Call bell within reach  - Keep bed low and locked with side rails adjusted as appropriate  - Keep care items and personal belongings within reach  - Initiate and maintain comfort rounds  - Make Fall Risk Sign visible to staff  - Offer Toileting every        Hours, in advance of need  - Initiate/Maintain alarm  - Obtain necessary fall risk management equipment:       - Apply yellow socks and bracelet for high fall risk patients  - Consider moving patient to room near nurses station  Outcome: Progressing

## 2022-05-31 LAB
ATRIAL RATE: 85 BPM
P AXIS: 75 DEGREES
PR INTERVAL: 144 MS
QRS AXIS: 31 DEGREES
QRSD INTERVAL: 82 MS
QT INTERVAL: 392 MS
QTC INTERVAL: 466 MS
T WAVE AXIS: 19 DEGREES
VENTRICULAR RATE: 85 BPM

## 2022-05-31 PROCEDURE — 93010 ELECTROCARDIOGRAM REPORT: CPT | Performed by: INTERNAL MEDICINE

## 2022-05-31 NOTE — DISCHARGE SUMMARY
Discharging Physician / Practitioner: Benjamín Stanford MD  PCP: Shaheed Sheppard DO  Admission Date:   Admission Orders (From admission, onward)     Ordered        05/28/22 2013  Place in Observation  Once                      Discharge Date: 05/30/22     Principal discharge diagnosis:  Intractable nausea and vomiting    Secondary diagnoses:  1  Early pregnancy  2  History of gastric ulcer  3  Chronic back pain    Consultations During Hospital Stay:  Magdaleno Prateekgarrett Avalos Course:   Jamir Ca is a 28 y o  female patient who originally presented to the hospital on 5/28/2022 due to intractable nausea and vomiting  She had a recent hospitalization from 5/24/22 to 5/27/22 with similar symptoms and was noted to have a mildly elevated HCG  Her symptoms were felt to be due to hyperemesis gravidarum  She left AMA at that time  On pelvic US done on 5/24/22 no intrauterine or ectopic pregnancy was seen  She had similar symptoms during her last pregnancy requiring a prolonged keofed tube placement  She wanted a termination of this pregnancy and was advised follow-up with OB in the week of 6/6/22  She was eager for discharge after her symptoms were controlled with IV medications recommended by OB and she was discharged per their recommendations on Unisom 12 5 mg TID, B6 25 mg TID and Reglan as needed  She will follow-up with OB in the week of 6/6/22 to determine when termination of pregnancy can be done  She has a history of medical marijuana use  Urine drug screen was positive for THC and benzodiazepines  She had received a prescription for Valium during her last admission  She was advised to avoid marijuana in view of intractable nausea and vomiting        Condition at Discharge: stable    Discharge Day Visit / Exam:   Subjective:    Vitals: Blood Pressure: 138/98 (05/30/22 0738)  Pulse: (!) 109 (05/30/22 0738)  Temperature: 98 6 °F (37 °C) (05/30/22 0738)  Respirations: 18 (05/28/22 2155)  SpO2: 94 % (05/30/22 3573)  Exam:   Physical Exam  Vitals reviewed  HENT:      Head: Normocephalic  Nose: Nose normal       Mouth/Throat:      Mouth: Mucous membranes are moist    Eyes:      Extraocular Movements: Extraocular movements intact  Cardiovascular:      Rate and Rhythm: Normal rate and regular rhythm  Pulmonary:      Effort: Pulmonary effort is normal  No respiratory distress  Breath sounds: Normal breath sounds  No wheezing  Abdominal:      General: Bowel sounds are normal  There is no distension  Palpations: Abdomen is soft  Tenderness: There is no abdominal tenderness  Musculoskeletal:         General: No swelling  Cervical back: Neck supple  Skin:     General: Skin is warm and dry  Neurological:      General: No focal deficit present  Mental Status: She is alert and oriented to person, place, and time  Psychiatric:         Mood and Affect: Mood normal          Behavior: Behavior normal           Discussion with Family: Patient declined call to   Discharge instructions/Information to patient and family:   See after visit summary for information provided to patient and family  Provisions for Follow-Up Care:  See after visit summary for information related to follow-up care and any pertinent home health orders  Disposition:   Home    Planned Readmission: no     Discharge Statement:  I spent 30 minutes discharging the patient  This time was spent on the day of discharge  I had direct contact with the patient on the day of discharge  Greater than 50% of the total time was spent examining patient, answering all patient questions, arranging and discussing plan of care with patient as well as directly providing post-discharge instructions  Additional time then spent on discharge activities  Discharge Medications:  See after visit summary for reconciled discharge medications provided to patient and/or family        **Please Note: This note may have been constructed using a voice recognition system**

## 2022-05-31 NOTE — UTILIZATION REVIEW
Notification of Discharge   This is a Notification of Discharge from our facility [address]  Please be advised that this patient has been discharge from our facility  Below you will find the admission and discharge date and time including the patients disposition  UTILIZATION REVIEW CONTACT:  Mulu Steward  Utilization   Network Utilization Review Department  Phone: [phone] x carefully listen to the prompts  All voicemails are confidential   Email: [email or web address]     PHYSICIAN ADVISORY SERVICES:  FOR [id number] REVIEW - MEDICAL NECESSITY DENIAL  Phone: [phone]  Fax: [phone]  Email: [email or web address]     PRESENTATION DATE: 5/24/2022  8:04 AM  OBERVATION ADMISSION DATE:   INPATIENT ADMISSION DATE: 5/26/22  2:53 PM   DISCHARGE DATE: 5/27/2022  2:47 PM  DISPOSITION: Home/Self Care Home/Self Care      IMPORTANT INFORMATION:  Send all requests for admission clinical reviews, approved or denied determinations and any other requests to dedicated fax number below belonging to the campus where the patient is receiving treatment   List of dedicated fax numbers:  [address] DENIALS (Administrative/Medical Necessity) [phone]   [address] (Maternity/NICU/Pediatrics) [phone]   [facility] [phone]   [address] [facility] [phone]   [address] [place] [address] [phone]   [address] [place] [address] [phone]   [facility]  [phone]   [address] [facility], [facility]  [address] [facility] 1000 [facility] [phone]

## 2022-06-03 ENCOUNTER — HOSPITAL ENCOUNTER (EMERGENCY)
Facility: HOSPITAL | Age: 32
Discharge: HOME | End: 2022-06-03
Attending: EMERGENCY MEDICINE | Admitting: EMERGENCY MEDICINE
Payer: COMMERCIAL

## 2022-06-03 ENCOUNTER — TELEPHONE (OUTPATIENT)
Dept: OBGYN CLINIC | Facility: CLINIC | Age: 32
End: 2022-06-03

## 2022-06-03 ENCOUNTER — APPOINTMENT (EMERGENCY)
Dept: RADIOLOGY | Facility: HOSPITAL | Age: 32
End: 2022-06-03
Payer: COMMERCIAL

## 2022-06-03 VITALS
SYSTOLIC BLOOD PRESSURE: 125 MMHG | RESPIRATION RATE: 18 BRPM | TEMPERATURE: 98.5 F | OXYGEN SATURATION: 97 % | HEART RATE: 84 BPM | DIASTOLIC BLOOD PRESSURE: 73 MMHG

## 2022-06-03 DIAGNOSIS — O03.9 MISCARRIAGE: ICD-10-CM

## 2022-06-03 DIAGNOSIS — O20.0 THREATENED MISCARRIAGE: Primary | ICD-10-CM

## 2022-06-03 DIAGNOSIS — O36.80X0 PREGNANCY, LOCATION UNKNOWN: ICD-10-CM

## 2022-06-03 PROBLEM — Z30.09 CONSULTATION FOR STERILIZATION: Status: ACTIVE | Noted: 2022-06-03

## 2022-06-03 LAB
ABO GROUP BLD: NORMAL
ALBUMIN SERPL BCP-MCNC: 3.9 G/DL (ref 3.5–5)
ALP SERPL-CCNC: 84 U/L (ref 46–116)
ALT SERPL W P-5'-P-CCNC: 28 U/L (ref 12–78)
ANION GAP SERPL CALCULATED.3IONS-SCNC: 7 MMOL/L (ref 4–13)
AST SERPL W P-5'-P-CCNC: 12 U/L (ref 5–45)
B-HCG SERPL-ACNC: 187 MIU/ML
BACTERIA UR QL AUTO: ABNORMAL /HPF
BASOPHILS # BLD AUTO: 0.06 THOUSANDS/ΜL (ref 0–0.1)
BASOPHILS NFR BLD AUTO: 1 % (ref 0–1)
BILIRUB SERPL-MCNC: 0.64 MG/DL (ref 0.2–1)
BILIRUB UR QL STRIP: ABNORMAL
BUN SERPL-MCNC: 8 MG/DL (ref 5–25)
CALCIUM SERPL-MCNC: 9.8 MG/DL (ref 8.3–10.1)
CHLORIDE SERPL-SCNC: 102 MMOL/L (ref 100–108)
CLARITY UR: ABNORMAL
CO2 SERPL-SCNC: 27 MMOL/L (ref 21–32)
COLOR UR: ABNORMAL
CREAT SERPL-MCNC: 0.85 MG/DL (ref 0.6–1.3)
EOSINOPHIL # BLD AUTO: 0.2 THOUSAND/ΜL (ref 0–0.61)
EOSINOPHIL NFR BLD AUTO: 2 % (ref 0–6)
ERYTHROCYTE [DISTWIDTH] IN BLOOD BY AUTOMATED COUNT: 12.7 % (ref 11.6–15.1)
EXT PREG TEST URINE: POSITIVE
EXT. CONTROL ED NAV: ABNORMAL
GFR SERPL CREATININE-BSD FRML MDRD: 92 ML/MIN/1.73SQ M
GLUCOSE SERPL-MCNC: 95 MG/DL (ref 65–140)
GLUCOSE UR STRIP-MCNC: NEGATIVE MG/DL
HCT VFR BLD AUTO: 44.6 % (ref 34.8–46.1)
HGB BLD-MCNC: 15.6 G/DL (ref 11.5–15.4)
HGB UR QL STRIP.AUTO: ABNORMAL
IMM GRANULOCYTES # BLD AUTO: 0.04 THOUSAND/UL (ref 0–0.2)
IMM GRANULOCYTES NFR BLD AUTO: 0 % (ref 0–2)
KETONES UR STRIP-MCNC: ABNORMAL MG/DL
LEUKOCYTE ESTERASE UR QL STRIP: ABNORMAL
LYMPHOCYTES # BLD AUTO: 4.03 THOUSANDS/ΜL (ref 0.6–4.47)
LYMPHOCYTES NFR BLD AUTO: 30 % (ref 14–44)
MCH RBC QN AUTO: 30.2 PG (ref 26.8–34.3)
MCHC RBC AUTO-ENTMCNC: 35 G/DL (ref 31.4–37.4)
MCV RBC AUTO: 86 FL (ref 82–98)
MONOCYTES # BLD AUTO: 0.84 THOUSAND/ΜL (ref 0.17–1.22)
MONOCYTES NFR BLD AUTO: 6 % (ref 4–12)
MUCOUS THREADS UR QL AUTO: ABNORMAL
NEUTROPHILS # BLD AUTO: 8.14 THOUSANDS/ΜL (ref 1.85–7.62)
NEUTS SEG NFR BLD AUTO: 61 % (ref 43–75)
NITRITE UR QL STRIP: NEGATIVE
NON-SQ EPI CELLS URNS QL MICRO: ABNORMAL /HPF
NRBC BLD AUTO-RTO: 0 /100 WBCS
PH UR STRIP.AUTO: 6 [PH] (ref 4.5–8)
PLATELET # BLD AUTO: 263 THOUSANDS/UL (ref 149–390)
PMV BLD AUTO: 10.6 FL (ref 8.9–12.7)
POTASSIUM SERPL-SCNC: 3 MMOL/L (ref 3.5–5.3)
PROT SERPL-MCNC: 7.7 G/DL (ref 6.4–8.2)
PROT UR STRIP-MCNC: ABNORMAL MG/DL
RBC # BLD AUTO: 5.16 MILLION/UL (ref 3.81–5.12)
RBC #/AREA URNS AUTO: ABNORMAL /HPF
RH BLD: POSITIVE
SODIUM SERPL-SCNC: 136 MMOL/L (ref 136–145)
SP GR UR STRIP.AUTO: 1.02 (ref 1–1.03)
UROBILINOGEN UR QL STRIP.AUTO: 2 E.U./DL
WBC # BLD AUTO: 13.31 THOUSAND/UL (ref 4.31–10.16)
WBC #/AREA URNS AUTO: ABNORMAL /HPF

## 2022-06-03 PROCEDURE — 99285 EMERGENCY DEPT VISIT HI MDM: CPT | Performed by: PHYSICIAN ASSISTANT

## 2022-06-03 PROCEDURE — 81001 URINALYSIS AUTO W/SCOPE: CPT

## 2022-06-03 PROCEDURE — 36415 COLL VENOUS BLD VENIPUNCTURE: CPT | Performed by: PHYSICIAN ASSISTANT

## 2022-06-03 PROCEDURE — 85025 COMPLETE CBC W/AUTO DIFF WBC: CPT | Performed by: PHYSICIAN ASSISTANT

## 2022-06-03 PROCEDURE — 86901 BLOOD TYPING SEROLOGIC RH(D): CPT | Performed by: PHYSICIAN ASSISTANT

## 2022-06-03 PROCEDURE — 96360 HYDRATION IV INFUSION INIT: CPT

## 2022-06-03 PROCEDURE — 84702 CHORIONIC GONADOTROPIN TEST: CPT | Performed by: PHYSICIAN ASSISTANT

## 2022-06-03 PROCEDURE — NC001 PR NO CHARGE: Performed by: OBSTETRICS & GYNECOLOGY

## 2022-06-03 PROCEDURE — 80053 COMPREHEN METABOLIC PANEL: CPT | Performed by: PHYSICIAN ASSISTANT

## 2022-06-03 PROCEDURE — 76817 TRANSVAGINAL US OBSTETRIC: CPT

## 2022-06-03 PROCEDURE — 99284 EMERGENCY DEPT VISIT MOD MDM: CPT

## 2022-06-03 PROCEDURE — 76816 OB US FOLLOW-UP PER FETUS: CPT

## 2022-06-03 PROCEDURE — 86900 BLOOD TYPING SEROLOGIC ABO: CPT | Performed by: PHYSICIAN ASSISTANT

## 2022-06-03 PROCEDURE — 81025 URINE PREGNANCY TEST: CPT | Performed by: PHYSICIAN ASSISTANT

## 2022-06-03 PROCEDURE — 96361 HYDRATE IV INFUSION ADD-ON: CPT

## 2022-06-03 RX ORDER — POTASSIUM CHLORIDE 20 MEQ/1
40 TABLET, EXTENDED RELEASE ORAL ONCE
Status: COMPLETED | OUTPATIENT
Start: 2022-06-03 | End: 2022-06-03

## 2022-06-03 RX ADMIN — POTASSIUM CHLORIDE 40 MEQ: 1500 TABLET, EXTENDED RELEASE ORAL at 15:22

## 2022-06-03 RX ADMIN — SODIUM CHLORIDE 1000 ML: 0.9 INJECTION, SOLUTION INTRAVENOUS at 13:23

## 2022-06-03 NOTE — ED PROVIDER NOTES
History  Chief Complaint   Patient presents with    Threatened Miscarriage     Found on 22 I was pregnant, they did US but couldn't see anything yet  Vaginal bleeding started this morning, heavy with clots  Called my OB and they said to come in  Soaking through 3 pads in 1 hour, that has slowed down some but clots are coming out all at once, cramping  Patient presents to the ER for evaluation of vaginal bleeding  The patient states that on 2022, she was told that she was pregnant however nothing was seen on ultrasound at that point in her hCG was very low  States that she did have a repeat in 48 hours which doubled however has not had any labs since then  Patient states that she was admitted twice for vomiting which has subsided and she has not had any vomiting today however states that around 330 this morning she began with vaginal bleeding  States that she was sitting in the bath due to back pain when this happened  Notes associated abdominal cramping  Patient states that she noticed large clots this morning was bleeding very heavily  States that she was initially soaking through around 3 pads an hour however states that that has since slowed down  Notes that she is still passing some smaller clots at this time  Patient states that she is a   Patient denies any headache, dizziness, fevers, difficulty breathing, chest pain, or any other concerning symptoms  Prior to Admission Medications   Prescriptions Last Dose Informant Patient Reported? Taking?    Pyridoxine HCl (vitamin B-6) 25 MG tablet   No No   Sig: Take 1 tablet (25 mg total) by mouth 3 (three) times a day   acetaminophen (TYLENOL) 325 mg tablet   No No   Sig: Take 2 tablets (650 mg total) by mouth every 6 (six) hours as needed for mild pain   doxylamine (UNISOM) 25 MG tablet   No No   Sig: Take 0 5 tablets (12 5 mg total) by mouth 3 (three) times a day for 7 days   famotidine (PEPCID) 20 mg tablet   No No   Sig: Take 1 tablet (20 mg total) by mouth daily at bedtime for 14 days   folic acid (FOLVITE) 1 mg tablet   No No   Sig: Take 1 tablet (1 mg total) by mouth daily   metoclopramide (REGLAN) 5 mg tablet   No No   Sig: Take 2 tablets (10 mg total) by mouth every 8 (eight) hours as needed (Nausea and vomiting)   pantoprazole (PROTONIX) 40 mg tablet   No No   Sig: Take 1 tablet (40 mg total) by mouth 2 (two) times a day for 14 days   thiamine 100 MG tablet   No No   Sig: Take 1 tablet (100 mg total) by mouth daily      Facility-Administered Medications: None       Past Medical History:   Diagnosis Date    Arthritis     Asthma     History of stomach ulcers     Psychiatric disorder     anxiety       Past Surgical History:   Procedure Laterality Date    BREAST SURGERY      COSMETIC SURGERY      EGD      WISDOM TOOTH EXTRACTION         History reviewed  No pertinent family history  I have reviewed and agree with the history as documented  E-Cigarette/Vaping    E-Cigarette Use Never User      E-Cigarette/Vaping Substances    Nicotine No     THC Yes     CBD Yes     Flavoring No     Other No     Unknown No      Social History     Tobacco Use    Smoking status: Never Smoker    Smokeless tobacco: Never Used   Vaping Use    Vaping Use: Never used   Substance Use Topics    Alcohol use: Not Currently    Drug use: Not Currently     Types: Marijuana       Review of Systems   Constitutional: Negative for fever  HENT: Negative for congestion, rhinorrhea and sore throat  Respiratory: Negative for shortness of breath  Cardiovascular: Negative for chest pain  Gastrointestinal: Positive for abdominal pain  Negative for nausea and vomiting  Genitourinary: Positive for vaginal bleeding  Negative for dysuria  Musculoskeletal: Negative for back pain and neck pain  Skin: Negative for rash  Neurological: Negative for weakness, numbness and headaches  All other systems reviewed and are negative        Physical Exam  Physical Exam  Constitutional:       Appearance: She is well-developed  HENT:      Head: Normocephalic and atraumatic  Nose: Nose normal    Eyes:      Conjunctiva/sclera: Conjunctivae normal    Cardiovascular:      Rate and Rhythm: Normal rate  Pulmonary:      Effort: Pulmonary effort is normal    Abdominal:      Palpations: Abdomen is soft  Tenderness: There is abdominal tenderness  There is no guarding  Comments: Mild tenderness to palpation of bilateral lower abdomen  Musculoskeletal:         General: Normal range of motion  Cervical back: Normal range of motion  Skin:     General: Skin is warm  Capillary Refill: Capillary refill takes less than 2 seconds  Neurological:      Mental Status: She is alert and oriented to person, place, and time           Vital Signs  ED Triage Vitals [06/03/22 1141]   Temperature Pulse Respirations Blood Pressure SpO2   98 5 °F (36 9 °C) (!) 112 20 (!) 134/105 98 %      Temp Source Heart Rate Source Patient Position - Orthostatic VS BP Location FiO2 (%)   Temporal Monitor Sitting Left arm --      Pain Score       8           Vitals:    06/03/22 1141 06/03/22 1334 06/03/22 1449   BP: (!) 134/105 118/75 125/73   Pulse: (!) 112 94 84   Patient Position - Orthostatic VS: Sitting Lying Lying         Visual Acuity      ED Medications  Medications   sodium chloride 0 9 % bolus 1,000 mL (0 mL Intravenous Stopped 6/3/22 1522)   potassium chloride (K-DUR,KLOR-CON) CR tablet 40 mEq (40 mEq Oral Given 6/3/22 1522)       Diagnostic Studies  Results Reviewed     Procedure Component Value Units Date/Time    Quantitative hCG [[id number]]  (Abnormal) Collected: 06/03/22 1249    Lab Status: Final result Specimen: Blood from Arm, Left Updated: 06/03/22 1338     HCG, Quant 187 mIU/mL     Narrative:       Expected Ranges:     Approximate               Approximate HCG  Gestation age          Concentration ( mIU/mL)  _____________          ______________________ Weeks                      HCG values  0 2-1                       5-50  1-2                           2-3                         100-5000  3-4                         500-79834  4-5                         1000-57492  5-6                         [id number]  6-8                         [id number]  8-12                        [id number]      Comprehensive metabolic panel [[id number]]  (Abnormal) Collected: 06/03/22 1249    Lab Status: Final result Specimen: Blood from Arm, Left Updated: 06/03/22 1336     Sodium 136 mmol/L      Potassium 3 0 mmol/L      Chloride 102 mmol/L      CO2 27 mmol/L      ANION GAP 7 mmol/L      BUN 8 mg/dL      Creatinine 0 85 mg/dL      Glucose 95 mg/dL      Calcium 9 8 mg/dL      AST 12 U/L      ALT 28 U/L      Alkaline Phosphatase 84 U/L      Total Protein 7 7 g/dL      Albumin 3 9 g/dL      Total Bilirubin 0 64 mg/dL      eGFR 92 ml/min/1 73sq m     Narrative:      National Kidney Disease Foundation guidelines for Chronic Kidney Disease (CKD):     Stage 1 with normal or high GFR (GFR > 90 mL/min/1 73 square meters)    Stage 2 Mild CKD (GFR = 60-89 mL/min/1 73 square meters)    Stage 3A Moderate CKD (GFR = 45-59 mL/min/1 73 square meters)    Stage 3B Moderate CKD (GFR = 30-44 mL/min/1 73 square meters)    Stage 4 Severe CKD (GFR = 15-29 mL/min/1 73 square meters)    Stage 5 End Stage CKD (GFR <15 mL/min/1 73 square meters)  Note: GFR calculation is accurate only with a steady state creatinine    Urine Microscopic [[id number]]  (Abnormal) Collected: 06/03/22 1258    Lab Status: Final result Specimen: Urine, Clean Catch Updated: 06/03/22 1336     RBC, UA Innumerable /hpf      WBC, UA 4-10 /hpf      Epithelial Cells Moderate /hpf      Bacteria, UA None Seen /hpf      MUCUS THREADS Innumerable    POCT pregnancy, urine [[id number]]  (Abnormal) Resulted: 06/03/22 1259    Lab Status: Final result Updated: 06/03/22 1300     EXT PREG TEST UR (Ref: Negative) positive     Control valid    Urine Macroscopic, POC [[id number]]  (Abnormal) Collected: 22 125    Lab Status: Final result Specimen: Urine Updated: 22     Color, UA Red     Clarity, UA Turbid     pH, UA 6 0     Leukocytes, UA Trace     Nitrite, UA Negative     Protein,  (2+) mg/dl      Glucose, UA Negative mg/dl      Ketones, UA 15 (1+) mg/dl      Urobilinogen, UA 2 0 E U /dl      Bilirubin, UA Interference- unable to analyze     Blood, UA Large     Specific Gravity, UA 1 025    Narrative:      CLINITEK RESULT    CBC and differential [[id number]]  (Abnormal) Collected: 22 124    Lab Status: Final result Specimen: Blood from Arm, Left Updated: 22     WBC 13 31 Thousand/uL      RBC 5 16 Million/uL      Hemoglobin 15 6 g/dL      Hematocrit 44 6 %      MCV 86 fL      MCH 30 2 pg      MCHC 35 0 g/dL      RDW 12 7 %      MPV 10 6 fL      Platelets 521 Thousands/uL      nRBC 0 /100 WBCs      Neutrophils Relative 61 %      Immat GRANS % 0 %      Lymphocytes Relative 30 %      Monocytes Relative 6 %      Eosinophils Relative 2 %      Basophils Relative 1 %      Neutrophils Absolute 8 14 Thousands/µL      Immature Grans Absolute 0 04 Thousand/uL      Lymphocytes Absolute 4 03 Thousands/µL      Monocytes Absolute 0 84 Thousand/µL      Eosinophils Absolute 0 20 Thousand/µL      Basophils Absolute 0 06 Thousands/µL     Urine culture [[id number]] Collected: 22    Lab Status: No result Specimen: Urine, Clean Catch                  US OB Follow up with Transvaginal   Final Result by Carissa Hilliard MD ([id number])      No intrauterine gestation or adnexal mass identified  Differential remains early IUP, spontaneous  and ectopic pregnancy  Short-term follow-up with serial beta-hCG and pelvic/OB ultrasound recommended in 2 weeks  The study was marked in EPIC for significant notification              Workstation performed: [id number]                    Procedures  Procedures         ED Course  ED Course as of 22 0734      1228 Blood Pressure(!): 134/105   1228 Temperature: 98 5 °F (36 9 °C)   1228 Pulse(!): 112   1228 Respirations: 20   1228 SpO2: 98 %   1309 PREGNANCY TEST URINE: positive  Patient known to be pregnant   1532 Patient resting comfortably   1537 WBC(!): 13 31   1537 Hemoglobin(!): 15 6   1538 Potassium(!): 3 0  Given 40mEq potassium in ER   1538 WBC, UA(!): 4-10   1609 US OB Follow up with Transvaginal  IMPRESSION:     No intrauterine gestation or adnexal mass identified  Differential remains early IUP, spontaneous  and ectopic pregnancy  Short-term follow-up with serial beta-hCG and pelvic/OB ultrasound recommended in 2 weeks  1612 Rh Factor: Positive   161 OBGYN tiger texted   79 [phone] 20 12 for discharge per OBGYN who saw patient in ED  Follow up HCG in 48 hours, follow up in office in 1 week                                             MDM     Patient well appearing and in no acute distress in the ER  Per patient bleeding has significantly slowed since this morning  Ultrasound does not show an intrauterine gestation  HCG is not trending as we would expect to 2 and a viable pregnancy, suspect likely miscarriage at this time however discussed with patient that her hCG will need to be trended until it returns to normal in ordered to rule out any other etiology  Patient was seen by Misa Fields in the ER who cleared her for discharge  Discussed follow-up in 48 hours for repeat HCG, patient was given outpatient script for this  Discussed follow-up in office within 1 week  Symptomatic treatment and strict return instructions given  Patient in no acute distress throughout ER stay  Vitals stable and reassuring  Patient stable for discharge at this time  Reviewed plan with patient/family  Reviewed red flag symptoms and strict return instructions  Patient/family voiced understanding and agreement to plan    Patient/family had opportunity to ask questions and all questions were answered at bedside  Disposition  Final diagnoses:   Threatened miscarriage   Miscarriage     Time reflects when diagnosis was documented in both MDM as applicable and the Disposition within this note     Time User Action Codes Description Comment    6/3/2022  4:26 PM Shaun Bryan Add [O20 0] Threatened miscarriage     6/3/2022  4:54 PM Shaun Bryan Add [O03 9] Miscarriage     6/3/2022  4:58 PM Gracia Veliz Add [O36 80X0] Pregnancy, location unknown       ED Disposition     ED Disposition   Discharge    Condition   Stable    Date/Time   Fri Kelvin 3, 2022  4:57 PM    Comment   Torrey Pérez discharge to home/self care                 Follow-up Information     Follow up With Specialties Details Why Contact Info Additional [address] [facility] Obstetrics and Gynecology Follow up in 1 week(s) Try to make appt w/ Dr Eduardo Javire, if nothing available anyone is fine and will have access to the ED notes [address] [place] [address], [facility], [address], [place], [facility] 59    [address] [place] [address] 34 [facility] Emergency Department Emergency Medicine  If symptoms worsen [address] [phone]  958 [facility] 64 [facility] Emergency Department, [address], [address], [place], [address]          Discharge Medication List as of 6/3/2022  4:59 PM      CONTINUE these medications which have NOT CHANGED    Details   acetaminophen (TYLENOL) 325 mg tablet Take 2 tablets (650 mg total) by mouth every 6 (six) hours as needed for mild pain, Starting Sat 12/5/2020, Normal      doxylamine (UNISOM) 25 MG tablet Take 0 5 tablets (12 5 mg total) by mouth 3 (three) times a day for 7 days, Starting Mon 5/30/2022, Until Mon 6/6/2022, Normal      famotidine (PEPCID) 20 mg tablet Take 1 tablet (20 mg total) by mouth daily at bedtime for 14 days, Starting Mon 5/30/2022, Until Mon 6/13/2022, Normal      metoclopramide (REGLAN) 5 mg tablet Take 2 tablets (10 mg total) by mouth every 8 (eight) hours as needed (Nausea and vomiting), Starting Mon 5/30/2022, No Print      pantoprazole (PROTONIX) 40 mg tablet Take 1 tablet (40 mg total) by mouth 2 (two) times a day for 14 days, Starting Mon 5/30/2022, Until Mon 6/13/2022, Normal      Pyridoxine HCl (vitamin B-6) 25 MG tablet Take 1 tablet (25 mg total) by mouth 3 (three) times a day, Starting Mon 5/30/2022, Normal      thiamine 100 MG tablet Take 1 tablet (100 mg total) by mouth daily, Starting Mon 5/30/2022, Normal         STOP taking these medications       folic acid (FOLVITE) 1 mg tablet Comments:   Reason for Stopping:               Outpatient Discharge Orders   hCG, quantitative   Standing Status: Future Standing Exp  Date: 06/03/23     hCG, quantitative   Standing Status: Future Standing Exp  Date: 06/03/23     hCG, quantitative   Standing Status: Future Standing Exp   Date: 06/03/23       PDMP Review       Value Time User    PDMP Reviewed  Yes 1/20/2021  2:27 PM Lizzie Vo PA-C          ED Provider  Electronically Signed by           Amos Forte PA-C  06/04/22 3558

## 2022-06-03 NOTE — DISCHARGE INSTRUCTIONS
Return to the ER with any new or worsening of symptoms such as but not limited to increased bleeding, dizziness, shortness of breath, fevers, increased pain, or any other concerning symptoms    Thank you for allowing us to be part of your care today      Get Beta HCG drawn on Sunday 6/5/22 and repeat on 6/10/22

## 2022-06-03 NOTE — CONSULTS
Consultation - Gynecology  Micha Lan 28 y o  female MRN: [id number]  Unit/Bed#: CRB Encounter: [id number]      Inpatient consult to Obstetrics / Gynecology  Consult performed by: Curt Ambrose DO  Consult ordered by: Kia Harvey PA-C            Chief Complaint   Patient presents with    Threatened Miscarriage     Found on 22 I was pregnant, they did US but couldn't see anything yet  Vaginal bleeding started this morning, heavy with clots  Called my OB and they said to come in  Soaking through 3 pads in 1 hour, that has slowed down some but clots are coming out all at once, cramping  Assessment/Plan      * Pregnancy, location unknown  Assessment & Plan  - Counseled on differential of early pregnancy, miscarriage, ectopic  Working dx is miscarriage, as her nausea has resolved and she was passing heavy clots, but will still send beta for 48 hours and then on 1 week to r/o ectopic pregnancy  These lab slips were placed in d/c instructions for her  - If viable pregnancy, follow up with [facility] for therapeutic   - Ectopic precautions given, recommended f/u at [address] [place] [address] ED if pain worsens as that is where we have in house OBGYN Coverage   - F/u in the [employer] office in 1 week for recheck    Consultation for sterilization  Assessment & Plan  Patient understands that BTL is intended for permanent sterilization and is not intended to be a reversible form of birth control  Patient understands that though this procedure is intended to provide permanent sterilization, there is still a chance for failure of the procedure and that she may become pregnant in the future despite BTL    She understands that with BTL there exists an increased risk of ectopic pregnancy were she to become pregnant postprocedure, and that this is considered an abnormal pregnancy and would likely result in miscarriage or termination, and that ectopic pregnancy poses a risk to her maternal health and well-being including a risk of death  Patient understands that there are alternative forms of birth control including abstinence, condoms or other barrier methods, OCPs, depo provera injection, ring, patch, Nexplanon, IUD - all of which are reversible and would allow for attempt of future pregnancy  She has declined these methods of birth control  Patient also declines vasectomy of partner as a method of birth control  Patient's partner said he was going to go for a vasectomy before but didn't and now she highly desires sterilization  MA31 forms not available, but will sign at office visit  History of Present Illness:  Physician Requesting Consult: Radha Francis MD  Reason for Consult / Principal Problem: Pregnancy unknown location  HPI: Gabe Claros is a 28 y o   female who presents with a pregnancy of unknown location  She first had a pelvic US done on 22 and no intrauterine or ectopic pregnancy was seen  She had similar symptoms during her last pregnancy requiring a prolonged keofed tube placement  She was admitted on  for hyperemesis type symptoms  She wanted a termination of this pregnancy and desires surgical sterilization via tubal ligation  Today she woke up with heavy vaginal bleeding, changing a pad 3x in 1 hr with heavy bleeding and clots  She took a bath and saw clots and "things floating in the tub"  Her nausea has started to resolve  Denies lightheadedness, dizziness  All other review of symptoms are negative        Historical Information   Past Medical History:   Diagnosis Date    Arthritis     Asthma     History of stomach ulcers     Psychiatric disorder     anxiety     Past Surgical History:   Procedure Laterality Date    BREAST SURGERY      COSMETIC SURGERY      EGD      WISDOM TOOTH EXTRACTION       OB History    Para Term  AB Living   3 2 2         SAB IAB Ectopic Multiple Live Births                  # Outcome Date GA Lbr Aftab/2nd Weight Sex Delivery Anes PTL Lv   3 Current            2 Term 06/2021     Vag-Spont      1 Term 2015     Vag-Spont        History reviewed  No pertinent family history  Social History   Social History     Substance and Sexual Activity   Alcohol Use Not Currently     Social History     Substance and Sexual Activity   Drug Use Not Currently    Types: Marijuana     Social History     Tobacco Use   Smoking Status Never Smoker   Smokeless Tobacco Never Used     No Known Allergies    Objective   Vitals: Blood pressure 125/73, pulse 84, temperature 98 5 °F (36 9 °C), temperature source Temporal, resp  rate 18, last menstrual period 02/13/2022, SpO2 97 %, unknown if currently breastfeeding  There is no height or weight on file to calculate BMI  Invasive Devices  Report    Peripheral Intravenous Line  Duration           Peripheral IV 06/03/22 Left Forearm <1 day                GEN: The patient was alert and oriented x3, pleasant well-appearing female in no acute distress  CV: Regular rate  PULM: nonlabored respirations  ABD: soft, nontender, no guarding/rebound  Pelvic: Speculum exam shows small amount of dark red blood, small clots cleared with proctoswab, cervix is fingertip dilated     MSK: Normal gait  Skin: warm, dry  Neuro: no focal deficits  Psych: normal affect and judgement, cooperative      Lab Results:   Recent Results (from the past 24 hour(s))   Comprehensive metabolic panel    Collection Time: 06/03/22 12:49 PM   Result Value Ref Range    Sodium 136 136 - 145 mmol/L    Potassium 3 0 (L) 3 5 - 5 3 mmol/L    Chloride 102 100 - 108 mmol/L    CO2 27 21 - 32 mmol/L    ANION GAP 7 4 - 13 mmol/L    BUN 8 5 - 25 mg/dL    Creatinine 0 85 0 60 - 1 30 mg/dL    Glucose 95 65 - 140 mg/dL    Calcium 9 8 8 3 - 10 1 mg/dL    AST 12 5 - 45 U/L    ALT 28 12 - 78 U/L    Alkaline Phosphatase 84 46 - 116 U/L    Total Protein 7 7 6 4 - 8 2 g/dL    Albumin 3 9 3 5 - 5 0 g/dL    Total Bilirubin 0 64 0 20 - 1 00 mg/dL    eGFR 92 ml/min/1 73sq m   CBC and differential    Collection Time: 06/03/22 12:49 PM   Result Value Ref Range    WBC 13 31 (H) 4 31 - 10 16 Thousand/uL    RBC 5 16 (H) 3 81 - 5 12 Million/uL    Hemoglobin 15 6 (H) 11 5 - 15 4 g/dL    Hematocrit 44 6 34 8 - 46 1 %    MCV 86 82 - 98 fL    MCH 30 2 26 8 - 34 3 pg    MCHC 35 0 31 4 - 37 4 g/dL    RDW 12 7 11 6 - 15 1 %    MPV 10 6 8 9 - 12 7 fL    Platelets 300 447 - 188 Thousands/uL    nRBC 0 /100 WBCs    Neutrophils Relative 61 43 - 75 %    Immat GRANS % 0 0 - 2 %    Lymphocytes Relative 30 14 - 44 %    Monocytes Relative 6 4 - 12 %    Eosinophils Relative 2 0 - 6 %    Basophils Relative 1 0 - 1 %    Neutrophils Absolute 8 14 (H) 1 85 - 7 62 Thousands/µL    Immature Grans Absolute 0 04 0 00 - 0 20 Thousand/uL    Lymphocytes Absolute 4 03 0 60 - 4 47 Thousands/µL    Monocytes Absolute 0 84 0 17 - 1 22 Thousand/µL    Eosinophils Absolute 0 20 0 00 - 0 61 Thousand/µL    Basophils Absolute 0 06 0 00 - 0 10 Thousands/µL   Quantitative hCG    Collection Time: 06/03/22 12:49 PM   Result Value Ref Range    HCG, Quant 187 (H) <=6 mIU/mL   ABO/Rh    Collection Time: 06/03/22 12:49 PM   Result Value Ref Range    ABO Grouping A     Rh Factor Positive    Urine Macroscopic, POC    Collection Time: 06/03/22 12:58 PM   Result Value Ref Range    Color, UA Red     Clarity, UA Turbid     pH, UA 6 0 4 5 - 8 0    Leukocytes, UA Trace (A) Negative    Nitrite, UA Negative Negative    Protein,  (2+) (A) Negative mg/dl    Glucose, UA Negative Negative mg/dl    Ketones, UA 15 (1+) (A) Negative mg/dl    Urobilinogen, UA 2 0 (A) 0 2, 1 0 E U /dl E U /dl    Bilirubin, UA Interference- unable to analyze (A) Negative    Blood, UA Large (A) Negative    Specific Gravity, UA 1 025 1 003 - 1 030   Urine Microscopic    Collection Time: 06/03/22 12:58 PM   Result Value Ref Range    RBC, UA Innumerable (A) None Seen, 1-2 /hpf    WBC, UA 4-10 (A) None Seen, 1-2 /hpf    Epithelial Cells Moderate (A) None Seen, Occasional /hpf    Bacteria, UA None Seen None Seen, Occasional /hpf    MUCUS THREADS Innumerable (A) None Seen   POCT pregnancy, urine    Collection Time: 06/03/22 12:59 PM   Result Value Ref Range    EXT PREG TEST UR (Ref: Negative) positive     Control valid        Jerel Ramirez DO  6/3/2022  5:08 PM

## 2022-06-03 NOTE — ASSESSMENT & PLAN NOTE
- Counseled on differential of early pregnancy, miscarriage, ectopic  Working dx is miscarriage, as her nausea has resolved and she was passing heavy clots, but will still send beta for 48 hours and then on 1 week to r/o ectopic pregnancy   These lab slips were placed in d/c instructions for her  - If viable pregnancy, follow up with [facility] for therapeutic   - Ectopic precautions given, recommended f/u at [facility] ED if pain worsens as that is where we have in house OBGYN Coverage   - F/u in the [facility] office in 1 week for recheck

## 2022-06-03 NOTE — ASSESSMENT & PLAN NOTE
Patient understands that BTL is intended for permanent sterilization and is not intended to be a reversible form of birth control  Patient understands that though this procedure is intended to provide permanent sterilization, there is still a chance for failure of the procedure and that she may become pregnant in the future despite BTL  She understands that with BTL there exists an increased risk of ectopic pregnancy were she to become pregnant postprocedure, and that this is considered an abnormal pregnancy and would likely result in miscarriage or termination, and that ectopic pregnancy poses a risk to her maternal health and well-being including a risk of death  Patient understands that there are alternative forms of birth control including abstinence, condoms or other barrier methods, OCPs, depo provera injection, ring, patch, Nexplanon, IUD - all of which are reversible and would allow for attempt of future pregnancy  She has declined these methods of birth control  Patient also declines vasectomy of partner as a method of birth control  Patient's partner said he was going to go for a vasectomy before but didn't and now she highly desires sterilization  MA31 forms not available, but will sign at office visit

## 2022-06-03 NOTE — TELEPHONE ENCOUNTER
Patient complained of taking a hot bath and noticing increased vaginal bleeding in the tub  She stated "there were things floating in the tub"  Patient did not want to proceed with the pregnancy and has an appointment scheduled on 22 for an   Patient agreed to go to the ED due to also experiencing severe cramping and vomiting  She is interested in having a tubal ligation and will call back to schedule visit

## 2022-06-07 ENCOUNTER — TELEPHONE (OUTPATIENT)
Dept: OTHER | Facility: HOSPITAL | Age: 32
End: 2022-06-07

## 2022-06-07 DIAGNOSIS — O03.9 COMPLETE MISCARRIAGE: Primary | ICD-10-CM

## 2022-06-07 NOTE — TELEPHONE ENCOUNTER
Called patient to follow up on need for repeat Hcg level  Patient reports that she had Hcg drawn at an L lab near her house yesterday and she received the results electronically  Per patient, the Hcg level is now 13 (previously 187)  Given Hcg decline and almost complete resolution of symptoms, this is most likely consistent with complete miscarriage  Patient has appointment scheduled for Thursday in office  Will repeat Hcg weekly and trend to zero   Patient to call with any new symptoms  All questions answered

## 2022-08-29 ENCOUNTER — HOSPITAL ENCOUNTER (OUTPATIENT)
Facility: HOSPITAL | Age: 32
Setting detail: OBSERVATION
Discharge: LEFT BEFORE DISCHARGE | End: 2022-08-30
Attending: EMERGENCY MEDICINE | Admitting: INTERNAL MEDICINE
Payer: COMMERCIAL

## 2022-08-29 DIAGNOSIS — O21.0 HYPEREMESIS GRAVIDARUM: Primary | ICD-10-CM

## 2022-08-29 DIAGNOSIS — R11.2 INTRACTABLE NAUSEA AND VOMITING: ICD-10-CM

## 2022-08-29 DIAGNOSIS — Z34.90 EARLY STAGE OF PREGNANCY: ICD-10-CM

## 2022-08-29 PROBLEM — D72.829 LEUKOCYTOSIS: Status: ACTIVE | Noted: 2022-08-29

## 2022-08-29 LAB
ALBUMIN SERPL BCP-MCNC: 4.2 G/DL (ref 3.5–5)
ALP SERPL-CCNC: 85 U/L (ref 46–116)
ALT SERPL W P-5'-P-CCNC: 18 U/L (ref 12–78)
ANION GAP SERPL CALCULATED.3IONS-SCNC: 9 MMOL/L (ref 4–13)
AST SERPL W P-5'-P-CCNC: 7 U/L (ref 5–45)
BACTERIA UR QL AUTO: ABNORMAL /HPF
BASOPHILS # BLD AUTO: 0.03 THOUSANDS/ΜL (ref 0–0.1)
BASOPHILS NFR BLD AUTO: 0 % (ref 0–1)
BILIRUB SERPL-MCNC: 0.56 MG/DL (ref 0.2–1)
BILIRUB UR QL STRIP: NEGATIVE
BUN SERPL-MCNC: 12 MG/DL (ref 5–25)
CALCIUM SERPL-MCNC: 9.8 MG/DL (ref 8.3–10.1)
CHLORIDE SERPL-SCNC: 102 MMOL/L (ref 96–108)
CLARITY UR: CLEAR
CO2 SERPL-SCNC: 24 MMOL/L (ref 21–32)
COLOR UR: ABNORMAL
COLOR, POC: NORMAL
CREAT SERPL-MCNC: 0.69 MG/DL (ref 0.6–1.3)
EOSINOPHIL # BLD AUTO: 0.01 THOUSAND/ΜL (ref 0–0.61)
EOSINOPHIL NFR BLD AUTO: 0 % (ref 0–6)
ERYTHROCYTE [DISTWIDTH] IN BLOOD BY AUTOMATED COUNT: 13.1 % (ref 11.6–15.1)
EXT PREG TEST URINE: POSITIVE
EXT. CONTROL ED NAV: ABNORMAL
GFR SERPL CREATININE-BSD FRML MDRD: 117 ML/MIN/1.73SQ M
GLUCOSE SERPL-MCNC: 116 MG/DL (ref 65–140)
GLUCOSE UR STRIP-MCNC: NEGATIVE MG/DL
HCT VFR BLD AUTO: 41.5 % (ref 34.8–46.1)
HGB BLD-MCNC: 13.9 G/DL (ref 11.5–15.4)
HGB UR QL STRIP.AUTO: ABNORMAL
IMM GRANULOCYTES # BLD AUTO: 0.12 THOUSAND/UL (ref 0–0.2)
IMM GRANULOCYTES NFR BLD AUTO: 1 % (ref 0–2)
KETONES UR STRIP-MCNC: ABNORMAL MG/DL
LEUKOCYTE ESTERASE UR QL STRIP: NEGATIVE
LIPASE SERPL-CCNC: 126 U/L (ref 73–393)
LYMPHOCYTES # BLD AUTO: 1.89 THOUSANDS/ΜL (ref 0.6–4.47)
LYMPHOCYTES NFR BLD AUTO: 9 % (ref 14–44)
MCH RBC QN AUTO: 29.4 PG (ref 26.8–34.3)
MCHC RBC AUTO-ENTMCNC: 33.5 G/DL (ref 31.4–37.4)
MCV RBC AUTO: 88 FL (ref 82–98)
MONOCYTES # BLD AUTO: 0.9 THOUSAND/ΜL (ref 0.17–1.22)
MONOCYTES NFR BLD AUTO: 5 % (ref 4–12)
MUCOUS THREADS UR QL AUTO: ABNORMAL
NEUTROPHILS # BLD AUTO: 17.07 THOUSANDS/ΜL (ref 1.85–7.62)
NEUTS SEG NFR BLD AUTO: 85 % (ref 43–75)
NITRITE UR QL STRIP: NEGATIVE
NON-SQ EPI CELLS URNS QL MICRO: ABNORMAL /HPF
NRBC BLD AUTO-RTO: 0 /100 WBCS
PH UR STRIP.AUTO: 7 [PH] (ref 4.5–8)
PLATELET # BLD AUTO: 239 THOUSANDS/UL (ref 149–390)
PMV BLD AUTO: 12.1 FL (ref 8.9–12.7)
POTASSIUM SERPL-SCNC: 3.4 MMOL/L (ref 3.5–5.3)
PROT SERPL-MCNC: 8.1 G/DL (ref 6.4–8.4)
PROT UR STRIP-MCNC: ABNORMAL MG/DL
RBC # BLD AUTO: 4.73 MILLION/UL (ref 3.81–5.12)
RBC #/AREA URNS AUTO: ABNORMAL /HPF
SODIUM SERPL-SCNC: 135 MMOL/L (ref 135–147)
SP GR UR STRIP.AUTO: 1.02 (ref 1–1.03)
T4 FREE SERPL-MCNC: 1.42 NG/DL (ref 0.76–1.46)
TSH SERPL DL<=0.05 MIU/L-ACNC: 0.35 UIU/ML (ref 0.45–4.5)
UROBILINOGEN UR QL STRIP.AUTO: 0.2 E.U./DL
WBC # BLD AUTO: 20.02 THOUSAND/UL (ref 4.31–10.16)
WBC #/AREA URNS AUTO: ABNORMAL /HPF

## 2022-08-29 PROCEDURE — 84443 ASSAY THYROID STIM HORMONE: CPT | Performed by: INTERNAL MEDICINE

## 2022-08-29 PROCEDURE — 80053 COMPREHEN METABOLIC PANEL: CPT

## 2022-08-29 PROCEDURE — 96375 TX/PRO/DX INJ NEW DRUG ADDON: CPT

## 2022-08-29 PROCEDURE — 99285 EMERGENCY DEPT VISIT HI MDM: CPT | Performed by: EMERGENCY MEDICINE

## 2022-08-29 PROCEDURE — 96365 THER/PROPH/DIAG IV INF INIT: CPT

## 2022-08-29 PROCEDURE — 96366 THER/PROPH/DIAG IV INF ADDON: CPT

## 2022-08-29 PROCEDURE — 99283 EMERGENCY DEPT VISIT LOW MDM: CPT

## 2022-08-29 PROCEDURE — 81025 URINE PREGNANCY TEST: CPT

## 2022-08-29 PROCEDURE — 81001 URINALYSIS AUTO W/SCOPE: CPT

## 2022-08-29 PROCEDURE — 96376 TX/PRO/DX INJ SAME DRUG ADON: CPT

## 2022-08-29 PROCEDURE — 76815 OB US LIMITED FETUS(S): CPT | Performed by: EMERGENCY MEDICINE

## 2022-08-29 PROCEDURE — 84439 ASSAY OF FREE THYROXINE: CPT | Performed by: INTERNAL MEDICINE

## 2022-08-29 PROCEDURE — 36415 COLL VENOUS BLD VENIPUNCTURE: CPT

## 2022-08-29 PROCEDURE — 85025 COMPLETE CBC W/AUTO DIFF WBC: CPT

## 2022-08-29 PROCEDURE — 96361 HYDRATE IV INFUSION ADD-ON: CPT

## 2022-08-29 PROCEDURE — 83690 ASSAY OF LIPASE: CPT

## 2022-08-29 PROCEDURE — 99220 PR INITIAL OBSERVATION CARE/DAY 70 MINUTES: CPT | Performed by: INTERNAL MEDICINE

## 2022-08-29 RX ORDER — METOCLOPRAMIDE HYDROCHLORIDE 5 MG/ML
10 INJECTION INTRAMUSCULAR; INTRAVENOUS EVERY 6 HOURS PRN
Status: DISCONTINUED | OUTPATIENT
Start: 2022-08-29 | End: 2022-08-30 | Stop reason: HOSPADM

## 2022-08-29 RX ORDER — LIDOCAINE HYDROCHLORIDE 20 MG/ML
15 SOLUTION OROPHARYNGEAL ONCE
Status: COMPLETED | OUTPATIENT
Start: 2022-08-29 | End: 2022-08-29

## 2022-08-29 RX ORDER — ONDANSETRON 2 MG/ML
4 INJECTION INTRAMUSCULAR; INTRAVENOUS EVERY 8 HOURS PRN
Status: DISCONTINUED | OUTPATIENT
Start: 2022-08-29 | End: 2022-08-30

## 2022-08-29 RX ORDER — PYRIDOXINE HCL (VITAMIN B6) 50 MG
25 TABLET ORAL 3 TIMES DAILY
Status: DISCONTINUED | OUTPATIENT
Start: 2022-08-29 | End: 2022-08-30 | Stop reason: HOSPADM

## 2022-08-29 RX ORDER — ACETAMINOPHEN 325 MG/1
650 TABLET ORAL EVERY 6 HOURS PRN
Status: DISCONTINUED | OUTPATIENT
Start: 2022-08-29 | End: 2022-08-30 | Stop reason: HOSPADM

## 2022-08-29 RX ORDER — MAGNESIUM HYDROXIDE/ALUMINUM HYDROXICE/SIMETHICONE 120; 1200; 1200 MG/30ML; MG/30ML; MG/30ML
30 SUSPENSION ORAL ONCE
Status: COMPLETED | OUTPATIENT
Start: 2022-08-29 | End: 2022-08-29

## 2022-08-29 RX ORDER — ONDANSETRON 2 MG/ML
4 INJECTION INTRAMUSCULAR; INTRAVENOUS ONCE
Status: COMPLETED | OUTPATIENT
Start: 2022-08-29 | End: 2022-08-29

## 2022-08-29 RX ORDER — FAMOTIDINE 20 MG/1
20 TABLET, FILM COATED ORAL ONCE
Status: DISCONTINUED | OUTPATIENT
Start: 2022-08-29 | End: 2022-08-29

## 2022-08-29 RX ORDER — LIDOCAINE 50 MG/G
1 PATCH TOPICAL ONCE
Status: COMPLETED | OUTPATIENT
Start: 2022-08-29 | End: 2022-08-30

## 2022-08-29 RX ORDER — SIMETHICONE 80 MG
80 TABLET,CHEWABLE ORAL 4 TIMES DAILY PRN
Status: DISCONTINUED | OUTPATIENT
Start: 2022-08-29 | End: 2022-08-30 | Stop reason: HOSPADM

## 2022-08-29 RX ORDER — SUCRALFATE 1 G/1
1 TABLET ORAL ONCE
Status: COMPLETED | OUTPATIENT
Start: 2022-08-29 | End: 2022-08-29

## 2022-08-29 RX ORDER — MAGNESIUM HYDROXIDE/ALUMINUM HYDROXICE/SIMETHICONE 120; 1200; 1200 MG/30ML; MG/30ML; MG/30ML
15 SUSPENSION ORAL EVERY 6 HOURS PRN
Status: DISCONTINUED | OUTPATIENT
Start: 2022-08-29 | End: 2022-08-30 | Stop reason: HOSPADM

## 2022-08-29 RX ORDER — LANOLIN ALCOHOL/MO/W.PET/CERES
100 CREAM (GRAM) TOPICAL DAILY
Status: DISCONTINUED | OUTPATIENT
Start: 2022-08-30 | End: 2022-08-30 | Stop reason: HOSPADM

## 2022-08-29 RX ORDER — METOCLOPRAMIDE HYDROCHLORIDE 5 MG/ML
10 INJECTION INTRAMUSCULAR; INTRAVENOUS ONCE
Status: COMPLETED | OUTPATIENT
Start: 2022-08-29 | End: 2022-08-29

## 2022-08-29 RX ORDER — SUCRALFATE ORAL 1 G/10ML
1000 SUSPENSION ORAL ONCE
Status: DISCONTINUED | OUTPATIENT
Start: 2022-08-29 | End: 2022-08-29 | Stop reason: RX

## 2022-08-29 RX ADMIN — ONDANSETRON 4 MG: 2 INJECTION INTRAMUSCULAR; INTRAVENOUS at 14:16

## 2022-08-29 RX ADMIN — LIDOCAINE HYDROCHLORIDE 15 ML: 20 SOLUTION ORAL; TOPICAL at 16:58

## 2022-08-29 RX ADMIN — SUCRALFATE 1 G: 1 TABLET ORAL at 15:37

## 2022-08-29 RX ADMIN — ALUMINUM HYDROXIDE, MAGNESIUM HYDROXIDE, AND SIMETHICONE 15 ML: 200; 200; 20 SUSPENSION ORAL at 21:46

## 2022-08-29 RX ADMIN — SODIUM CHLORIDE 1000 ML: 0.9 INJECTION, SOLUTION INTRAVENOUS at 14:16

## 2022-08-29 RX ADMIN — METOCLOPRAMIDE HYDROCHLORIDE 10 MG: 5 INJECTION INTRAMUSCULAR; INTRAVENOUS at 19:32

## 2022-08-29 RX ADMIN — PYRIDOXINE HYDROCHLORIDE 125 ML/HR: 100 INJECTION, SOLUTION INTRAMUSCULAR; INTRAVENOUS at 22:28

## 2022-08-29 RX ADMIN — ALUMINUM HYDROXIDE, MAGNESIUM HYDROXIDE, AND SIMETHICONE 30 ML: 200; 200; 20 SUSPENSION ORAL at 16:58

## 2022-08-29 RX ADMIN — ONDANSETRON 4 MG: 2 INJECTION INTRAMUSCULAR; INTRAVENOUS at 16:19

## 2022-08-29 RX ADMIN — DEXTROSE AND SODIUM CHLORIDE 1000 ML: 5; .9 INJECTION, SOLUTION INTRAVENOUS at 17:06

## 2022-08-29 RX ADMIN — LIDOCAINE 5% 1 PATCH: 700 PATCH TOPICAL at 19:32

## 2022-08-29 NOTE — Clinical Note
Case was discussed with SHYANNE and the patient's admission status was agreed to be Admission Status: observation status to the service of Dr Ayaz eRis

## 2022-08-29 NOTE — ED CARE HANDOFF
Emergency Department Sign Out Note        Sign out and transfer of care from Dr Dariela George  See Separate Emergency Department note  The patient, Rolando , was evaluated by the previous provider for hyperemesis gravidarum  Workup Completed:  Lab work    ED Course / Workup Pending (followup):  UA and reassessment following second dose of Zofran  ED Course as of 08/30/22 0135   Mon Aug 29, 2022   1636 SO: Hyperemesis gravidarum; Bedside US with IUP; S/p Zofran x2; Plan for unasom and B6 if no improvement with most recent Zofran dose; Dispo pending third dose of anti-emetics; Pending UA   1643 Ketones, UA(!): 80 (3+)  Will order D5     1645 Leukocytes, UA: Negative   1645 Nitrite, UA: Negative   1713 WBC, UA(!): 2-4   1713 Bacteria, UA: Occasional   1713 Epithelial Cells(!): Moderate  In setting of moderate epis with <5 WBC and negative nitrites, likely dirty sample and will not treat based off this UA  Will attempt to get clean catch from patient  1714 Pt reassessed and reports that she is feeling better but is tired  D5 hanging  Will reassess again after bolus  1841 Pt continuing to vomit  Will order unisom and B6, per sign out  1930 Pt still unable to tolerate PO  Will plan for admission  46 [facility] contacted for admission  Procedures  MDM  Number of Diagnoses or Management Options  Hyperemesis gravidarum  Intractable nausea and vomiting  Diagnosis management comments: Pt is a 33yo F who presents with nausea and vomiting in pregnancy  Urine came back with 3+ ketones  D5 bolus ordered  Patient received 2 doses of Zofran prior to sign-out but patient continued to have vomiting  Per sign-out, Unisom and B6 ordered, however patient unable to tolerate p o   Will order Reglan and plan for admission for intractable nausea and vomiting  Patient agreeable to plan  Patient also reporting back pain that is chronic for her    As patient cannot tolerate oral medications and cannot have an sense at the setting of pregnancy, will give lidocaine patch and reassess  Plan to [facility] pt to [facility] [place]  Pt discussed with admitting team and admission orders placed  Pt admitted without incident  Amount and/or Complexity of Data Reviewed  Clinical lab tests: reviewed            Disposition  Final diagnoses:   Hyperemesis gravidarum   Intractable nausea and vomiting     Time reflects when diagnosis was documented in both MDM as applicable and the Disposition within this note     Time User Action Codes Description Comment    8/29/2022  7:57 PM Thalia Martin [O21 0] Hyperemesis gravidarum     8/29/2022  7:57 PM Cody Mon Add [R11 2] Intractable nausea and vomiting     8/29/2022  9:19 PM Carmaria d Carranza Add [Z34 90] Early stage of pregnancy       ED Disposition     ED Disposition   Admit    Condition   Stable    Date/Time   Mon Aug 29, 2022  8:51 PM    Comment   Case was discussed with SHYANNE and the patient's admission status was agreed to be Admission Status: observation status to the service of Dr Tim Henderson  Follow-up Information    None       Patient's Medications   Discharge Prescriptions    No medications on file     No discharge procedures on file         ED Provider  Electronically Signed by     Hang Johnson MD  08/30/22 3542

## 2022-08-29 NOTE — ED ATTENDING ATTESTATION
8/29/2022  IJerson MD, saw and evaluated the patient  I have discussed the patient with the resident/non-physician practitioner and agree with the resident's/non-physician practitioner's findings, Plan of Care, and MDM as documented in the resident's/non-physician practitioner's note, except where noted  All available labs and Radiology studies were reviewed  I was present for key portions of any procedure(s) performed by the resident/non-physician practitioner and I was immediately available to provide assistance  At this point I agree with the current assessment done in the Emergency Department  I have conducted an independent evaluation of this patient a history and physical is as follows:    ED Course     60-year-old female, history of hyperemesis with previous pregnancy as well as history of intractable nausea and vomiting not associated with pregnancy, presenting to the emergency department for evaluation of a 4 day history of nausea, vomiting, diarrhea  No blood in the vomitus or diarrhea  No fever  No known ill contacts  Patient reports some mild crampy epigastric abdominal discomfort  Ten systems reviewed and negative except as noted  The patient is resting comfortably on a stretcher in no acute respiratory distress  The patient appears nontoxic  HEENT reveals moist mucous membranes  Head is normocephalic and atraumatic  Conjunctiva and sclera are normal  Neck is nontender and supple with full range of motion to flexion, extension, lateral rotation  No meningismus appreciated  No masses are appreciated  Lungs are clear to auscultation bilaterally without any wheezes, rales or rhonchi  Heart is regular rate and rhythm without any murmurs, rubs or gallops  Abdomen is soft and nontender without any rebound or guarding  Extremities appear grossly normal without any significant arthropathy  Patient is awake, alert, and oriented x3  The patient has normal interaction   Motor is 5 out of 5  Labs Reviewed   CBC AND DIFFERENTIAL - Abnormal       Result Value Ref Range Status    WBC 20 02 (*) 4 31 - 10 16 Thousand/uL Final    RBC 4 73  3 81 - 5 12 Million/uL Final    Hemoglobin 13 9  11 5 - 15 4 g/dL Final    Hematocrit 41 5  34 8 - 46 1 % Final    MCV 88  82 - 98 fL Final    MCH 29 4  26 8 - 34 3 pg Final    MCHC 33 5  31 4 - 37 4 g/dL Final    RDW 13 1  11 6 - 15 1 % Final    MPV 12 1  8 9 - 12 7 fL Final    Platelets 158  402 - 390 Thousands/uL Final    nRBC 0  /100 WBCs Final    Neutrophils Relative 85 (*) 43 - 75 % Final    Immat GRANS % 1  0 - 2 % Final    Lymphocytes Relative 9 (*) 14 - 44 % Final    Monocytes Relative 5  4 - 12 % Final    Eosinophils Relative 0  0 - 6 % Final    Basophils Relative 0  0 - 1 % Final    Neutrophils Absolute 17 07 (*) 1 85 - 7 62 Thousands/µL Final    Immature Grans Absolute 0 12  0 00 - 0 20 Thousand/uL Final    Lymphocytes Absolute 1 89  0 60 - 4 47 Thousands/µL Final    Monocytes Absolute 0 90  0 17 - 1 22 Thousand/µL Final    Eosinophils Absolute 0 01  0 00 - 0 61 Thousand/µL Final    Basophils Absolute 0 03  0 00 - 0 10 Thousands/µL Final   COMPREHENSIVE METABOLIC PANEL - Abnormal    Sodium 135  135 - 147 mmol/L Final    Potassium 3 4 (*) 3 5 - 5 3 mmol/L Final    Chloride 102  96 - 108 mmol/L Final    CO2 24  21 - 32 mmol/L Final    ANION GAP 9  4 - 13 mmol/L Final    BUN 12  5 - 25 mg/dL Final    Creatinine 0 69  0 60 - 1 30 mg/dL Final    Comment: Standardized to IDMS reference method    Glucose 116  65 - 140 mg/dL Final    Comment: If the patient is fasting, the ADA then defines impaired fasting glucose as > 100 mg/dL and diabetes as > or equal to 123 mg/dL  Specimen collection should occur prior to Sulfasalazine administration due to the potential for falsely depressed results  Specimen collection should occur prior to Sulfapyridine administration due to the potential for falsely elevated results      Calcium 9 8  8 3 - 10 1 mg/dL Final AST 7  5 - 45 U/L Final    Comment: Specimen collection should occur prior to Sulfasalazine administration due to the potential for falsely depressed results  ALT 18  12 - 78 U/L Final    Comment: Specimen collection should occur prior to Sulfasalazine and/or Sulfapyridine administration due to the potential for falsely depressed results  Alkaline Phosphatase 85  46 - 116 U/L Final    Total Protein 8 1  6 4 - 8 4 g/dL Final    Albumin 4 2  3 5 - 5 0 g/dL Final    Total Bilirubin 0 56  0 20 - 1 00 mg/dL Final    Comment: Use of this assay is not recommended for patients undergoing treatment with eltrombopag due to the potential for falsely elevated results      eGFR 117  ml/min/1 73sq m Final    Narrative:     National Kidney Disease Foundation guidelines for Chronic Kidney Disease (CKD):     Stage 1 with normal or high GFR (GFR > 90 mL/min/1 73 square meters)    Stage 2 Mild CKD (GFR = 60-89 mL/min/1 73 square meters)    Stage 3A Moderate CKD (GFR = 45-59 mL/min/1 73 square meters)    Stage 3B Moderate CKD (GFR = 30-44 mL/min/1 73 square meters)    Stage 4 Severe CKD (GFR = 15-29 mL/min/1 73 square meters)    Stage 5 End Stage CKD (GFR <15 mL/min/1 73 square meters)  Note: GFR calculation is accurate only with a steady state creatinine   URINE MICROSCOPIC - Abnormal    RBC, UA 4-10 (*) None Seen, 1-2 /hpf Final    WBC, UA 2-4 (*) None Seen, 1-2 /hpf Final    Epithelial Cells Moderate (*) None Seen, Occasional /hpf Final    Bacteria, UA Occasional  None Seen, Occasional /hpf Final    MUCUS THREADS Innumerable (*) None Seen Final   TSH, 3RD GENERATION WITH FREE T4 REFLEX - Abnormal    TSH 3RD GENERATON 0 355 (*) 0 450 - 4 500 uIU/mL Final    Comment: The recommended reference ranges for TSH during pregnancy are as follows:   First trimester 0 1 to 2 5 uIU/mL   Second trimester  0 2 to 3 0 uIU/mL   Third trimester 0 3 to 3 0 uIU/m    Note: Normal ranges may not apply to patients who are transgender, non-binary, or whose legal sex, sex at birth, and gender identity differ  Adult TSH (3rd generation) reference range follows the recommended guidelines of the American Thy[employer] Association, January, 2020  Narrative:     Patients undergoing fluorescein dye angiography may retain small amounts of fluorescein in the body for 48-72 hours post procedure  Samples containing fluorescein can produce falsely depressed TSH values  If the patient had this procedure,a specimen should be resubmitted post fluorescein clearance  POCT PREGNANCY, URINE - Abnormal    EXT PREG TEST UR (Ref: Negative) positive   Final    Control valid   Final   URINE MACROSCOPIC, POC - Abnormal    Color, UA Susan   Final    Clarity, UA Clear   Final    pH, UA 7 0  4 5 - 8 0 Final    Leukocytes, UA Negative  Negative Final    Nitrite, UA Negative  Negative Final    Protein,  (2+) (*) Negative mg/dl Final    Glucose, UA Negative  Negative mg/dl Final    Ketones, UA 80 (3+) (*) Negative mg/dl Final    Urobilinogen, UA 0 2  0 2, 1 0 E U /dl E U /dl Final    Bilirubin, UA Negative  Negative Final    Occult Blood, UA Trace (*) Negative Final    Specific Gravity, UA 1 025  1 003 - 1 030 Final    Narrative:     CLINITEK RESULT   COVID19, INFLUENZA A/B, RSV PCR, [facility] - Normal    SARS-CoV-2 Negative  Negative Final    Comment:      INFLUENZA A PCR Negative  Negative Final    Comment:      INFLUENZA B PCR Negative  Negative Final    Comment:      RSV PCR Negative  Negative Final    Comment:      Narrative:     FOR PEDIATRIC PATIENTS - copy/paste COVID Guidelines URL to browser: [email or web address]    SARS-CoV-2 assay is a Nucleic Acid Amplification assay intended for the  qualitative detection of nucleic acid from SARS-CoV-2 in nasopharyngeal  swabs  Results are for the presumptive identification of SARS-CoV-2 RNA      Positive results are indicative of infection with SARS-CoV-2, the virus  causing COVID-19, but do not rule out bacterial infection or co-infection  with other viruses  Laboratories within the [place] and its  territories are required to report all positive results to the appropriate  public health authorities  Negative results do not preclude SARS-CoV-2  infection and should not be used as the sole basis for treatment or other  patient management decisions  Negative results must be combined with  clinical observations, patient history, and epidemiological information  This test has not been FDA cleared or approved  This test has been authorized by FDA under an Emergency Use Authorization  (EUA)  This test is only authorized for the duration of time the  declaration that circumstances exist justifying the authorization of the  emergency use of an in vitro diagnostic tests for detection of SARS-CoV-2  virus and/or diagnosis of COVID-19 infection under section 564(b)(1) of  the Act, 21 U  S C  435PMJ-5(P)(9), unless the authorization is terminated  or revoked sooner  The test has been validated but independent review by FDA  and CLIA is pending  Test performed using [employer]pert: This RT-PCR assay targets N2,  a region unique to SARS-CoV-2  A conserved region in the E-gene was chosen  for pan-Sarbecovirus detection which includes SARS-CoV-2  LIPASE - Normal    Lipase 126  73 - 393 u/L Final   T4, FREE - Normal    Free T4 1 42  0 76 - 1 46 ng/dL Final    Comment: Specimen collection should occur prior to Sulfasalazine administration due to the potential for falsely elevated results     POCT URINALYSIS DIPSTICK - Normal    Color, UA see chart   Final       No orders to display           Critical Care Time  Procedures

## 2022-08-29 NOTE — ED PROVIDER NOTES
History  Chief Complaint   Patient presents with    Vomiting     Pt reports severe vomiting since Friday, hx of hyperemesis with admissions, reports LMP in June, reports unsure if she is pregnant  YRN Worley is a 28 y o  female with PMH significant for hyperemesis gravidarum, miscarriage coming in today with complaint of vomiting  She reports this started on Friday  She reports she is vomiting 3x per hour, non blood non bilious  She has decreased PO intake  She reports this has happened before, previously she has been diagnosed with hyperemesis gravidarum has been pregnant  Reports her last menstrual period was in June, she is sexually active, and does not use contraception  Denies any vaginal bleeding or discharge  She reports some associated diarrhea and epigastric pain, however denies any abdominal surgery history, alcohol use, episodes of syncope, chest pain, shortness of breath, fevers, chills  She reports she has a history of GI problems and was supposed to be workup for Crohn's disease  She denies any systemic symptoms  She reports she feels lightheaded, no alleviating factors  She does not drink alcohol  No other complaints this time       - No language barrier  -PFH/PSH reviewed  - History obtained from patient and chart  Prior to Admission Medications   Prescriptions Last Dose Informant Patient Reported? Taking?    Pyridoxine HCl (vitamin B-6) 25 MG tablet   No No   Sig: Take 1 tablet (25 mg total) by mouth 3 (three) times a day   acetaminophen (TYLENOL) 325 mg tablet   No No   Sig: Take 2 tablets (650 mg total) by mouth every 6 (six) hours as needed for mild pain   doxylamine (UNISOM) 25 MG tablet   No No   Sig: Take 0 5 tablets (12 5 mg total) by mouth 3 (three) times a day for 7 days   famotidine (PEPCID) 20 mg tablet   No No   Sig: Take 1 tablet (20 mg total) by mouth daily at bedtime for 14 days   metoclopramide (REGLAN) 5 mg tablet   No No   Sig: Take 2 tablets (10 mg total) by mouth every 8 (eight) hours as needed (Nausea and vomiting)   pantoprazole (PROTONIX) 40 mg tablet   No No   Sig: Take 1 tablet (40 mg total) by mouth 2 (two) times a day for 14 days   thiamine 100 MG tablet   No No   Sig: Take 1 tablet (100 mg total) by mouth daily      Facility-Administered Medications: None       Past Medical History:   Diagnosis Date    Arthritis     Asthma     History of stomach ulcers     Psychiatric disorder     anxiety       Past Surgical History:   Procedure Laterality Date    BREAST SURGERY      COSMETIC SURGERY      EGD      WISDOM TOOTH EXTRACTION         History reviewed  No pertinent family history  I have reviewed and agree with the history as documented  E-Cigarette/Vaping    E-Cigarette Use Never User      E-Cigarette/Vaping Substances    Nicotine No     THC Yes     CBD Yes     Flavoring No     Other No     Unknown No      Social History     Tobacco Use    Smoking status: Never Smoker    Smokeless tobacco: Never Used   Vaping Use    Vaping Use: Never used   Substance Use Topics    Alcohol use: Not Currently    Drug use: Not Currently     Types: Marijuana        Review of Systems   Constitutional: Negative for chills and fever  HENT: Negative for sore throat  Eyes: Negative for pain and visual disturbance  Respiratory: Negative for cough and shortness of breath  Cardiovascular: Negative for chest pain and palpitations  Gastrointestinal: Positive for nausea and vomiting  Negative for abdominal pain  Genitourinary: Negative for dysuria and hematuria  Musculoskeletal: Negative for back pain  Skin: Negative for rash  Neurological: Negative for syncope  All other systems reviewed and are negative        Physical Exam  ED Triage Vitals   Temperature Pulse Respirations Blood Pressure SpO2   08/29/22 1248 08/29/22 1248 08/29/22 1248 08/29/22 1248 08/29/22 1248   97 6 °F (36 4 °C) (!) 112 22 139/97 98 %      Temp src Heart Rate Source Patient Position - Orthostatic VS BP Location FiO2 (%)   -- 08/29/22 1539 08/29/22 1539 08/29/22 1539 --    Monitor Lying Left arm       Pain Score       08/29/22 1248       7             Orthostatic Vital Signs  Vitals:    08/29/22 2148 08/30/22 0046 08/30/22 0323 08/30/22 0657   BP: 135/78 110/63 114/74 124/81   Pulse: 74 78 82 85   Patient Position - Orthostatic VS: Sitting Lying Lying Lying       Physical Exam  Vitals and nursing note reviewed  Constitutional:       General: She is not in acute distress  Appearance: She is well-developed  HENT:      Head: Normocephalic and atraumatic  Eyes:      Conjunctiva/sclera: Conjunctivae normal    Cardiovascular:      Rate and Rhythm: Normal rate and regular rhythm  Heart sounds: No murmur heard  Pulmonary:      Effort: Pulmonary effort is normal  No respiratory distress  Breath sounds: Normal breath sounds  Abdominal:      Palpations: Abdomen is soft  Tenderness: There is abdominal tenderness  There is no guarding or rebound  Musculoskeletal:      Cervical back: Neck supple  Skin:     General: Skin is warm and dry  Neurological:      General: No focal deficit present  Mental Status: She is alert     Psychiatric:         Mood and Affect: Mood normal          ED Medications  Medications   dextrose 5 % and sodium chloride 0 45 % 1,000 mL with thiamine 169 mg, folic acid 1 mg, pyridoxine 25 mg infusion ( Intravenous Canceled Entry 8/30/22 0633)   sodium chloride 0 9 % bolus 1,000 mL (0 mL Intravenous Stopped 8/29/22 1706)   ondansetron (ZOFRAN) injection 4 mg (4 mg Intravenous Given 8/29/22 1416)   Lidocaine Viscous HCl (XYLOCAINE) 2 % mucosal solution 15 mL (15 mL Swish & Spit Given 8/29/22 1658)   sucralfate (CARAFATE) tablet 1 g (1 g Oral Given 8/29/22 1537)   aluminum-magnesium hydroxide-simethicone (MYLANTA) oral suspension 30 mL (30 mL Oral Given 8/29/22 1658)   ondansetron (ZOFRAN) injection 4 mg (4 mg Intravenous Given 8/29/22 1619) dextrose 5 % and sodium chloride 0 9 % bolus 1,000 mL (0 mL Intravenous Stopped 8/29/22 1932)   metoclopramide (REGLAN) injection 10 mg (10 mg Intravenous Given 8/29/22 1932)   lidocaine (LIDODERM) 5 % patch 1 patch (1 patch Topical Patch Removed 8/30/22 9455)       Diagnostic Studies  Results Reviewed     Procedure Component Value Units Date/Time    COVID/FLU/RSV [[id number]]  (Normal) Collected: 08/30/22 0750    Lab Status: Final result Specimen: Nares from Nose Updated: 08/30/22 0844     SARS-CoV-2 Negative     INFLUENZA A PCR Negative     INFLUENZA B PCR Negative     RSV PCR Negative    Narrative:      FOR PEDIATRIC PATIENTS - copy/paste COVID Guidelines URL to browser: [email or web address]  [employer]    SARS-CoV-2 assay is a Nucleic Acid Amplification assay intended for the  qualitative detection of nucleic acid from SARS-CoV-2 in nasopharyngeal  swabs  Results are for the presumptive identification of SARS-CoV-2 RNA  Positive results are indicative of infection with SARS-CoV-2, the virus  causing COVID-19, but do not rule out bacterial infection or co-infection  with other viruses  Laboratories within the [place] and its  territories are required to report all positive results to the appropriate  public health authorities  Negative results do not preclude SARS-CoV-2  infection and should not be used as the sole basis for treatment or other  patient management decisions  Negative results must be combined with  clinical observations, patient history, and epidemiological information  This test has not been FDA cleared or approved  This test has been authorized by FDA under an Emergency Use Authorization  (EUA)   This test is only authorized for the duration of time the  declaration that circumstances exist justifying the authorization of the  emergency use of an in vitro diagnostic tests for detection of SARS-CoV-2  virus and/or diagnosis of COVID-19 infection under section 564(b)(1) of  the Act, 21 U  S C  106QCB-4(L)(5), unless the authorization is terminated  or revoked sooner  The test has been validated but independent review by FDA  and CLIA is pending  Test performed using [employer] GeneXpert: This RT-PCR assay targets N2,  a region unique to SARS-CoV-2  A conserved region in the E-gene was chosen  for pan-Sarbecovirus detection which includes SARS-CoV-2  T4, free [id number]  (Normal) Collected: 08/29/22 1416    Lab Status: Final result Specimen: Blood from Arm, Right Updated: 08/29/22 2321     Free T4 1 42 ng/dL     TSH, 3rd generation with Free T4 reflex [[id number]]  (Abnormal) Collected: 08/29/22 1416    Lab Status: Final result Specimen: Blood from Arm, Right Updated: 08/29/22 2305     TSH 3RD GENERATON 0 355 uIU/mL     Narrative:      Patients undergoing fluorescein dye angiography may retain small amounts of fluorescein in the body for 48-72 hours post procedure  Samples containing fluorescein can produce falsely depressed TSH values  If the patient had this procedure,a specimen should be resubmitted post fluorescein clearance        Urine Microscopic [[id number]]  (Abnormal) Collected: 08/29/22 1641    Lab Status: Final result Specimen: Urine, Clean Catch Updated: 08/29/22 1711     RBC, UA 4-10 /hpf      WBC, UA 2-4 /hpf      Epithelial Cells Moderate /hpf      Bacteria, UA Occasional /hpf      MUCUS THREADS Innumerable    POCT urinalysis dipstick [[id number]]  (Normal) Resulted: 08/29/22 1643    Lab Status: Final result Specimen: Urine Updated: 08/29/22 1705     Color, UA see chart    Urine Macroscopic, POC [[id number]]  (Abnormal) Collected: 08/29/22 1641    Lab Status: Final result Specimen: Urine Updated: 08/29/22 1643     Color, UA Susan     Clarity, UA Clear     pH, UA 7 0     Leukocytes, UA Negative     Nitrite, UA Negative     Protein,  (2+) mg/dl      Glucose, UA Negative mg/dl      Ketones, UA 80 (3+) mg/dl      Urobilinogen, UA 0 2 E U /dl      Bilirubin, UA Negative     Occult Blood, UA Trace     Specific [place], UA 1 025    Narrative:      CLINITEK RESULT    POCT pregnancy, urine [[id number]]  (Abnormal) Resulted: 08/29/22 1539    Lab Status: Final result Updated: 08/29/22 1539     EXT PREG TEST UR (Ref: Negative) positive     Control valid    Comprehensive metabolic panel [[id number]]  (Abnormal) Collected: 08/29/22 1416    Lab Status: Final result Specimen: Blood from Arm, Right Updated: 08/29/22 1454     Sodium 135 mmol/L      Potassium 3 4 mmol/L      Chloride 102 mmol/L      CO2 24 mmol/L      ANION GAP 9 mmol/L      BUN 12 mg/dL      Creatinine 0 69 mg/dL      Glucose 116 mg/dL      Calcium 9 8 mg/dL      AST 7 U/L      ALT 18 U/L      Alkaline Phosphatase 85 U/L      Total Protein 8 1 g/dL      Albumin 4 2 g/dL      Total Bilirubin 0 56 mg/dL      eGFR 117 ml/min/1 73sq m     Narrative:      [place] guidelines for Chronic Kidney Disease (CKD):     Stage 1 with normal or high GFR (GFR > 90 mL/min/1 73 square meters)    Stage 2 Mild CKD (GFR = 60-89 mL/min/1 73 square meters)    Stage 3A Moderate CKD (GFR = 45-59 mL/min/1 73 square meters)    Stage 3B Moderate CKD (GFR = 30-44 mL/min/1 73 square meters)    Stage 4 Severe CKD (GFR = 15-29 mL/min/1 73 square meters)    Stage 5 End Stage CKD (GFR <15 mL/min/1 73 square meters)  Note: GFR calculation is accurate only with a steady state creatinine    Lipase [[id number]]  (Normal) Collected: 08/29/22 1416    Lab Status: Final result Specimen: Blood from Arm, Right Updated: 08/29/22 1454     Lipase 126 u/L     CBC and differential [[id number]]  (Abnormal) Collected: 08/29/22 1416    Lab Status: Final result Specimen: Blood from Arm, Right Updated: 08/29/22 1436     WBC 20 02 Thousand/uL      RBC 4 73 Million/uL      Hemoglobin 13 9 g/dL      Hematocrit 41 5 %      MCV 88 fL      MCH 29 4 pg      MCHC 33 5 g/dL      RDW 13 1 %      MPV 12 1 fL      Platelets 504 Thousands/uL      nRBC 0 /100 WBCs      Neutrophils Relative 85 %      Immat GRANS % 1 %      Lymphocytes Relative 9 %      Monocytes Relative 5 %      Eosinophils Relative 0 %      Basophils Relative 0 %      Neutrophils Absolute 17 07 Thousands/µL      Immature Grans Absolute 0 12 Thousand/uL      Lymphocytes Absolute 1 89 Thousands/µL      Monocytes Absolute 0 90 Thousand/µL      Eosinophils Absolute 0 01 Thousand/µL      Basophils Absolute 0 03 Thousands/µL                  No orders to display         Procedures  POC Pelvic US    Date/Time: 8/29/2022 4:06 PM  Performed by: Dmitri Thompson MD  Authorized by: Dmitri Thompson MD     Other Assisting Provider: Yes (comment) Karrie Cid    Procedure details:     Exam Type:  Diagnostic    Indications: evaluate for IUP      Assessment for: evaluate fetal viability      Technique:  Transabdominal obstetric (HCG+) exam    Image quality: diagnostic      Image availability:  Images available in PACS  Uterine findings:     Endometrial stripe: identified      Intrauterine pregnancy: identified      Single gestation: identified      Fetal heart rate: identified      Fetal heart rate (bpm):  130  Other findings:     Free pelvic fluid: not identified      Free peritoneal fluid: not identified    Interpretation:     Ultrasound impressions: normal      Pregnancy findings: intrauterine pregnancy (IUP)            ED Course  ED Course as of 08/30/22 1252   Mon Aug 29, 2022   1441 WBC(!): 20 02   1541 PREGNANCY TEST URINE: positive   1606 Bedside US showed fetal movement, IUP, HR 130s                             SBIRT 22yo+    Flowsheet Row Most Recent Value   SBIRT (23 yo +)    In order to provide better care to our patients, we are screening all of our patients for alcohol and drug use  Would it be okay to ask you these screening questions?  No Filed at: 08/29/2022 1808                [facility]  Number of Diagnoses or Management Options  Hyperemesis gravidarum  Intractable nausea and vomiting  Diagnosis management comments: Mera Hernandes is a 28 y o  who presents with complaints of vomiting    Vital signs are stable, physical exam shows epigastric tenderness, however moist mucous membranes, no peritoneal signs    Ddx: Pregnancy vs Hyperemesis gravidarum vs gastroenteritis vs pancreatitis vs other    Plan: CBC, CMP, Lipase, UPT, IVF, Zofran    Re-assessment: Remainder of care signed out  The pt was admitted for intractable nausea/vomiting, likely 2/2 hyperemesis gravidarum  Bedside US showed an IUP with HR of 130s  No acute issues during her stay  Amount and/or Complexity of Data Reviewed  Clinical lab tests: ordered and reviewed  Tests in the radiology section of CPT®: ordered and reviewed    Risk of Complications, Morbidity, and/or Mortality  Presenting problems: moderate  Diagnostic procedures: low  Management options: low    Patient Progress  Patient progress: stable      Disposition  Final diagnoses:   Hyperemesis gravidarum   Intractable nausea and vomiting     Time reflects when diagnosis was documented in both MDM as applicable and the Disposition within this note     Time User Action Codes Description Comment    8/29/2022  7:57 PM Nik Holley Add [O21 0] Hyperemesis gravidarum     8/29/2022  7:57 PM Nik Holley Add [R11 2] Intractable nausea and vomiting     8/29/2022  9:19 PM Tahmina Daley Add [Z34 90] Early stage of pregnancy       ED Disposition     ED Disposition   AMA    Condition   --    Date/Time   Tue Aug 30, 2022  9:15 AM    Comment   Date: 8/30/2022  Patient: Mera Hernandes  Admitted: 8/29/2022  1:33 PM  Attending Provider: Radha Barksdale MD    Mera Hernandes or her authorized caregiver has made the decision for the patient to leave the emergency department against the advi ce of  She or her authorized caregiver has been informed and understands the inherent risks, including death    She or her authorized caregiver has decided to accept the responsibility for this decision  Tyree Monge and all necessary parties have  been advised that she may return for further evaluation or treatment  Her condition at time of discharge was stable, ambulatory  Tyree Monge had current vital signs as follows:  /81 (BP Location: Left arm)   Pulse 85   Temp 97 6 °F (36 4  °C)   Resp 18   Wt 75 4 kg (166 lb 3 6 oz)   LMP  (Exact Date)            Follow-up Information    None         Discharge Medication List as of 8/30/2022  9:16 AM      CONTINUE these medications which have NOT CHANGED    Details   acetaminophen (TYLENOL) 325 mg tablet Take 2 tablets (650 mg total) by mouth every 6 (six) hours as needed for mild pain, Starting Sat 12/5/2020, Normal      doxylamine (UNISOM) 25 MG tablet Take 0 5 tablets (12 5 mg total) by mouth 3 (three) times a day for 7 days, Starting Mon 5/30/2022, Until Mon 6/6/2022, Normal      famotidine (PEPCID) 20 mg tablet Take 1 tablet (20 mg total) by mouth daily at bedtime for 14 days, Starting Mon 5/30/2022, Until Mon 6/13/2022, Normal      metoclopramide (REGLAN) 5 mg tablet Take 2 tablets (10 mg total) by mouth every 8 (eight) hours as needed (Nausea and vomiting), Starting Mon 5/30/2022, No Print      pantoprazole (PROTONIX) 40 mg tablet Take 1 tablet (40 mg total) by mouth 2 (two) times a day for 14 days, Starting Mon 5/30/2022, Until Mon 6/13/2022, Normal      Pyridoxine HCl (vitamin B-6) 25 MG tablet Take 1 tablet (25 mg total) by mouth 3 (three) times a day, Starting Mon 5/30/2022, Normal      thiamine 100 MG tablet Take 1 tablet (100 mg total) by mouth daily, Starting Mon 5/30/2022, Normal           No discharge procedures on file  PDMP Review       Value Time User    PDMP Reviewed  Yes 1/20/2021  2:27 PM Dakotah Davis PA-C           ED Provider  Attending physically available and evaluated Tyree Monge  I managed the patient along with the ED Attending      Electronically Signed by         Sonja Domínguez MD  08/30/22 5950

## 2022-08-30 VITALS
OXYGEN SATURATION: 99 % | BODY MASS INDEX: 27.66 KG/M2 | HEART RATE: 85 BPM | RESPIRATION RATE: 18 BRPM | DIASTOLIC BLOOD PRESSURE: 81 MMHG | TEMPERATURE: 97.6 F | WEIGHT: 166.23 LBS | SYSTOLIC BLOOD PRESSURE: 124 MMHG

## 2022-08-30 LAB
FLUAV RNA RESP QL NAA+PROBE: NEGATIVE
FLUBV RNA RESP QL NAA+PROBE: NEGATIVE
RSV RNA RESP QL NAA+PROBE: NEGATIVE
SARS-COV-2 RNA RESP QL NAA+PROBE: NEGATIVE

## 2022-08-30 PROCEDURE — 0241U HB NFCT DS VIR RESP RNA 4 TRGT: CPT | Performed by: PHYSICIAN ASSISTANT

## 2022-08-30 RX ORDER — ONDANSETRON 2 MG/ML
4 INJECTION INTRAMUSCULAR; INTRAVENOUS EVERY 4 HOURS PRN
Status: DISCONTINUED | OUTPATIENT
Start: 2022-08-30 | End: 2022-08-30

## 2022-08-30 RX ORDER — ONDANSETRON 2 MG/ML
4 INJECTION INTRAMUSCULAR; INTRAVENOUS EVERY 4 HOURS PRN
Status: DISCONTINUED | OUTPATIENT
Start: 2022-08-30 | End: 2022-08-30 | Stop reason: HOSPADM

## 2022-08-30 RX ADMIN — Medication 25 MG: at 00:45

## 2022-08-30 RX ADMIN — ONDANSETRON 4 MG: 2 INJECTION INTRAMUSCULAR; INTRAVENOUS at 00:09

## 2022-08-30 RX ADMIN — ONDANSETRON 4 MG: 2 INJECTION INTRAMUSCULAR; INTRAVENOUS at 06:33

## 2022-08-30 RX ADMIN — METOCLOPRAMIDE HYDROCHLORIDE 10 MG: 5 INJECTION INTRAMUSCULAR; INTRAVENOUS at 03:18

## 2022-08-30 RX ADMIN — ALUMINUM HYDROXIDE, MAGNESIUM HYDROXIDE, AND SIMETHICONE 15 ML: 200; 200; 20 SUSPENSION ORAL at 03:18

## 2022-08-30 RX ADMIN — ACETAMINOPHEN 650 MG: 325 TABLET ORAL at 07:36

## 2022-08-30 NOTE — ED NOTES
Pt requested a shower at this time to help with back pain - pt provided with shower supplies at this time     [facility]  08/30/22 7767

## 2022-08-30 NOTE — ASSESSMENT & PLAN NOTE
· One-week history of intractable nausea/vomiting worsening over the past 3 days    With ongoing nausea despite multiple antiemetics and IV fluids provided during ED evaluation  · Suspect secondary to hyperemesis  given positive pregnancy test and additionally patient with marijuana use, has had prior admissions for similar  · Clear liquid diet for now, advance diet as tolerated  · IV fluids  · P r n  antiemetics

## 2022-08-30 NOTE — H&P
[address] 1990, 28 y o  female MRN: [id number]  Unit/Bed#: ED 01 Encounter: [id number]  Primary Care Provider: Ashley Ashofrd DO   Date and time admitted to hospital: 2022  1:33 PM    * Intractable nausea and vomiting  Assessment & Plan  · One-week history of intractable nausea/vomiting worsening over the past 3 days  With ongoing nausea despite multiple antiemetics and IV fluids provided during ED evaluation  · Suspect secondary to hyperemesis  given positive pregnancy test and additionally patient with marijuana use, has had prior admissions for similar  · Clear liquid diet for now, advance diet as tolerated  · IV fluids  · P r n  antiemetics    Leukocytosis  Assessment & Plan  · WBC 20, no clear infectious etiology identified at this time  · Possibly reactive due to ongoing nausea/vomiting  Observe off antibiotics for now and monitor with CBC    Back pain  Assessment & Plan  · Chronic and stable  · P r n  pain control    Early stage of pregnancy  Assessment & Plan  · Noted with positive pregnancy test during ED evaluation  · OBGYN consult; patient states she intends to terminate pregnancy      VTE Prophylaxis: Pharmacologic VTE Prophylaxis contraindicated due to low risk  / sequential compression device   Code Status: Level 1 - Full Code  POLST: POLST form is not discussed and not completed at this time  Discussion with family:  Patient declines at this time    Anticipated Length of Stay:  Patient will be admitted on an Observation basis with an anticipated length of stay of  less than 2 midnights  Justification for Hospital Stay: Please see detailed plans noted above  Chief Complaint:     Ongoing nausea and vomiting  History of Present Illness:  Giana Osman is a 28 y o  female who presents with intractable nausea/vomiting    States her episodes of nausea/vomiting began approximately 1 week previously, nonbloody/nonbilious, and with associated p o  intake  States she has had several hospitalizations for similar including prolonged hospitalization near the birth of her 2nd child which apparently required prolonged feeding tube  She denied any fevers/chills, known sick contacts, recent travel, dizziness/lightheadedness, melena/hematochezia  Did not take any medications aside from Tylenol for symptoms, but did note some improvement in symptoms with warm showers  The past 3 days, however, she had noted a marked increase in frequency of symptoms and due to ongoing symptoms presented for evaluation  During ED evaluation patient received IV fluids and multiple antiemetics without improvement in symptoms, and due to ongoing symptoms is admitted for further management  Of note patient found with positive hCG and informal POC pelvic ultrasound apparently revealing intrauterine pregnancy; patient stating to me that she desires to terminate pregnancy      Review of Systems:    Constitutional:  Denies fever or chills   Eyes:  Denies change in visual acuity   HENT:  Denies nasal congestion or sore throat   Respiratory:  Denies cough or shortness of breath   Cardiovascular:  Denies chest pain or edema   GI:  Denies abdominal pain, bloody stools or diarrhea but reported nausea/vomiting  :  Denies dysuria   Musculoskeletal:  Denies back pain or joint pain   Integument:  Denies rash   Neurologic:  Denies headache, focal weakness or sensory changes   Endocrine:  Denies polyuria or polydipsia   Lymphatic:  Denies swollen glands   Psychiatric:  Denies depression or anxiety     Past Medical and Surgical History:   Past Medical History:   Diagnosis Date    Arthritis     Asthma     History of stomach ulcers     Psychiatric disorder     anxiety     Past Surgical History:   Procedure Laterality Date    BREAST SURGERY      COSMETIC SURGERY      EGD      WISDOM TOOTH EXTRACTION         Meds/Allergies:    Current Facility-Administered Medications:     acetaminophen (TYLENOL) tablet 650 mg, 650 mg, Oral, Q6H PRN, Jovita Wakefield DO    aluminum-magnesium hydroxide-simethicone (MYLANTA) oral suspension 15 mL, 15 mL, Oral, Q6H PRN, Jovita Wakefield DO    dextrose 5 % and sodium chloride 0 45 % 1,000 mL with thiamine 432 mg, folic acid 1 mg, pyridoxine 25 mg infusion, 125 mL/hr, Intravenous, Continuous, Jovita Wakefield DO    lidocaine (LIDODERM) 5 % patch 1 patch, 1 patch, Topical, Once, Manasa Fisher MD, 1 patch at 08/29/22 1932    metoclopramide (REGLAN) injection 10 mg, 10 mg, Intravenous, Q6H PRN, Jovita Wakefield DO    ondansetron [facility]) injection 4 mg, 4 mg, Intravenous, Q8H PRN, Jovita Wakefield DO    [START ON 8/30/2022] prenatal multivitamin tablet 1 tablet, 1 tablet, Oral, Daily, Jovita Wakefield DO    pyridoxine (VITAMIN B6) tablet 25 mg, 25 mg, Oral, TID, Jovita Wakefield DO    simethicone (MYLICON) chewable tablet 80 mg, 80 mg, Oral, 4x Daily PRN, Jovita Wakefield DO    [START ON 8/30/2022] thiamine tablet 100 mg, 100 mg, Oral, Daily, Jovita Wakefield DO    Current Outpatient Medications:     acetaminophen (TYLENOL) 325 mg tablet, Take 2 tablets (650 mg total) by mouth every 6 (six) hours as needed for mild pain, Disp: 60 tablet, Rfl: 0    doxylamine (UNISOM) 25 MG tablet, Take 0 5 tablets (12 5 mg total) by mouth 3 (three) times a day for 7 days, Disp: 11 tablet, Rfl: 0    famotidine (PEPCID) 20 mg tablet, Take 1 tablet (20 mg total) by mouth daily at bedtime for 14 days, Disp: 14 tablet, Rfl: 0    metoclopramide (REGLAN) 5 mg tablet, Take 2 tablets (10 mg total) by mouth every 8 (eight) hours as needed (Nausea and vomiting), Disp: 30 tablet, Rfl: 0    pantoprazole (PROTONIX) 40 mg tablet, Take 1 tablet (40 mg total) by mouth 2 (two) times a day for 14 days, Disp: 28 tablet, Rfl: 0    Pyridoxine HCl (vitamin B-6) 25 MG tablet, Take 1 tablet (25 mg total) by mouth 3 (three) times a day, Disp: 90 tablet, Rfl: 0    thiamine 100 MG tablet, Take 1 tablet (100 mg total) by mouth daily, Disp: 30 tablet, Rfl: 0    Allergies: No Known Allergies  History:  Marital Status: Single     Substance Use History:   Social History     Substance and Sexual Activity   Alcohol Use Not Currently     Social History     Tobacco Use   Smoking Status Never Smoker   Smokeless Tobacco Never Used     Social History     Substance and Sexual Activity   Drug Use Not Currently    Types: Marijuana       Family History:  History reviewed  No pertinent family history  Physical Exam:     Vitals:   Blood Pressure: 115/63 (08/29/22 1755)  Pulse: 93 (08/29/22 1755)  Temperature: 97 6 °F (36 4 °C) (08/29/22 1248)  Respirations: 20 (08/29/22 1755)  Weight - Scale: 75 4 kg (166 lb 3 6 oz) (08/29/22 1248)  SpO2: 98 % (08/29/22 1755)    Constitutional:  Well developed, well nourished, no acute distress, non-toxic appearance   Eyes:  PERRL, conjunctiva normal   HENT:  Atraumatic, external ears normal, nose normal, oropharynx moist, no pharyngeal exudates  Neck- normal range of motion, no tenderness, supple   Respiratory:  No respiratory distress, normal breath sounds, no rales, no wheezing   Cardiovascular:  Normal rate, normal rhythm, no murmurs, no gallops, no rubs   GI:  Soft, nondistended, normal bowel sounds, nontender, no organomegaly, no mass, no rebound, no guarding   :  No costovertebral angle tenderness   Musculoskeletal:  No edema, no tenderness, no deformities  Back- no tenderness  Integument:  Well hydrated, no rash   Lymphatic:  No lymphadenopathy noted   Neurologic:  Alert &awake, communicative, CN 2-12 normal, normal motor function, normal sensory function, no focal deficits noted   Psychiatric:  Speech and behavior appropriate       Lab Results: I have personally reviewed pertinent reports        Results from last 7 days   Lab Units 08/29/22  1416   WBC Thousand/uL 20 02*   HEMOGLOBIN g/dL 13 9   HEMATOCRIT % 41 5   PLATELETS Thousands/uL 239   NEUTROS PCT % 85*   LYMPHS PCT % 9*   MONOS PCT % 5   EOS PCT % 0     Results from last 7 days   Lab Units 08/29/22  1416   POTASSIUM mmol/L 3 4*   CHLORIDE mmol/L 102   CO2 mmol/L 24   BUN mg/dL 12   CREATININE mg/dL 0 69   CALCIUM mg/dL 9 8   ALK PHOS U/L 85   ALT U/L 18   AST U/L 7           ** Please Note: Dragon 360 Dictation voice to text software was used in the creation of this document   **

## 2022-08-30 NOTE — QUICK NOTE
Patient left AMA prior to being evaluated by Regina Clay provider today  AMA form signed and scanned into chart as per ED RN

## 2022-08-30 NOTE — ASSESSMENT & PLAN NOTE
· WBC 20, no clear infectious etiology identified at this time  · Possibly reactive due to ongoing nausea/vomiting    Observe off antibiotics for now and monitor with CBC

## 2022-08-30 NOTE — ASSESSMENT & PLAN NOTE
· Noted with positive pregnancy test during ED evaluation  · OBGYN consult; patient states she intends to terminate pregnancy

## 2022-08-30 NOTE — UTILIZATION REVIEW
Initial Clinical Review    Admission: Date/Time/Statement:   Admission Orders (From admission, onward)     Ordered        22  Place in Observation  Once                      Orders Placed This Encounter   Procedures    Place in Observation     Standing Status:   Standing     Number of Occurrences:   1     Order Specific Question:   Level of Care     Answer:   Med Surg [16]     ED Arrival Information     Expected   -    Arrival   2022 12:23    Acuity   Urgent            Means of arrival   Walk-In    Escorted by   Self    Service   Hospitalist    Admission type   Urgent            Arrival complaint   vomiting            Chief Complaint   Patient presents with    Vomiting     Pt reports severe vomiting since Friday, hx of hyperemesis with admissions, reports LMP in , reports unsure if she is pregnant  Initial Presentation: 28 y o  female to ED presents with intractable nausea and vomiting  States her episodes of nausea/vomiting began approximately 1 week previously, nonbloody/nonbilious, and with associated p o  intake  States she has had several hospitalizations for similar including prolonged hospitalization near the birth of her 2nd child which apparently required prolonged feeding tube  Noted some improvement with symptoms with warm showers  Also noted marked increase in frequency of symptoms and due to ongoing symptoms for past 3 days  In ED, given Iv fluids and multiple antiemetics without relief  Found with positive for hCGand informal POC pelvic ultrasound apparently revealing intrauterine pregnancy  Pt stating to me that she desires to terminate pregnancy  Admit Observation level of care for Intractable nausea and vomiting  Leukocytosis  Early stage of pregnancy  Suspect secondary to hyperemesis  given positive pregnancy test and additionally pt with marijuana use, has had prior admit for similar  Clear liquid diet for now, advance as tolerated  Iv fluids  Antiemetics prn   Wbc 20  Observe off antibiotics  OB/GYN consult for positive pregnancy test      8/30 Left AMA  Did not want to wait for provider        ED Triage Vitals   Temperature Pulse Respirations Blood Pressure SpO2   08/29/22 1248 08/29/22 1248 08/29/22 1248 08/29/22 1248 08/29/22 1248   97 6 °F (36 4 °C) (!) 112 22 139/97 98 %      Temp src Heart Rate Source Patient Position - Orthostatic VS BP Location FiO2 (%)   -- 08/29/22 1539 08/29/22 1539 08/29/22 1539 --    Monitor Lying Left arm       Pain Score       08/29/22 1248       7          Wt Readings from Last 1 Encounters:   08/29/22 75 4 kg (166 lb 3 6 oz)     Additional Vital Signs:   08/30/22 0657 -- 85 18 124/81 -- 99 % None (Room air) Lying   08/30/22 0323 -- 82 20 114/74 -- 99 % None (Room air) Lying   08/30/22 0046 -- 78 20 110/63 -- 98 % None (Room air) Lying   08/29/22 2148 -- 74 20 135/78 -- 98 % None (Room air) Sitting   08/29/22 1755 -- 93 20 115/63 76 98 % None (Room air) Lying     Pertinent Labs/Diagnostic Test Results:   No orders to display     Results from last 7 days   Lab Units 08/30/22  0750   SARS-COV-2  Negative     Results from last 7 days   Lab Units 08/29/22  1416   WBC Thousand/uL 20 02*   HEMOGLOBIN g/dL 13 9   HEMATOCRIT % 41 5   PLATELETS Thousands/uL 239   NEUTROS ABS Thousands/µL 17 07*         Results from last 7 days   Lab Units 08/29/22  1416   SODIUM mmol/L 135   POTASSIUM mmol/L 3 4*   CHLORIDE mmol/L 102   CO2 mmol/L 24   ANION GAP mmol/L 9   BUN mg/dL 12   CREATININE mg/dL 0 69   EGFR ml/min/1 73sq m 117   CALCIUM mg/dL 9 8     Results from last 7 days   Lab Units 08/29/22  1416   AST U/L 7   ALT U/L 18   ALK PHOS U/L 85   TOTAL PROTEIN g/dL 8 1   ALBUMIN g/dL 4 2   TOTAL BILIRUBIN mg/dL 0 56         Results from last 7 days   Lab Units 08/29/22  1416   GLUCOSE RANDOM mg/dL 116         Results from last 7 days   Lab Units 08/29/22  1416   TSH 3RD GENERATON uIU/mL 0 355*         Results from last 7 days   Lab Units 08/29/22  1416   LIPASE u/L 126       Results from last 7 days   Lab Units 08/29/22  1643 08/29/22  1641   CLARITY UA   --  Clear   COLOR UA  see chart Susan   SPEC GRAV UA   --  1 025   PH UA   --  7 0   GLUCOSE UA mg/dl  --  Negative   KETONES UA mg/dl  --  80 (3+)*   BLOOD UA   --  Trace*   PROTEIN UA mg/dl  --  100 (2+)*   NITRITE UA   --  Negative   BILIRUBIN UA   --  Negative   UROBILINOGEN UA E U /dl  --  0 2   LEUKOCYTES UA   --  Negative   WBC UA /hpf  --  2-4*   RBC UA /hpf  --  4-10*   BACTERIA UA /hpf  --  Occasional   EPITHELIAL CELLS WET PREP /hpf  --  Moderate*   MUCUS THREADS   --  Innumerable*     Results from last 7 days   Lab Units 08/30/22  0750   INFLUENZA A PCR  Negative   INFLUENZA B PCR  Negative   RSV PCR  Negative       ED Treatment:   Medication Administration from 08/29/2022 1222 to 08/30/2022 0845       Date/Time Order Dose Route Action     08/29/2022 1416 sodium chloride 0 9 % bolus 1,000 mL 1,000 mL Intravenous New Bag     08/29/2022 1416 ondansetron (ZOFRAN) injection 4 mg 4 mg Intravenous Given     08/29/2022 1658 Lidocaine Viscous HCl (XYLOCAINE) 2 % mucosal solution 15 mL 15 mL Swish & Spit Given     08/29/2022 1537 sucralfate (CARAFATE) tablet 1 g 1 g Oral Given     08/29/2022 1658 aluminum-magnesium hydroxide-simethicone (MYLANTA) oral suspension 30 mL 30 mL Oral Given     08/29/2022 1619 ondansetron (ZOFRAN) injection 4 mg 4 mg Intravenous Given     08/29/2022 1706 dextrose 5 % and sodium chloride 0 9 % bolus 1,000 mL 1,000 mL Intravenous New Bag     08/29/2022 1932 metoclopramide (REGLAN) injection 10 mg 10 mg Intravenous Given     08/30/2022 0742 lidocaine (LIDODERM) 5 % patch 1 patch 1 patch Topical Patch Removed     08/29/2022 1932 lidocaine (LIDODERM) 5 % patch 1 patch 1 patch Topical Medication Applied     08/30/2022 0009 ondansetron (ZOFRAN) injection 4 mg 4 mg Intravenous Given     08/30/2022 0318 metoclopramide (REGLAN) injection 10 mg 10 mg Intravenous Given     08/30/2022 0318 aluminum-magnesium hydroxide-simethicone (MYLANTA) oral suspension 15 mL 15 mL Oral Given     08/29/2022 2146 aluminum-magnesium hydroxide-simethicone (MYLANTA) oral suspension 15 mL 15 mL Oral Given     08/29/2022 2228 dextrose 5 % and sodium chloride 0 45 % 1,000 mL with thiamine 457 mg, folic acid 1 mg, pyridoxine 25 mg infusion 125 mL/hr Intravenous New Bag     08/30/2022 0045 pyridoxine (VITAMIN B6) tablet 25 mg 25 mg Oral Given     08/30/2022 0736 acetaminophen (TYLENOL) tablet 650 mg 650 mg Oral Given     08/30/2022 0633 ondansetron (ZOFRAN) injection 4 mg 4 mg Intravenous Given        Past Medical History:   Diagnosis Date    Arthritis     Asthma     History of stomach ulcers     Psychiatric disorder     anxiety     Present on Admission:   Intractable nausea and vomiting   Back pain   Leukocytosis      Admitting Diagnosis: No admission diagnoses are documented for this encounter  Age/Sex: 28 y o  female     Admission Orders:  Scheduled Medications:  prenatal multivitamin, 1 tablet, Oral, Daily  vitamin B-6, 25 mg, Oral, TID  thiamine, 100 mg, Oral, Daily      Continuous IV Infusions:     PRN Meds:  acetaminophen, 650 mg, Oral, Q6H PRN  aluminum-magnesium hydroxide-simethicone, 15 mL, Oral, Q6H PRN  metoclopramide, 10 mg, Intravenous, Q6H PRN  ondansetron, 4 mg, Intravenous, Q4H PRN  simethicone, 80 mg, Oral, 4x Daily PRN        IP CONSULT TO OB GYN    Network Utilization Review Department  ATTENTION: Please call with any questions or concerns to [phone] and carefully listen to the prompts so that you are directed to the right person  All voicemails are confidential   [facility] all requests for admission clinical reviews, approved or denied determinations and any other requests to dedicated fax number below belonging to the campus where the patient is receiving treatment   List of dedicated fax numbers for the Facilities:  FACILITY NAME UR FAX NUMBER   ADMISSION DENIALS (Administrative/Medical [facility]) [phone]   [address] (Maternity/NICU/Pediatrics) [address] [place] [address],[address] [facility] [address] [place] [address]  [phone]   [address] [address] [place] [address] [place] [address] [phone]   [address] [phone]

## 2022-08-31 ENCOUNTER — HOSPITAL ENCOUNTER (EMERGENCY)
Facility: HOSPITAL | Age: 32
Discharge: HOME | End: 2022-08-31
Attending: EMERGENCY MEDICINE
Payer: COMMERCIAL

## 2022-08-31 VITALS
DIASTOLIC BLOOD PRESSURE: 85 MMHG | OXYGEN SATURATION: 97 % | RESPIRATION RATE: 22 BRPM | SYSTOLIC BLOOD PRESSURE: 135 MMHG | TEMPERATURE: 98.2 F | HEART RATE: 85 BPM

## 2022-08-31 DIAGNOSIS — O21.0 HYPEREMESIS GRAVIDARUM: Primary | ICD-10-CM

## 2022-08-31 DIAGNOSIS — E87.6 HYPOKALEMIA: ICD-10-CM

## 2022-08-31 LAB
ALBUMIN SERPL BCP-MCNC: 3.9 G/DL (ref 3.5–5)
ALP SERPL-CCNC: 82 U/L (ref 46–116)
ALT SERPL W P-5'-P-CCNC: 17 U/L (ref 12–78)
AMORPH URATE CRY URNS QL MICRO: ABNORMAL
ANION GAP SERPL CALCULATED.3IONS-SCNC: 5 MMOL/L (ref 4–13)
AST SERPL W P-5'-P-CCNC: 7 U/L (ref 5–45)
BACTERIA UR QL AUTO: ABNORMAL /HPF
BASOPHILS # BLD AUTO: 0.06 THOUSANDS/ΜL (ref 0–0.1)
BASOPHILS NFR BLD AUTO: 0 % (ref 0–1)
BILIRUB SERPL-MCNC: 0.55 MG/DL (ref 0.2–1)
BILIRUB UR QL STRIP: NEGATIVE
BUN SERPL-MCNC: 10 MG/DL (ref 5–25)
CALCIUM SERPL-MCNC: 9.2 MG/DL (ref 8.3–10.1)
CHLORIDE SERPL-SCNC: 102 MMOL/L (ref 96–108)
CLARITY UR: ABNORMAL
CO2 SERPL-SCNC: 28 MMOL/L (ref 21–32)
COLOR UR: YELLOW
CREAT SERPL-MCNC: 0.65 MG/DL (ref 0.6–1.3)
EOSINOPHIL # BLD AUTO: 0.01 THOUSAND/ΜL (ref 0–0.61)
EOSINOPHIL NFR BLD AUTO: 0 % (ref 0–6)
ERYTHROCYTE [DISTWIDTH] IN BLOOD BY AUTOMATED COUNT: 12.9 % (ref 11.6–15.1)
GFR SERPL CREATININE-BSD FRML MDRD: 119 ML/MIN/1.73SQ M
GLUCOSE SERPL-MCNC: 114 MG/DL (ref 65–140)
GLUCOSE UR STRIP-MCNC: NEGATIVE MG/DL
HCT VFR BLD AUTO: 41.8 % (ref 34.8–46.1)
HGB BLD-MCNC: 14.2 G/DL (ref 11.5–15.4)
HGB UR QL STRIP.AUTO: NEGATIVE
IMM GRANULOCYTES # BLD AUTO: 0.11 THOUSAND/UL (ref 0–0.2)
IMM GRANULOCYTES NFR BLD AUTO: 1 % (ref 0–2)
KETONES UR STRIP-MCNC: ABNORMAL MG/DL
LEUKOCYTE ESTERASE UR QL STRIP: NEGATIVE
LIPASE SERPL-CCNC: 399 U/L (ref 73–393)
LYMPHOCYTES # BLD AUTO: 3.17 THOUSANDS/ΜL (ref 0.6–4.47)
LYMPHOCYTES NFR BLD AUTO: 17 % (ref 14–44)
MCH RBC QN AUTO: 29.2 PG (ref 26.8–34.3)
MCHC RBC AUTO-ENTMCNC: 34 G/DL (ref 31.4–37.4)
MCV RBC AUTO: 86 FL (ref 82–98)
MONOCYTES # BLD AUTO: 1.19 THOUSAND/ΜL (ref 0.17–1.22)
MONOCYTES NFR BLD AUTO: 6 % (ref 4–12)
MUCOUS THREADS UR QL AUTO: ABNORMAL
NEUTROPHILS # BLD AUTO: 14.11 THOUSANDS/ΜL (ref 1.85–7.62)
NEUTS SEG NFR BLD AUTO: 76 % (ref 43–75)
NITRITE UR QL STRIP: NEGATIVE
NON-SQ EPI CELLS URNS QL MICRO: ABNORMAL /HPF
NRBC BLD AUTO-RTO: 0 /100 WBCS
PH UR STRIP.AUTO: 8 [PH]
PLATELET # BLD AUTO: 239 THOUSANDS/UL (ref 149–390)
PMV BLD AUTO: 11.8 FL (ref 8.9–12.7)
POTASSIUM SERPL-SCNC: 2.9 MMOL/L (ref 3.5–5.3)
PROT SERPL-MCNC: 8 G/DL (ref 6.4–8.4)
PROT UR STRIP-MCNC: ABNORMAL MG/DL
RBC # BLD AUTO: 4.87 MILLION/UL (ref 3.81–5.12)
RBC #/AREA URNS AUTO: ABNORMAL /HPF
SODIUM SERPL-SCNC: 135 MMOL/L (ref 135–147)
SP GR UR STRIP.AUTO: 1.02 (ref 1–1.03)
UROBILINOGEN UR STRIP-ACNC: <2 MG/DL
WBC # BLD AUTO: 18.65 THOUSAND/UL (ref 4.31–10.16)
WBC #/AREA URNS AUTO: ABNORMAL /HPF

## 2022-08-31 PROCEDURE — 85025 COMPLETE CBC W/AUTO DIFF WBC: CPT | Performed by: INTERNAL MEDICINE

## 2022-08-31 PROCEDURE — 80053 COMPREHEN METABOLIC PANEL: CPT | Performed by: INTERNAL MEDICINE

## 2022-08-31 PROCEDURE — 96375 TX/PRO/DX INJ NEW DRUG ADDON: CPT

## 2022-08-31 PROCEDURE — 36415 COLL VENOUS BLD VENIPUNCTURE: CPT | Performed by: INTERNAL MEDICINE

## 2022-08-31 PROCEDURE — 96365 THER/PROPH/DIAG IV INF INIT: CPT

## 2022-08-31 PROCEDURE — 87086 URINE CULTURE/COLONY COUNT: CPT | Performed by: INTERNAL MEDICINE

## 2022-08-31 PROCEDURE — 96367 TX/PROPH/DG ADDL SEQ IV INF: CPT

## 2022-08-31 PROCEDURE — 99284 EMERGENCY DEPT VISIT MOD MDM: CPT | Performed by: EMERGENCY MEDICINE

## 2022-08-31 PROCEDURE — 99283 EMERGENCY DEPT VISIT LOW MDM: CPT

## 2022-08-31 PROCEDURE — 96366 THER/PROPH/DIAG IV INF ADDON: CPT

## 2022-08-31 PROCEDURE — 81001 URINALYSIS AUTO W/SCOPE: CPT | Performed by: INTERNAL MEDICINE

## 2022-08-31 PROCEDURE — 87077 CULTURE AEROBIC IDENTIFY: CPT | Performed by: INTERNAL MEDICINE

## 2022-08-31 PROCEDURE — 83690 ASSAY OF LIPASE: CPT | Performed by: INTERNAL MEDICINE

## 2022-08-31 RX ORDER — MAGNESIUM HYDROXIDE/ALUMINUM HYDROXICE/SIMETHICONE 120; 1200; 1200 MG/30ML; MG/30ML; MG/30ML
30 SUSPENSION ORAL ONCE
Status: COMPLETED | OUTPATIENT
Start: 2022-08-31 | End: 2022-08-31

## 2022-08-31 RX ORDER — LIDOCAINE HYDROCHLORIDE 20 MG/ML
15 SOLUTION OROPHARYNGEAL ONCE
Status: COMPLETED | OUTPATIENT
Start: 2022-08-31 | End: 2022-08-31

## 2022-08-31 RX ORDER — ALUMINA, MAGNESIA, AND SIMETHICONE 2400; 2400; 240 MG/30ML; MG/30ML; MG/30ML
10 SUSPENSION ORAL EVERY 6 HOURS PRN
Qty: 355 ML | Refills: 0 | Status: SHIPPED | OUTPATIENT
Start: 2022-08-31

## 2022-08-31 RX ORDER — ONDANSETRON 4 MG/1
4 TABLET, ORALLY DISINTEGRATING ORAL EVERY 6 HOURS PRN
Qty: 20 TABLET | Refills: 0 | Status: SHIPPED | OUTPATIENT
Start: 2022-08-31 | End: 2022-08-31 | Stop reason: SDUPTHER

## 2022-08-31 RX ORDER — ALUMINA, MAGNESIA, AND SIMETHICONE 2400; 2400; 240 MG/30ML; MG/30ML; MG/30ML
10 SUSPENSION ORAL EVERY 6 HOURS PRN
Qty: 355 ML | Refills: 0 | Status: SHIPPED | OUTPATIENT
Start: 2022-08-31 | End: 2022-08-31 | Stop reason: SDUPTHER

## 2022-08-31 RX ORDER — ONDANSETRON 2 MG/ML
4 INJECTION INTRAMUSCULAR; INTRAVENOUS ONCE
Status: COMPLETED | OUTPATIENT
Start: 2022-08-31 | End: 2022-08-31

## 2022-08-31 RX ORDER — SODIUM CHLORIDE, SODIUM GLUCONATE, SODIUM ACETATE, POTASSIUM CHLORIDE, MAGNESIUM CHLORIDE, SODIUM PHOSPHATE, DIBASIC, AND POTASSIUM PHOSPHATE .53; .5; .37; .037; .03; .012; .00082 G/100ML; G/100ML; G/100ML; G/100ML; G/100ML; G/100ML; G/100ML
1000 INJECTION, SOLUTION INTRAVENOUS ONCE
Status: COMPLETED | OUTPATIENT
Start: 2022-08-31 | End: 2022-08-31

## 2022-08-31 RX ORDER — METOCLOPRAMIDE HYDROCHLORIDE 5 MG/ML
10 INJECTION INTRAMUSCULAR; INTRAVENOUS ONCE
Status: COMPLETED | OUTPATIENT
Start: 2022-08-31 | End: 2022-08-31

## 2022-08-31 RX ORDER — POTASSIUM CHLORIDE 14.9 MG/ML
20 INJECTION INTRAVENOUS ONCE
Status: COMPLETED | OUTPATIENT
Start: 2022-08-31 | End: 2022-08-31

## 2022-08-31 RX ORDER — LIDOCAINE 50 MG/G
1 PATCH TOPICAL ONCE
Status: DISCONTINUED | OUTPATIENT
Start: 2022-08-31 | End: 2022-08-31 | Stop reason: HOSPADM

## 2022-08-31 RX ORDER — ONDANSETRON 4 MG/1
4 TABLET, ORALLY DISINTEGRATING ORAL EVERY 6 HOURS PRN
Qty: 20 TABLET | Refills: 0 | Status: SHIPPED | OUTPATIENT
Start: 2022-08-31

## 2022-08-31 RX ADMIN — ALUMINA, MAGNESIA, AND SIMETHICONE ORAL SUSPENSION REGULAR STRENGTH 30 ML: 1200; 1200; 120 SUSPENSION ORAL at 05:34

## 2022-08-31 RX ADMIN — SODIUM CHLORIDE, SODIUM GLUCONATE, SODIUM ACETATE, POTASSIUM CHLORIDE, MAGNESIUM CHLORIDE, SODIUM PHOSPHATE, DIBASIC, AND POTASSIUM PHOSPHATE 1000 ML: .53; .5; .37; .037; .03; .012; .00082 INJECTION, SOLUTION INTRAVENOUS at 04:03

## 2022-08-31 RX ADMIN — LIDOCAINE 5% 1 PATCH: 700 PATCH TOPICAL at 05:36

## 2022-08-31 RX ADMIN — LIDOCAINE HYDROCHLORIDE 15 ML: 20 SOLUTION ORAL; TOPICAL at 05:34

## 2022-08-31 RX ADMIN — POTASSIUM CHLORIDE 20 MEQ: 14.9 INJECTION, SOLUTION INTRAVENOUS at 05:41

## 2022-08-31 RX ADMIN — METOCLOPRAMIDE HYDROCHLORIDE 10 MG: 5 INJECTION INTRAMUSCULAR; INTRAVENOUS at 05:32

## 2022-08-31 RX ADMIN — ONDANSETRON 4 MG: 2 INJECTION INTRAMUSCULAR; INTRAVENOUS at 04:03

## 2022-08-31 NOTE — ED ATTENDING ATTESTATION
2022  IKeron MD, saw and evaluated the patient  I have discussed the patient with the resident/non-physician practitioner and agree with the resident's/non-physician practitioner's findings, Plan of Care, and MDM as documented in the resident's/non-physician practitioner's note, except where noted  All available labs and Radiology studies were reviewed  I was present for key portions of any procedure(s) performed by the resident/non-physician practitioner and I was immediately available to provide assistance  At this point I agree with the current assessment done in the Emergency Department  I have conducted an independent evaluation of this patient a history and physical is as follows:    ED Course  ED Course as of 22 0434   Wed Aug 31, 2022   0402 27 YO F ; recent admission for hyperemesis gravidarum;      Emergency Department Note- Consuelo Morris 28 y o  female MRN: [id number]    Unit/Bed#: ED 19 Encounter: [id number]    Consuelo Morris is a 28 y o  female who presents with   Chief Complaint   Patient presents with    Vomiting     Pt pregnant  Reports multiple days of vomiting  Was admitted to hospital yesterday and left AMA  Unable to hold down food/drink         History of Present Illness   HPI:  Consuelo Morris is a 28 y o  female who presents for evaluation of:  Recurrence of nausea and vomiting  Patient is a  4 para ; estimated gestational age by dates; patient uncertain of last menstrual period  Patient was admitted yesterday for intractable vomiting and signed out yesterday morning against medical advice  Patient noted recurrent nausea and vomiting at home this evening; her nausea and vomiting is associated with abdominal discomfort  She denies dysuria  She denies any sort of vaginal discharge and vaginal bleeding  She denies suprapubic abdominal pain    Review of electronic medical record:  Bedside ultrasound on 2022 demonstrated intrauterine pregnancy with fetal heart rate noted  Patient's last menstrual period was 02/13/2022 (exact date)  Review of Systems   Constitutional: Negative for chills and fever  HENT: Negative for congestion and rhinorrhea  Respiratory: Negative for cough and shortness of breath  Cardiovascular: Negative for chest pain and palpitations  Gastrointestinal: Positive for nausea and vomiting  Negative for abdominal distention and diarrhea  Genitourinary: Negative for vaginal bleeding, vaginal discharge and vaginal pain  All other systems reviewed and are negative  Historical Information   Past Medical History:   Diagnosis Date    Arthritis     Asthma     History of stomach ulcers     Psychiatric disorder     anxiety     Past Surgical History:   Procedure Laterality Date    BREAST SURGERY      COSMETIC SURGERY      EGD      WISDOM TOOTH EXTRACTION       Social History   Social History     Substance and Sexual Activity   Alcohol Use Not Currently     Social History     Substance and Sexual Activity   Drug Use Not Currently    Types: Marijuana     Social History     Tobacco Use   Smoking Status Never Smoker   Smokeless Tobacco Never Used     Family History: History reviewed  No pertinent family history  Meds/Allergies   PTA meds:   Prior to Admission Medications   Prescriptions Last Dose Informant Patient Reported? Taking?    Pyridoxine HCl (vitamin B-6) 25 MG tablet   No No   Sig: Take 1 tablet (25 mg total) by mouth 3 (three) times a day   acetaminophen (TYLENOL) 325 mg tablet   No No   Sig: Take 2 tablets (650 mg total) by mouth every 6 (six) hours as needed for mild pain   doxylamine (UNISOM) 25 MG tablet   No No   Sig: Take 0 5 tablets (12 5 mg total) by mouth 3 (three) times a day for 7 days   famotidine (PEPCID) 20 mg tablet   No No   Sig: Take 1 tablet (20 mg total) by mouth daily at bedtime for 14 days   metoclopramide (REGLAN) 5 mg tablet   No No   Sig: Take 2 tablets (10 mg total) by mouth every 8 (eight) hours as needed (Nausea and vomiting)   pantoprazole (PROTONIX) 40 mg tablet   No No   Sig: Take 1 tablet (40 mg total) by mouth 2 (two) times a day for 14 days   thiamine 100 MG tablet   No No   Sig: Take 1 tablet (100 mg total) by mouth daily      Facility-Administered Medications: None     No Known Allergies    Objective   First Vitals:   Blood Pressure: 140/81 (08/31/22 0320)  Pulse: 103 (08/31/22 0320)  Temperature: 98 2 °F (36 8 °C) (08/31/22 0320)  Temp Source: Oral (08/31/22 0320)  Respirations: (!) 24 (08/31/22 0320)  SpO2: 98 % (08/31/22 0320)    Current Vitals:   Blood Pressure: 140/81 (08/31/22 0320)  Pulse: 103 (08/31/22 0320)  Temperature: 98 2 °F (36 8 °C) (08/31/22 0320)  Temp Source: Oral (08/31/22 0320)  Respirations: (!) 24 (08/31/22 0320)  SpO2: 98 % (08/31/22 0320)    No intake or output data in the 24 hours ending 08/31/22 0435    Invasive Devices  Report    Peripheral Intravenous Line  Duration           Peripheral IV 08/31/22 Left Forearm <1 day                Physical Exam  Vitals and nursing note reviewed  Constitutional:       General: She is not in acute distress  Appearance: Normal appearance  She is well-developed  HENT:      Head: Normocephalic and atraumatic  Right Ear: External ear normal       Left Ear: External ear normal       Nose: Nose normal       Mouth/Throat:      Pharynx: No oropharyngeal exudate  Eyes:      Conjunctiva/sclera: Conjunctivae normal       Pupils: Pupils are equal, round, and reactive to light  Cardiovascular:      Rate and Rhythm: Normal rate and regular rhythm  Pulmonary:      Effort: Pulmonary effort is normal  No respiratory distress  Abdominal:      General: Abdomen is flat  There is no distension  Palpations: Abdomen is soft  Musculoskeletal:         General: No deformity  Normal range of motion  Cervical back: Normal range of motion and neck supple     Skin:     General: Skin is warm and dry       Capillary Refill: Capillary refill takes less than 2 seconds  Neurological:      General: No focal deficit present  Mental Status: She is alert and oriented to person, place, and time  Mental status is at baseline  Coordination: Coordination normal    Psychiatric:         Mood and Affect: Mood normal          Behavior: Behavior normal          Thought Content: Thought content normal          Judgment: Judgment normal            Medical Decision Makin  Hyperemesis gravidarum:  Antiemetics; IV fluids  2  First-trimester pregnancy:  Urinalysis; patient states that she is A positive for her blood type      Recent Results (from the past 36 hour(s))   Urine Macroscopic, POC    Collection Time: 22  4:41 PM   Result Value Ref Range    Color, UA Susan     Clarity, UA Clear     pH, UA 7 0 4 5 - 8 0    Leukocytes, UA Negative Negative    Nitrite, UA Negative Negative    Protein,  (2+) (A) Negative mg/dl    Glucose, UA Negative Negative mg/dl    Ketones, UA 80 (3+) (A) Negative mg/dl    Urobilinogen, UA 0 2 0 2, 1 0 E U /dl E U /dl    Bilirubin, UA Negative Negative    Occult Blood, UA Trace (A) Negative    Specific Gravity, UA 1 025 1 003 - 1 030   Urine Microscopic    Collection Time: 22  4:41 PM   Result Value Ref Range    RBC, UA 4-10 (A) None Seen, 1-2 /hpf    WBC, UA 2-4 (A) None Seen, 1-2 /hpf    Epithelial Cells Moderate (A) None Seen, Occasional /hpf    Bacteria, UA Occasional None Seen, Occasional /hpf    MUCUS THREADS Innumerable (A) None Seen   POCT urinalysis dipstick    Collection Time: 22  4:43 PM   Result Value Ref Range    Color, UA see chart    COVID/FLU/RSV    Collection Time: 22  7:50 AM    Specimen: Nose; Nares   Result Value Ref Range    SARS-CoV-2 Negative Negative    INFLUENZA A PCR Negative Negative    INFLUENZA B PCR Negative Negative    RSV PCR Negative Negative   CBC and differential    Collection Time: 22  4:02 AM   Result Value Ref Range    WBC 18 65 (H) 4 31 - 10 16 Thousand/uL    RBC 4 87 3 81 - 5 12 Million/uL    Hemoglobin 14 2 11 5 - 15 4 g/dL    Hematocrit 41 8 34 8 - 46 1 %    MCV 86 82 - 98 fL    MCH 29 2 26 8 - 34 3 pg    MCHC 34 0 31 4 - 37 4 g/dL    RDW 12 9 11 6 - 15 1 %    MPV 11 8 8 9 - 12 7 fL    Platelets 294 005 - 090 Thousands/uL    nRBC 0 /100 WBCs    Neutrophils Relative 76 (H) 43 - 75 %    Immat GRANS % 1 0 - 2 %    Lymphocytes Relative 17 14 - 44 %    Monocytes Relative 6 4 - 12 %    Eosinophils Relative 0 0 - 6 %    Basophils Relative 0 0 - 1 %    Neutrophils Absolute 14 11 (H) 1 85 - 7 62 Thousands/µL    Immature Grans Absolute 0 11 0 00 - 0 20 Thousand/uL    Lymphocytes Absolute 3 17 0 60 - 4 47 Thousands/µL    Monocytes Absolute 1 19 0 17 - 1 22 Thousand/µL    Eosinophils Absolute 0 01 0 00 - 0 61 Thousand/µL    Basophils Absolute 0 06 0 00 - 0 10 Thousands/µL     No orders to display         Portions of the record may have been created with voice recognition software  Occasional wrong word or "sound a like" substitutions may have occurred due to the inherent limitations of voice recognition software  Read the chart carefully and recognize, using context, where substitutions have occurred          Critical Care Time  Procedures

## 2022-08-31 NOTE — ED NOTES
Called into room and pt c/o sharp stabbing pain to left elbow  Pt currently has K running  Stop infusion and applied ice to left arm  Pt's infusion finished  Pt endorses relief after infusion stopped   No discoloration or swelling noted     Phillip Gan RN  08/31/22 [address] [facility] [address] Brit Montoya Numbers  08/31/22 1758

## 2022-08-31 NOTE — ED PROVIDER NOTES
History  Chief Complaint   Patient presents with    Vomiting     Pt pregnant  Reports multiple days of vomiting  Was admitted to hospital yesterday and left AMA  Unable to hold down food/drink     HPI  26-year-old  female with hyperemesis gravidarum presents to the ER for continued nausea and vomiting  Patient reports symptoms for the last 5-6 days  She reports she was admitted 2 days ago for the same and signed out against medical advice  She came home and was able to be home for about 24 hours, but then her nausea and vomiting have significantly worsen  Patient reports she is vomiting 2-3 times per hour  She describes nonbloody, bilious vomiting  She states she has not been able to keep any food or water down  She feels like her mouth is very dry  She reports upper abdominal cramping after vomiting only  She denies any lower abdominal pain, uterine contractions, vaginal bleeding or vaginal discharge  Patient denies headache, visual changes, dizziness, fevers, chills, chest pain, palpitations, diarrhea, melena, hematochezia, dysuria, new skin rashes or numbness or tingling of the extremities  Prior to Admission Medications   Prescriptions Last Dose Informant Patient Reported? Taking?    Pyridoxine HCl (vitamin B-6) 25 MG tablet   No No   Sig: Take 1 tablet (25 mg total) by mouth 3 (three) times a day   acetaminophen (TYLENOL) 325 mg tablet   No No   Sig: Take 2 tablets (650 mg total) by mouth every 6 (six) hours as needed for mild pain   doxylamine (UNISOM) 25 MG tablet   No No   Sig: Take 0 5 tablets (12 5 mg total) by mouth 3 (three) times a day for 7 days   famotidine (PEPCID) 20 mg tablet   No No   Sig: Take 1 tablet (20 mg total) by mouth daily at bedtime for 14 days   metoclopramide (REGLAN) 5 mg tablet   No No   Sig: Take 2 tablets (10 mg total) by mouth every 8 (eight) hours as needed (Nausea and vomiting)   pantoprazole (PROTONIX) 40 mg tablet   No No   Sig: Take 1 tablet (40 mg total) by mouth 2 (two) times a day for 14 days   thiamine 100 MG tablet   No No   Sig: Take 1 tablet (100 mg total) by mouth daily      Facility-Administered Medications: None       Past Medical History:   Diagnosis Date    Arthritis     Asthma     History of stomach ulcers     Psychiatric disorder     anxiety       Past Surgical History:   Procedure Laterality Date    BREAST SURGERY      COSMETIC SURGERY      EGD      WISDOM TOOTH EXTRACTION         History reviewed  No pertinent family history  I have reviewed and agree with the history as documented  E-Cigarette/Vaping    E-Cigarette Use Never User      E-Cigarette/Vaping Substances    Nicotine No     THC Yes     CBD Yes     Flavoring No     Other No     Unknown No      Social History     Tobacco Use    Smoking status: Never Smoker    Smokeless tobacco: Never Used   Vaping Use    Vaping Use: Never used   Substance Use Topics    Alcohol use: Not Currently    Drug use: Not Currently     Types: Marijuana        Review of Systems   All other systems reviewed and are negative        Physical Exam  ED Triage Vitals   Temperature Pulse Respirations Blood Pressure SpO2   08/31/22 0320 08/31/22 0320 08/31/22 0320 08/31/22 0320 08/31/22 0320   98 2 °F (36 8 °C) 103 (!) 24 140/81 98 %      Temp Source Heart Rate Source Patient Position - Orthostatic VS BP Location FiO2 (%)   08/31/22 0320 08/31/22 0320 08/31/22 0320 08/31/22 0320 --   Oral Monitor Lying Right arm       Pain Score       08/31/22 0542       7             Orthostatic Vital Signs  Vitals:    08/31/22 0320 08/31/22 0542   BP: 140/81 135/85   Pulse: 103 85   Patient Position - Orthostatic VS: Lying        Physical Exam   PHYSICAL EXAM    Constitutional:  Well developed, well nourished, tearful, uncomfortable  HEENT:  Conjunctiva normal  Oropharynx moist  Respiratory:  No respiratory distress, normal breath sounds  Cardiovascular:  Normal rate, normal rhythm, no murmurs  GI:  Soft, nondistended, normal bowel sounds, nontender  :  No costovertebral angle tenderness   Musculoskeletal:  No edema, no tenderness, no deformities     Integument:  Well hydrated, no rash   Lymphatic:  No lymphadenopathy noted   Neurologic:  Alert & oriented, normal motor function, normal sensory function, no focal deficits noted   Psychiatric:  Speech and behavior appropriate     ED Medications  Medications   ondansetron (ZOFRAN) injection 4 mg (4 mg Intravenous Given 8/31/22 0403)   multi-electrolyte (ISOLYTE-S PH 7 4) bolus 1,000 mL (0 mL Intravenous Stopped 8/31/22 0534)   potassium chloride 20 mEq IVPB (premix) (0 mEq Intravenous Stopped 8/31/22 0729)   metoclopramide (REGLAN) injection 10 mg (10 mg Intravenous Given 8/31/22 0532)   aluminum-magnesium hydroxide-simethicone (MYLANTA) oral suspension 30 mL (30 mL Oral Given 8/31/22 0534)   Lidocaine Viscous HCl (XYLOCAINE) 2 % mucosal solution 15 mL (15 mL Swish & Spit Given 8/31/22 0534)       Diagnostic Studies  Results Reviewed     Procedure Component Value Units Date/Time    Urine Microscopic [[id number]]  (Abnormal) Collected: 08/31/22 0536    Lab Status: Final result Specimen: Urine, Clean Catch Updated: 08/31/22 0628     RBC, UA 1-2 /hpf      WBC, UA None Seen /hpf      Epithelial Cells Occasional /hpf      Bacteria, UA Occasional /hpf      MUCUS THREADS Occasional     Amorphous Crystals, UA Moderate     URINE COMMENT --    UA w Reflex to Microscopic w Reflex to Culture [[id number]]  (Abnormal) Collected: 08/31/22 0536    Lab Status: Final result Specimen: Urine, Clean Catch Updated: 08/31/22 0618     Color, UA Yellow     Clarity, UA Turbid     Specific [place], UA 1 017     pH, UA 8 0     Leukocytes, UA Negative     Nitrite, UA Negative     Protein, UA 30 (1+) mg/dl      Glucose, UA Negative mg/dl      Ketones, UA 20 (1+) mg/dl      Urobilinogen, UA <2 0 mg/dl      Bilirubin, UA Negative     Occult Blood, UA Negative     URINE COMMENT --    Urine culture [[id number]] Collected: 08/31/22 0536    Lab Status:  In process Specimen: Urine, Clean Catch Updated: 08/31/22 0618    CMP [[id number]]  (Abnormal) Collected: 08/31/22 0402    Lab Status: Final result Specimen: Blood from Arm, Left Updated: 08/31/22 0445     Sodium 135 mmol/L      Potassium 2 9 mmol/L      Chloride 102 mmol/L      CO2 28 mmol/L      ANION GAP 5 mmol/L      BUN 10 mg/dL      Creatinine 0 65 mg/dL      Glucose 114 mg/dL      Calcium 9 2 mg/dL      AST 7 U/L      ALT 17 U/L      Alkaline Phosphatase 82 U/L      Total Protein 8 0 g/dL      Albumin 3 9 g/dL      Total Bilirubin 0 55 mg/dL      eGFR 119 ml/min/1 73sq m     Narrative:      National Kidney Disease Foundation guidelines for Chronic Kidney Disease (CKD):     Stage 1 with normal or high GFR (GFR > 90 mL/min/1 73 square meters)    Stage 2 Mild CKD (GFR = 60-89 mL/min/1 73 square meters)    Stage 3A Moderate CKD (GFR = 45-59 mL/min/1 73 square meters)    Stage 3B Moderate CKD (GFR = 30-44 mL/min/1 73 square meters)    Stage 4 Severe CKD (GFR = 15-29 mL/min/1 73 square meters)    Stage 5 End Stage CKD (GFR <15 mL/min/1 73 square meters)  Note: GFR calculation is accurate only with a steady state creatinine    Lipase [[id number]]  (Abnormal) Collected: 08/31/22 0402    Lab Status: Final result Specimen: Blood from Arm, Left Updated: 08/31/22 0445     Lipase 399 u/L     CBC and differential [[id number]]  (Abnormal) Collected: 08/31/22 0402    Lab Status: Final result Specimen: Blood from Arm, Left Updated: 08/31/22 0416     WBC 18 65 Thousand/uL      RBC 4 87 Million/uL      Hemoglobin 14 2 g/dL      Hematocrit 41 8 %      MCV 86 fL      MCH 29 2 pg      MCHC 34 0 g/dL      RDW 12 9 %      MPV 11 8 fL      Platelets 584 Thousands/uL      nRBC 0 /100 WBCs      Neutrophils Relative 76 %      Immat GRANS % 1 %      Lymphocytes Relative 17 %      Monocytes Relative 6 %      Eosinophils Relative 0 %      Basophils Relative 0 %      Neutrophils Absolute 14 11 Thousands/µL      Immature Grans Absolute 0 11 Thousand/uL      Lymphocytes Absolute 3 17 Thousands/µL      Monocytes Absolute 1 19 Thousand/µL      Eosinophils Absolute 0 01 Thousand/µL      Basophils Absolute 0 06 Thousands/µL                  No orders to display         Procedures  Procedures      ED Course                                       MDM  Number of Diagnoses or Management Options  Hyperemesis gravidarum  Hypokalemia  Diagnosis management comments: On reexamination, patient improved resolution of symptoms  She passed PO challenge  She asked to be discharged at this time      Disposition  Final diagnoses:   Hyperemesis gravidarum   Hypokalemia     Time reflects when diagnosis was documented in both MDM as applicable and the Disposition within this note     Time User Action Codes Description Comment    8/31/2022  5:20 AM Maple Peppers Add [O21 0] Hyperemesis gravidarum     8/31/2022  5:20 AM Maple Peppers Add [E87 6] Hypokalemia     8/31/2022  6:48 AM Maple Peppers Modify [E87 6] Hypokalemia       ED Disposition     ED Disposition   Discharge    Condition   Stable    Date/Time   Wed Aug 31, 2022  6:46 AM    Comment   Josiane Gutierrez discharge to home/self care                 Follow-up Information     Follow up With Specialties Details Why Kelby Terrazas DO Family Medicine Schedule an appointment as soon as possible for a visit in 2 days  Edie Esposito 3  [phone]            Discharge Medication List as of 8/31/2022  7:32 AM      CONTINUE these medications which have CHANGED    Details   aluminum-magnesium hydroxide-simethicone (MAALOX MAX) 400-400-40 MG/5ML suspension Take 10 mL by mouth every 6 (six) hours as needed for indigestion or heartburn, Starting Wed 8/31/2022, Normal      doxylamine (UNISOM) 25 MG tablet Take 0 5 tablets (12 5 mg total) by mouth daily at bedtime as needed for sleep, Starting Wed 8/31/2022, Normal      ondansetron (Zofran ODT) 4 mg disintegrating tablet Take 1 tablet (4 mg total) by mouth every 6 (six) hours as needed for nausea or vomiting, Starting Wed 8/31/2022, Normal         CONTINUE these medications which have NOT CHANGED    Details   acetaminophen (TYLENOL) 325 mg tablet Take 2 tablets (650 mg total) by mouth every 6 (six) hours as needed for mild pain, Starting Sat 12/5/2020, Normal      famotidine (PEPCID) 20 mg tablet Take 1 tablet (20 mg total) by mouth daily at bedtime for 14 days, Starting Mon 5/30/2022, Until Mon 6/13/2022, Normal      metoclopramide (REGLAN) 5 mg tablet Take 2 tablets (10 mg total) by mouth every 8 (eight) hours as needed (Nausea and vomiting), Starting Mon 5/30/2022, No Print      pantoprazole (PROTONIX) 40 mg tablet Take 1 tablet (40 mg total) by mouth 2 (two) times a day for 14 days, Starting Mon 5/30/2022, Until Mon 6/13/2022, Normal      Pyridoxine HCl (vitamin B-6) 25 MG tablet Take 1 tablet (25 mg total) by mouth 3 (three) times a day, Starting Mon 5/30/2022, Normal      thiamine 100 MG tablet Take 1 tablet (100 mg total) by mouth daily, Starting Mon 5/30/2022, Normal           No discharge procedures on file  PDMP Review       Value Time User    PDMP Reviewed  Yes 1/20/2021  2:27 PM Priscila Roberts PA-C           ED Provider  Attending physically available and evaluated Radha Snell  I managed the patient along with the ED Attending      Electronically Signed by         Tabitha Ramos MD  08/31/22 6384

## 2022-09-01 LAB
BACTERIA UR CULT: ABNORMAL
BACTERIA UR CULT: ABNORMAL

## 2022-09-03 ENCOUNTER — APPOINTMENT (OUTPATIENT)
Dept: RADIOLOGY | Facility: HOSPITAL | Age: 32
End: 2022-09-03
Payer: COMMERCIAL

## 2022-09-03 ENCOUNTER — HOSPITAL ENCOUNTER (EMERGENCY)
Facility: HOSPITAL | Age: 32
Discharge: HOME | End: 2022-09-03
Attending: EMERGENCY MEDICINE
Payer: COMMERCIAL

## 2022-09-03 VITALS
RESPIRATION RATE: 18 BRPM | TEMPERATURE: 97 F | DIASTOLIC BLOOD PRESSURE: 93 MMHG | SYSTOLIC BLOOD PRESSURE: 131 MMHG | HEART RATE: 84 BPM | OXYGEN SATURATION: 100 %

## 2022-09-03 DIAGNOSIS — R10.9 ABDOMINAL CRAMPING: ICD-10-CM

## 2022-09-03 DIAGNOSIS — O03.9 ABORTION: ICD-10-CM

## 2022-09-03 DIAGNOSIS — R11.2 NAUSEA AND VOMITING: Primary | ICD-10-CM

## 2022-09-03 LAB
ABO GROUP BLD: NORMAL
ALBUMIN SERPL BCP-MCNC: 3.9 G/DL (ref 3.5–5)
ALP SERPL-CCNC: 104 U/L (ref 46–116)
ALT SERPL W P-5'-P-CCNC: 70 U/L (ref 12–78)
AMORPH URATE CRY URNS QL MICRO: ABNORMAL
ANION GAP SERPL CALCULATED.3IONS-SCNC: 8 MMOL/L (ref 4–13)
AST SERPL W P-5'-P-CCNC: 30 U/L (ref 5–45)
BACTERIA UR QL AUTO: ABNORMAL /HPF
BASOPHILS # BLD AUTO: 0.07 THOUSANDS/ΜL (ref 0–0.1)
BASOPHILS NFR BLD AUTO: 0 % (ref 0–1)
BILIRUB SERPL-MCNC: 1.12 MG/DL (ref 0.2–1)
BILIRUB UR QL STRIP: NEGATIVE
BLD GP AB SCN SERPL QL: NEGATIVE
BUN SERPL-MCNC: 11 MG/DL (ref 5–25)
CALCIUM SERPL-MCNC: 9.1 MG/DL (ref 8.3–10.1)
CHLORIDE SERPL-SCNC: 99 MMOL/L (ref 96–108)
CLARITY UR: ABNORMAL
CO2 SERPL-SCNC: 25 MMOL/L (ref 21–32)
COLOR UR: ABNORMAL
CREAT SERPL-MCNC: 0.83 MG/DL (ref 0.6–1.3)
EOSINOPHIL # BLD AUTO: 0.23 THOUSAND/ΜL (ref 0–0.61)
EOSINOPHIL NFR BLD AUTO: 1 % (ref 0–6)
ERYTHROCYTE [DISTWIDTH] IN BLOOD BY AUTOMATED COUNT: 12.6 % (ref 11.6–15.1)
GFR SERPL CREATININE-BSD FRML MDRD: 94 ML/MIN/1.73SQ M
GLUCOSE SERPL-MCNC: 108 MG/DL (ref 65–140)
GLUCOSE UR STRIP-MCNC: NEGATIVE MG/DL
HCT VFR BLD AUTO: 42.8 % (ref 34.8–46.1)
HGB BLD-MCNC: 15.3 G/DL (ref 11.5–15.4)
HGB UR QL STRIP.AUTO: ABNORMAL
IMM GRANULOCYTES # BLD AUTO: 0.1 THOUSAND/UL (ref 0–0.2)
IMM GRANULOCYTES NFR BLD AUTO: 1 % (ref 0–2)
KETONES UR STRIP-MCNC: ABNORMAL MG/DL
LEUKOCYTE ESTERASE UR QL STRIP: NEGATIVE
LIPASE SERPL-CCNC: 114 U/L (ref 73–393)
LYMPHOCYTES # BLD AUTO: 3.4 THOUSANDS/ΜL (ref 0.6–4.47)
LYMPHOCYTES NFR BLD AUTO: 18 % (ref 14–44)
MCH RBC QN AUTO: 30 PG (ref 26.8–34.3)
MCHC RBC AUTO-ENTMCNC: 35.7 G/DL (ref 31.4–37.4)
MCV RBC AUTO: 84 FL (ref 82–98)
MONOCYTES # BLD AUTO: 1.06 THOUSAND/ΜL (ref 0.17–1.22)
MONOCYTES NFR BLD AUTO: 6 % (ref 4–12)
MUCOUS THREADS UR QL AUTO: ABNORMAL
NEUTROPHILS # BLD AUTO: 14.13 THOUSANDS/ΜL (ref 1.85–7.62)
NEUTS SEG NFR BLD AUTO: 74 % (ref 43–75)
NITRITE UR QL STRIP: NEGATIVE
NON-SQ EPI CELLS URNS QL MICRO: ABNORMAL /HPF
NRBC BLD AUTO-RTO: 0 /100 WBCS
PH UR STRIP.AUTO: 6.5 [PH]
PLATELET # BLD AUTO: 289 THOUSANDS/UL (ref 149–390)
PMV BLD AUTO: 11.5 FL (ref 8.9–12.7)
POTASSIUM SERPL-SCNC: 3.2 MMOL/L (ref 3.5–5.3)
PROT SERPL-MCNC: 8.1 G/DL (ref 6.4–8.4)
PROT UR STRIP-MCNC: ABNORMAL MG/DL
RBC # BLD AUTO: 5.1 MILLION/UL (ref 3.81–5.12)
RBC #/AREA URNS AUTO: ABNORMAL /HPF
RH BLD: POSITIVE
SODIUM SERPL-SCNC: 132 MMOL/L (ref 135–147)
SP GR UR STRIP.AUTO: 1.02 (ref 1–1.03)
SPECIMEN EXPIRATION DATE: NORMAL
UROBILINOGEN UR STRIP-ACNC: 2 MG/DL
WBC # BLD AUTO: 18.99 THOUSAND/UL (ref 4.31–10.16)
WBC #/AREA URNS AUTO: ABNORMAL /HPF

## 2022-09-03 PROCEDURE — 86901 BLOOD TYPING SEROLOGIC RH(D): CPT | Performed by: EMERGENCY MEDICINE

## 2022-09-03 PROCEDURE — 96376 TX/PRO/DX INJ SAME DRUG ADON: CPT

## 2022-09-03 PROCEDURE — 99285 EMERGENCY DEPT VISIT HI MDM: CPT | Performed by: EMERGENCY MEDICINE

## 2022-09-03 PROCEDURE — 83690 ASSAY OF LIPASE: CPT | Performed by: EMERGENCY MEDICINE

## 2022-09-03 PROCEDURE — 96375 TX/PRO/DX INJ NEW DRUG ADDON: CPT

## 2022-09-03 PROCEDURE — 85025 COMPLETE CBC W/AUTO DIFF WBC: CPT | Performed by: EMERGENCY MEDICINE

## 2022-09-03 PROCEDURE — 76830 TRANSVAGINAL US NON-OB: CPT

## 2022-09-03 PROCEDURE — 76856 US EXAM PELVIC COMPLETE: CPT

## 2022-09-03 PROCEDURE — 86900 BLOOD TYPING SEROLOGIC ABO: CPT | Performed by: EMERGENCY MEDICINE

## 2022-09-03 PROCEDURE — 86850 RBC ANTIBODY SCREEN: CPT | Performed by: EMERGENCY MEDICINE

## 2022-09-03 PROCEDURE — 80053 COMPREHEN METABOLIC PANEL: CPT | Performed by: EMERGENCY MEDICINE

## 2022-09-03 PROCEDURE — 96374 THER/PROPH/DIAG INJ IV PUSH: CPT

## 2022-09-03 PROCEDURE — 99284 EMERGENCY DEPT VISIT MOD MDM: CPT

## 2022-09-03 PROCEDURE — 81001 URINALYSIS AUTO W/SCOPE: CPT | Performed by: EMERGENCY MEDICINE

## 2022-09-03 PROCEDURE — 36415 COLL VENOUS BLD VENIPUNCTURE: CPT | Performed by: EMERGENCY MEDICINE

## 2022-09-03 RX ORDER — ONDANSETRON 4 MG/1
4 TABLET, FILM COATED ORAL EVERY 6 HOURS
Qty: 12 TABLET | Refills: 0 | Status: SHIPPED | OUTPATIENT
Start: 2022-09-03

## 2022-09-03 RX ORDER — ACETAMINOPHEN 325 MG/1
975 TABLET ORAL ONCE
Status: COMPLETED | OUTPATIENT
Start: 2022-09-03 | End: 2022-09-03

## 2022-09-03 RX ORDER — ONDANSETRON 2 MG/ML
4 INJECTION INTRAMUSCULAR; INTRAVENOUS ONCE
Status: COMPLETED | OUTPATIENT
Start: 2022-09-03 | End: 2022-09-03

## 2022-09-03 RX ORDER — HYDROMORPHONE HCL/PF 1 MG/ML
0.5 SYRINGE (ML) INJECTION ONCE
Status: COMPLETED | OUTPATIENT
Start: 2022-09-03 | End: 2022-09-03

## 2022-09-03 RX ADMIN — ONDANSETRON 4 MG: 2 INJECTION INTRAMUSCULAR; INTRAVENOUS at 09:50

## 2022-09-03 RX ADMIN — ONDANSETRON 4 MG: 2 INJECTION INTRAMUSCULAR; INTRAVENOUS at 12:33

## 2022-09-03 RX ADMIN — HYDROMORPHONE HYDROCHLORIDE 0.5 MG: 1 INJECTION, SOLUTION INTRAMUSCULAR; INTRAVENOUS; SUBCUTANEOUS at 09:50

## 2022-09-03 RX ADMIN — ACETAMINOPHEN 975 MG: 325 TABLET ORAL at 09:50

## 2022-09-03 NOTE — ED ATTENDING ATTESTATION
9/3/2022  I, Mauricio Dubose MD, saw and evaluated the patient  I have discussed the patient with the resident/non-physician practitioner and agree with the resident's/non-physician practitioner's findings, Plan of Care, and MDM as documented in the resident's/non-physician practitioner's note, except where noted  All available labs and Radiology studies were reviewed  I was present for key portions of any procedure(s) performed by the resident/non-physician practitioner and I was immediately available to provide assistance  At this point I agree with the current assessment done in the Emergency Department  I have conducted an independent evaluation of this patient a history and physical is as follows: This is evaluation of a 45-year-old female  with history of hyperemesis gravidarum during her pregnancy who presents to the emergency department 1 day status post elective  complaining of lower abdominal cramping and vaginal bleeding  Patient states that she still has had some ongoing nausea vomiting but it is improving  She notes that this morning her bleeding and cramping intensified prompting her to come to the emergency department for further evaluation  She notes that she is using a thin pad anything to change it approximately 1 and half to 2 hours  She does note occasional clots  She denies any chest pain pressure or palpitations  No shortness of breath  No urinary symptoms  No bowel changes  On exam patient is nontoxic and in no acute distress  She has already received Zofran and IV fluid hydration which has significantly improved her symptoms  Not soaking through pads at this time  Vital signs currently stable with heart rate in the 80s  HEENT is normocephalic and atraumatic with moist mucous membranes and clear sclera and conjunctiva  Neck is supple full range of motion  Heart is regular rate with no appreciable murmurs  Lungs are clear no wheezing rhonchi rales  Abdomen is soft with positive bowel sounds mild tenderness to palpation over lower abdomen no rebound no guarding no palpable masses  No edema no calf tenderness palpation  Intact distal pulses capillary refill less than 2 seconds  Awake alert oriented appropriate  Assessment and plan 71-year-old female status post elective AB now with ongoing bleeding and cramping  She is not bleeding through a pad per hour but given her ongoing symptoms she presents for evaluation  Will obtain basic blood work as well as ultrasound imaging  Will give IV fluid hydration antiemetic as needed monitor and treat accordingly  ED Course     Patient feels significantly improved at this time  Heart rate has been in the 80s blood pressure stable 120 to 130s she denies any lightheadedness dizziness chest pain shortness of breath nausea or vomiting at this time  Patient is actively drinking water and juice without difficulty  Discussed in detail her ultrasound report  PT with [facility] uchanged from previous two visits, likely related to vomiting given no fever or other infectious sxms at this time, advised f/u to resolution  Dr Jac Reina discussed in detail with Dr Dariana Rogers from [facility] [place]  Given patient is stable vital signs and feels improved at this time with stable hemoglobin okay for discharge  Patient given extremely strict return and follow-up precautions  Discussed that if she has ongoing bleeding worsening bleeding or any other symptoms including lightheadedness dizziness chest pain shortness of breath that she should return for further evaluation  Patient expresses understanding and is in agreement with this plan  PT would like to go home  She will otherwise call and follow-up with OBGYN within the coming week  She was also provided with an ambulatory referral     Portions of the record may have been created with voice recognition software    Occasional wrong word or sound-a-like" substitutions may have occurred due to the inherent limitations of voice recognition software  Review chart carefully and recognize, using context, where substitutions have occurred      Critical Care Time  Procedures

## 2022-09-03 NOTE — ED PROVIDER NOTES
History  Chief Complaint   Patient presents with    Abdominal Pain     Abdominal pain, vaginal bleeding, vomiting  Had an  yesterday  29 y/o female, A2 s/p  (yesterday at 8 weeks) presents to the ED for evaluation of lower abdominal cramping, nausea, vomiting, and vaginal bleeding since last night  She had been experiencing hyperemesis gravidarum with her current pregnancy and was evaluated in the ER multiple times for her symptoms  She ultimately decided with her partner to terminate this pregnancy and was seen at the [facility] yesterday where she reports that "they did the suction thing " She denies fever, chills, cough, SOB, chest pain, upper abdominal pain, diarrhea, urinary symptoms, and vaginal discharge  Prior to Admission Medications   Prescriptions Last Dose Informant Patient Reported? Taking?    Pyridoxine HCl (vitamin B-6) 25 MG tablet   No No   Sig: Take 1 tablet (25 mg total) by mouth 3 (three) times a day   acetaminophen (TYLENOL) 325 mg tablet   No No   Sig: Take 2 tablets (650 mg total) by mouth every 6 (six) hours as needed for mild pain   aluminum-magnesium hydroxide-simethicone (MAALOX MAX) 400-400-40 MG/5ML suspension   No No   Sig: Take 10 mL by mouth every 6 (six) hours as needed for indigestion or heartburn   doxylamine (UNISOM) 25 MG tablet   No No   Sig: Take 0 5 tablets (12 5 mg total) by mouth 3 (three) times a day for 7 days   doxylamine (UNISOM) 25 MG tablet   No No   Sig: Take 0 5 tablets (12 5 mg total) by mouth daily at bedtime as needed for sleep   famotidine (PEPCID) 20 mg tablet   No No   Sig: Take 1 tablet (20 mg total) by mouth daily at bedtime for 14 days   metoclopramide (REGLAN) 5 mg tablet   No No   Sig: Take 2 tablets (10 mg total) by mouth every 8 (eight) hours as needed (Nausea and vomiting)   ondansetron (Zofran ODT) 4 mg disintegrating tablet   No No   Sig: Take 1 tablet (4 mg total) by mouth every 6 (six) hours as needed for nausea or vomiting   pantoprazole (PROTONIX) 40 mg tablet   No No   Sig: Take 1 tablet (40 mg total) by mouth 2 (two) times a day for 14 days   thiamine 100 MG tablet   No No   Sig: Take 1 tablet (100 mg total) by mouth daily      Facility-Administered Medications: None       Past Medical History:   Diagnosis Date    Arthritis     Asthma     History of stomach ulcers     Psychiatric disorder     anxiety       Past Surgical History:   Procedure Laterality Date    BREAST SURGERY      COSMETIC SURGERY      EGD      WISDOM TOOTH EXTRACTION         History reviewed  No pertinent family history  I have reviewed and agree with the history as documented  E-Cigarette/Vaping    E-Cigarette Use Never User      E-Cigarette/Vaping Substances    Nicotine No     THC Yes     CBD Yes     Flavoring No     Other No     Unknown No      Social History     Tobacco Use    Smoking status: Never Smoker    Smokeless tobacco: Never Used   Vaping Use    Vaping Use: Never used   Substance Use Topics    Alcohol use: Not Currently    Drug use: Not Currently     Types: Marijuana        Review of Systems   Constitutional: Negative for chills and fever  HENT: Negative for congestion, rhinorrhea and sore throat  Respiratory: Negative for cough and shortness of breath  Cardiovascular: Negative for chest pain and palpitations  Gastrointestinal: Positive for abdominal pain, nausea and vomiting  Negative for diarrhea  Genitourinary: Positive for vaginal bleeding  Negative for dysuria, flank pain, hematuria and vaginal discharge  See HPI   Musculoskeletal: Negative for back pain and neck pain  Neurological: Negative for dizziness, weakness, light-headedness, numbness and headaches  All other systems reviewed and are negative        Physical Exam  ED Triage Vitals [09/03/22 0857]   Temperature Pulse Respirations Blood Pressure SpO2   (!) 97 °F (36 1 °C) (!) 142 16 131/93 98 %      Temp src Heart Rate Source Patient Position - Orthostatic VS BP Location FiO2 (%)   -- -- -- -- --      Pain Score       8             Orthostatic Vital Signs  Vitals:    09/03/22 0857 09/03/22 1114 09/03/22 1238   BP: 131/93     Pulse: (!) 142 89 84       Physical Exam  Vitals and nursing note reviewed  Constitutional:       General: She is not in acute distress  Appearance: Normal appearance  She is normal weight  She is not ill-appearing  HENT:      Head: Normocephalic and atraumatic  Right Ear: External ear normal       Left Ear: External ear normal       Nose: Nose normal  No congestion or rhinorrhea  Mouth/Throat:      Mouth: Mucous membranes are moist       Pharynx: Oropharynx is clear  No oropharyngeal exudate or posterior oropharyngeal erythema  Eyes:      Extraocular Movements: Extraocular movements intact  Conjunctiva/sclera: Conjunctivae normal       Pupils: Pupils are equal, round, and reactive to light  Cardiovascular:      Rate and Rhythm: Normal rate and regular rhythm  Pulses: Normal pulses  Heart sounds: Normal heart sounds  No murmur heard  Pulmonary:      Effort: Pulmonary effort is normal  No respiratory distress  Breath sounds: Normal breath sounds  No wheezing or rales  Abdominal:      General: Abdomen is flat  Bowel sounds are normal  There is no distension  Palpations: Abdomen is soft  Tenderness: There is abdominal tenderness in the suprapubic area  There is no right CVA tenderness, left CVA tenderness, guarding or rebound  Musculoskeletal:         General: No swelling or tenderness  Normal range of motion  Cervical back: Normal range of motion and neck supple  No tenderness  Skin:     General: Skin is warm and dry  Capillary Refill: Capillary refill takes less than 2 seconds  Neurological:      General: No focal deficit present  Mental Status: She is alert and oriented to person, place, and time           ED Medications  Medications ondansetron [facility]) injection 4 mg (4 mg Intravenous Given 9/3/22 0950)   HYDROmorphone (DILAUDID) injection 0 5 mg (0 5 mg Intravenous Given 9/3/22 0950)   acetaminophen (TYLENOL) tablet 975 mg (975 mg Oral Given 9/3/22 0950)   ondansetron (ZOFRAN) injection 4 mg (4 mg Intravenous Given 9/3/22 1233)       Diagnostic Studies  Results Reviewed     Procedure Component Value Units Date/Time    Urine Microscopic [[id number]]  (Abnormal) Collected: 09/03/22 1032    Lab Status: Final result Specimen: Urine, Clean Catch Updated: 09/03/22 1054     RBC, UA Innumerable /hpf      WBC, UA 2-4 /hpf      Epithelial Cells Occasional /hpf      Bacteria, UA Occasional /hpf      MUCUS THREADS Occasional     Amorphous Crystals, UA Occasional    UA w Reflex to Microscopic w Reflex to Culture [[id number]]  (Abnormal) Collected: 09/03/22 1032    Lab Status: Final result Specimen: Urine, Clean Catch Updated: 09/03/22 1046     Color, UA Light Orange     Clarity, UA Turbid     Specific [place], UA 1 018     pH, UA 6 5     Leukocytes, UA Negative     Nitrite, UA Negative     Protein, UA Trace mg/dl      Glucose, UA Negative mg/dl      Ketones, UA 60 (2+) mg/dl      Urobilinogen, UA 2 0 mg/dl      Bilirubin, UA Negative     Occult Blood, UA Large    Comprehensive metabolic panel [[id number]]  (Abnormal) Collected: 09/03/22 0950    Lab Status: Final result Specimen: Blood from Arm, Right Updated: 09/03/22 1020     Sodium 132 mmol/L      Potassium 3 2 mmol/L      Chloride 99 mmol/L      CO2 25 mmol/L      ANION GAP 8 mmol/L      BUN 11 mg/dL      Creatinine 0 83 mg/dL      Glucose 108 mg/dL      Calcium 9 1 mg/dL      AST 30 U/L      ALT 70 U/L      Alkaline Phosphatase 104 U/L      Total Protein 8 1 g/dL      Albumin 3 9 g/dL      Total Bilirubin 1 12 mg/dL      eGFR 94 ml/min/1 73sq m     Narrative:      [place] guidelines for Chronic Kidney Disease (CKD):     Stage 1 with normal or high GFR (GFR > 90 mL/min/1 73 square meters)    Stage 2 Mild CKD (GFR = 60-89 mL/min/1 73 square meters)    Stage 3A Moderate CKD (GFR = 45-59 mL/min/1 73 square meters)    Stage 3B Moderate CKD (GFR = 30-44 mL/min/1 73 square meters)    Stage 4 Severe CKD (GFR = 15-29 mL/min/1 73 square meters)    Stage 5 End Stage CKD (GFR <15 mL/min/1 73 square meters)  Note: GFR calculation is accurate only with a steady state creatinine    Lipase [[id number]]  (Normal) Collected: 09/03/22 0950    Lab Status: Final result Specimen: Blood from Arm, Right Updated: 09/03/22 1020     Lipase 114 u/L     CBC and differential [[id number]]  (Abnormal) Collected: 09/03/22 0950    Lab Status: Final result Specimen: Blood from Arm, Right Updated: 09/03/22 1003     WBC 18 99 Thousand/uL      RBC 5 10 Million/uL      Hemoglobin 15 3 g/dL      Hematocrit 42 8 %      MCV 84 fL      MCH 30 0 pg      MCHC 35 7 g/dL      RDW 12 6 %      MPV 11 5 fL      Platelets 259 Thousands/uL      nRBC 0 /100 WBCs      Neutrophils Relative 74 %      Immat GRANS % 1 %      Lymphocytes Relative 18 %      Monocytes Relative 6 %      Eosinophils Relative 1 %      Basophils Relative 0 %      Neutrophils Absolute 14 13 Thousands/µL      Immature Grans Absolute 0 10 Thousand/uL      Lymphocytes Absolute 3 40 Thousands/µL      Monocytes Absolute 1 06 Thousand/µL      Eosinophils Absolute 0 23 Thousand/µL      Basophils Absolute 0 07 Thousands/µL                  US pelvis complete w transvaginal   Final Result by Cece Luna MD (09/03 1050)       Markedly distended endometrial cavity containing heterogeneous, complex material, likely blood clots  No associated abnormal internal vascularity demonstrated                    Workstation performed: [id number]               Procedures  Procedures      ED Course  ED Course as of 09/03/22 1952   Sat Sep 03, 2022   1112 US pelvis complete w transvaginal  "Markedly distended endometrial cavity containing heterogeneous, complex material, likely blood clots  No associated abnormal internal vascularity demonstrated "                                       MDM  Number of Diagnoses or Management Options  Abdominal cramping    Nausea and vomiting  Diagnosis management comments: This is a 72-year-old female A2 s/p  (yesterday at 8 weeks) presenting for evaluation of lower abdominal cramping, nausea, vomiting, and vaginal bleeding since last night  She had been experiencing hyperemesis gravidarum with her current pregnancy and was evaluated in the ER multiple times for her symptoms  She ultimately decided with her partner to terminate this pregnancy and was seen at the [facility] yesterday where she reports that "they did the suction thing " She denies fever, chills, cough, SOB, chest pain, upper abdominal pain, diarrhea, urinary symptoms, and vaginal discharge  On exam the patient is afebrile, appears uncomfortable due to nausea and abdominal cramping  Heart with regular rate and rhythm, lungs are clear to auscultation bilaterally, abdomen is soft and nondistended, with mild lower abdominal tenderness to palpation, no palpable masses or uterine fundus  Will check labs and ultrasound  Ultrasound shows "Markedly distended endometrial cavity containing heterogeneous, complex material, likely blood clots  No associated abnormal internal vascularity demonstrated " Work up otherwise within normal limits and reassuring  Dr Janki Dumont, OBGYN on-call, consulted  Recommends close return precautions and outpatient follow up, considering that the patient is stable, symptoms controlled, and on with only a small amount of bleeding; findings may represent retained products versus post-procedural changes and blood clots  Patient will contact her OBGYN for close follow-up this week and return to the ER if she has any worsening symptoms   Discussed all findings, treatment, red flags/return precautions, and outpatient follow-up and the patient/family understands and agrees  Stable for discharge  Disposition  Final diagnoses:   Nausea and vomiting   Abdominal cramping        Time reflects when diagnosis was documented in both MDM as applicable and the Disposition within this note     Time User Action Codes Description Comment    9/3/2022 12:29 PM Savita Myers Add [R11 2] Nausea and vomiting     9/3/2022 12:29 PM Savita Myers Add [R10 9] Abdominal cramping     9/3/2022 12:29 PM Savita Myers Add [O03 9]        ED Disposition     ED Disposition   Discharge    Condition   Stable    Date/Time   Sat Sep 3, 2022 12:30 PM    Comment   Tyree Monge discharge to home/self care                 Follow-up Information     Follow up With Specialties Details Why Contact Info Additional [address] [facility], DO Family Medicine Call  As needed [address]   [place] [address] Obstetrics and Gynecology Call in 3 days For follow up [address], [place], [place], [address] [facility] Emergency Medicine Go to  If symptoms worsen [address] [phone]  [address] [facility] 64 [facility], [address] [place], [place], [address]          Discharge Medication List as of 9/3/2022 12:30 PM      CONTINUE these medications which have NOT CHANGED    Details   acetaminophen (TYLENOL) 325 mg tablet Take 2 tablets (650 mg total) by mouth every 6 (six) hours as needed for mild pain, Starting Sat 2020, Normal      aluminum-magnesium hydroxide-simethicone (MAALOX MAX) 400-400-40 MG/5ML suspension Take 10 mL by mouth every 6 (six) hours as needed for indigestion or heartburn, Starting Wed 8/31/2022, Normal      doxylamine (UNISOM) 25 MG tablet Take 0 5 tablets (12 5 mg total) by mouth daily at bedtime as needed for sleep, Starting Wed 8/31/2022, Normal      famotidine (PEPCID) 20 mg tablet Take 1 tablet (20 mg total) by mouth daily at bedtime for 14 days, Starting Mon 5/30/2022, Until Mon 6/13/2022, Normal      metoclopramide (REGLAN) 5 mg tablet Take 2 tablets (10 mg total) by mouth every 8 (eight) hours as needed (Nausea and vomiting), Starting Mon 5/30/2022, No Print      ondansetron (Zofran ODT) 4 mg disintegrating tablet Take 1 tablet (4 mg total) by mouth every 6 (six) hours as needed for nausea or vomiting, Starting Wed 8/31/2022, Normal      pantoprazole (PROTONIX) 40 mg tablet Take 1 tablet (40 mg total) by mouth 2 (two) times a day for 14 days, Starting Mon 5/30/2022, Until Mon 6/13/2022, Normal      Pyridoxine HCl (vitamin B-6) 25 MG tablet Take 1 tablet (25 mg total) by mouth 3 (three) times a day, Starting Mon 5/30/2022, Normal      thiamine 100 MG tablet Take 1 tablet (100 mg total) by mouth daily, Starting Mon 5/30/2022, Normal               PDMP Review       Value Time User    PDMP Reviewed  Yes 1/20/2021  2:27 PM Cisco Nagy PA-C           ED Provider  Attending physically available and evaluated Jacquelinamen   I managed the patient along with the ED Attending      Electronically Signed by         Dion Milner MD  09/03/22 8203

## 2023-07-16 ENCOUNTER — HOSPITAL ENCOUNTER (EMERGENCY)
Facility: HOSPITAL | Age: 33
Discharge: HOME | End: 2023-07-17
Attending: EMERGENCY MEDICINE
Payer: COMMERCIAL

## 2023-07-16 VITALS
TEMPERATURE: 97.8 F | OXYGEN SATURATION: 98 % | RESPIRATION RATE: 20 BRPM | HEART RATE: 78 BPM | DIASTOLIC BLOOD PRESSURE: 78 MMHG | SYSTOLIC BLOOD PRESSURE: 129 MMHG

## 2023-07-16 DIAGNOSIS — F12.90 CANNABINOID HYPEREMESIS SYNDROME: ICD-10-CM

## 2023-07-16 DIAGNOSIS — R11.2 CANNABINOID HYPEREMESIS SYNDROME: ICD-10-CM

## 2023-07-16 DIAGNOSIS — R11.10 VOMITING: Primary | ICD-10-CM

## 2023-07-16 LAB
ALBUMIN SERPL BCP-MCNC: 4 G/DL (ref 3.5–5)
ALP SERPL-CCNC: 64 U/L (ref 46–116)
ALT SERPL W P-5'-P-CCNC: 27 U/L (ref 12–78)
ANION GAP SERPL CALCULATED.3IONS-SCNC: 5 MMOL/L
AST SERPL W P-5'-P-CCNC: 12 U/L (ref 5–45)
B-HCG SERPL-ACNC: <1 MIU/ML (ref 0–5)
BASOPHILS # BLD AUTO: 0.02 THOUSANDS/ÂΜL (ref 0–0.1)
BASOPHILS NFR BLD AUTO: 0 % (ref 0–1)
BILIRUB SERPL-MCNC: 0.41 MG/DL (ref 0.2–1)
BUN SERPL-MCNC: 10 MG/DL (ref 5–25)
CALCIUM SERPL-MCNC: 8.6 MG/DL (ref 8.3–10.1)
CHLORIDE SERPL-SCNC: 112 MMOL/L (ref 96–108)
CO2 SERPL-SCNC: 23 MMOL/L (ref 21–32)
CREAT SERPL-MCNC: 0.81 MG/DL (ref 0.6–1.3)
EOSINOPHIL # BLD AUTO: 0 THOUSAND/ÂΜL (ref 0–0.61)
EOSINOPHIL NFR BLD AUTO: 0 % (ref 0–6)
ERYTHROCYTE [DISTWIDTH] IN BLOOD BY AUTOMATED COUNT: 13.2 % (ref 11.6–15.1)
GFR SERPL CREATININE-BSD FRML MDRD: 97 ML/MIN/1.73SQ M
GLUCOSE SERPL-MCNC: 116 MG/DL (ref 65–140)
HCT VFR BLD AUTO: 42.1 % (ref 34.8–46.1)
HGB BLD-MCNC: 14.1 G/DL (ref 11.5–15.4)
IMM GRANULOCYTES # BLD AUTO: 0.03 THOUSAND/UL (ref 0–0.2)
IMM GRANULOCYTES NFR BLD AUTO: 0 % (ref 0–2)
LIPASE SERPL-CCNC: 84 U/L (ref 73–393)
LYMPHOCYTES # BLD AUTO: 0.93 THOUSANDS/ÂΜL (ref 0.6–4.47)
LYMPHOCYTES NFR BLD AUTO: 9 % (ref 14–44)
MCH RBC QN AUTO: 29.8 PG (ref 26.8–34.3)
MCHC RBC AUTO-ENTMCNC: 33.5 G/DL (ref 31.4–37.4)
MCV RBC AUTO: 89 FL (ref 82–98)
MONOCYTES # BLD AUTO: 0.98 THOUSAND/ÂΜL (ref 0.17–1.22)
MONOCYTES NFR BLD AUTO: 9 % (ref 4–12)
NEUTROPHILS # BLD AUTO: 8.85 THOUSANDS/ÂΜL (ref 1.85–7.62)
NEUTS SEG NFR BLD AUTO: 82 % (ref 43–75)
NRBC BLD AUTO-RTO: 0 /100 WBCS
PLATELET # BLD AUTO: 184 THOUSANDS/UL (ref 149–390)
PMV BLD AUTO: 12 FL (ref 8.9–12.7)
POTASSIUM SERPL-SCNC: 3.3 MMOL/L (ref 3.5–5.3)
PROT SERPL-MCNC: 7.4 G/DL (ref 6.4–8.4)
RBC # BLD AUTO: 4.73 MILLION/UL (ref 3.81–5.12)
SODIUM SERPL-SCNC: 140 MMOL/L (ref 135–147)
WBC # BLD AUTO: 10.81 THOUSAND/UL (ref 4.31–10.16)

## 2023-07-16 PROCEDURE — 93005 ELECTROCARDIOGRAM TRACING: CPT

## 2023-07-16 PROCEDURE — 84702 CHORIONIC GONADOTROPIN TEST: CPT

## 2023-07-16 PROCEDURE — 96375 TX/PRO/DX INJ NEW DRUG ADDON: CPT

## 2023-07-16 PROCEDURE — 85025 COMPLETE CBC W/AUTO DIFF WBC: CPT

## 2023-07-16 PROCEDURE — 83690 ASSAY OF LIPASE: CPT

## 2023-07-16 PROCEDURE — 96365 THER/PROPH/DIAG IV INF INIT: CPT

## 2023-07-16 PROCEDURE — 80053 COMPREHEN METABOLIC PANEL: CPT

## 2023-07-16 PROCEDURE — 96366 THER/PROPH/DIAG IV INF ADDON: CPT

## 2023-07-16 PROCEDURE — 36415 COLL VENOUS BLD VENIPUNCTURE: CPT

## 2023-07-16 PROCEDURE — 99284 EMERGENCY DEPT VISIT MOD MDM: CPT

## 2023-07-16 RX ORDER — ONDANSETRON 2 MG/ML
4 INJECTION INTRAMUSCULAR; INTRAVENOUS ONCE
Status: COMPLETED | OUTPATIENT
Start: 2023-07-16 | End: 2023-07-16

## 2023-07-16 RX ORDER — ONDANSETRON 2 MG/ML
INJECTION INTRAMUSCULAR; INTRAVENOUS
Status: COMPLETED
Start: 2023-07-16 | End: 2023-07-16

## 2023-07-16 RX ORDER — FENTANYL CITRATE 50 UG/ML
50 INJECTION, SOLUTION INTRAMUSCULAR; INTRAVENOUS ONCE
Status: COMPLETED | OUTPATIENT
Start: 2023-07-16 | End: 2023-07-16

## 2023-07-16 RX ORDER — DROPERIDOL 2.5 MG/ML
0.62 INJECTION, SOLUTION INTRAMUSCULAR; INTRAVENOUS ONCE
Status: COMPLETED | OUTPATIENT
Start: 2023-07-16 | End: 2023-07-16

## 2023-07-16 RX ORDER — SODIUM CHLORIDE, SODIUM GLUCONATE, SODIUM ACETATE, POTASSIUM CHLORIDE, MAGNESIUM CHLORIDE, SODIUM PHOSPHATE, DIBASIC, AND POTASSIUM PHOSPHATE .53; .5; .37; .037; .03; .012; .00082 G/100ML; G/100ML; G/100ML; G/100ML; G/100ML; G/100ML; G/100ML
1000 INJECTION, SOLUTION INTRAVENOUS ONCE
Status: COMPLETED | OUTPATIENT
Start: 2023-07-16 | End: 2023-07-16

## 2023-07-16 RX ADMIN — ONDANSETRON 4 MG: 2 INJECTION INTRAMUSCULAR; INTRAVENOUS at 21:56

## 2023-07-16 RX ADMIN — DROPERIDOL 0.62 MG: 2.5 INJECTION, SOLUTION INTRAMUSCULAR; INTRAVENOUS at 23:33

## 2023-07-16 RX ADMIN — SODIUM CHLORIDE, SODIUM GLUCONATE, SODIUM ACETATE, POTASSIUM CHLORIDE, MAGNESIUM CHLORIDE, SODIUM PHOSPHATE, DIBASIC, AND POTASSIUM PHOSPHATE 1000 ML: .53; .5; .37; .037; .03; .012; .00082 INJECTION, SOLUTION INTRAVENOUS at 22:00

## 2023-07-16 RX ADMIN — FENTANYL CITRATE 50 MCG: 50 INJECTION INTRAMUSCULAR; INTRAVENOUS at 21:55

## 2023-07-17 LAB
ATRIAL RATE: 67 BPM
P AXIS: 76 DEGREES
PR INTERVAL: 138 MS
QRS AXIS: 82 DEGREES
QRSD INTERVAL: 80 MS
QT INTERVAL: 416 MS
QTC INTERVAL: 439 MS
T WAVE AXIS: 57 DEGREES
VENTRICULAR RATE: 67 BPM

## 2023-07-17 PROCEDURE — 96376 TX/PRO/DX INJ SAME DRUG ADON: CPT

## 2023-07-17 PROCEDURE — 96375 TX/PRO/DX INJ NEW DRUG ADDON: CPT

## 2023-07-17 PROCEDURE — 93010 ELECTROCARDIOGRAM REPORT: CPT | Performed by: INTERNAL MEDICINE

## 2023-07-17 RX ORDER — ONDANSETRON 2 MG/ML
4 INJECTION INTRAMUSCULAR; INTRAVENOUS ONCE
Status: COMPLETED | OUTPATIENT
Start: 2023-07-17 | End: 2023-07-17

## 2023-07-17 RX ORDER — LORAZEPAM 2 MG/ML
1 INJECTION INTRAMUSCULAR ONCE
Status: COMPLETED | OUTPATIENT
Start: 2023-07-17 | End: 2023-07-17

## 2023-07-17 RX ORDER — ONDANSETRON 4 MG/1
4 TABLET, FILM COATED ORAL EVERY 6 HOURS
Qty: 12 TABLET | Refills: 0 | Status: SHIPPED | OUTPATIENT
Start: 2023-07-17

## 2023-07-17 RX ORDER — CAPSAICIN 0.025 %
1 CREAM (GRAM) TOPICAL 2 TIMES DAILY
Qty: 60 G | Refills: 0 | Status: SHIPPED | OUTPATIENT
Start: 2023-07-17

## 2023-07-17 RX ADMIN — LORAZEPAM 1 MG: 2 INJECTION INTRAMUSCULAR; INTRAVENOUS at 00:13

## 2023-07-17 RX ADMIN — ONDANSETRON 4 MG: 2 INJECTION INTRAMUSCULAR; INTRAVENOUS at 00:13

## 2023-07-17 NOTE — ED ATTENDING ATTESTATION
7/16/2023  I, Lauren Banuelos MD, saw and evaluated the patient. I have discussed the patient with the resident/non-physician practitioner and agree with the resident's/non-physician practitioner's findings, Plan of Care, and MDM as documented in the resident's/non-physician practitioner's note, except where noted. All available labs and Radiology studies were reviewed. I was present for key portions of any procedure(s) performed by the resident/non-physician practitioner and I was immediately available to provide assistance. At this point I agree with the current assessment done in the Emergency Department.   I have conducted an independent evaluation of this patient a history and physical is as follows:  C/o vomiting    Non bilious non bloody    Has epigastric pain   Has chronic back pain s/p nerve ablation   No fever   No diarrhea   No dysuria   No abd surgery   No sick contacts   Non toxic   No drug drug use other than marijuana  No alcohol abuse  Mm dry  Anicteric   Lungs clear heart rrr no m abd soft  Pos bs   Tender in upper abd  Ext normal   Imp    Gastroenteritis      Neuropathic pain syndrome   Patient was seen at 5 AM at [facility] emergency department today she had an extensive work-up which included ct  of the abdomen pelvis which was normal lab work-up which was normal lipase normal negative pregnancy test negative urinalysis she was treated symptomatically and discharged    ED Course   PDMP queried patient received small rx of narcotics in  2022     Patient will be treated symptomatically and reevaluated  Critical Care Time  Procedures

## 2023-07-17 NOTE — DISCHARGE INSTRUCTIONS
You were seen in the emergency department for nausea and vomiting along with back pain. The back pain appears to be clinically related to your nausea and vomiting. We believe that the nausea vomiting was secondary to cannabinoid hyperemesis syndrome. We used recommend that you avoid cannabis to prevent recurrent symptoms. Your lab work was normal, there is no evidence of pregnancy. If you have new or worsening symptoms please return to the ED. You can also follow-up with your primary care doctor. We have sent medications to your pharmacy, ondansetron/Zofran for nausea, and capsaicin cream for relief of your symptoms.

## 2023-07-17 NOTE — ED PROVIDER NOTES
History  Chief Complaint   Patient presents with   • Vomiting     Pt reports she "cannot stop throwing up" along with extreme abdominal pain that has gotten worse over the last couple days. HPI  Patient is a 33 y.o. female with history of anxiety, asthma, arthritis, stomach ulcers presenting to the emergency department for nausea and vomiting. Patient states that she has had nausea and vomiting all day that does not allow her to intake anything orally. Patient states that she has chronic back pain which she believes is made worse by her incontractible vomiting. Patient states that she has had this before but they have not figured out why she has vomiting episodes. Patient denies any headache dizziness chest pain shortness of breath diarrhea dysuria. Patient denies sick contacts denies travel  Prior to Admission Medications   Prescriptions Last Dose Informant Patient Reported? Taking?    Pyridoxine HCl (vitamin B-6) 25 MG tablet   No No   Sig: Take 1 tablet (25 mg total) by mouth 3 (three) times a day   acetaminophen (TYLENOL) 325 mg tablet   No No   Sig: Take 2 tablets (650 mg total) by mouth every 6 (six) hours as needed for mild pain   aluminum-magnesium hydroxide-simethicone (MAALOX MAX) 400-400-40 MG/5ML suspension   No No   Sig: Take 10 mL by mouth every 6 (six) hours as needed for indigestion or heartburn   doxylamine (UNISOM) 25 MG tablet   No No   Sig: Take 0.5 tablets (12.5 mg total) by mouth daily at bedtime as needed for sleep   famotidine (PEPCID) 20 mg tablet   No No   Sig: Take 1 tablet (20 mg total) by mouth daily at bedtime for 14 days   metoclopramide (REGLAN) 5 mg tablet   No No   Sig: Take 2 tablets (10 mg total) by mouth every 8 (eight) hours as needed (Nausea and vomiting)   ondansetron (ZOFRAN) 4 mg tablet   No No   Sig: Take 1 tablet (4 mg total) by mouth every 6 (six) hours   ondansetron (Zofran ODT) 4 mg disintegrating tablet   No No   Sig: Take 1 tablet (4 mg total) by mouth every 6 (six) hours as needed for nausea or vomiting   pantoprazole (PROTONIX) 40 mg tablet   No No   Sig: Take 1 tablet (40 mg total) by mouth 2 (two) times a day for 14 days   thiamine 100 MG tablet   No No   Sig: Take 1 tablet (100 mg total) by mouth daily      Facility-Administered Medications: None       Past Medical History:   Diagnosis Date   • Arthritis    • Asthma    • History of stomach ulcers    • Psychiatric disorder     anxiety       Past Surgical History:   Procedure Laterality Date   • BREAST SURGERY     • COSMETIC SURGERY     • EGD     • WISDOM TOOTH EXTRACTION         History reviewed. No pertinent family history. I have reviewed and agree with the history as documented. E-Cigarette/Vaping   • E-Cigarette Use Never User      E-Cigarette/Vaping Substances   • Nicotine No    • THC Yes    • CBD Yes    • Flavoring No    • Other No    • Unknown No      Social History     Tobacco Use   • Smoking status: Never   • Smokeless tobacco: Never   Vaping Use   • Vaping Use: Never used   Substance Use Topics   • Alcohol use: Not Currently   • Drug use: Not Currently     Types: Marijuana        Review of Systems   Constitutional: Negative for chills and fever. HENT: Negative for ear pain and sore throat. Eyes: Negative for pain and visual disturbance. Respiratory: Negative for cough and shortness of breath. Cardiovascular: Negative for chest pain and palpitations. Gastrointestinal: Positive for nausea and vomiting. Negative for abdominal pain, constipation and diarrhea. Genitourinary: Negative for dysuria and hematuria. Musculoskeletal: Positive for back pain and myalgias. Negative for arthralgias. Skin: Negative for color change and rash. Neurological: Negative for dizziness, seizures, syncope, numbness and headaches. Psychiatric/Behavioral: The patient is nervous/anxious. All other systems reviewed and are negative.       Physical Exam  ED Triage Vitals [07/16/23 2136]   Temperature Pulse Respirations Blood Pressure SpO2   97.8 °F (36.6 °C) 78 20 129/78 98 %      Temp Source Heart Rate Source Patient Position - Orthostatic VS BP Location FiO2 (%)   Oral Monitor Lying Left arm --      Pain Score       9             Orthostatic Vital Signs  Vitals:    07/16/23 2136   BP: 129/78   Pulse: 78   Patient Position - Orthostatic VS: Lying       Physical Exam  Vitals and nursing note reviewed. Constitutional:       General: She is not in acute distress. Appearance: She is well-developed. She is not ill-appearing or diaphoretic. HENT:      Head: Normocephalic and atraumatic. Mouth/Throat:      Mouth: Mucous membranes are dry. Eyes:      Extraocular Movements: Extraocular movements intact. Conjunctiva/sclera: Conjunctivae normal.      Pupils: Pupils are equal, round, and reactive to light. Cardiovascular:      Rate and Rhythm: Regular rhythm. Tachycardia present. Pulses: Normal pulses. Heart sounds: No murmur heard. Pulmonary:      Effort: Pulmonary effort is normal. No respiratory distress. Breath sounds: Normal breath sounds. Abdominal:      General: There is no distension. Palpations: Abdomen is soft. Tenderness: There is no abdominal tenderness. There is no guarding or rebound. Musculoskeletal:         General: No swelling or deformity. Cervical back: Neck supple. Skin:     General: Skin is warm and dry. Capillary Refill: Capillary refill takes less than 2 seconds. Neurological:      General: No focal deficit present. Mental Status: She is alert and oriented to person, place, and time.    Psychiatric:         Mood and Affect: Mood normal.         ED Medications  Medications   ondansetron (ZOFRAN) injection 4 mg (4 mg Intravenous Given 7/16/23 2156)   fentanyl citrate (PF) 100 MCG/2ML 50 mcg (50 mcg Intravenous Given 7/16/23 2155)   multi-electrolyte (ISOLYTE-S PH 7.4) bolus 1,000 mL (0 mL Intravenous Stopped 7/16/23 2333)   droperidol (INAPSINE) injection 0.625 mg (0.625 mg Intravenous Given 7/16/23 2333)   LORazepam (ATIVAN) injection 1 mg (1 mg Intravenous Given 7/17/23 0013)   ondansetron (ZOFRAN) injection 4 mg (4 mg Intravenous Given 7/17/23 0013)       Diagnostic Studies  Results Reviewed     Procedure Component Value Units Date/Time    hCG, quantitative [[id number]]  (Normal) Collected: 07/16/23 2212    Lab Status: Final result Specimen: Blood from Arm, Right Updated: 07/16/23 2247     HCG, Quant <1 mIU/mL     Narrative:       Expected Ranges:    HCG results between 5 and 25 mIU/mL may be indicative of early pregnancy but should be interpreted in light of the total clinical presentation. HCG can rise to detectable levels in [place] and post menopausal women (0-11.6 mIU/mL).      Approximate               Approximate HCG  Gestation age          Concentration ( mIU/mL)  _____________          ______________________   Cole Parveen                      HCG values  0.2-1                       5-50  1-2                           2-3                         100-5000  3-4                         500-94317  4-5                         1000-37333  5-6                         [id number]  6-8                         [id number]  8-12                        [id number]      Comprehensive metabolic panel [[id number]]  (Abnormal) Collected: 07/16/23 2212    Lab Status: Final result Specimen: Blood from Arm, Right Updated: 07/16/23 2241     Sodium 140 mmol/L      Potassium 3.3 mmol/L      Chloride 112 mmol/L      CO2 23 mmol/L      ANION GAP 5 mmol/L      BUN 10 mg/dL      Creatinine 0.81 mg/dL      Glucose 116 mg/dL      Calcium 8.6 mg/dL      AST 12 U/L      ALT 27 U/L      Alkaline Phosphatase 64 U/L      Total Protein 7.4 g/dL      Albumin 4.0 g/dL      Total Bilirubin 0.41 mg/dL      eGFR 97 ml/min/1.73sq m     Narrative:      [place] guidelines for Chronic Kidney Disease (CKD):   •  Stage 1 with normal or high GFR (GFR > 90 mL/min/1.73 square meters)  •  Stage 2 Mild CKD (GFR = 60-89 mL/min/1.73 square meters)  •  Stage 3A Moderate CKD (GFR = 45-59 mL/min/1.73 square meters)  •  Stage 3B Moderate CKD (GFR = 30-44 mL/min/1.73 square meters)  •  Stage 4 Severe CKD (GFR = 15-29 mL/min/1.73 square meters)  •  Stage 5 End Stage CKD (GFR <15 mL/min/1.73 square meters)  Note: GFR calculation is accurate only with a steady state creatinine    Lipase [[id number]]  (Normal) Collected: 07/16/23 2212    Lab Status: Final result Specimen: Blood from Arm, Right Updated: 07/16/23 2240     Lipase 84 u/L     CBC and differential [[id number]]  (Abnormal) Collected: 07/16/23 2212    Lab Status: Final result Specimen: Blood from Arm, Right Updated: 07/16/23 2220     WBC 10.81 Thousand/uL      RBC 4.73 Million/uL      Hemoglobin 14.1 g/dL      Hematocrit 42.1 %      MCV 89 fL      MCH 29.8 pg      MCHC 33.5 g/dL      RDW 13.2 %      MPV 12.0 fL      Platelets 985 Thousands/uL      nRBC 0 /100 WBCs      Neutrophils Relative 82 %      Immat GRANS % 0 %      Lymphocytes Relative 9 %      Monocytes Relative 9 %      Eosinophils Relative 0 %      Basophils Relative 0 %      Neutrophils Absolute 8.85 Thousands/µL      Immature Grans Absolute 0.03 Thousand/uL      Lymphocytes Absolute 0.93 Thousands/µL      Monocytes Absolute 0.98 Thousand/µL      Eosinophils Absolute 0.00 Thousand/µL      Basophils Absolute 0.02 Thousands/µL                  No orders to display         Procedures  Procedures      ED Course  ED Course as of 07/17/23 0107   Mon Jul 17, 2023   0001 Patient continues to feel nauseated. Will provide antiemetic and 1 mg of Ativan for presumed cannabinoid hyperemesis syndrome. Medical Decision Making  Amount and/or Complexity of Data Reviewed  Labs: ordered. Risk  OTC drugs. Prescription drug management.       Patient is a 33 y.o. female with PMH of anxiety, asthma, arthritis, stomach ulcers who presents to the ED with nausea/vomiting and back pain. Vital signs within normal limits on triage. On exam patient in pain and has episodes of retching writer is in the room. Patient has no abdominal pain or tenderness on palpation. No tenderness in the back when palpated. Dry mucous membranes. Lungs and heart sounds normal other than tachycardia during exam.  No signs of head trauma. Further history alluded that patient does use marijuana frequently but has not used it in several days. Patient also now endorses that she has been told she has cannabinoid hyperemesis syndrome and has experienced this same pain before  History and physical exam most consistent with cannabinoid hyperemesis syndrome. However, differential diagnosis included but not limited to gastroenteritis, anxiety, gastric ulcer. Plan will obtain labs to look for signs of infection and signs of dehydration. Provide nausea relief. We will also give droperidol for presumed cannabinoid hyperemesis syndrome. View ED course above for further discussion on patient workup. All labs reviewed and utilized in the medical decision making process  All radiology studies independently viewed by me and interpreted by the radiologist.  I reviewed all testing with the patient. Patient reports feeling better after fluids, Zofran, droperidol and Ativan. Patient understands that cannabis use can lead to this syndrome in the future. Patient also given return and follow-up precautions. Patient states that she understands all instructions given.   Patient well-appearing upon discharge  Disposition  Final diagnoses:   Vomiting   Cannabinoid hyperemesis syndrome     Time reflects when diagnosis was documented in both MDM as applicable and the Disposition within this note     Time User Action Codes Description Comment    7/17/2023 12:58 AM Karen Burrell Add [R11.10] Vomiting     7/17/2023 12:59 AM Long Oconnor Add [R11.2,  F12.90] Cannabinoid hyperemesis syndrome ED Disposition     ED Disposition   Discharge    Condition   Stable    Date/Time   Mon Jul 17, 2023 12:58 AM    Comment   Josse Watts discharge to home/self care. Follow-up Information    None         Patient's Medications   Discharge Prescriptions    CAPSAICIN (ZOSTRIX) 0.025 % CREAM    Apply 1 Application topically 2 (two) times a day       Start Date: 7/17/2023 End Date: --       Order Dose: 1 Application       Quantity: 60 g    Refills: 0    ONDANSETRON (ZOFRAN) 4 MG TABLET    Take 1 tablet (4 mg total) by mouth every 6 (six) hours       Start Date: 7/17/2023 End Date: --       Order Dose: 4 mg       Quantity: 12 tablet    Refills: 0     No discharge procedures on file. PDMP Review       Value Time User    PDMP Reviewed  Yes 1/20/2021  2:27 PM Wayne Peguero PA-C           ED Provider  Attending physically available and evaluated Josse Watts. I managed the patient along with the ED Attending.     Electronically Signed by         Meghna Patel MD  07/18/23 1914

## 2023-07-18 ENCOUNTER — HOSPITAL ENCOUNTER (EMERGENCY)
Facility: HOSPITAL | Age: 33
Discharge: HOME | End: 2023-07-18
Attending: EMERGENCY MEDICINE
Payer: COMMERCIAL

## 2023-07-18 VITALS
TEMPERATURE: 98 F | DIASTOLIC BLOOD PRESSURE: 66 MMHG | OXYGEN SATURATION: 97 % | RESPIRATION RATE: 21 BRPM | SYSTOLIC BLOOD PRESSURE: 105 MMHG | HEART RATE: 59 BPM

## 2023-07-18 DIAGNOSIS — R11.2 NAUSEA AND VOMITING: Primary | ICD-10-CM

## 2023-07-18 DIAGNOSIS — N17.9 AKI (ACUTE KIDNEY INJURY) (HCC): ICD-10-CM

## 2023-07-18 DIAGNOSIS — R10.9 ABDOMINAL PAIN: ICD-10-CM

## 2023-07-18 DIAGNOSIS — E86.0 DEHYDRATION: ICD-10-CM

## 2023-07-18 DIAGNOSIS — M54.9 BACK PAIN: ICD-10-CM

## 2023-07-18 LAB
ALBUMIN SERPL BCP-MCNC: 4.6 G/DL (ref 3.5–5)
ALP SERPL-CCNC: 74 U/L (ref 46–116)
ALT SERPL W P-5'-P-CCNC: 32 U/L (ref 12–78)
ANION GAP SERPL CALCULATED.3IONS-SCNC: 10 MMOL/L
AST SERPL W P-5'-P-CCNC: 18 U/L (ref 5–45)
ATRIAL RATE: 103 BPM
ATRIAL RATE: 92 BPM
BACTERIA UR QL AUTO: NORMAL /HPF
BASOPHILS # BLD AUTO: 0.03 THOUSANDS/ÂΜL (ref 0–0.1)
BASOPHILS NFR BLD AUTO: 0 % (ref 0–1)
BILIRUB SERPL-MCNC: 0.63 MG/DL (ref 0.2–1)
BILIRUB UR QL STRIP: NEGATIVE
BUN SERPL-MCNC: 11 MG/DL (ref 5–25)
CALCIUM SERPL-MCNC: 9.7 MG/DL (ref 8.3–10.1)
CHLORIDE SERPL-SCNC: 101 MMOL/L (ref 96–108)
CLARITY UR: CLEAR
CO2 SERPL-SCNC: 26 MMOL/L (ref 21–32)
COLOR UR: ABNORMAL
CREAT SERPL-MCNC: 1.12 MG/DL (ref 0.6–1.3)
EOSINOPHIL # BLD AUTO: 0 THOUSAND/ÂΜL (ref 0–0.61)
EOSINOPHIL NFR BLD AUTO: 0 % (ref 0–6)
ERYTHROCYTE [DISTWIDTH] IN BLOOD BY AUTOMATED COUNT: 13.2 % (ref 11.6–15.1)
EXT PREGNANCY TEST URINE: NEGATIVE
EXT. CONTROL: NORMAL
GFR SERPL CREATININE-BSD FRML MDRD: 65 ML/MIN/1.73SQ M
GLUCOSE SERPL-MCNC: 122 MG/DL (ref 65–140)
GLUCOSE UR STRIP-MCNC: NEGATIVE MG/DL
HCT VFR BLD AUTO: 46.4 % (ref 34.8–46.1)
HGB BLD-MCNC: 15.6 G/DL (ref 11.5–15.4)
HGB UR QL STRIP.AUTO: ABNORMAL
IMM GRANULOCYTES # BLD AUTO: 0.02 THOUSAND/UL (ref 0–0.2)
IMM GRANULOCYTES NFR BLD AUTO: 0 % (ref 0–2)
KETONES UR STRIP-MCNC: ABNORMAL MG/DL
LEUKOCYTE ESTERASE UR QL STRIP: NEGATIVE
LIPASE SERPL-CCNC: 107 U/L (ref 73–393)
LYMPHOCYTES # BLD AUTO: 2.25 THOUSANDS/ÂΜL (ref 0.6–4.47)
LYMPHOCYTES NFR BLD AUTO: 23 % (ref 14–44)
MCH RBC QN AUTO: 29.2 PG (ref 26.8–34.3)
MCHC RBC AUTO-ENTMCNC: 33.6 G/DL (ref 31.4–37.4)
MCV RBC AUTO: 87 FL (ref 82–98)
MONOCYTES # BLD AUTO: 0.97 THOUSAND/ÂΜL (ref 0.17–1.22)
MONOCYTES NFR BLD AUTO: 10 % (ref 4–12)
NEUTROPHILS # BLD AUTO: 6.6 THOUSANDS/ÂΜL (ref 1.85–7.62)
NEUTS SEG NFR BLD AUTO: 67 % (ref 43–75)
NITRITE UR QL STRIP: NEGATIVE
NON-SQ EPI CELLS URNS QL MICRO: NORMAL /HPF
NRBC BLD AUTO-RTO: 0 /100 WBCS
P AXIS: 59 DEGREES
P AXIS: 83 DEGREES
PH UR STRIP.AUTO: 7 [PH]
PLATELET # BLD AUTO: 229 THOUSANDS/UL (ref 149–390)
PMV BLD AUTO: 12.3 FL (ref 8.9–12.7)
POTASSIUM SERPL-SCNC: 3.3 MMOL/L (ref 3.5–5.3)
PR INTERVAL: 128 MS
PR INTERVAL: 130 MS
PROT SERPL-MCNC: 8.7 G/DL (ref 6.4–8.4)
PROT UR STRIP-MCNC: NEGATIVE MG/DL
QRS AXIS: 72 DEGREES
QRS AXIS: 82 DEGREES
QRSD INTERVAL: 70 MS
QRSD INTERVAL: 82 MS
QT INTERVAL: 368 MS
QT INTERVAL: 406 MS
QTC INTERVAL: 482 MS
QTC INTERVAL: 502 MS
RBC # BLD AUTO: 5.34 MILLION/UL (ref 3.81–5.12)
RBC #/AREA URNS AUTO: NORMAL /HPF
SODIUM SERPL-SCNC: 137 MMOL/L (ref 135–147)
SP GR UR STRIP.AUTO: 1.01 (ref 1–1.03)
T WAVE AXIS: 45 DEGREES
T WAVE AXIS: 65 DEGREES
UROBILINOGEN UR STRIP-ACNC: <2 MG/DL
VENTRICULAR RATE: 103 BPM
VENTRICULAR RATE: 92 BPM
WBC # BLD AUTO: 9.87 THOUSAND/UL (ref 4.31–10.16)
WBC #/AREA URNS AUTO: NORMAL /HPF

## 2023-07-18 PROCEDURE — 81001 URINALYSIS AUTO W/SCOPE: CPT

## 2023-07-18 PROCEDURE — 36415 COLL VENOUS BLD VENIPUNCTURE: CPT

## 2023-07-18 PROCEDURE — 93005 ELECTROCARDIOGRAM TRACING: CPT

## 2023-07-18 PROCEDURE — 81025 URINE PREGNANCY TEST: CPT

## 2023-07-18 PROCEDURE — 85025 COMPLETE CBC W/AUTO DIFF WBC: CPT

## 2023-07-18 PROCEDURE — 80053 COMPREHEN METABOLIC PANEL: CPT

## 2023-07-18 PROCEDURE — 93010 ELECTROCARDIOGRAM REPORT: CPT | Performed by: INTERNAL MEDICINE

## 2023-07-18 PROCEDURE — 83690 ASSAY OF LIPASE: CPT

## 2023-07-18 RX ORDER — POTASSIUM CHLORIDE 20 MEQ/1
40 TABLET, EXTENDED RELEASE ORAL ONCE
Status: COMPLETED | OUTPATIENT
Start: 2023-07-18 | End: 2023-07-18

## 2023-07-18 RX ORDER — KETOROLAC TROMETHAMINE 30 MG/ML
15 INJECTION, SOLUTION INTRAMUSCULAR; INTRAVENOUS ONCE
Status: COMPLETED | OUTPATIENT
Start: 2023-07-18 | End: 2023-07-18

## 2023-07-18 RX ORDER — DROPERIDOL 2.5 MG/ML
0.62 INJECTION, SOLUTION INTRAMUSCULAR; INTRAVENOUS ONCE
Status: COMPLETED | OUTPATIENT
Start: 2023-07-18 | End: 2023-07-18

## 2023-07-18 RX ORDER — LIDOCAINE 50 MG/G
1 PATCH TOPICAL ONCE
Status: DISCONTINUED | OUTPATIENT
Start: 2023-07-18 | End: 2023-07-18 | Stop reason: HOSPADM

## 2023-07-18 RX ORDER — ONDANSETRON 4 MG/1
4 TABLET, FILM COATED ORAL EVERY 6 HOURS
Qty: 12 TABLET | Refills: 0 | Status: SHIPPED | OUTPATIENT
Start: 2023-07-18

## 2023-07-18 RX ADMIN — DROPERIDOL 0.62 MG: 2.5 INJECTION, SOLUTION INTRAMUSCULAR; INTRAVENOUS at 07:40

## 2023-07-18 RX ADMIN — POTASSIUM CHLORIDE 40 MEQ: 1500 TABLET, EXTENDED RELEASE ORAL at 08:32

## 2023-07-18 RX ADMIN — SODIUM CHLORIDE 1000 ML: 0.9 INJECTION, SOLUTION INTRAVENOUS at 07:38

## 2023-07-18 RX ADMIN — LIDOCAINE 1 PATCH: 50 PATCH CUTANEOUS at 07:40

## 2023-07-18 RX ADMIN — KETOROLAC TROMETHAMINE 15 MG: 30 INJECTION, SOLUTION INTRAMUSCULAR; INTRAVENOUS at 07:40
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
## 2023-07-18 NOTE — Clinical Note
Talita Casey was seen and treated in our emergency department on 7/18/2023. Diagnosis:     Cece Elizabeth  may return to work on return date. She may return on this date: 07/19/2023         If you have any questions or concerns, please don't hesitate to call.       Axel Portillo MD    ______________________________           _______________          _______________  [facility]                              Date                                Time

## 2023-07-19 ENCOUNTER — HOSPITAL ENCOUNTER (EMERGENCY)
Facility: HOSPITAL | Age: 33
Discharge: HOME | End: 2023-07-20
Attending: EMERGENCY MEDICINE
Payer: COMMERCIAL
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
## 2023-07-19 DIAGNOSIS — M54.50 ACUTE LOW BACK PAIN: Primary | ICD-10-CM

## 2023-07-19 DIAGNOSIS — E87.6 HYPOKALEMIA: ICD-10-CM

## 2023-07-19 DIAGNOSIS — R11.2 NAUSEA AND VOMITING: ICD-10-CM

## 2023-07-19 LAB
ALBUMIN SERPL BCP-MCNC: 4.3 G/DL (ref 3.5–5)
ALP SERPL-CCNC: 63 U/L (ref 46–116)
ALT SERPL W P-5'-P-CCNC: 27 U/L (ref 12–78)
ANION GAP SERPL CALCULATED.3IONS-SCNC: 6 MMOL/L
AST SERPL W P-5'-P-CCNC: 13 U/L (ref 5–45)
BASOPHILS # BLD AUTO: 0.04 THOUSANDS/ÂΜL (ref 0–0.1)
BASOPHILS NFR BLD AUTO: 0 % (ref 0–1)
BILIRUB SERPL-MCNC: 0.94 MG/DL (ref 0.2–1)
BUN SERPL-MCNC: 9 MG/DL (ref 5–25)
CALCIUM SERPL-MCNC: 9.5 MG/DL (ref 8.3–10.1)
CHLORIDE SERPL-SCNC: 100 MMOL/L (ref 96–108)
CO2 SERPL-SCNC: 29 MMOL/L (ref 21–32)
CREAT SERPL-MCNC: 0.98 MG/DL (ref 0.6–1.3)
EOSINOPHIL # BLD AUTO: 0.01 THOUSAND/ÂΜL (ref 0–0.61)
EOSINOPHIL NFR BLD AUTO: 0 % (ref 0–6)
ERYTHROCYTE [DISTWIDTH] IN BLOOD BY AUTOMATED COUNT: 12.4 % (ref 11.6–15.1)
GFR SERPL CREATININE-BSD FRML MDRD: 77 ML/MIN/1.73SQ M
GLUCOSE SERPL-MCNC: 121 MG/DL (ref 65–140)
HCT VFR BLD AUTO: 44.4 % (ref 34.8–46.1)
HGB BLD-MCNC: 16.1 G/DL (ref 11.5–15.4)
IMM GRANULOCYTES # BLD AUTO: 0.03 THOUSAND/UL (ref 0–0.2)
IMM GRANULOCYTES NFR BLD AUTO: 0 % (ref 0–2)
LIPASE SERPL-CCNC: 78 U/L (ref 73–393)
LYMPHOCYTES # BLD AUTO: 2.69 THOUSANDS/ÂΜL (ref 0.6–4.47)
LYMPHOCYTES NFR BLD AUTO: 28 % (ref 14–44)
MCH RBC QN AUTO: 30.2 PG (ref 26.8–34.3)
MCHC RBC AUTO-ENTMCNC: 36.3 G/DL (ref 31.4–37.4)
MCV RBC AUTO: 83 FL (ref 82–98)
MONOCYTES # BLD AUTO: 0.74 THOUSAND/ÂΜL (ref 0.17–1.22)
MONOCYTES NFR BLD AUTO: 8 % (ref 4–12)
NEUTROPHILS # BLD AUTO: 6.08 THOUSANDS/ÂΜL (ref 1.85–7.62)
NEUTS SEG NFR BLD AUTO: 64 % (ref 43–75)
NRBC BLD AUTO-RTO: 0 /100 WBCS
PLATELET # BLD AUTO: 223 THOUSANDS/UL (ref 149–390)
PMV BLD AUTO: 11.1 FL (ref 8.9–12.7)
POTASSIUM SERPL-SCNC: 2.8 MMOL/L (ref 3.5–5.3)
PROT SERPL-MCNC: 8.1 G/DL (ref 6.4–8.4)
RBC # BLD AUTO: 5.33 MILLION/UL (ref 3.81–5.12)
SODIUM SERPL-SCNC: 135 MMOL/L (ref 135–147)
WBC # BLD AUTO: 9.59 THOUSAND/UL (ref 4.31–10.16)

## 2023-07-19 PROCEDURE — 80053 COMPREHEN METABOLIC PANEL: CPT

## 2023-07-19 PROCEDURE — 83690 ASSAY OF LIPASE: CPT

## 2023-07-19 PROCEDURE — 85025 COMPLETE CBC W/AUTO DIFF WBC: CPT

## 2023-07-19 PROCEDURE — 93005 ELECTROCARDIOGRAM TRACING: CPT

## 2023-07-19 PROCEDURE — 36415 COLL VENOUS BLD VENIPUNCTURE: CPT

## 2023-07-19 RX ORDER — DROPERIDOL 2.5 MG/ML
1.25 INJECTION, SOLUTION INTRAMUSCULAR; INTRAVENOUS ONCE
Status: COMPLETED | OUTPATIENT
Start: 2023-07-19 | End: 2023-07-19

## 2023-07-19 RX ADMIN — SODIUM CHLORIDE 1000 ML: 0.9 INJECTION, SOLUTION INTRAVENOUS at 23:34

## 2023-07-19 RX ADMIN — DROPERIDOL 1.25 MG: 2.5 INJECTION, SOLUTION INTRAMUSCULAR; INTRAVENOUS at 23:33

## 2023-07-20 ENCOUNTER — APPOINTMENT (EMERGENCY)
Dept: RADIOLOGY | Facility: HOSPITAL | Age: 33
End: 2023-07-20
Payer: COMMERCIAL

## 2023-07-20 VITALS
DIASTOLIC BLOOD PRESSURE: 109 MMHG | SYSTOLIC BLOOD PRESSURE: 157 MMHG | OXYGEN SATURATION: 97 % | TEMPERATURE: 97.8 F | HEART RATE: 78 BPM | RESPIRATION RATE: 16 BRPM

## 2023-07-20 LAB
ATRIAL RATE: 86 BPM
P AXIS: 82 DEGREES
PR INTERVAL: 138 MS
QRS AXIS: 72 DEGREES
QRSD INTERVAL: 76 MS
QT INTERVAL: 388 MS
QTC INTERVAL: 464 MS
T WAVE AXIS: 53 DEGREES
VENTRICULAR RATE: 86 BPM

## 2023-07-20 PROCEDURE — G1004 CDSM NDSC: HCPCS

## 2023-07-20 PROCEDURE — 72131 CT LUMBAR SPINE W/O DYE: CPT

## 2023-07-20 PROCEDURE — 93010 ELECTROCARDIOGRAM REPORT: CPT | Performed by: INTERNAL MEDICINE

## 2023-07-20 RX ORDER — POTASSIUM CHLORIDE 20 MEQ/1
40 TABLET, EXTENDED RELEASE ORAL ONCE
Status: DISCONTINUED | OUTPATIENT
Start: 2023-07-20 | End: 2023-07-20

## 2023-07-20 RX ORDER — POTASSIUM CHLORIDE 20 MEQ/1
40 TABLET, EXTENDED RELEASE ORAL ONCE
Status: DISCONTINUED | OUTPATIENT
Start: 2023-07-20 | End: 2023-07-20 | Stop reason: HOSPADM

## 2023-07-20 NOTE — DISCHARGE INSTRUCTIONS
You need to follow-up as described on your prior visits with your primary medical doctor. We do not see a clear reason why you are having such nausea and vomiting at this time. Your recent scan and imaging today do not reveal any acute injuries. Please continue to take your Zofran at home as needed for nausea and vomiting.

## 2023-07-20 NOTE — ED ATTENDING ATTESTATION
7/19/2023  I, Celia Thomas MD, saw and evaluated the patient. I have discussed the patient with the resident/non-physician practitioner and agree with the resident's/non-physician practitioner's findings, Plan of Care, and MDM as documented in the resident's/non-physician practitioner's note, except where noted. All available labs and Radiology studies were reviewed. I was present for key portions of any procedure(s) performed by the resident/non-physician practitioner and I was immediately available to provide assistance. At this point I agree with the current assessment done in the Emergency Department. I have conducted an independent evaluation of this patient a history and physical is as follows:    32-year-old female with a history of chronic low back pain presents to the emergency department for evaluation of acute on chronic back pain after a fall. Patient states she was walking down the steps when she fell onto her bottom. She did not strike her head or lose consciousness, but since then has been having worsening low back pain. It radiates into the right posterior thigh. No associated numbness or tingling. No saddle anesthesia, bowel or bladder incontinence. No fevers or chills. No nausea or vomiting. Did not strike her head or lose consciousness. She denies any other complaints at this time. Physical Exam  Vitals and nursing note reviewed. Constitutional:       General: She is not in acute distress. HENT:      Head: Normocephalic and atraumatic. Right Ear: External ear normal.      Left Ear: External ear normal.      Nose: Nose normal.      Mouth/Throat:      Mouth: Mucous membranes are moist.   Eyes:      Extraocular Movements: Extraocular movements intact. Conjunctiva/sclera: Conjunctivae normal.      Pupils: Pupils are equal, round, and reactive to light. Cardiovascular:      Rate and Rhythm: Regular rhythm. Tachycardia present. Pulses: Normal pulses.       Heart sounds: Normal heart sounds. No murmur heard. Pulmonary:      Effort: Pulmonary effort is normal. No respiratory distress. Breath sounds: Normal breath sounds. No stridor. Musculoskeletal:         General: No deformity. Normal range of motion. Cervical back: Normal range of motion and neck supple. Comments: No midline neck or back tenderness, no step-offs or deformities, no overlying skin changes, patient has paraspinal tenderness in her lumbar region   Skin:     General: Skin is warm and dry. Neurological:      General: No focal deficit present. Mental Status: She is alert and oriented to person, place, and time. Sensory: No sensory deficit. Motor: No weakness. Deep Tendon Reflexes: Reflexes normal.   Psychiatric:      Comments: anxious       MDM:  Low back pain: Differential diagnosis includes but is not limited to contusion, fracture, dislocation, soft tissue injury. No red flag symptoms. On initial evaluation, patient was uncomfortable appearing, anxious and tachycardic. We will check basic labs, will also get CT scan of the lumbar spine given trauma. Labs demonstrated hypokalemia, but were otherwise unremarkable. CT scan was negative for any acute findings. Symptoms improved following medications, plan to discharge home. Vital signs improved.       ED Course  ED Course as of 07/20/23 0141   Thu Jul 20, 2023   0016 Potassium(!): 2.8   0141 Pulse: 78   0141 Respirations: 16         Critical Care Time  Procedures

## 2023-07-21 NOTE — ED PROVIDER NOTES
History  Chief Complaint   Patient presents with   • Back Pain     Pt reports back pain/nausea since Friday. Has been seen twice in ER for same but pain is much more severe, cannot keep any food or drinks down. Lower R sided back pain radiates down to R leg       36-year-old female is presenting for evaluation of acute on chronic lower back pain. Patient reports that earlier this evening she was walking down a flight of stairs whenever she says that she stepped "the wrong way" causing her to slide down several stairs on her buttocks. She reports that since the fall she has been having a severe worsening of her right-sided lower back pain near her SI joint. She states that at baseline she does have some radiation of her chronic pain into her upper buttocks and occasionally into her posterior thighs. She is currently endorsing a worsening radiation into her upper right buttock. On my examination she is denying any radiation into her posterior thigh however during her triage she was endorsing such radiation. She is denying any saddle anesthesias or paresthesias, she is denying any fever or chills, she denies any loss of consciousness or head strike and she is not on any anticoagulation or antiplatelet agents. She does not take chronic immunosuppression and has not been losing weight recently unintentionally. She does report that she has recently been evaluated at the this emergency department as well as other emergency departments for some flank pain and found some success recently with droperidol to temporarily relieve some of her pain. She is endorsing some nausea and vomiting which has been ongoing even prior to her fall today but she states it is at her baseline from the last few days and denies any new symptoms in that regard. Prior to Admission Medications   Prescriptions Last Dose Informant Patient Reported? Taking?    Pyridoxine HCl (vitamin B-6) 25 MG tablet   No No   Sig: Take 1 tablet (25 mg total) by mouth 3 (three) times a day   acetaminophen (TYLENOL) 325 mg tablet   No No   Sig: Take 2 tablets (650 mg total) by mouth every 6 (six) hours as needed for mild pain   aluminum-magnesium hydroxide-simethicone (MAALOX MAX) 400-400-40 MG/5ML suspension   No No   Sig: Take 10 mL by mouth every 6 (six) hours as needed for indigestion or heartburn   capsaicin (ZOSTRIX) 0.025 % cream   No No   Sig: Apply 1 Application topically 2 (two) times a day   doxylamine (UNISOM) 25 MG tablet   No No   Sig: Take 0.5 tablets (12.5 mg total) by mouth daily at bedtime as needed for sleep   famotidine (PEPCID) 20 mg tablet   No No   Sig: Take 1 tablet (20 mg total) by mouth daily at bedtime for 14 days   metoclopramide (REGLAN) 5 mg tablet   No No   Sig: Take 2 tablets (10 mg total) by mouth every 8 (eight) hours as needed (Nausea and vomiting)   ondansetron (ZOFRAN) 4 mg tablet   No No   Sig: Take 1 tablet (4 mg total) by mouth every 6 (six) hours   ondansetron (ZOFRAN) 4 mg tablet   No No   Sig: Take 1 tablet (4 mg total) by mouth every 6 (six) hours   ondansetron (ZOFRAN) 4 mg tablet   No No   Sig: Take 1 tablet (4 mg total) by mouth every 6 (six) hours   ondansetron (Zofran ODT) 4 mg disintegrating tablet   No No   Sig: Take 1 tablet (4 mg total) by mouth every 6 (six) hours as needed for nausea or vomiting   pantoprazole (PROTONIX) 40 mg tablet   No No   Sig: Take 1 tablet (40 mg total) by mouth 2 (two) times a day for 14 days   thiamine 100 MG tablet   No No   Sig: Take 1 tablet (100 mg total) by mouth daily      Facility-Administered Medications: None       Past Medical History:   Diagnosis Date   • Arthritis    • Asthma    • History of stomach ulcers    • Psychiatric disorder     anxiety       Past Surgical History:   Procedure Laterality Date   • BREAST SURGERY     • COSMETIC SURGERY     • EGD     • WISDOM TOOTH EXTRACTION         History reviewed. No pertinent family history.   I have reviewed and agree with the history as documented. E-Cigarette/Vaping   • E-Cigarette Use Never User      E-Cigarette/Vaping Substances   • Nicotine No    • THC Yes    • CBD Yes    • Flavoring No    • Other No    • Unknown No      Social History     Tobacco Use   • Smoking status: Never   • Smokeless tobacco: Never   Vaping Use   • Vaping Use: Never used   Substance Use Topics   • Alcohol use: Not Currently   • Drug use: Not Currently     Types: Marijuana        Review of Systems   Constitutional: Negative for chills and fever. HENT: Negative for ear pain and sore throat. Eyes: Negative for pain and visual disturbance. Respiratory: Negative for cough and shortness of breath. Cardiovascular: Negative for chest pain and palpitations. Gastrointestinal: Positive for nausea and vomiting. Negative for abdominal pain. Genitourinary: Positive for flank pain. Negative for dysuria and hematuria. Musculoskeletal: Positive for arthralgias, back pain and gait problem. Skin: Negative for color change and rash. Neurological: Negative for seizures and syncope. All other systems reviewed and are negative. Physical Exam  ED Triage Vitals [07/19/23 2208]   Temperature Pulse Respirations Blood Pressure SpO2   97.8 °F (36.6 °C) (!) 134 (!) 30 (!) 157/109 97 %      Temp Source Heart Rate Source Patient Position - Orthostatic VS BP Location FiO2 (%)   Tympanic Monitor Sitting Left arm --      Pain Score       --             Orthostatic Vital Signs  Vitals:    07/19/23 2208 07/20/23 0131   BP: (!) 157/109    Pulse: (!) 134 78   Patient Position - Orthostatic VS: Sitting        Physical Exam  Vitals and nursing note reviewed. Constitutional:       General: She is not in acute distress. Appearance: She is well-developed and normal weight. She is not ill-appearing or diaphoretic. HENT:      Head: Normocephalic and atraumatic.    Eyes:      Conjunctiva/sclera: Conjunctivae normal.   Cardiovascular:      Rate and Rhythm: Regular rhythm. Tachycardia present. Heart sounds: No murmur heard. Pulmonary:      Effort: Pulmonary effort is normal. No respiratory distress. Breath sounds: Normal breath sounds. Comments: Mild tachypnea, likely due to acute pain on initial evaluation. Abdominal:      General: There is no distension. Palpations: Abdomen is soft. Tenderness: There is no abdominal tenderness. Musculoskeletal:         General: Tenderness (R SI joint, R lumbar spine) present. No swelling or deformity. Cervical back: Neck supple. Right lower leg: No edema. Left lower leg: No edema. Skin:     General: Skin is warm and dry. Capillary Refill: Capillary refill takes less than 2 seconds. Coloration: Skin is not jaundiced. Findings: No bruising. Neurological:      General: No focal deficit present. Mental Status: She is alert and oriented to person, place, and time. Cranial Nerves: No cranial nerve deficit. Motor: No weakness. Gait: Gait (Patient has some discomfort with gait but is able to overcome and ambulate normally without foot drop or ataxia) normal.   Psychiatric:      Comments: Pt is very anxious appearing, uncomfortably shifting positions in bed, somewhat agitated, occasionally crying out that we are not helping her fast enough.          ED Medications  Medications   sodium chloride 0.9 % bolus 1,000 mL (0 mL Intravenous Stopped 7/20/23 0206)   droperidol (INAPSINE) injection 1.25 mg (1.25 mg Intravenous Given 7/19/23 2333)       Diagnostic Studies  Results Reviewed     Procedure Component Value Units Date/Time    Comprehensive metabolic panel [[id number]]  (Abnormal) Collected: 07/19/23 2321    Lab Status: Final result Specimen: Blood from Arm, Left Updated: 07/19/23 9206     Sodium 135 mmol/L      Potassium 2.8 mmol/L      Chloride 100 mmol/L      CO2 29 mmol/L      ANION GAP 6 mmol/L      BUN 9 mg/dL      Creatinine 0.98 mg/dL      Glucose 121 mg/dL Calcium 9.5 mg/dL      AST 13 U/L      ALT 27 U/L      Alkaline Phosphatase 63 U/L      Total Protein 8.1 g/dL      Albumin 4.3 g/dL      Total Bilirubin 0.94 mg/dL      eGFR 77 ml/min/1.73sq m     Narrative:      [facility] (CKD):   •  Stage 1 with normal or high GFR (GFR > 90 mL/min/1.73 square meters)  •  Stage 2 Mild CKD (GFR = 60-89 mL/min/1.73 square meters)  •  Stage 3A Moderate CKD (GFR = 45-59 mL/min/1.73 square meters)  •  Stage 3B Moderate CKD (GFR = 30-44 mL/min/1.73 square meters)  •  Stage 4 Severe CKD (GFR = 15-29 mL/min/1.73 square meters)  •  Stage 5 End Stage CKD (GFR <15 mL/min/1.73 square meters)  Note: GFR calculation is accurate only with a steady state creatinine    Lipase [[id number]]  (Normal) Collected: 07/19/23 2321    Lab Status: Final result Specimen: Blood from Arm, Left Updated: 07/19/23 2357     Lipase 78 u/L     CBC and differential [[id number]]  (Abnormal) Collected: 07/19/23 2321    Lab Status: Final result Specimen: Blood from Arm, Left Updated: 07/19/23 2329     WBC 9.59 Thousand/uL      RBC 5.33 Million/uL      Hemoglobin 16.1 g/dL      Hematocrit 44.4 %      MCV 83 fL      MCH 30.2 pg      MCHC 36.3 g/dL      RDW 12.4 %      MPV 11.1 fL      Platelets 668 Thousands/uL      nRBC 0 /100 WBCs      Neutrophils Relative 64 %      Immat GRANS % 0 %      Lymphocytes Relative 28 %      Monocytes Relative 8 %      Eosinophils Relative 0 %      Basophils Relative 0 %      Neutrophils Absolute 6.08 Thousands/µL      Immature Grans Absolute 0.03 Thousand/uL      Lymphocytes Absolute 2.69 Thousands/µL      Monocytes Absolute 0.74 Thousand/µL      Eosinophils Absolute 0.01 Thousand/µL      Basophils Absolute 0.04 Thousands/µL                  CT spine lumbar without contrast   Final Result by Romie Coates MD (07/20 0220)      No fracture or listhesis visualized on computed tomography of the lumbar spine.          Workstation performed: [id number]               Procedures  Procedures      ED Course  ED Course as of 07/21/23 1250   Thu Jul 20, 2023   0120 Patient reassessed at bedside. Seated comfortably, resting, heart rate currently 71 bpm, respiratory rate 14 breaths/min, good oxygen saturation on room air greater than 95%, in no apparent distress at this time. Medical Decision Making  Assessment: Acute on chronic lower back pain after fall down a few stairs today, mechanism is moderate in nature from patient's description. Differential would include acute fractures and dislocations of the lumbosacral spine, less likely though still possible. More likely diagnosis would be a contusion, soft tissue injury, or back sprain/strain of the paraspinal muscles and tendons/ligaments. Patient is denying all red flag symptoms of acute lower back pain that would be more concerning for cauda equina syndrome, epidural abscess, or spinal cord compression. Patient does appear uncomfortable, she is anxious, she is tachycardic, and has some ongoing seemingly more chronic symptoms that have been evaluated extensively over the past few days in multiple emergency departments. Will recheck labs due to chronic conditions, will also screen urine if patient willing to give sample, will treat with IV analgesia (she says droperidol worked well for her over the past few days, would check ECG for intervals and read dose if patient is amenable to this plan), and then evaluate for traumatic injuries mentioned above with CT scan of the lumbar spine. Will need reevaluation after work-up complete. Reevaluation/disposition: Patient does have hypokalemia on lab work but otherwise baseline labs. Was given oral repletion of potassium. Negative traumatic bony abnormalities on CT scan.   Significantly improved after dose of droperidol, she is now tolerating p.o., is ambulating at baseline with some mild discomfort, and continues to deny any of the red flags of lower back pain mentioned in the subjective HPI. Therefore patient is stable for discharge with continued ongoing follow-up for her chronic back pain that is already scheduled. However she was made aware that if she has new numbness, weakness, inability to walk, or other new severe symptoms she should come immediately back to the emergency department for reevaluation. Amount and/or Complexity of Data Reviewed  Labs: ordered. Radiology: ordered. Risk  Prescription drug management. Disposition  Final diagnoses:   Acute low back pain   Hypokalemia   Fall   Nausea and vomiting     Time reflects when diagnosis was documented in both MDM as applicable and the Disposition within this note     Time User Action Codes Description Comment    7/20/2023 12:16 AM Check, Moises Hubbard Add [M54.50] Acute low back pain     7/20/2023 12:17 AM Check, Moises Uribe [E87.6] Hypokalemia     7/20/2023  2:03 AM [place] Shay Add [D88. XXXA] Fall     7/20/2023  2:03 AM Cookie Allred Add [R11.2] Nausea and vomiting       ED Disposition     ED Disposition   Discharge    Condition   Stable    Date/Time   Thu Jul 20, 2023  2:28 AM    Comment   Iveth Stable discharge to home/self care.                Follow-up Information     Follow up With Specialties Details Why Contact Info Additional [address], DO Family Medicine   [address] [place] [address] Emergency Department Emergency Medicine Go to  If symptoms worsen, As needed [address] [phone]  [facility] Emergency Department, [address] [facility]          Discharge Medication List as of 7/20/2023  2:28 AM      CONTINUE these medications which have NOT CHANGED    Details   acetaminophen (TYLENOL) 325 mg tablet Take 2 tablets (650 mg total) by mouth every 6 (six) hours as needed for mild pain, Starting Sat 12/5/2020, Normal      aluminum-magnesium hydroxide-simethicone (MAALOX MAX) 400-400-40 MG/5ML suspension Take 10 mL by mouth every 6 (six) hours as needed for indigestion or heartburn, Starting Wed 8/31/2022, Normal      capsaicin (ZOSTRIX) 0.025 % cream Apply 1 Application topically 2 (two) times a day, Starting Mon 7/17/2023, Normal      doxylamine (UNISOM) 25 MG tablet Take 0.5 tablets (12.5 mg total) by mouth daily at bedtime as needed for sleep, Starting Wed 8/31/2022, Normal      famotidine (PEPCID) 20 mg tablet Take 1 tablet (20 mg total) by mouth daily at bedtime for 14 days, Starting Mon 5/30/2022, Until Mon 6/13/2022, Normal      metoclopramide (REGLAN) 5 mg tablet Take 2 tablets (10 mg total) by mouth every 8 (eight) hours as needed (Nausea and vomiting), Starting Mon 5/30/2022, No Print      ondansetron (Zofran ODT) 4 mg disintegrating tablet Take 1 tablet (4 mg total) by mouth every 6 (six) hours as needed for nausea or vomiting, Starting Wed 8/31/2022, Normal      !! ondansetron (ZOFRAN) 4 mg tablet Take 1 tablet (4 mg total) by mouth every 6 (six) hours, Starting Sat 9/3/2022, Normal      !! ondansetron (ZOFRAN) 4 mg tablet Take 1 tablet (4 mg total) by mouth every 6 (six) hours, Starting Mon 7/17/2023, Normal      !! ondansetron (ZOFRAN) 4 mg tablet Take 1 tablet (4 mg total) by mouth every 6 (six) hours, Starting Tue 7/18/2023, Normal      pantoprazole (PROTONIX) 40 mg tablet Take 1 tablet (40 mg total) by mouth 2 (two) times a day for 14 days, Starting Mon 5/30/2022, Until Mon 6/13/2022, Normal      Pyridoxine HCl (vitamin B-6) 25 MG tablet Take 1 tablet (25 mg total) by mouth 3 (three) times a day, Starting Mon 5/30/2022, Normal      thiamine 100 MG tablet Take 1 tablet (100 mg total) by mouth daily, Starting Mon 5/30/2022, Normal       !! - Potential duplicate medications found. Please discuss with provider.         No discharge procedures on file. PDMP Review       Value Time User    PDMP Reviewed  Yes 1/20/2021  2:27 PM Luis Maynard PA-C           ED Provider  Attending physically available and evaluated Rae Schaefer. I managed the patient along with the ED Attending.     Electronically Signed by         Gayle Jewell MD  07/21/23 6540

## 2023-07-23 ENCOUNTER — HOSPITAL ENCOUNTER (EMERGENCY)
Facility: HOSPITAL | Age: 33
Discharge: HOME | End: 2023-07-24
Attending: EMERGENCY MEDICINE
Payer: COMMERCIAL

## 2023-07-23 VITALS
SYSTOLIC BLOOD PRESSURE: 121 MMHG | DIASTOLIC BLOOD PRESSURE: 79 MMHG | OXYGEN SATURATION: 99 % | RESPIRATION RATE: 18 BRPM | TEMPERATURE: 98.7 F | HEART RATE: 119 BPM

## 2023-07-23 DIAGNOSIS — E86.0 DEHYDRATION: ICD-10-CM

## 2023-07-23 DIAGNOSIS — R11.2 NAUSEA AND VOMITING: Primary | ICD-10-CM

## 2023-07-23 DIAGNOSIS — R10.13 EPIGASTRIC PAIN: ICD-10-CM

## 2023-07-23 LAB
ALBUMIN SERPL BCP-MCNC: 4.4 G/DL (ref 3.5–5)
ALP SERPL-CCNC: 68 U/L (ref 46–116)
ALT SERPL W P-5'-P-CCNC: 57 U/L (ref 12–78)
ANION GAP SERPL CALCULATED.3IONS-SCNC: 13 MMOL/L
AST SERPL W P-5'-P-CCNC: 27 U/L (ref 5–45)
BASOPHILS # BLD AUTO: 0.04 THOUSANDS/ÂΜL (ref 0–0.1)
BASOPHILS NFR BLD AUTO: 0 % (ref 0–1)
BILIRUB SERPL-MCNC: 1.23 MG/DL (ref 0.2–1)
BUN SERPL-MCNC: 11 MG/DL (ref 5–25)
CALCIUM SERPL-MCNC: 9.6 MG/DL (ref 8.3–10.1)
CHLORIDE SERPL-SCNC: 92 MMOL/L (ref 96–108)
CO2 SERPL-SCNC: 28 MMOL/L (ref 21–32)
CREAT SERPL-MCNC: 0.94 MG/DL (ref 0.6–1.3)
EOSINOPHIL # BLD AUTO: 0.11 THOUSAND/ÂΜL (ref 0–0.61)
EOSINOPHIL NFR BLD AUTO: 1 % (ref 0–6)
ERYTHROCYTE [DISTWIDTH] IN BLOOD BY AUTOMATED COUNT: 12.4 % (ref 11.6–15.1)
GFR SERPL CREATININE-BSD FRML MDRD: 81 ML/MIN/1.73SQ M
GLUCOSE SERPL-MCNC: 120 MG/DL (ref 65–140)
HCT VFR BLD AUTO: 45.3 % (ref 34.8–46.1)
HGB BLD-MCNC: 16.6 G/DL (ref 11.5–15.4)
IMM GRANULOCYTES # BLD AUTO: 0.06 THOUSAND/UL (ref 0–0.2)
IMM GRANULOCYTES NFR BLD AUTO: 0 % (ref 0–2)
LIPASE SERPL-CCNC: 87 U/L (ref 73–393)
LYMPHOCYTES # BLD AUTO: 4.73 THOUSANDS/ÂΜL (ref 0.6–4.47)
LYMPHOCYTES NFR BLD AUTO: 28 % (ref 14–44)
MCH RBC QN AUTO: 30.4 PG (ref 26.8–34.3)
MCHC RBC AUTO-ENTMCNC: 36.6 G/DL (ref 31.4–37.4)
MCV RBC AUTO: 83 FL (ref 82–98)
MONOCYTES # BLD AUTO: 0.95 THOUSAND/ÂΜL (ref 0.17–1.22)
MONOCYTES NFR BLD AUTO: 6 % (ref 4–12)
NEUTROPHILS # BLD AUTO: 10.89 THOUSANDS/ÂΜL (ref 1.85–7.62)
NEUTS SEG NFR BLD AUTO: 65 % (ref 43–75)
NRBC BLD AUTO-RTO: 0 /100 WBCS
PLATELET # BLD AUTO: 250 THOUSANDS/UL (ref 149–390)
PMV BLD AUTO: 11.3 FL (ref 8.9–12.7)
POTASSIUM SERPL-SCNC: 2.8 MMOL/L (ref 3.5–5.3)
PROT SERPL-MCNC: 8.2 G/DL (ref 6.4–8.4)
RBC # BLD AUTO: 5.46 MILLION/UL (ref 3.81–5.12)
SODIUM SERPL-SCNC: 133 MMOL/L (ref 135–147)
WBC # BLD AUTO: 16.78 THOUSAND/UL (ref 4.31–10.16)

## 2023-07-23 PROCEDURE — 80053 COMPREHEN METABOLIC PANEL: CPT

## 2023-07-23 PROCEDURE — 36415 COLL VENOUS BLD VENIPUNCTURE: CPT

## 2023-07-23 PROCEDURE — 99284 EMERGENCY DEPT VISIT MOD MDM: CPT

## 2023-07-23 PROCEDURE — 96361 HYDRATE IV INFUSION ADD-ON: CPT

## 2023-07-23 PROCEDURE — 85025 COMPLETE CBC W/AUTO DIFF WBC: CPT

## 2023-07-23 PROCEDURE — 84703 CHORIONIC GONADOTROPIN ASSAY: CPT

## 2023-07-23 PROCEDURE — 83690 ASSAY OF LIPASE: CPT

## 2023-07-23 PROCEDURE — 96375 TX/PRO/DX INJ NEW DRUG ADDON: CPT

## 2023-07-23 RX ORDER — METOCLOPRAMIDE HYDROCHLORIDE 5 MG/ML
10 INJECTION INTRAMUSCULAR; INTRAVENOUS ONCE
Status: COMPLETED | OUTPATIENT
Start: 2023-07-23 | End: 2023-07-23

## 2023-07-23 RX ADMIN — METOCLOPRAMIDE HYDROCHLORIDE 10 MG: 5 INJECTION INTRAMUSCULAR; INTRAVENOUS at 22:52

## 2023-07-23 RX ADMIN — SODIUM CHLORIDE 1000 ML: 0.9 INJECTION, SOLUTION INTRAVENOUS at 22:52

## 2023-07-23 NOTE — Clinical Note
Patric Perez was seen and treated in our emergency department on 7/23/2023. Diagnosis: Nausea and vomiting    Jacqui Richard  may return to work on return date. She may return on this date: 07/26/2023         If you have any questions or concerns, please don't hesitate to call.       Mani Singh, DO    ______________________________           _______________          _______________  Hospital Representative                              Date                                Time

## 2023-07-24 LAB — HCG SERPL QL: NEGATIVE

## 2023-07-24 PROCEDURE — 96365 THER/PROPH/DIAG IV INF INIT: CPT

## 2023-07-24 RX ORDER — SODIUM CHLORIDE, SODIUM GLUCONATE, SODIUM ACETATE, POTASSIUM CHLORIDE, MAGNESIUM CHLORIDE, SODIUM PHOSPHATE, DIBASIC, AND POTASSIUM PHOSPHATE .53; .5; .37; .037; .03; .012; .00082 G/100ML; G/100ML; G/100ML; G/100ML; G/100ML; G/100ML; G/100ML
1000 INJECTION, SOLUTION INTRAVENOUS ONCE
Status: COMPLETED | OUTPATIENT
Start: 2023-07-24 | End: 2023-07-24

## 2023-07-24 RX ORDER — METOCLOPRAMIDE 10 MG/1
10 TABLET ORAL EVERY 6 HOURS
Qty: 30 TABLET | Refills: 0 | Status: SHIPPED | OUTPATIENT
Start: 2023-07-24

## 2023-07-24 RX ORDER — POTASSIUM CHLORIDE 20 MEQ/1
40 TABLET, EXTENDED RELEASE ORAL ONCE
Status: COMPLETED | OUTPATIENT
Start: 2023-07-24 | End: 2023-07-24

## 2023-07-24 RX ADMIN — POTASSIUM CHLORIDE 40 MEQ: 1500 TABLET, EXTENDED RELEASE ORAL at 00:23

## 2023-07-24 RX ADMIN — SODIUM CHLORIDE, SODIUM GLUCONATE, SODIUM ACETATE, POTASSIUM CHLORIDE, MAGNESIUM CHLORIDE, SODIUM PHOSPHATE, DIBASIC, AND POTASSIUM PHOSPHATE 1000 ML: .53; .5; .37; .037; .03; .012; .00082 INJECTION, SOLUTION INTRAVENOUS at 00:13

## 2023-07-24 NOTE — DISCHARGE INSTRUCTIONS
You were seen in the emergency department for nausea, vomiting, and upper abdominal pain. Your lab work showed evidence of dehydration and low potassium. You were given IV fluids and potassium. A prescription for Reglan, the same medication you were given for nausea, in the emergency department was sent to your pharmacy. Call the number attached to schedule an appoint with gastroenterology. If you have worsening symptoms please return to the emergency department.

## 2023-07-24 NOTE — ED PROVIDER NOTES
History  Chief Complaint   Patient presents with   • Vomiting     Per pt vomiting x 10 days. Abdominal pain, has not had BM for few days. 44-year-old female with a history of gastric ulcers, asthma, and cannabinoid hyperemesis syndrome use who presents complaining of 10 days of intractable vomiting and epigastric pain. The patient has been seen in the emergency department multiple times for the same complaint. The patient had an unremarkable CTA of the abdomen and pelvis on 7/16 at an outside hospital.  The patient states that during her multiple ED visits that she has been treated with some improvement in her symptoms, however they returned after she is discharged. The patient states that she has been taking Zofran without relief of her nausea. The patient states that she vomits anytime she tries to eat or drink. The patient states that today she noticed tinges of blood in her vomit. The patient states that she has not had a bowel movement since her symptoms began, but she has not been able to eat anything. The patient states that she feels dizzy which she describes as feeling off balance. Patient denies fever, chills, headache, chest pain, shortness of breath, dysuria, hematuria, vaginal bleeding, vaginal discharge. Prior to Admission Medications   Prescriptions Last Dose Informant Patient Reported? Taking?    Pyridoxine HCl (vitamin B-6) 25 MG tablet   No No   Sig: Take 1 tablet (25 mg total) by mouth 3 (three) times a day   acetaminophen (TYLENOL) 325 mg tablet   No No   Sig: Take 2 tablets (650 mg total) by mouth every 6 (six) hours as needed for mild pain   aluminum-magnesium hydroxide-simethicone (MAALOX MAX) 400-400-40 MG/5ML suspension   No No   Sig: Take 10 mL by mouth every 6 (six) hours as needed for indigestion or heartburn   capsaicin (ZOSTRIX) 0.025 % cream   No No   Sig: Apply 1 Application topically 2 (two) times a day   doxylamine (UNISOM) 25 MG tablet   No No   Sig: Take 0.5 tablets (12.5 mg total) by mouth daily at bedtime as needed for sleep   famotidine (PEPCID) 20 mg tablet   No No   Sig: Take 1 tablet (20 mg total) by mouth daily at bedtime for 14 days   metoclopramide (REGLAN) 5 mg tablet   No No   Sig: Take 2 tablets (10 mg total) by mouth every 8 (eight) hours as needed (Nausea and vomiting)   ondansetron (ZOFRAN) 4 mg tablet   No No   Sig: Take 1 tablet (4 mg total) by mouth every 6 (six) hours   ondansetron (ZOFRAN) 4 mg tablet   No No   Sig: Take 1 tablet (4 mg total) by mouth every 6 (six) hours   ondansetron (ZOFRAN) 4 mg tablet   No No   Sig: Take 1 tablet (4 mg total) by mouth every 6 (six) hours   ondansetron (Zofran ODT) 4 mg disintegrating tablet   No No   Sig: Take 1 tablet (4 mg total) by mouth every 6 (six) hours as needed for nausea or vomiting   pantoprazole (PROTONIX) 40 mg tablet   No No   Sig: Take 1 tablet (40 mg total) by mouth 2 (two) times a day for 14 days   thiamine 100 MG tablet   No No   Sig: Take 1 tablet (100 mg total) by mouth daily      Facility-Administered Medications: None       Past Medical History:   Diagnosis Date   • Arthritis    • Asthma    • History of stomach ulcers    • Psychiatric disorder     anxiety       Past Surgical History:   Procedure Laterality Date   • BREAST SURGERY     • COSMETIC SURGERY     • EGD     • WISDOM TOOTH EXTRACTION         No family history on file. I have reviewed and agree with the history as documented. E-Cigarette/Vaping   • E-Cigarette Use Never User      E-Cigarette/Vaping Substances   • Nicotine No    • THC Yes    • CBD Yes    • Flavoring No    • Other No    • Unknown No      Social History     Tobacco Use   • Smoking status: Never   • Smokeless tobacco: Never   Vaping Use   • Vaping Use: Never used   Substance Use Topics   • Alcohol use: Not Currently   • Drug use: Not Currently     Types: Marijuana        Review of Systems   Constitutional: Negative for chills, diaphoresis and fever.    HENT: Negative for congestion and sore throat. Eyes: Negative for pain and redness. Respiratory: Negative for cough and shortness of breath. Cardiovascular: Negative for chest pain, palpitations and leg swelling. Gastrointestinal: Positive for abdominal pain, nausea and vomiting. Negative for blood in stool and diarrhea. Genitourinary: Negative for dysuria, flank pain and hematuria. Musculoskeletal: Negative for arthralgias and myalgias. Skin: Negative for color change, pallor and rash. Neurological: Positive for dizziness and light-headedness. Negative for syncope, weakness, numbness and headaches. All other systems reviewed and are negative. Physical Exam  ED Triage Vitals [07/23/23 2205]   Temperature Pulse Respirations Blood Pressure SpO2   98.7 °F (37.1 °C) (!) 119 18 123/78 99 %      Temp Source Heart Rate Source Patient Position - Orthostatic VS BP Location FiO2 (%)   Oral Monitor Sitting Left arm --      Pain Score       --             Orthostatic Vital Signs  Vitals:    07/23/23 2205 07/23/23 2230   BP: 123/78 121/79   Pulse: (!) 119    Patient Position - Orthostatic VS: Sitting        Physical Exam  Vitals and nursing note reviewed. Constitutional:       General: She is not in acute distress. Appearance: Normal appearance. She is not ill-appearing, toxic-appearing or diaphoretic. HENT:      Head: Normocephalic and atraumatic. Nose: Nose normal. No congestion or rhinorrhea. Mouth/Throat:      Mouth: Mucous membranes are dry. Pharynx: Oropharynx is clear. Eyes:      General: No scleral icterus. Extraocular Movements: Extraocular movements intact. Conjunctiva/sclera: Conjunctivae normal.      Pupils: Pupils are equal, round, and reactive to light. Cardiovascular:      Rate and Rhythm: Normal rate and regular rhythm. Pulses: Normal pulses. Heart sounds: Normal heart sounds. No murmur heard. No friction rub. No gallop.    Pulmonary:      Effort: Pulmonary effort is normal.      Breath sounds: Normal breath sounds. No wheezing, rhonchi or rales. Abdominal:      General: Abdomen is flat. Palpations: Abdomen is soft. Tenderness: There is abdominal tenderness in the epigastric area. There is no right CVA tenderness, left CVA tenderness, guarding or rebound. Musculoskeletal:         General: No swelling, tenderness, deformity or signs of injury. Normal range of motion. Cervical back: Normal range of motion and neck supple. No rigidity or tenderness. Right lower leg: No edema. Left lower leg: No edema. Lymphadenopathy:      Cervical: No cervical adenopathy. Skin:     General: Skin is warm and dry. Capillary Refill: Capillary refill takes less than 2 seconds. Coloration: Skin is not jaundiced or pale. Findings: No bruising, erythema, lesion or rash. Neurological:      General: No focal deficit present. Mental Status: She is alert and oriented to person, place, and time.          ED Medications  Medications   sodium chloride 0.9 % bolus 1,000 mL (0 mL Intravenous Stopped 7/24/23 0013)   metoclopramide (REGLAN) injection 10 mg (10 mg Intravenous Given 7/23/23 2252)   multi-electrolyte (ISOLYTE-S PH 7.4) bolus 1,000 mL (0 mL Intravenous Stopped 7/24/23 0117)   potassium chloride (K-DUR,KLOR-CON) CR tablet 40 mEq (40 mEq Oral Given 7/24/23 0023)       Diagnostic Studies  Results Reviewed     Procedure Component Value Units Date/Time    hCG, qualitative pregnancy [[id number]]  (Normal) Collected: 07/23/23 2252    Lab Status: Final result Specimen: Blood from Arm, Right Updated: 07/24/23 0022     Preg, Serum Negative    Comprehensive metabolic panel [[id number]]  (Abnormal) Collected: 07/23/23 2252    Lab Status: Final result Specimen: Blood from Arm, Right Updated: 07/23/23 2353     Sodium 133 mmol/L      Potassium 2.8 mmol/L      Chloride 92 mmol/L      CO2 28 mmol/L      ANION GAP 13 mmol/L      BUN 11 mg/dL Creatinine 0.94 mg/dL      Glucose 120 mg/dL      Calcium 9.6 mg/dL      AST 27 U/L      ALT 57 U/L      Alkaline Phosphatase 68 U/L      Total Protein 8.2 g/dL      Albumin 4.4 g/dL      Total Bilirubin 1.23 mg/dL      eGFR 81 ml/min/1.73sq m     Narrative:      [facility] guidelines for Chronic Kidney Disease (CKD):   •  Stage 1 with normal or high GFR (GFR > 90 mL/min/1.73 square meters)  •  Stage 2 Mild CKD (GFR = 60-89 mL/min/1.73 square meters)  •  Stage 3A Moderate CKD (GFR = 45-59 mL/min/1.73 square meters)  •  Stage 3B Moderate CKD (GFR = 30-44 mL/min/1.73 square meters)  •  Stage 4 Severe CKD (GFR = 15-29 mL/min/1.73 square meters)  •  Stage 5 End Stage CKD (GFR <15 mL/min/1.73 square meters)  Note: GFR calculation is accurate only with a steady state creatinine    Lipase [[id number]]  (Normal) Collected: 07/23/23 2252    Lab Status: Final result Specimen: Blood from Arm, Right Updated: 07/23/23 2353     Lipase 87 u/L     CBC and differential [[id number]]  (Abnormal) Collected: 07/23/23 2252    Lab Status: Final result Specimen: Blood from Arm, Right Updated: 07/23/23 2302     WBC 16.78 Thousand/uL      RBC 5.46 Million/uL      Hemoglobin 16.6 g/dL      Hematocrit 45.3 %      MCV 83 fL      MCH 30.4 pg      MCHC 36.6 g/dL      RDW 12.4 %      MPV 11.3 fL      Platelets 406 Thousands/uL      nRBC 0 /100 WBCs      Neutrophils Relative 65 %      Immat GRANS % 0 %      Lymphocytes Relative 28 %      Monocytes Relative 6 %      Eosinophils Relative 1 %      Basophils Relative 0 %      Neutrophils Absolute 10.89 Thousands/µL      Immature Grans Absolute 0.06 Thousand/uL      Lymphocytes Absolute 4.73 Thousands/µL      Monocytes Absolute 0.95 Thousand/µL      Eosinophils Absolute 0.11 Thousand/µL      Basophils Absolute 0.04 Thousands/µL                  No orders to display         Procedures  Procedures      ED Course                                       Medical Decision Making  81-year-old female with a history of gastric ulcers, asthma, and cannabinoid hyperemesis syndrome who presents complaining of 10 days of intractable vomiting and epigastric pain. The patient has been seen in the emergency department multiple times for the same complaint. Patient is tachycardic at 119. The remainder the patient's vitals are within the normal limits. On exam patient is uncomfortable appearing, mucous membranes are dry, heart is regular rate and rhythm, lungs are clear to auscultation bilaterally, abdomen is soft with epigastric tenderness but no rebound or guarding. Differential diagnosis includes cannabinoid hyperemesis syndrome, gastritis, pancreatitis, biliary disease. Had a CT scan 1 week, do not think repeat Stephen imaging is warranted at this time. Will order CBC, CMP, lipase, qualitative hCG. We will treat the patient symptoms with IV fluids and Reglan. hCG is negative. Hemoglobin is 16.6. WBC count is 16.78 likely reactive versus hemoconcentration. Potassium is 2.8. Will replete orally. Remainder the patient's lab work is without actionable derangements. Patient reports significant improvement in her symptoms. Given the frequency of the patient's visits the patient is offered admission, but states that she has no one to take care of her son. A prescription for Reglan sent to the patient's pharmacy. The patient is given information for gastroenterology to schedule a follow-up appointment. Return precautions are given and the patient is discharged. Amount and/or Complexity of Data Reviewed  Labs: ordered. Risk  Prescription drug management.             Disposition  Final diagnoses:   Nausea and vomiting   Dehydration   Epigastric pain     Time reflects when diagnosis was documented in both MDM as applicable and the Disposition within this note     Time User Action Codes Description Comment    7/24/2023  1:16 AM Maverick Cano Add [R11.2] Nausea and vomiting 7/24/2023  1:16 AM Debbie Hoang BEL Add [E86.0] Dehydration     7/24/2023  1:17 AM Brooklyn Kaur Add [R10.13] Epigastric pain       ED Disposition     ED Disposition   Discharge    Condition   Stable    Date/Time   Mon Jul 24, 2023  1:16 AM    Comment   Alyssa Nissen discharge to home/self care. Follow-up Information     Follow up With Specialties Details Why Contact Info Additional [address] [facility] Emergency Department Emergency Medicine Go to  If symptoms worsen [address] [facility] Emergency Department, [address] [place], [place], [address] [facility] Gastroenterology [address] [place] [address] Gastroenterology Schedule an appointment as soon as possible for a visit   [address] [facility] [address] [facility],4Th Floor [address] Gastroenterology [facility], [address] [facility], [address] [place] [address], [id number], [phone]          Patient's Medications   Discharge Prescriptions    METOCLOPRAMIDE (REGLAN) 10 MG TABLET    Take 1 tablet (10 mg total) by mouth every 6 (six) hours       Start Date: 7/24/2023 End Date: --       Order Dose: 10 mg       Quantity: 30 tablet    Refills: 0     No discharge procedures on file. PDMP Review       Value Time User    PDMP Reviewed  Yes 1/20/2021  2:27 PM Lovina Severance, PA-C           ED Provider  Attending physically available and evaluated Alyssa Nissen. I managed the patient along with the ED Attending.     Electronically Signed by         Lizabeth Mancilla DO  07/24/23 0126

## 2023-07-24 NOTE — ED ATTENDING ATTESTATION
7/23/2023  I, No Tai MD, saw and evaluated the patient. I have discussed the patient with the resident/non-physician practitioner and agree with the resident's/non-physician practitioner's findings, Plan of Care, and MDM as documented in the resident's/non-physician practitioner's note, except where noted. All available labs and Radiology studies were reviewed. I was present for key portions of any procedure(s) performed by the resident/non-physician practitioner and I was immediately available to provide assistance. At this point I agree with the current assessment done in the Emergency Department. I have conducted an independent evaluation of this patient a history and physical is as follows:    ED Course         Critical Care Time  Procedures    32 yo female with repeated ed visits for vomiting, abdominal pain mostly in epigastric area. Pt not tolerating po. Pt with no bm last few days. No fever, no cp, no sob.  pmh gastric ulcers. Pt taking zofran at home with no relief. Vss, afebrile, lungs cta, rrr, abdomen soft tender epigastric area, no rebound, no guarding. Labs, ivf, antiemetics.

## 2023-08-19 ENCOUNTER — HOSPITAL ENCOUNTER (EMERGENCY)
Facility: HOSPITAL | Age: 33
Discharge: HOME | End: 2023-08-19
Attending: EMERGENCY MEDICINE | Admitting: EMERGENCY MEDICINE
Payer: COMMERCIAL

## 2023-08-19 VITALS
HEART RATE: 74 BPM | DIASTOLIC BLOOD PRESSURE: 104 MMHG | OXYGEN SATURATION: 97 % | TEMPERATURE: 98.3 F | RESPIRATION RATE: 18 BRPM | SYSTOLIC BLOOD PRESSURE: 148 MMHG

## 2023-08-19 DIAGNOSIS — R11.2 NAUSEA AND VOMITING: ICD-10-CM

## 2023-08-19 DIAGNOSIS — Z79.899 MEDICAL MARIJUANA USE: ICD-10-CM

## 2023-08-19 DIAGNOSIS — R10.9 ABDOMINAL PAIN: ICD-10-CM

## 2023-08-19 DIAGNOSIS — Z87.11 HISTORY OF GASTRIC ULCER: Primary | ICD-10-CM

## 2023-08-19 LAB
ALBUMIN SERPL BCP-MCNC: 4.4 G/DL (ref 3.5–5)
ALP SERPL-CCNC: 78 U/L (ref 46–116)
ALT SERPL W P-5'-P-CCNC: 30 U/L (ref 12–78)
ANION GAP SERPL CALCULATED.3IONS-SCNC: 12 MMOL/L
AST SERPL W P-5'-P-CCNC: 12 U/L (ref 5–45)
BACTERIA UR QL AUTO: ABNORMAL /HPF
BASOPHILS # BLD AUTO: 0.06 THOUSANDS/ÂΜL (ref 0–0.1)
BASOPHILS NFR BLD AUTO: 0 % (ref 0–1)
BILIRUB SERPL-MCNC: 1.01 MG/DL (ref 0.2–1)
BILIRUB UR QL STRIP: NEGATIVE
BUN SERPL-MCNC: 13 MG/DL (ref 5–25)
CALCIUM SERPL-MCNC: 9.9 MG/DL (ref 8.3–10.1)
CHLORIDE SERPL-SCNC: 103 MMOL/L (ref 96–108)
CLARITY UR: CLEAR
CO2 SERPL-SCNC: 22 MMOL/L (ref 21–32)
COLOR UR: YELLOW
CREAT SERPL-MCNC: 0.82 MG/DL (ref 0.6–1.3)
EOSINOPHIL # BLD AUTO: 0.05 THOUSAND/ÂΜL (ref 0–0.61)
EOSINOPHIL NFR BLD AUTO: 0 % (ref 0–6)
ERYTHROCYTE [DISTWIDTH] IN BLOOD BY AUTOMATED COUNT: 12.8 % (ref 11.6–15.1)
EXT PREGNANCY TEST URINE: NEGATIVE
EXT. CONTROL: NORMAL
GFR SERPL CREATININE-BSD FRML MDRD: 95 ML/MIN/1.73SQ M
GLUCOSE SERPL-MCNC: 137 MG/DL (ref 65–140)
GLUCOSE UR STRIP-MCNC: NEGATIVE MG/DL
HCT VFR BLD AUTO: 42.7 % (ref 34.8–46.1)
HGB BLD-MCNC: 15.2 G/DL (ref 11.5–15.4)
HGB UR QL STRIP.AUTO: NEGATIVE
IMM GRANULOCYTES # BLD AUTO: 0.11 THOUSAND/UL (ref 0–0.2)
IMM GRANULOCYTES NFR BLD AUTO: 1 % (ref 0–2)
KETONES UR STRIP-MCNC: ABNORMAL MG/DL
LEUKOCYTE ESTERASE UR QL STRIP: ABNORMAL
LIPASE SERPL-CCNC: 69 U/L (ref 73–393)
LYMPHOCYTES # BLD AUTO: 2.05 THOUSANDS/ÂΜL (ref 0.6–4.47)
LYMPHOCYTES NFR BLD AUTO: 10 % (ref 14–44)
MCH RBC QN AUTO: 30.2 PG (ref 26.8–34.3)
MCHC RBC AUTO-ENTMCNC: 35.6 G/DL (ref 31.4–37.4)
MCV RBC AUTO: 85 FL (ref 82–98)
MONOCYTES # BLD AUTO: 0.98 THOUSAND/ÂΜL (ref 0.17–1.22)
MONOCYTES NFR BLD AUTO: 5 % (ref 4–12)
MUCOUS THREADS UR QL AUTO: ABNORMAL
NEUTROPHILS # BLD AUTO: 16.58 THOUSANDS/ÂΜL (ref 1.85–7.62)
NEUTS SEG NFR BLD AUTO: 84 % (ref 43–75)
NITRITE UR QL STRIP: NEGATIVE
NON-SQ EPI CELLS URNS QL MICRO: ABNORMAL /HPF
NRBC BLD AUTO-RTO: 0 /100 WBCS
PH UR STRIP.AUTO: 7 [PH]
PLATELET # BLD AUTO: 253 THOUSANDS/UL (ref 149–390)
PMV BLD AUTO: 11.3 FL (ref 8.9–12.7)
POTASSIUM SERPL-SCNC: 3.1 MMOL/L (ref 3.5–5.3)
PROT SERPL-MCNC: 8.3 G/DL (ref 6.4–8.4)
PROT UR STRIP-MCNC: NEGATIVE MG/DL
RBC # BLD AUTO: 5.03 MILLION/UL (ref 3.81–5.12)
RBC #/AREA URNS AUTO: ABNORMAL /HPF
SODIUM SERPL-SCNC: 137 MMOL/L (ref 135–147)
SP GR UR STRIP.AUTO: 1.01 (ref 1–1.03)
UROBILINOGEN UR STRIP-ACNC: 2 MG/DL
WBC # BLD AUTO: 19.83 THOUSAND/UL (ref 4.31–10.16)
WBC #/AREA URNS AUTO: ABNORMAL /HPF

## 2023-08-19 PROCEDURE — 99284 EMERGENCY DEPT VISIT MOD MDM: CPT

## 2023-08-19 PROCEDURE — 99285 EMERGENCY DEPT VISIT HI MDM: CPT | Performed by: EMERGENCY MEDICINE

## 2023-08-19 PROCEDURE — 96366 THER/PROPH/DIAG IV INF ADDON: CPT

## 2023-08-19 PROCEDURE — 96365 THER/PROPH/DIAG IV INF INIT: CPT

## 2023-08-19 PROCEDURE — 81025 URINE PREGNANCY TEST: CPT

## 2023-08-19 PROCEDURE — 80053 COMPREHEN METABOLIC PANEL: CPT

## 2023-08-19 PROCEDURE — 96375 TX/PRO/DX INJ NEW DRUG ADDON: CPT

## 2023-08-19 PROCEDURE — 83690 ASSAY OF LIPASE: CPT

## 2023-08-19 PROCEDURE — 93005 ELECTROCARDIOGRAM TRACING: CPT

## 2023-08-19 PROCEDURE — 36415 COLL VENOUS BLD VENIPUNCTURE: CPT

## 2023-08-19 PROCEDURE — 85025 COMPLETE CBC W/AUTO DIFF WBC: CPT

## 2023-08-19 PROCEDURE — 81001 URINALYSIS AUTO W/SCOPE: CPT

## 2023-08-19 RX ORDER — POTASSIUM CHLORIDE 20 MEQ/1
40 TABLET, EXTENDED RELEASE ORAL ONCE
Status: COMPLETED | OUTPATIENT
Start: 2023-08-19 | End: 2023-08-19

## 2023-08-19 RX ORDER — HYDROMORPHONE HCL/PF 1 MG/ML
1 SYRINGE (ML) INJECTION ONCE
Status: COMPLETED | OUTPATIENT
Start: 2023-08-19 | End: 2023-08-19

## 2023-08-19 RX ORDER — ONDANSETRON 4 MG/1
4 TABLET, FILM COATED ORAL EVERY 6 HOURS
Qty: 12 TABLET | Refills: 0 | Status: SHIPPED | OUTPATIENT
Start: 2023-08-19

## 2023-08-19 RX ORDER — ONDANSETRON 2 MG/ML
4 INJECTION INTRAMUSCULAR; INTRAVENOUS ONCE
Status: COMPLETED | OUTPATIENT
Start: 2023-08-19 | End: 2023-08-19

## 2023-08-19 RX ADMIN — POTASSIUM CHLORIDE 40 MEQ: 1500 TABLET, EXTENDED RELEASE ORAL at 22:45

## 2023-08-19 RX ADMIN — ONDANSETRON 4 MG: 2 INJECTION INTRAMUSCULAR; INTRAVENOUS at 19:59

## 2023-08-19 RX ADMIN — HYDROMORPHONE HYDROCHLORIDE 1 MG: 1 INJECTION, SOLUTION INTRAMUSCULAR; INTRAVENOUS; SUBCUTANEOUS at 19:59

## 2023-08-19 RX ADMIN — SODIUM CHLORIDE, SODIUM LACTATE, POTASSIUM CHLORIDE, AND CALCIUM CHLORIDE 1000 ML: .6; .31; .03; .02 INJECTION, SOLUTION INTRAVENOUS at 20:01

## 2023-08-19 NOTE — ED ATTENDING ATTESTATION
8/19/2023  I, Boom Carranza MD, saw and evaluated the patient. I have discussed the patient with the resident/non-physician practitioner and agree with the resident's/non-physician practitioner's findings, Plan of Care, and MDM as documented in the resident's/non-physician practitioner's note, except where noted. All available labs and Radiology studies were reviewed. I was present for key portions of any procedure(s) performed by the resident/non-physician practitioner and I was immediately available to provide assistance. At this point I agree with the current assessment done in the Emergency Department. I have conducted an independent evaluation of this patient a history and physical is as follows:   Pt presents with abd pain and vomiting for 1 week Pt states vomiting preceded abd pain Pt has had similar episodes in past felt to be related to cannabis and gastritis PE: alert heart reg lungs clear abd soft tender diffusely greatest upper MDM:Pt has had numerous ct scans without definitive cause will check labs a treat symptoms   ED Course         Critical Care Time  Procedures

## 2023-08-20 LAB
ATRIAL RATE: 102 BPM
P AXIS: 85 DEGREES
PR INTERVAL: 138 MS
QRS AXIS: 88 DEGREES
QRSD INTERVAL: 76 MS
QT INTERVAL: 378 MS
QTC INTERVAL: 492 MS
T WAVE AXIS: 18 DEGREES
VENTRICULAR RATE: 102 BPM

## 2023-08-20 PROCEDURE — 93010 ELECTROCARDIOGRAM REPORT: CPT | Performed by: INTERNAL MEDICINE

## 2023-08-20 NOTE — DISCHARGE INSTRUCTIONS
You have been evaluated in the emergency department for abdominal pain and vomiting. Your evaluation is showing no acute process requiring emergency intervention or admission to the hospital.  Please follow-up with gastroenterology for further diagnostics and treatment of your condition. Take Zofran at home as needed for vomiting. To stay hydrated. Return to the emergency department if your symptoms worsen, you feel fatigued, dehydrated, develop chest pain or fevers. To limit your marijuana intake as this may be contributing to your vomiting.

## 2023-08-20 NOTE — ED PROVIDER NOTES
History  Chief Complaint   Patient presents with   • Abdominal Pain     Pt reports left sided abd pain and vomiting off and on since Monday      Patient is a 60-year-old female with a past medical history of stomach ulcers and GERD, presenting with abdominal pain and vomiting for the last week that developed into abdominal pain in her epigastrium today. Patient has had multiple previous episodes evaluated in the emergency department, generally appearing to the providers to be related to cannabis hyperemesis syndrome or gastritis. She has been written referrals to GI that she has not been able to follow-up on. Patient has been having vomiting over the last week, with abdominal pain developing today. Denies chest pain, shortness of breath, fevers, dysuria. Denies vaginal bleeding, vaginal discharge. Patient states that she has daily extensive marijuana use for her back pain. Prior to Admission Medications   Prescriptions Last Dose Informant Patient Reported? Taking?    Pyridoxine HCl (vitamin B-6) 25 MG tablet   No No   Sig: Take 1 tablet (25 mg total) by mouth 3 (three) times a day   acetaminophen (TYLENOL) 325 mg tablet   No No   Sig: Take 2 tablets (650 mg total) by mouth every 6 (six) hours as needed for mild pain   aluminum-magnesium hydroxide-simethicone (MAALOX MAX) 400-400-40 MG/5ML suspension   No No   Sig: Take 10 mL by mouth every 6 (six) hours as needed for indigestion or heartburn   capsaicin (ZOSTRIX) 0.025 % cream   No No   Sig: Apply 1 Application topically 2 (two) times a day   doxylamine (UNISOM) 25 MG tablet   No No   Sig: Take 0.5 tablets (12.5 mg total) by mouth daily at bedtime as needed for sleep   famotidine (PEPCID) 20 mg tablet   No No   Sig: Take 1 tablet (20 mg total) by mouth daily at bedtime for 14 days   metoclopramide (REGLAN) 5 mg tablet   No No   Sig: Take 2 tablets (10 mg total) by mouth every 8 (eight) hours as needed (Nausea and vomiting)   metoclopramide (Reglan) 10 mg tablet   No No   Sig: Take 1 tablet (10 mg total) by mouth every 6 (six) hours   ondansetron (ZOFRAN) 4 mg tablet   No No   Sig: Take 1 tablet (4 mg total) by mouth every 6 (six) hours   ondansetron (ZOFRAN) 4 mg tablet   No No   Sig: Take 1 tablet (4 mg total) by mouth every 6 (six) hours   ondansetron (ZOFRAN) 4 mg tablet   No No   Sig: Take 1 tablet (4 mg total) by mouth every 6 (six) hours   ondansetron (Zofran ODT) 4 mg disintegrating tablet   No No   Sig: Take 1 tablet (4 mg total) by mouth every 6 (six) hours as needed for nausea or vomiting   pantoprazole (PROTONIX) 40 mg tablet   No No   Sig: Take 1 tablet (40 mg total) by mouth 2 (two) times a day for 14 days   thiamine 100 MG tablet   No No   Sig: Take 1 tablet (100 mg total) by mouth daily      Facility-Administered Medications: None       Past Medical History:   Diagnosis Date   • Arthritis    • Asthma    • History of stomach ulcers    • Psychiatric disorder     anxiety       Past Surgical History:   Procedure Laterality Date   • BREAST SURGERY     • COSMETIC SURGERY     • EGD     • WISDOM TOOTH EXTRACTION         History reviewed. No pertinent family history. I have reviewed and agree with the history as documented. E-Cigarette/Vaping   • E-Cigarette Use Never User      E-Cigarette/Vaping Substances   • Nicotine No    • THC Yes    • CBD Yes    • Flavoring No    • Other No    • Unknown No      Social History     Tobacco Use   • Smoking status: Never   • Smokeless tobacco: Never   Vaping Use   • Vaping Use: Never used   Substance Use Topics   • Alcohol use: Not Currently   • Drug use: Not Currently     Types: Marijuana        Review of Systems   Constitutional: Negative for chills, fatigue and fever. HENT: Negative for rhinorrhea, sore throat and trouble swallowing. Eyes: Negative for discharge and visual disturbance. Respiratory: Negative for cough and shortness of breath. Cardiovascular: Negative for chest pain and palpitations. Gastrointestinal: Positive for abdominal pain, nausea and vomiting. Negative for constipation and diarrhea. Genitourinary: Negative for dysuria and hematuria. Musculoskeletal: Negative for arthralgias and back pain. Skin: Negative for color change and rash. Neurological: Negative for seizures and syncope. All other systems reviewed and are negative. Physical Exam  ED Triage Vitals [08/19/23 1927]   Temperature Pulse Respirations Blood Pressure SpO2   98.3 °F (36.8 °C) 74 18 (!) 148/104 97 %      Temp Source Heart Rate Source Patient Position - Orthostatic VS BP Location FiO2 (%)   Oral Monitor Lying Left arm --      Pain Score       10 - Worst Possible Pain             Orthostatic Vital Signs  Vitals:    08/19/23 1927   BP: (!) 148/104   Pulse: 74   Patient Position - Orthostatic VS: Lying       Physical Exam  Vitals and nursing note reviewed. Constitutional:       General: She is not in acute distress. Appearance: She is well-developed. HENT:      Head: Normocephalic and atraumatic. Eyes:      Conjunctiva/sclera: Conjunctivae normal.   Cardiovascular:      Rate and Rhythm: Normal rate and regular rhythm. Heart sounds: No murmur heard. Pulmonary:      Effort: Pulmonary effort is normal. No respiratory distress. Breath sounds: Normal breath sounds. Abdominal:      Palpations: Abdomen is soft. Tenderness: There is abdominal tenderness in the epigastric area. There is no right CVA tenderness or left CVA tenderness. Musculoskeletal:         General: No swelling. Cervical back: Neck supple. Skin:     General: Skin is warm and dry. Capillary Refill: Capillary refill takes less than 2 seconds. Neurological:      Mental Status: She is alert.    Psychiatric:         Mood and Affect: Mood normal.         ED Medications  Medications   ondansetron (ZOFRAN) injection 4 mg (4 mg Intravenous Given 8/19/23 1959)   HYDROmorphone (DILAUDID) injection 1 mg (1 mg Intravenous Given 8/19/23 1959)   lactated ringers bolus 1,000 mL (0 mL Intravenous Stopped 8/19/23 2253)   potassium chloride (K-DUR,KLOR-CON) CR tablet 40 mEq (40 mEq Oral Given 8/19/23 2245)       Diagnostic Studies  Results Reviewed     Procedure Component Value Units Date/Time    Urine Microscopic [[id number]]  (Abnormal) Collected: 08/19/23 2200    Lab Status: Final result Specimen: Urine, Clean Catch Updated: 08/19/23 2218     RBC, UA 2-4 /hpf      WBC, UA 1-2 /hpf      Epithelial Cells Occasional /hpf      Bacteria, UA Occasional /hpf      MUCUS THREADS Occasional    UA w Reflex to Microscopic w Reflex to Culture [[id number]]  (Abnormal) Collected: 08/19/23 2200    Lab Status: Final result Specimen: Urine, Clean Catch Updated: 08/19/23 2215     Color, UA Yellow     Clarity, UA Clear     Specific Gravity, UA 1.012     pH, UA 7.0     Leukocytes, UA Trace     Nitrite, UA Negative     Protein, UA Negative mg/dl      Glucose, UA Negative mg/dl      Ketones, UA 40 (2+) mg/dl      Urobilinogen, UA 2.0 mg/dl      Bilirubin, UA Negative     Occult Blood, UA Negative    POCT pregnancy, urine [[id number]]  (Normal) Resulted: 08/19/23 2205    Lab Status: Final result Updated: 08/19/23 2205     EXT Preg Test, Ur Negative     Control Valid    Comprehensive metabolic panel [[id number]]  (Abnormal) Collected: 08/19/23 2003    Lab Status: Final result Specimen: Blood from Arm, Left Updated: 08/19/23 2033     Sodium 137 mmol/L      Potassium 3.1 mmol/L      Chloride 103 mmol/L      CO2 22 mmol/L      ANION GAP 12 mmol/L      BUN 13 mg/dL      Creatinine 0.82 mg/dL      Glucose 137 mg/dL      Calcium 9.9 mg/dL      AST 12 U/L      ALT 30 U/L      Alkaline Phosphatase 78 U/L      Total Protein 8.3 g/dL      Albumin 4.4 g/dL      Total Bilirubin 1.01 mg/dL      eGFR 95 ml/min/1.73sq m     Narrative:      [place] guidelines for Chronic Kidney Disease (CKD):   •  Stage 1 with normal or high GFR (GFR > 90 mL/min/1.73 square meters)  •  Stage 2 Mild CKD (GFR = 60-89 mL/min/1.73 square meters)  •  Stage 3A Moderate CKD (GFR = 45-59 mL/min/1.73 square meters)  •  Stage 3B Moderate CKD (GFR = 30-44 mL/min/1.73 square meters)  •  Stage 4 Severe CKD (GFR = 15-29 mL/min/1.73 square meters)  •  Stage 5 End Stage CKD (GFR <15 mL/min/1.73 square meters)  Note: GFR calculation is accurate only with a steady state creatinine    Lipase [[id number]]  (Abnormal) Collected: 08/19/23 2003    Lab Status: Final result Specimen: Blood from Arm, Left Updated: 08/19/23 2033     Lipase 69 u/L     CBC and differential [[id number]]  (Abnormal) Collected: 08/19/23 2003    Lab Status: Final result Specimen: Blood from Arm, Left Updated: 08/19/23 2012     WBC 19.83 Thousand/uL      RBC 5.03 Million/uL      Hemoglobin 15.2 g/dL      Hematocrit 42.7 %      MCV 85 fL      MCH 30.2 pg      MCHC 35.6 g/dL      RDW 12.8 %      MPV 11.3 fL      Platelets 168 Thousands/uL      nRBC 0 /100 WBCs      Neutrophils Relative 84 %      Immat GRANS % 1 %      Lymphocytes Relative 10 %      Monocytes Relative 5 %      Eosinophils Relative 0 %      Basophils Relative 0 %      Neutrophils Absolute 16.58 Thousands/µL      Immature Grans Absolute 0.11 Thousand/uL      Lymphocytes Absolute 2.05 Thousands/µL      Monocytes Absolute 0.98 Thousand/µL      Eosinophils Absolute 0.05 Thousand/µL      Basophils Absolute 0.06 Thousands/µL                  No orders to display         Procedures  ECG 12 Lead Documentation Only    Date/Time: 8/20/2023 12:55 AM    Performed by: Niko Art MD  Authorized by: Niko Art MD    Patient location:  ED  Interpretation:     Interpretation: normal    Rate:     ECG rate assessment: normal    Rhythm:     Rhythm: sinus rhythm    Ectopy:     Ectopy: none    QRS:     QRS axis:  Normal    QRS intervals:  Normal  Conduction:     Conduction: normal    ST segments:     ST segments:  Normal  T waves:     T waves: normal            ED Course                             SBIRT 20yo+    Flowsheet Row Most Recent Value   Initial Alcohol Screen: US AUDIT-C     1. How often do you have a drink containing alcohol? 0 Filed at: 08/19/2023 1930   Audit-C Score 0 Filed at: 08/19/2023 1930   CATHY: How many times in the past year have you. .. Used an illegal drug or used a prescription medication for non-medical reasons? Never Filed at: 08/19/2023 1930                Medical Decision Making  Patient is 28-year-old female with PMH of stridorous who presents to the ED with abdominal pain. Vital signs stable. On exam tearful and in distress, with abdominal tenderness without rebound, rigidity. History and physical exam most consistent with gastritis or GERD. However, differential diagnosis included but not limited to ectopic, pancreatitis, hepatitis. Plan: CMP, CBC, lites's, UA, U pregnant patient has had several CAT scans in the past, related to this complaint. Will avoid CT unless significant abnormalities in the CMP or lipase are found. Symptomatic management Dilaudid and Zofran. View ED course above for further discussion on patient workup    All labs reviewed and utilized in the medical decision making process  All radiology studies independently viewed by me and interpreted by the radiologist.  I reviewed all testing with the patient. Upon re-evaluation patient feeling much better with fluids, Zofran, Dilaudid. Sitting comfortably in the bed, able to walk, tenderness gone. No longer tearful. Discussed patient the differential for her condition, including gastritis, GERD, cannabinoid hyperemesis, and the need to follow-up with gastroenterology. Laboratory analysis largely unremarkable aside from leukocytosis, likely reactive to patient's period of pain and agitation.   Encouraged good hydration, Zofran use at home for nausea, and gave explicit return precautions for development of acute abdominal processes emergencies, to which patient expressed understanding. All questions answered prior to discharge. Amount and/or Complexity of Data Reviewed  Labs: ordered. Risk  Prescription drug management. Disposition  Final diagnoses:   History of gastric ulcer   Medical marijuana use   Nausea and vomiting   Abdominal pain     Time reflects when diagnosis was documented in both MDM as applicable and the Disposition within this note     Time User Action Codes Description Comment    8/19/2023 10:21 PM Kennth Boros Add [Z87.11] History of gastric ulcer     8/19/2023 10:21 PM Kennth Boros Add [L60.340] Medical marijuana use     8/19/2023 10:21 PM Kennth Boros Add [R11.2] Nausea and vomiting     8/19/2023 10:21 PM Kennth Boros Add [R10.9] Abdominal pain       ED Disposition     ED Disposition   Discharge    Condition   Stable    Date/Time   Sat Aug 19, 2023 10:20 PM    Comment   Amparo Moser discharge to home/self care. Follow-up Information     Follow up With Specialties Details Why Contact Info Additional [address] [facility] Emergency Department Emergency Medicine Go to  If symptoms worsen [address] [id number]  [facility] Emergency Department, [address] [place], [place], [address] [facility]   [address] [facility] [address] [facility] [address]  [address] [place] [address] [facility], [address] [facility], [address] [place] [address], [id number], [phone]          Discharge Medication List as of 8/19/2023 10:32 PM      START taking these medications    Details   !! ondansetron (ZOFRAN) 4 mg tablet Take 1 tablet (4 mg total) by mouth every 6 (six) hours, Starting Sat 8/19/2023, Normal       !! - Potential duplicate medications found. Please discuss with provider.       CONTINUE these medications which have NOT CHANGED    Details   acetaminophen (TYLENOL) 325 mg tablet Take 2 tablets (650 mg total) by mouth every 6 (six) hours as needed for mild pain, Starting Sat 12/5/2020, Normal      aluminum-magnesium hydroxide-simethicone (MAALOX MAX) 400-400-40 MG/5ML suspension Take 10 mL by mouth every 6 (six) hours as needed for indigestion or heartburn, Starting Wed 8/31/2022, Normal      capsaicin (ZOSTRIX) 0.025 % cream Apply 1 Application topically 2 (two) times a day, Starting Mon 7/17/2023, Normal      doxylamine (UNISOM) 25 MG tablet Take 0.5 tablets (12.5 mg total) by mouth daily at bedtime as needed for sleep, Starting Wed 8/31/2022, Normal      famotidine (PEPCID) 20 mg tablet Take 1 tablet (20 mg total) by mouth daily at bedtime for 14 days, Starting Mon 5/30/2022, Until Mon 6/13/2022, Normal      !! metoclopramide (Reglan) 10 mg tablet Take 1 tablet (10 mg total) by mouth every 6 (six) hours, Starting Mon 7/24/2023, Normal      !! metoclopramide (REGLAN) 5 mg tablet Take 2 tablets (10 mg total) by mouth every 8 (eight) hours as needed (Nausea and vomiting), Starting Mon 5/30/2022, No Print      ondansetron (Zofran ODT) 4 mg disintegrating tablet Take 1 tablet (4 mg total) by mouth every 6 (six) hours as needed for nausea or vomiting, Starting Wed 8/31/2022, Normal      !! ondansetron (ZOFRAN) 4 mg tablet Take 1 tablet (4 mg total) by mouth every 6 (six) hours, Starting Sat 9/3/2022, Normal      !! ondansetron (ZOFRAN) 4 mg tablet Take 1 tablet (4 mg total) by mouth every 6 (six) hours, Starting Mon 7/17/2023, Normal      !! ondansetron (ZOFRAN) 4 mg tablet Take 1 tablet (4 mg total) by mouth every 6 (six) hours, Starting Tue 7/18/2023, Normal      pantoprazole (PROTONIX) 40 mg tablet Take 1 tablet (40 mg total) by mouth 2 (two) times a day for 14 days, Starting Mon 5/30/2022, Until Mon 6/13/2022, Normal      Pyridoxine HCl (vitamin B-6) 25 MG tablet Take 1 tablet (25 mg total) by mouth 3 (three) times a day, Starting Mon 5/30/2022, Normal      thiamine 100 MG tablet Take 1 tablet (100 mg total) by mouth daily, Starting Mon 5/30/2022, Normal       !! - Potential duplicate medications found. Please discuss with provider. PDMP Review       Value Time User    PDMP Reviewed  Yes 1/20/2021  2:27 PM Andrew Erickson PA-C           ED Provider  Attending physically available and evaluated Beverley Diallo. I managed the patient along with the ED Attending.     Electronically Signed by         Umm Rehman MD  08/20/23 0101

## 2023-08-21 ENCOUNTER — HOSPITAL ENCOUNTER (EMERGENCY)
Facility: HOSPITAL | Age: 33
Discharge: HOME | End: 2023-08-21
Attending: EMERGENCY MEDICINE
Payer: COMMERCIAL

## 2023-08-21 VITALS
SYSTOLIC BLOOD PRESSURE: 136 MMHG | DIASTOLIC BLOOD PRESSURE: 82 MMHG | RESPIRATION RATE: 22 BRPM | OXYGEN SATURATION: 98 % | TEMPERATURE: 98 F | HEART RATE: 90 BPM

## 2023-08-21 DIAGNOSIS — R11.2 NAUSEA AND VOMITING: Primary | ICD-10-CM

## 2023-08-21 LAB
ALBUMIN SERPL BCP-MCNC: 4.3 G/DL (ref 3.5–5)
ALP SERPL-CCNC: 80 U/L (ref 46–116)
ALT SERPL W P-5'-P-CCNC: 33 U/L (ref 12–78)
ANION GAP SERPL CALCULATED.3IONS-SCNC: 11 MMOL/L
AST SERPL W P-5'-P-CCNC: 18 U/L (ref 5–45)
ATRIAL RATE: 102 BPM
BASOPHILS # BLD AUTO: 0.05 THOUSANDS/ÂΜL (ref 0–0.1)
BASOPHILS NFR BLD AUTO: 0 % (ref 0–1)
BILIRUB SERPL-MCNC: 1.15 MG/DL (ref 0.2–1)
BILIRUB UR QL STRIP: NEGATIVE
BILIRUB UR QL STRIP: NEGATIVE
BUN SERPL-MCNC: 10 MG/DL (ref 5–25)
CALCIUM SERPL-MCNC: 10 MG/DL (ref 8.3–10.1)
CHLORIDE SERPL-SCNC: 98 MMOL/L (ref 96–108)
CLARITY UR: CLEAR
CLARITY UR: CLEAR
CO2 SERPL-SCNC: 24 MMOL/L (ref 21–32)
COLOR UR: ABNORMAL
COLOR UR: YELLOW
CREAT SERPL-MCNC: 0.87 MG/DL (ref 0.6–1.3)
EOSINOPHIL # BLD AUTO: 0.03 THOUSAND/ÂΜL (ref 0–0.61)
EOSINOPHIL NFR BLD AUTO: 0 % (ref 0–6)
ERYTHROCYTE [DISTWIDTH] IN BLOOD BY AUTOMATED COUNT: 12.5 % (ref 11.6–15.1)
EXT PREGNANCY TEST URINE: NEGATIVE
EXT. CONTROL: NORMAL
GFR SERPL CREATININE-BSD FRML MDRD: 89 ML/MIN/1.73SQ M
GLUCOSE SERPL-MCNC: 102 MG/DL (ref 65–140)
GLUCOSE UR STRIP-MCNC: NEGATIVE MG/DL
GLUCOSE UR STRIP-MCNC: NEGATIVE MG/DL
HCT VFR BLD AUTO: 42.8 % (ref 34.8–46.1)
HGB BLD-MCNC: 15.3 G/DL (ref 11.5–15.4)
HGB UR QL STRIP.AUTO: NEGATIVE
HGB UR QL STRIP.AUTO: NEGATIVE
IMM GRANULOCYTES # BLD AUTO: 0.05 THOUSAND/UL (ref 0–0.2)
IMM GRANULOCYTES NFR BLD AUTO: 0 % (ref 0–2)
KETONES UR STRIP-MCNC: ABNORMAL MG/DL
KETONES UR STRIP-MCNC: ABNORMAL MG/DL
LEUKOCYTE ESTERASE UR QL STRIP: NEGATIVE
LEUKOCYTE ESTERASE UR QL STRIP: NEGATIVE
LIPASE SERPL-CCNC: 74 U/L (ref 73–393)
LYMPHOCYTES # BLD AUTO: 2.7 THOUSANDS/ÂΜL (ref 0.6–4.47)
LYMPHOCYTES NFR BLD AUTO: 16 % (ref 14–44)
MCH RBC QN AUTO: 30.2 PG (ref 26.8–34.3)
MCHC RBC AUTO-ENTMCNC: 35.7 G/DL (ref 31.4–37.4)
MCV RBC AUTO: 84 FL (ref 82–98)
MONOCYTES # BLD AUTO: 0.84 THOUSAND/ÂΜL (ref 0.17–1.22)
MONOCYTES NFR BLD AUTO: 5 % (ref 4–12)
NEUTROPHILS # BLD AUTO: 12.84 THOUSANDS/ÂΜL (ref 1.85–7.62)
NEUTS SEG NFR BLD AUTO: 79 % (ref 43–75)
NITRITE UR QL STRIP: NEGATIVE
NITRITE UR QL STRIP: NEGATIVE
NRBC BLD AUTO-RTO: 0 /100 WBCS
P AXIS: 86 DEGREES
PH UR STRIP.AUTO: 7.5 [PH]
PH UR STRIP.AUTO: 7.5 [PH] (ref 4.5–8)
PLATELET # BLD AUTO: 259 THOUSANDS/UL (ref 149–390)
PMV BLD AUTO: 11.5 FL (ref 8.9–12.7)
POTASSIUM SERPL-SCNC: 3.1 MMOL/L (ref 3.5–5.3)
PR INTERVAL: 138 MS
PROT SERPL-MCNC: 8.6 G/DL (ref 6.4–8.4)
PROT UR STRIP-MCNC: NEGATIVE MG/DL
PROT UR STRIP-MCNC: NEGATIVE MG/DL
QRS AXIS: 87 DEGREES
QRSD INTERVAL: 78 MS
QT INTERVAL: 356 MS
QTC INTERVAL: 463 MS
RBC # BLD AUTO: 5.07 MILLION/UL (ref 3.81–5.12)
SODIUM SERPL-SCNC: 133 MMOL/L (ref 135–147)
SP GR UR STRIP.AUTO: 1.01 (ref 1–1.03)
SP GR UR STRIP.AUTO: 1.02 (ref 1–1.03)
T WAVE AXIS: 1 DEGREES
UROBILINOGEN UR QL STRIP.AUTO: 1 E.U./DL
UROBILINOGEN UR STRIP-ACNC: <2 MG/DL
VENTRICULAR RATE: 102 BPM
WBC # BLD AUTO: 16.51 THOUSAND/UL (ref 4.31–10.16)

## 2023-08-21 PROCEDURE — 96375 TX/PRO/DX INJ NEW DRUG ADDON: CPT

## 2023-08-21 PROCEDURE — 85025 COMPLETE CBC W/AUTO DIFF WBC: CPT

## 2023-08-21 PROCEDURE — 83690 ASSAY OF LIPASE: CPT

## 2023-08-21 PROCEDURE — 81003 URINALYSIS AUTO W/O SCOPE: CPT

## 2023-08-21 PROCEDURE — 96361 HYDRATE IV INFUSION ADD-ON: CPT

## 2023-08-21 PROCEDURE — 81025 URINE PREGNANCY TEST: CPT

## 2023-08-21 PROCEDURE — 93010 ELECTROCARDIOGRAM REPORT: CPT | Performed by: INTERNAL MEDICINE

## 2023-08-21 PROCEDURE — 96374 THER/PROPH/DIAG INJ IV PUSH: CPT

## 2023-08-21 PROCEDURE — 36415 COLL VENOUS BLD VENIPUNCTURE: CPT

## 2023-08-21 PROCEDURE — 99285 EMERGENCY DEPT VISIT HI MDM: CPT | Performed by: EMERGENCY MEDICINE

## 2023-08-21 PROCEDURE — 93005 ELECTROCARDIOGRAM TRACING: CPT

## 2023-08-21 PROCEDURE — 80053 COMPREHEN METABOLIC PANEL: CPT

## 2023-08-21 PROCEDURE — 99284 EMERGENCY DEPT VISIT MOD MDM: CPT

## 2023-08-21 PROCEDURE — 96376 TX/PRO/DX INJ SAME DRUG ADON: CPT

## 2023-08-21 RX ORDER — KETOROLAC TROMETHAMINE 30 MG/ML
15 INJECTION, SOLUTION INTRAMUSCULAR; INTRAVENOUS ONCE
Status: COMPLETED | OUTPATIENT
Start: 2023-08-21 | End: 2023-08-21

## 2023-08-21 RX ORDER — ONDANSETRON 4 MG/1
4 TABLET, ORALLY DISINTEGRATING ORAL EVERY 6 HOURS PRN
Qty: 20 TABLET | Refills: 0 | Status: SHIPPED | OUTPATIENT
Start: 2023-08-21

## 2023-08-21 RX ORDER — DROPERIDOL 2.5 MG/ML
0.62 INJECTION, SOLUTION INTRAMUSCULAR; INTRAVENOUS ONCE
Status: COMPLETED | OUTPATIENT
Start: 2023-08-21 | End: 2023-08-21

## 2023-08-21 RX ORDER — ONDANSETRON 4 MG/1
4 TABLET, ORALLY DISINTEGRATING ORAL EVERY 6 HOURS PRN
Qty: 20 TABLET | Refills: 0 | Status: SHIPPED | OUTPATIENT
Start: 2023-08-21 | End: 2023-08-21

## 2023-08-21 RX ORDER — POTASSIUM CHLORIDE 20 MEQ/1
20 TABLET, EXTENDED RELEASE ORAL ONCE
Status: COMPLETED | OUTPATIENT
Start: 2023-08-21 | End: 2023-08-21

## 2023-08-21 RX ADMIN — DROPERIDOL 0.62 MG: 2.5 INJECTION, SOLUTION INTRAMUSCULAR; INTRAVENOUS at 11:27

## 2023-08-21 RX ADMIN — POTASSIUM CHLORIDE 20 MEQ: 1500 TABLET, EXTENDED RELEASE ORAL at 13:41

## 2023-08-21 RX ADMIN — KETOROLAC TROMETHAMINE 15 MG: 30 INJECTION, SOLUTION INTRAMUSCULAR; INTRAVENOUS at 12:14

## 2023-08-21 RX ADMIN — DROPERIDOL 0.62 MG: 2.5 INJECTION, SOLUTION INTRAMUSCULAR; INTRAVENOUS at 12:17

## 2023-08-21 RX ADMIN — SODIUM CHLORIDE 1000 ML: 0.9 INJECTION, SOLUTION INTRAVENOUS at 11:24

## 2023-08-21 NOTE — ED ATTENDING ATTESTATION
8/21/2023  ITony MD, saw and evaluated the patient. I have discussed the patient with the resident/non-physician practitioner and agree with the resident's/non-physician practitioner's findings, Plan of Care, and MDM as documented in the resident's/non-physician practitioner's note, except where noted. All available labs and Radiology studies were reviewed. I was present for key portions of any procedure(s) performed by the resident/non-physician practitioner and I was immediately available to provide assistance. At this point I agree with the current assessment done in the Emergency Department. I have conducted an independent evaluation of this patient a history and physical is as follows:    ED Course     36-year-old female, history of cannabis use hyperemesis, presenting to the emergency department for evaluation of epigastric abdominal discomfort associated with nausea and vomiting. Since mid July, patient has had a total of 7 emergency department visits for nausea and vomiting. Patient has not followed up with gastroenterology. Patient was seen in the emergency department 2 days prior patient was seen in the emergency department 2 days prior for same. Patient is presenting to the emergency department for evaluation of epigastric abdominal discomfort. No diarrhea. No f    Critical Care Time  Procedures    ever. No chest pain, cough, shortness of breath. On entering the room the patient is seated in the stretcher texting on her phone. She then starts to hyperventilate and starts to shake. With examination of the abdomen the patient starts thrashing on the stretcher and crying out. Head is normocephalic and atraumatic. Eyelids lashes are normal.  Mucous membranes are moist.  Neck is supple. Lungs are clear bilaterally. Heart is regular rate and rhythm with no murmurs rubs or gallops. Abdomen is nondistended, soft, with generalized tenderness.   Extremities unremarkable. Epigastric abdominal discomfort with nausea and vomiting. Likely gastritis. Evaluate for pancreatitis, hepatitis, recommend follow-up with GI as an outpatient. Labs Reviewed   CBC AND DIFFERENTIAL - Abnormal       Result Value Ref Range Status    WBC 16.51 (*) 4.31 - 10.16 Thousand/uL Final    RBC 5.07  3.81 - 5.12 Million/uL Final    Hemoglobin 15.3  11.5 - 15.4 g/dL Final    Hematocrit 42.8  34.8 - 46.1 % Final    MCV 84  82 - 98 fL Final    MCH 30.2  26.8 - 34.3 pg Final    MCHC 35.7  31.4 - 37.4 g/dL Final    RDW 12.5  11.6 - 15.1 % Final    MPV 11.5  8.9 - 12.7 fL Final    Platelets 563  865 - 390 Thousands/uL Final    nRBC 0  /100 WBCs Final    Neutrophils Relative 79 (*) 43 - 75 % Final    Immat GRANS % 0  0 - 2 % Final    Lymphocytes Relative 16  14 - 44 % Final    Monocytes Relative 5  4 - 12 % Final    Eosinophils Relative 0  0 - 6 % Final    Basophils Relative 0  0 - 1 % Final    Neutrophils Absolute 12.84 (*) 1.85 - 7.62 Thousands/µL Final    Immature Grans Absolute 0.05  0.00 - 0.20 Thousand/uL Final    Lymphocytes Absolute 2.70  0.60 - 4.47 Thousands/µL Final    Monocytes Absolute 0.84  0.17 - 1.22 Thousand/µL Final    Eosinophils Absolute 0.03  0.00 - 0.61 Thousand/µL Final    Basophils Absolute 0.05  0.00 - 0.10 Thousands/µL Final   COMPREHENSIVE METABOLIC PANEL - Abnormal    Sodium 133 (*) 135 - 147 mmol/L Final    Potassium 3.1 (*) 3.5 - 5.3 mmol/L Final    Chloride 98  96 - 108 mmol/L Final    CO2 24  21 - 32 mmol/L Final    ANION GAP 11  mmol/L Final    BUN 10  5 - 25 mg/dL Final    Creatinine 0.87  0.60 - 1.30 mg/dL Final    Comment: Standardized to IDMS reference method    Glucose 102  65 - 140 mg/dL Final    Comment: If the patient is fasting, the ADA then defines impaired fasting glucose as > 100 mg/dL and diabetes as > or equal to 123 mg/dL. Specimen collection should occur prior to Sulfasalazine administration due to the potential for falsely depressed results. Specimen collection should occur prior to Sulfapyridine administration due to the potential for falsely elevated results. Calcium 10.0  8.3 - 10.1 mg/dL Final    AST 18  5 - 45 U/L Final    Comment: Specimen collection should occur prior to Sulfasalazine administration due to the potential for falsely depressed results. ALT 33  12 - 78 U/L Final    Comment: Specimen collection should occur prior to Sulfasalazine and/or Sulfapyridine administration due to the potential for falsely depressed results. Alkaline Phosphatase 80  46 - 116 U/L Final    Total Protein 8.6 (*) 6.4 - 8.4 g/dL Final    Albumin 4.3  3.5 - 5.0 g/dL Final    Total Bilirubin 1.15 (*) 0.20 - 1.00 mg/dL Final    Comment: Use of this assay is not recommended for patients undergoing treatment with eltrombopag due to the potential for falsely elevated results.     eGFR 89  ml/min/1.73sq m Final    Narrative:     [employer] guidelines for Chronic Kidney Disease (CKD):   •  Stage 1 with normal or high GFR (GFR > 90 mL/min/1.73 square meters)  •  Stage 2 Mild CKD (GFR = 60-89 mL/min/1.73 square meters)  •  Stage 3A Moderate CKD (GFR = 45-59 mL/min/1.73 square meters)  •  Stage 3B Moderate CKD (GFR = 30-44 mL/min/1.73 square meters)  •  Stage 4 Severe CKD (GFR = 15-29 mL/min/1.73 square meters)  •  Stage 5 End Stage CKD (GFR <15 mL/min/1.73 square meters)  Note: GFR calculation is accurate only with a steady state creatinine   UA W REFLEX TO MICROSCOPIC WITH REFLEX TO CULTURE - Abnormal    Color, UA Light Yellow   Final    Clarity, UA Clear   Final    Specific Gravity, UA 1.007  1.003 - 1.030 Final    pH, UA 7.5  4.5, 5.0, 5.5, 6.0, 6.5, 7.0, 7.5, 8.0 Final    Leukocytes, UA Negative  Negative Final    Nitrite, UA Negative  Negative Final    Protein, UA Negative  Negative mg/dl Final    Glucose, UA Negative  Negative mg/dl Final    Ketones, UA 80 (3+) (*) Negative mg/dl Final    Urobilinogen, UA <2.0  <2.0 mg/dl mg/dl Final Bilirubin, UA Negative  Negative Final    Occult Blood, UA Negative  Negative Final   URINE MACROSCOPIC, POC - Abnormal    Color, UA Yellow   Final    Clarity, UA Clear   Final    pH, UA 7.5  4.5 - 8.0 Final    Leukocytes, UA Negative  Negative Final    Nitrite, UA Negative  Negative Final    Protein, UA Negative  Negative mg/dl Final    Glucose, UA Negative  Negative mg/dl Final    Ketones, UA 80 (3+) (*) Negative mg/dl Final    Urobilinogen, UA 1.0  0.2, 1.0 E.U./dl E.U./dl Final    Bilirubin, UA Negative  Negative Final    Occult Blood, UA Negative  Negative Final    Specific Gravity, UA 1.020  1.003 - 1.030 Final    Narrative:     CLINITEK RESULT   LIPASE - Normal    Lipase 74  73 - 393 u/L Final   POCT PREGNANCY, URINE - Normal    EXT Preg Test, Ur Negative   Final    Control Valid   Final       Reviewed results of CT scan performed on 7/16/2023 that demonstrated no acute intra-abdominal pathology.

## 2023-08-21 NOTE — ED PROVIDER NOTES
History  Chief Complaint   Patient presents with   • Abdominal Pain     Pt reports worsening abdominal pain/vomiting since last Sunday - unable to keep anything down. HPI  Patient is a 71-year-old female with past medical history of cannabis use hyperemesis, gastric ulcers, GERD, who presents to the ED for evaluation of intractable vomiting with epigastric abdominal pain for the past 8 days. Patient reports the pain began on Sunday, was initially mild with several episodes of nonbloody/nonbilious vomiting. Patient did not have Reglan at home, attempted to smoke marijuana in order to improve symptoms. States she has been unable to eat or drink and has had persistent abdominal pain since onset that has been progressively worsening. Patient states she is concerned as she has been treated for this multiple times and in 1 instance she needed to be admitted for NG tube feedings while she was pregnant with her son. Patient reports a negative pregnancy test 2 days ago, but states she would like to be retested to confirm. She denies any fevers, chills, headaches, changes in vision or hearing, chest pain, shortness of breath, dysuria, hematuria, vaginal discharge, diarrhea, or any other complaints or concerns at this time. Prior to Admission Medications   Prescriptions Last Dose Informant Patient Reported? Taking?    Pyridoxine HCl (vitamin B-6) 25 MG tablet   No No   Sig: Take 1 tablet (25 mg total) by mouth 3 (three) times a day   acetaminophen (TYLENOL) 325 mg tablet   No No   Sig: Take 2 tablets (650 mg total) by mouth every 6 (six) hours as needed for mild pain   aluminum-magnesium hydroxide-simethicone (MAALOX MAX) 400-400-40 MG/5ML suspension   No No   Sig: Take 10 mL by mouth every 6 (six) hours as needed for indigestion or heartburn   capsaicin (ZOSTRIX) 0.025 % cream   No No   Sig: Apply 1 Application topically 2 (two) times a day   doxylamine (UNISOM) 25 MG tablet   No No   Sig: Take 0.5 tablets (12.5 mg total) by mouth daily at bedtime as needed for sleep   famotidine (PEPCID) 20 mg tablet   No No   Sig: Take 1 tablet (20 mg total) by mouth daily at bedtime for 14 days   metoclopramide (REGLAN) 5 mg tablet   No No   Sig: Take 2 tablets (10 mg total) by mouth every 8 (eight) hours as needed (Nausea and vomiting)   metoclopramide (Reglan) 10 mg tablet   No No   Sig: Take 1 tablet (10 mg total) by mouth every 6 (six) hours   ondansetron (ZOFRAN) 4 mg tablet   No No   Sig: Take 1 tablet (4 mg total) by mouth every 6 (six) hours   ondansetron (ZOFRAN) 4 mg tablet   No No   Sig: Take 1 tablet (4 mg total) by mouth every 6 (six) hours   ondansetron (ZOFRAN) 4 mg tablet   No No   Sig: Take 1 tablet (4 mg total) by mouth every 6 (six) hours   ondansetron (ZOFRAN) 4 mg tablet   No No   Sig: Take 1 tablet (4 mg total) by mouth every 6 (six) hours   ondansetron (Zofran ODT) 4 mg disintegrating tablet   No No   Sig: Take 1 tablet (4 mg total) by mouth every 6 (six) hours as needed for nausea or vomiting   pantoprazole (PROTONIX) 40 mg tablet   No No   Sig: Take 1 tablet (40 mg total) by mouth 2 (two) times a day for 14 days   thiamine 100 MG tablet   No No   Sig: Take 1 tablet (100 mg total) by mouth daily      Facility-Administered Medications: None       Past Medical History:   Diagnosis Date   • Arthritis    • Asthma    • History of stomach ulcers    • Psychiatric disorder     anxiety       Past Surgical History:   Procedure Laterality Date   • BREAST SURGERY     • COSMETIC SURGERY     • EGD     • WISDOM TOOTH EXTRACTION         History reviewed. No pertinent family history. I have reviewed and agree with the history as documented.     E-Cigarette/Vaping   • E-Cigarette Use Never User      E-Cigarette/Vaping Substances   • Nicotine No    • THC Yes    • CBD Yes    • Flavoring No    • Other No    • Unknown No      Social History     Tobacco Use   • Smoking status: Never   • Smokeless tobacco: Never   Vaping Use   • Vaping Use: Never used   Substance Use Topics   • Alcohol use: Not Currently   • Drug use: Not Currently     Types: Marijuana        Review of Systems   Constitutional: Negative for fever. Gastrointestinal: Positive for abdominal pain, nausea and vomiting. Negative for diarrhea. All other systems reviewed and are negative. Physical Exam  ED Triage Vitals [08/21/23 1002]   Temperature Pulse Respirations Blood Pressure SpO2   98 °F (36.7 °C) (!) 107 (!) 25 (!) 147/114 99 %      Temp Source Heart Rate Source Patient Position - Orthostatic VS BP Location FiO2 (%)   Temporal Monitor Lying Left arm --      Pain Score       10 - Worst Possible Pain             Orthostatic Vital Signs  Vitals:    08/21/23 1002 08/21/23 1145 08/21/23 1230   BP: (!) 147/114  136/82   Pulse: (!) 107 94 90   Patient Position - Orthostatic VS: Lying  Lying       Physical Exam  Vitals and nursing note reviewed. Constitutional:       Comments: Rolling around in bed, appears uncomfortable, has an empty bucket brought from home next to her. HENT:      Head: Normocephalic and atraumatic. Mouth/Throat:      Mouth: Mucous membranes are moist.   Eyes:      General: No scleral icterus. Conjunctiva/sclera: Conjunctivae normal.   Cardiovascular:      Rate and Rhythm: Normal rate and regular rhythm. Heart sounds: Normal heart sounds. Pulmonary:      Effort: Pulmonary effort is normal. No respiratory distress. Breath sounds: Normal breath sounds. Abdominal:      Palpations: Abdomen is soft. Tenderness: There is abdominal tenderness in the epigastric area. There is no guarding or rebound. Musculoskeletal:         General: No deformity. Normal range of motion. Cervical back: Normal range of motion and neck supple. Skin:     General: Skin is warm. Capillary Refill: Capillary refill takes less than 2 seconds. Findings: No rash. Neurological:      Mental Status: She is alert. Mental status is at baseline. Psychiatric:         Mood and Affect: Mood normal.         Behavior: Behavior normal.         ED Medications  Medications   droperidol (INAPSINE) injection 0.625 mg (0.625 mg Intravenous Given 8/21/23 1127)   sodium chloride 0.9 % bolus 1,000 mL (0 mL Intravenous Stopped 8/21/23 1330)   ketorolac (TORADOL) injection 15 mg (15 mg Intravenous Given 8/21/23 1214)   droperidol (INAPSINE) injection 0.625 mg (0.625 mg Intravenous Given 8/21/23 1217)   potassium chloride (K-DUR,KLOR-CON) CR tablet 20 mEq (20 mEq Oral Given 8/21/23 1341)       Diagnostic Studies  Results Reviewed     Procedure Component Value Units Date/Time    UA w Reflex to Microscopic w Reflex to Culture [[id number]]  (Abnormal) Collected: 08/21/23 1234    Lab Status: Final result Specimen: Urine, Clean Catch Updated: 08/21/23 1320     Color, UA Light Yellow     Clarity, UA Clear     Specific Gravity, UA 1.007     pH, UA 7.5     Leukocytes, UA Negative     Nitrite, UA Negative     Protein, UA Negative mg/dl      Glucose, UA Negative mg/dl      Ketones, UA 80 (3+) mg/dl      Urobilinogen, UA <2.0 mg/dl      Bilirubin, UA Negative     Occult Blood, UA Negative    POCT pregnancy, urine [[id number]]  (Normal) Resulted: 08/21/23 1234    Lab Status: Final result Updated: 08/21/23 1234     EXT Preg Test, Ur Negative     Control Valid    Urine Macroscopic, POC [[id number]]  (Abnormal) Collected: 08/21/23 1231    Lab Status: Final result Specimen: Urine Updated: 08/21/23 1233     Color, UA Yellow     Clarity, UA Clear     pH, UA 7.5     Leukocytes, UA Negative     Nitrite, UA Negative     Protein, UA Negative mg/dl      Glucose, UA Negative mg/dl      Ketones, UA 80 (3+) mg/dl      Urobilinogen, UA 1.0 E.U./dl      Bilirubin, UA Negative     Occult Blood, UA Negative     Specific Gravity, UA 1.020    Narrative:      CLINITEK RESULT    Comprehensive metabolic panel [[id number]]  (Abnormal) Collected: 08/21/23 1129    Lab Status: Final result Specimen: Blood from Arm, Left Updated: 08/21/23 1206     Sodium 133 mmol/L      Potassium 3.1 mmol/L      Chloride 98 mmol/L      CO2 24 mmol/L      ANION GAP 11 mmol/L      BUN 10 mg/dL      Creatinine 0.87 mg/dL      Glucose 102 mg/dL      Calcium 10.0 mg/dL      AST 18 U/L      ALT 33 U/L      Alkaline Phosphatase 80 U/L      Total Protein 8.6 g/dL      Albumin 4.3 g/dL      Total Bilirubin 1.15 mg/dL      eGFR 89 ml/min/1.73sq m     Narrative:      National Kidney Disease Foundation guidelines for Chronic Kidney Disease (CKD):   •  Stage 1 with normal or high GFR (GFR > 90 mL/min/1.73 square meters)  •  Stage 2 Mild CKD (GFR = 60-89 mL/min/1.73 square meters)  •  Stage 3A Moderate CKD (GFR = 45-59 mL/min/1.73 square meters)  •  Stage 3B Moderate CKD (GFR = 30-44 mL/min/1.73 square meters)  •  Stage 4 Severe CKD (GFR = 15-29 mL/min/1.73 square meters)  •  Stage 5 End Stage CKD (GFR <15 mL/min/1.73 square meters)  Note: GFR calculation is accurate only with a steady state creatinine    Lipase [[id number]]  (Normal) Collected: 08/21/23 1129    Lab Status: Final result Specimen: Blood from Arm, Left Updated: 08/21/23 1206     Lipase 74 u/L     CBC and differential [[id number]]  (Abnormal) Collected: 08/21/23 1129    Lab Status: Final result Specimen: Blood from Arm, Left Updated: 08/21/23 1139     WBC 16.51 Thousand/uL      RBC 5.07 Million/uL      Hemoglobin 15.3 g/dL      Hematocrit 42.8 %      MCV 84 fL      MCH 30.2 pg      MCHC 35.7 g/dL      RDW 12.5 %      MPV 11.5 fL      Platelets 668 Thousands/uL      nRBC 0 /100 WBCs      Neutrophils Relative 79 %      Immat GRANS % 0 %      Lymphocytes Relative 16 %      Monocytes Relative 5 %      Eosinophils Relative 0 %      Basophils Relative 0 %      Neutrophils Absolute 12.84 Thousands/µL      Immature Grans Absolute 0.05 Thousand/uL      Lymphocytes Absolute 2.70 Thousands/µL      Monocytes Absolute 0.84 Thousand/µL      Eosinophils Absolute 0.03 Thousand/µL      Basophils Absolute 0.05 Thousands/µL                  No orders to display         Procedures  Procedures      ED Course  ED Course as of 08/21/23 1630   Mon Aug 21, 2023   1200 EKG performed at 1001 as read by me: Sinus tachycardia with sinus arrhythmia at 102 bpm with occasional PVCs, normal axis, normal intervals, nonspecific T wave abnormality, unchanged compared to prior on 19 August 2023.   1205 Patient reevaluated after initial dose of droperidol, slight improvement in pain, no further episodes of retching or vomiting. Additional dose and toradol ordered. 1223 Lipase: 74  Doubt pancreatitis   1224 WBC(!): 16.51  Likely reactionary to vomiting   1224 Potassium(!): 3.1  Patient advised, will attempt to replenish after PO challenge   1304 Urine Macroscopic, POC(!)  Doubt UTI   1340 PREGNANCY TEST URINE: Negative  Doubt ectopic, patient advised and reassured                             SBIRT 22yo+    Flowsheet Row Most Recent Value   Initial Alcohol Screen: US AUDIT-C     1. How often do you have a drink containing alcohol? 0 Filed at: 08/21/2023 1004   2. How many drinks containing alcohol do you have on a typical day you are drinking? 0 Filed at: 08/21/2023 1004   3b. FEMALE Any Age, or MALE 65+: How often do you have 4 or more drinks on one occassion? 0 Filed at: 08/21/2023 1004   Audit-C Score 0 Filed at: 08/21/2023 1004   CATHY: How many times in the past year have you. .. Used an illegal drug or used a prescription medication for non-medical reasons? Never Filed at: 08/21/2023 1004                Medical Decision Making  Nausea and vomiting: acute illness or injury  Amount and/or Complexity of Data Reviewed  External Data Reviewed: radiology and notes. Details: Patient with multiple visits to the ED with similar complaints, frequently treated with droperidol and fluid bolus with significant improvement. Patient with CTA abdomen pelvis on 7/16/2023 without acute findings. Labs: ordered.  Decision-making details documented in ED Course. Risk  OTC drugs. Prescription drug management. Patient is a 33 y.o. female who presents with abdominal pain, nausea, and vomiting. Differential diagnosis includes but not limited to: Hyperemesis cannabinoid syndrome, gastroenteritis, doubt gastric ulcer perforation, doubt pancreatitis, doubt ectopic pregnancy, doubt UTI. PLAN: CBC, CMP, lipase, UA, urine pregnancy, EKG ordered. Treated with droperidol and nacl bolus. See ED course for additional details. Upon reevaluation, patient reports significant improvement in abdominal pain. No further episodes of vomiting while observed in the ED. Patient states she would like to be discharged at this time she is feeling better and she also needs to  her son from . Discussed results and plan with patient. Advised on need for outpatient follow up, given information. Given return precautions verbally and in discharge instructions, confirmed with teach back method. Patient given Zofran and advised on proper use. Patient counseled on decreasing cannabis use. All questions answered. Patient expressed verbal understanding and is agreeable with plan for discharge with outpatient follow up. Disposition  Final diagnoses:   Nausea and vomiting     Time reflects when diagnosis was documented in both MDM as applicable and the Disposition within this note     Time User Action Codes Description Comment    8/21/2023  2:15 PM Blanquita Haines Comes Add [R11.2] Nausea and vomiting       ED Disposition     ED Disposition   Discharge    Condition   Stable    Date/Time   Mon Aug 21, 2023  2:15 PM    Comment   Nupur Zhang discharge to home/self care.                Follow-up Information     Follow up With Specialties Details Why Contact Info Additional Information    Jaylin Pool DO Family Medicine Schedule an appointment as soon as possible for a visit   [address]  [address] [address] [address] [facility] [address] [facility] [facility] Emergency Department Emergency Medicine Go to  If symptoms worsen [address] [id number]  [facility] Emergency Department, [address] [place] [address], [address] [place] [address] Road,[address] Floor, [facility]          Discharge Medication List as of 8/21/2023  2:17 PM      START taking these medications    Details   !! ondansetron (ZOFRAN-ODT) 4 mg disintegrating tablet Take 1 tablet (4 mg total) by mouth every 6 (six) hours as needed for nausea or vomiting, Starting Mon 8/21/2023, Normal       !! - Potential duplicate medications found. Please discuss with provider.       CONTINUE these medications which have NOT CHANGED    Details   !! metoclopramide (Reglan) 10 mg tablet Take 1 tablet (10 mg total) by mouth every 6 (six) hours, Starting Mon 7/24/2023, Normal      acetaminophen (TYLENOL) 325 mg tablet Take 2 tablets (650 mg total) by mouth every 6 (six) hours as needed for mild pain, Starting Sat 12/5/2020, Normal      aluminum-magnesium hydroxide-simethicone (MAALOX MAX) 400-400-40 MG/5ML suspension Take 10 mL by mouth every 6 (six) hours as needed for indigestion or heartburn, Starting Wed 8/31/2022, Normal      capsaicin (ZOSTRIX) 0.025 % cream Apply 1 Application topically 2 (two) times a day, Starting Mon 7/17/2023, Normal      doxylamine (UNISOM) 25 MG tablet Take 0.5 tablets (12.5 mg total) by mouth daily at bedtime as needed for sleep, Starting Wed 8/31/2022, Normal      famotidine (PEPCID) 20 mg tablet Take 1 tablet (20 mg total) by mouth daily at bedtime for 14 days, Starting Mon 5/30/2022, Until Mon 6/13/2022, Normal      !! metoclopramide (REGLAN) 5 mg tablet Take 2 tablets (10 mg total) by mouth every 8 (eight) hours as needed (Nausea and vomiting), Starting Mon 5/30/2022, No Print      !! ondansetron (Zofran ODT) 4 mg disintegrating tablet Take 1 tablet (4 mg total) by mouth every 6 (six) hours as needed for nausea or vomiting, Starting Wed 8/31/2022, Normal      !! ondansetron (ZOFRAN) 4 mg tablet Take 1 tablet (4 mg total) by mouth every 6 (six) hours, Starting Sat 9/3/2022, Normal      !! ondansetron (ZOFRAN) 4 mg tablet Take 1 tablet (4 mg total) by mouth every 6 (six) hours, Starting Mon 7/17/2023, Normal      !! ondansetron (ZOFRAN) 4 mg tablet Take 1 tablet (4 mg total) by mouth every 6 (six) hours, Starting Tue 7/18/2023, Normal      !! ondansetron (ZOFRAN) 4 mg tablet Take 1 tablet (4 mg total) by mouth every 6 (six) hours, Starting Sat 8/19/2023, Normal      pantoprazole (PROTONIX) 40 mg tablet Take 1 tablet (40 mg total) by mouth 2 (two) times a day for 14 days, Starting Mon 5/30/2022, Until Mon 6/13/2022, Normal      Pyridoxine HCl (vitamin B-6) 25 MG tablet Take 1 tablet (25 mg total) by mouth 3 (three) times a day, Starting Mon 5/30/2022, Normal      thiamine 100 MG tablet Take 1 tablet (100 mg total) by mouth daily, Starting Mon 5/30/2022, Normal       !! - Potential duplicate medications found. Please discuss with provider. No discharge procedures on file. PDMP Review       Value Time User    PDMP Reviewed  Yes 1/20/2021  2:27 PM Regulo Rucker PA-C           ED Provider  Attending physically available and evaluated Carmen Beebe. I managed the patient along with the ED Attending.     Electronically Signed by         Ilda Foss DO  08/21/23 1415

## 2023-08-21 NOTE — ED NOTES
Pt reminded urine sample is needed ;pt states she is unable to urinate at this time      Liz Aus  08/21/23 122

## 2023-10-13 ENCOUNTER — OFFICE VISIT (OUTPATIENT)
Dept: GASTROENTEROLOGY | Facility: CLINIC | Age: 33
End: 2023-10-13

## 2023-10-13 ENCOUNTER — TELEPHONE (OUTPATIENT)
Dept: GASTROENTEROLOGY | Facility: CLINIC | Age: 33
End: 2023-10-13

## 2023-10-13 VITALS
WEIGHT: 170 LBS | BODY MASS INDEX: 28.32 KG/M2 | TEMPERATURE: 98.6 F | HEIGHT: 65 IN | SYSTOLIC BLOOD PRESSURE: 122 MMHG | DIASTOLIC BLOOD PRESSURE: 70 MMHG

## 2023-10-13 DIAGNOSIS — R10.9 ABDOMINAL PAIN, UNSPECIFIED ABDOMINAL LOCATION: ICD-10-CM

## 2023-10-13 DIAGNOSIS — R19.7 DIARRHEA, UNSPECIFIED TYPE: ICD-10-CM

## 2023-10-13 DIAGNOSIS — R11.2 INTRACTABLE NAUSEA AND VOMITING: Primary | ICD-10-CM

## 2023-10-13 RX ORDER — DICYCLOMINE HYDROCHLORIDE 10 MG/1
10 CAPSULE ORAL
Qty: 120 CAPSULE | Refills: 2 | Status: SHIPPED | OUTPATIENT
Start: 2023-10-13

## 2023-10-13 NOTE — H&P (VIEW-ONLY)
[facility] - Outpatient Consultation  Davey Kehr 33 y.o. female MRN: [id number]  Encounter: [id number]          ASSESSMENT AND PLAN:      Isidoro Dietz is a 32 y/o female with hx of PUD, cyclical vomiting syndrome who presents for consultation of n/v.     Bloody diarrhea  Hx of PUD  Hx of CHS/cyclical vomiting syndrome   GERD  Pt has been in and out of the hospital many times over the last several yrs for n/v and abdominal pain. She was dx with CHS in the past and when she was having issues during her pregnancy, she was also dx with hyperemesis. Pt's last EGD was during her pregnancy in 2021, which noted LA Grade A esophagitis; gastric and duodenal bx negative for H.pylori and celiac, respectively. Though she had EGD the year prior to that, which noted multiple gastric ulcers. The most recent trip to the ED was in August, with negative CT, LFTs except for mildly elevated T.Bili at 1.3, CBC, and lipase. Today: she says she only gets the cyclical n/v about 2 times/year and she has been stable since her last ED visit. She says her main issue is that for the last few yrs, she has been getting daily diarrhea; she has started to get blood in her diarrhea at least once/week as well. She denies constipation or straining and notes that she is unsure when her last formed BM was. She says there are days when she can have up to 10-15 loose-watery Bms/ day. She says she gets painful abdominal spasms prior to the diarrhea. Pt is tearful as she says "other providers have been trying to blame her symptoms on marijuana for years" when in reality, she says that she only gets the n/v about twice/year and they have occurred even without her using medical marijuana. -explained to pt that at this time, it is very unclear the etiology of her symptoms.  I explained that marijuana use certainly could still be the cause of her cyclical n/v but I do not suspect this would have anything to do with her chronic diarrhea. I also explained that due to her hx of PUD, this could be the cause of her cyclical n/v and I would like to further rule out thyroid dysfunction, liver injury, celiac disease, H.pylori, gastrointestinal infection, IBD and microscopic colitis  -repeat LFTs  -TSH, celiac panel  -infectious stool studies + fecal david ordered  -EGD and colonoscopy ordered to be done with bx to rule out the above   -would recommend staying off of medical marijuana for now just to see if this helps however she says she has been using this daily sine her last episode of n/v and has not had symptoms so she would not like to stop at this time   -start bentyl prn abdominal pain; pt defers PPI   -NSAID cessation   ______________________________________________________________________    HPI:  Kristi Pinto is a 34 y/o female with hx of PUD, cyclical vomiting syndrome who presents for consultation of n/v. Pt says that for many yrs, she would have cyclical episodes of n/v. She says that over the last 2 yrs, she has only been getting this about 2 times/year or so and she adamantly notes that this is "not related" to her marijuana use. She says that she uses medical marijuana most days but there have been days that she does not use this and still has had the n/v. She says she has not had n/v since her last ED visit in August. Rather, the more concerning symptoms is the daily diarrhea that she has been getting for years. She says there are days when she has 3-5 episodes of watery diarrhea/day but there are also days where she has 10-15 episodes. She says she has also noticed blood mixed in her stool at least once/week. She says she cannot remember when her last formed BM was. Pt denies constipation or straining, family hx of colon cancer, unintentional weight loss, fevers, chills, night sweats, bloody or black Bms. Pt does admit to using NSAIDs a few times/week. Pt denies tobacco and alcohol use.        REVIEW OF SYSTEMS:    CONSTITUTIONAL: Denies any fever, chills, rigors, and weight loss. HEENT: No earache or tinnitus. Denies hearing loss or visual disturbances. CARDIOVASCULAR: No chest pain or palpitations. RESPIRATORY: Denies any cough, hemoptysis, shortness of breath or dyspnea on exertion. GASTROINTESTINAL: As noted in the History of Present Illness. GENITOURINARY: No problems with urination. Denies any hematuria or dysuria. NEUROLOGIC: No dizziness or vertigo, denies headaches. MUSCULOSKELETAL: Denies any muscle or joint pain. SKIN: Denies skin rashes or itching. ENDOCRINE: Denies excessive thirst. Denies intolerance to heat or cold. PSYCHOSOCIAL: Denies depression or anxiety. Denies any recent memory loss. Historical Information   Past Medical History:   Diagnosis Date    Arthritis     Asthma     History of stomach ulcers     Psychiatric disorder     anxiety     Past Surgical History:   Procedure Laterality Date    BREAST SURGERY      COSMETIC SURGERY      EGD      WISDOM TOOTH EXTRACTION       Social History   Social History     Substance and Sexual Activity   Alcohol Use Not Currently     Social History     Substance and Sexual Activity   Drug Use Not Currently    Types: Marijuana     Social History     Tobacco Use   Smoking Status Never   Smokeless Tobacco Never     No family history on file.     Meds/Allergies       Current Outpatient Medications:     acetaminophen (TYLENOL) 325 mg tablet    aluminum-magnesium hydroxide-simethicone (MAALOX MAX) 400-400-40 MG/5ML suspension    capsaicin (ZOSTRIX) 0.025 % cream    doxylamine (UNISOM) 25 MG tablet    famotidine (PEPCID) 20 mg tablet    metoclopramide (Reglan) 10 mg tablet    metoclopramide (REGLAN) 5 mg tablet    ondansetron (Zofran ODT) 4 mg disintegrating tablet    ondansetron (ZOFRAN) 4 mg tablet    ondansetron (ZOFRAN) 4 mg tablet    ondansetron (ZOFRAN) 4 mg tablet    ondansetron (ZOFRAN) 4 mg tablet    ondansetron (ZOFRAN-ODT) 4 mg disintegrating tablet    pantoprazole (PROTONIX) 40 mg tablet    Pyridoxine HCl (vitamin B-6) 25 MG tablet    thiamine 100 MG tablet    No Known Allergies        Objective     unknown if currently breastfeeding. There is no height or weight on file to calculate BMI. PHYSICAL EXAM:      General Appearance:   Alert, cooperative, no distress   HEENT:   Normocephalic, atraumatic, anicteric. Neck:  Supple, symmetrical, trachea midline   Lungs:   Clear to auscultation bilaterally; no rales, rhonchi or wheezing; respirations unlabored    Heart[de-identified]   Regular rate and rhythm; no murmur, rub, or gallop. Abdomen:   Soft, non-tender, non-distended; normal bowel sounds; no masses, no organomegaly    Genitalia:   Deferred    Rectal:   Deferred    Extremities:  No cyanosis, clubbing or edema    Pulses:  2+ and symmetric    Skin:  No jaundice, rashes, or lesions    Lymph nodes:  No palpable cervical lymphadenopathy        Lab Results:   No visits with results within 1 Day(s) from this visit.    Latest known visit with results is:   Admission on 08/21/2023, Discharged on 08/21/2023   Component Date Value    Ventricular Rate 08/21/2023 102     Atrial Rate 08/21/2023 102     [place] Interval 08/21/2023 138     QRSD Interval 08/21/2023 78     QT Interval 08/21/2023 356     QTC Interval 08/21/2023 463     P Axis 08/21/2023 86     QRS Axis 08/21/2023 87     T Wave [place] 08/21/2023 1     WBC 08/21/2023 16.51 (H)     RBC 08/21/2023 5.07     Hemoglobin 08/21/2023 15.3     Hematocrit 08/21/2023 42.8     MCV 08/21/2023 84     MCH 08/21/2023 30.2     MCHC 08/21/2023 35.7     RDW 08/21/2023 12.5     MPV 08/21/2023 11.5     Platelets 28/69/0042 259     nRBC 08/21/2023 0     Neutrophils Relative 08/21/2023 79 (H)     Immat GRANS % 08/21/2023 0     Lymphocytes Relative 08/21/2023 16     Monocytes Relative 08/21/2023 5     Eosinophils Relative 08/21/2023 0     Basophils Relative 08/21/2023 0     Neutrophils Absolute 08/21/2023 12.84 (H)     Immature Grans Absolute 08/21/2023 0.05 Lymphocytes Absolute 08/21/2023 2.70     Monocytes Absolute 08/21/2023 0.84     Eosinophils Absolute 08/21/2023 0.03     Basophils Absolute 08/21/2023 0.05     Sodium 08/21/2023 133 (L)     Potassium 08/21/2023 3.1 (L)     Chloride 08/21/2023 98     CO2 08/21/2023 24     ANION GAP 08/21/2023 11     BUN 08/21/2023 10     Creatinine 08/21/2023 0.87     Glucose 08/21/2023 102     Calcium 08/21/2023 10.0     AST 08/21/2023 18     ALT 08/21/2023 33     Alkaline Phosphatase 08/21/2023 80     Total Protein 08/21/2023 8.6 (H)     Albumin 08/21/2023 4.3     Total Bilirubin 08/21/2023 1.15 (H)     eGFR 08/21/2023 89     Lipase 08/21/2023 74     Color, UA 08/21/2023 Light Yellow     Clarity, UA 08/21/2023 Clear     Specific Gravity, UA 08/21/2023 1.007     pH, UA 08/21/2023 7.5     Leukocytes, UA 08/21/2023 Negative     Nitrite, UA 08/21/2023 Negative     Protein, UA 08/21/2023 Negative     Glucose, UA 08/21/2023 Negative     Ketones, UA 08/21/2023 80 (3+) (A)     Urobilinogen, UA 08/21/2023 <2.0     Bilirubin, UA 08/21/2023 Negative     Occult Blood, UA 08/21/2023 Negative     EXT Preg Test, Ur 08/21/2023 Negative     Control 08/21/2023 Valid     Color, UA 08/21/2023 Yellow     Clarity, UA 08/21/2023 Clear     pH, UA 08/21/2023 7.5     Leukocytes, UA 08/21/2023 Negative     Nitrite, UA 08/21/2023 Negative     Protein, UA 08/21/2023 Negative     Glucose, UA 08/21/2023 Negative     Ketones, UA 08/21/2023 80 (3+) (A)     Urobilinogen, UA 08/21/2023 1.0     Bilirubin, UA 08/21/2023 Negative     Occult Blood, UA 08/21/2023 Negative     Specific Gravity, UA 08/21/2023 1.020          Radiology Results:   No results found.    Answers submitted by the patient for this visit:  Abdominal Pain Questionnaire (Submitted on 10/13/2023)  Chief Complaint: Abdominal pain  Chronicity: recurrent  Onset: more than 1 year ago  Onset quality: sudden  Frequency: daily  Episode duration: 2 Hours  Progression since onset: waxing and waning  Pain location: generalized abdominal region  Pain - numeric: 9/10  Pain quality: burning, cramping, dull, sharp  Radiates to: LLQ, LUQ, RLQ, RUQ, epigastric region, back, left flank, right flank, pelvis  anorexia: No  arthralgias: Yes  belching: No  constipation: No  diarrhea: Yes  dysuria: No  fever: No  flatus: No  frequency: No  headaches: Yes  hematochezia: Yes  hematuria: No  melena:  Yes  myalgias: Yes  nausea: Yes  weight loss: Yes  vomiting: Yes  Aggravated by: bowel movement, certain positions, drinking alcohol, eating, vomiting  Relieved by: bowel movements, certain positions, palpation, recumbency  Diagnostic workup: CT scan, ultrasound

## 2023-10-13 NOTE — TELEPHONE ENCOUNTER
ASC Screening    ASC Screening  BMI > than 45: No  Are you currently pregnant?: No  Do you rely on a wheelchair for mobility?: No  Do you need oxygen during the day?: No  Have you ever been informed by anesthesia that you have a difficult airway?: No  Have you been diagnosed with End Stage Renal Disease (ESRD)?: No  Are you actively on dialysis?: No  Have you been diagnosed with Pulmonary Hypertension?: No  Do you have a pacemaker or an Automatic Implantable Cardioverter Defibrillator (AICD)?: No  Have you ever had an organ transplant?: No  Have you had a stroke, heart attack, myocardial infarction (MI) within the last 6 months?: No  Have you ever been diagnosed with Aortic Stenosis?: No  Have you ever been diagnosed  with Congestive Heart Failure?: No  Have you ever been diagnosed with a heart valve disease?: No  Are you Diabetic?: No  If you are Diabetic, has your A1C been greater than 12 within the last six months?: No

## 2023-10-13 NOTE — PROGRESS NOTES
[facility] - Outpatient Consultation  Emma Valadez 33 y.o. female MRN: [id number]  Encounter: [id number]          ASSESSMENT AND PLAN:      Lita Mcdowell is a 34 y/o female with hx of PUD, cyclical vomiting syndrome who presents for consultation of n/v.     Bloody diarrhea  Hx of PUD  Hx of CHS/cyclical vomiting syndrome   GERD  Pt has been in and out of the hospital many times over the last several yrs for n/v and abdominal pain. She was dx with CHS in the past and when she was having issues during her pregnancy, she was also dx with hyperemesis. Pt's last EGD was during her pregnancy in 2021, which noted LA Grade A esophagitis; gastric and duodenal bx negative for H.pylori and celiac, respectively. Though she had EGD the year prior to that, which noted multiple gastric ulcers. The most recent trip to the ED was in August, with negative CT, LFTs except for mildly elevated T.Bili at 1.3, CBC, and lipase. Today: she says she only gets the cyclical n/v about 2 times/year and she has been stable since her last ED visit. She says her main issue is that for the last few yrs, she has been getting daily diarrhea; she has started to get blood in her diarrhea at least once/week as well. She denies constipation or straining and notes that she is unsure when her last formed BM was. She says there are days when she can have up to 10-15 loose-watery Bms/ day. She says she gets painful abdominal spasms prior to the diarrhea. Pt is tearful as she says "other providers have been trying to blame her symptoms on marijuana for years" when in reality, she says that she only gets the n/v about twice/year and they have occurred even without her using medical marijuana. -explained to pt that at this time, it is very unclear the etiology of her symptoms.  I explained that marijuana use certainly could still be the cause of her cyclical n/v but I do not suspect this would have anything to do with her chronic diarrhea. I also explained that due to her hx of PUD, this could be the cause of her cyclical n/v and I would like to further rule out thyroid dysfunction, liver injury, celiac disease, H.pylori, gastrointestinal infection, IBD and microscopic colitis  -repeat LFTs  -TSH, celiac panel  -infectious stool studies + fecal david ordered  -EGD and colonoscopy ordered to be done with bx to rule out the above   -would recommend staying off of medical marijuana for now just to see if this helps however she says she has been using this daily sine her last episode of n/v and has not had symptoms so she would not like to stop at this time   -start bentyl prn abdominal pain; pt defers PPI   -NSAID cessation   ______________________________________________________________________    HPI:  Obinna Kevin is a 34 y/o female with hx of PUD, cyclical vomiting syndrome who presents for consultation of n/v. Pt says that for many yrs, she would have cyclical episodes of n/v. She says that over the last 2 yrs, she has only been getting this about 2 times/year or so and she adamantly notes that this is "not related" to her marijuana use. She says that she uses medical marijuana most days but there have been days that she does not use this and still has had the n/v. She says she has not had n/v since her last ED visit in August. Rather, the more concerning symptoms is the daily diarrhea that she has been getting for years. She says there are days when she has 3-5 episodes of watery diarrhea/day but there are also days where she has 10-15 episodes. She says she has also noticed blood mixed in her stool at least once/week. She says she cannot remember when her last formed BM was. Pt denies constipation or straining, family hx of colon cancer, unintentional weight loss, fevers, chills, night sweats, bloody or black Bms. Pt does admit to using NSAIDs a few times/week. Pt denies tobacco and alcohol use.        REVIEW OF SYSTEMS:    CONSTITUTIONAL: Denies any fever, chills, rigors, and weight loss. HEENT: No earache or tinnitus. Denies hearing loss or visual disturbances. CARDIOVASCULAR: No chest pain or palpitations. RESPIRATORY: Denies any cough, hemoptysis, shortness of breath or dyspnea on exertion. GASTROINTESTINAL: As noted in the History of Present Illness. GENITOURINARY: No problems with urination. Denies any hematuria or dysuria. NEUROLOGIC: No dizziness or vertigo, denies headaches. MUSCULOSKELETAL: Denies any muscle or joint pain. SKIN: Denies skin rashes or itching. ENDOCRINE: Denies excessive thirst. Denies intolerance to heat or cold. PSYCHOSOCIAL: Denies depression or anxiety. Denies any recent memory loss. Historical Information   Past Medical History:   Diagnosis Date    Arthritis     Asthma     History of stomach ulcers     Psychiatric disorder     anxiety     Past Surgical History:   Procedure Laterality Date    BREAST SURGERY      COSMETIC SURGERY      EGD      WISDOM TOOTH EXTRACTION       Social History   Social History     Substance and Sexual Activity   Alcohol Use Not Currently     Social History     Substance and Sexual Activity   Drug Use Not Currently    Types: Marijuana     Social History     Tobacco Use   Smoking Status Never   Smokeless Tobacco Never     No family history on file.     Meds/Allergies       Current Outpatient Medications:     acetaminophen (TYLENOL) 325 mg tablet    aluminum-magnesium hydroxide-simethicone (MAALOX MAX) 400-400-40 MG/5ML suspension    capsaicin (ZOSTRIX) 0.025 % cream    doxylamine (UNISOM) 25 MG tablet    famotidine (PEPCID) 20 mg tablet    metoclopramide (Reglan) 10 mg tablet    metoclopramide (REGLAN) 5 mg tablet    ondansetron (Zofran ODT) 4 mg disintegrating tablet    ondansetron (ZOFRAN) 4 mg tablet    ondansetron (ZOFRAN) 4 mg tablet    ondansetron (ZOFRAN) 4 mg tablet    ondansetron (ZOFRAN) 4 mg tablet    ondansetron (ZOFRAN-ODT) 4 mg disintegrating tablet    pantoprazole (PROTONIX) 40 mg tablet    Pyridoxine HCl (vitamin B-6) 25 MG tablet    thiamine 100 MG tablet    No Known Allergies        Objective     unknown if currently breastfeeding. There is no height or weight on file to calculate BMI. PHYSICAL EXAM:      General Appearance:   Alert, cooperative, no distress   HEENT:   Normocephalic, atraumatic, anicteric. Neck:  Supple, symmetrical, trachea midline   Lungs:   Clear to auscultation bilaterally; no rales, rhonchi or wheezing; respirations unlabored    Heart[de-identified]   Regular rate and rhythm; no murmur, rub, or gallop. Abdomen:   Soft, non-tender, non-distended; normal bowel sounds; no masses, no organomegaly    Genitalia:   Deferred    Rectal:   Deferred    Extremities:  No cyanosis, clubbing or edema    Pulses:  2+ and symmetric    Skin:  No jaundice, rashes, or lesions    Lymph nodes:  No palpable cervical lymphadenopathy        Lab Results:   No visits with results within 1 Day(s) from this visit.    Latest known visit with results is:   Admission on 08/21/2023, Discharged on 08/21/2023   Component Date Value    Ventricular Rate 08/21/2023 102     Atrial Rate 08/21/2023 102     [place] Interval 08/21/2023 138     QRSD Interval 08/21/2023 78     QT Interval 08/21/2023 356     QTC Interval 08/21/2023 463     P Axis 08/21/2023 86     QRS Axis 08/21/2023 87     T Wave [place] 08/21/2023 1     WBC 08/21/2023 16.51 (H)     RBC 08/21/2023 5.07     Hemoglobin 08/21/2023 15.3     Hematocrit 08/21/2023 42.8     MCV 08/21/2023 84     MCH 08/21/2023 30.2     MCHC 08/21/2023 35.7     RDW 08/21/2023 12.5     MPV 08/21/2023 11.5     Platelets 82/99/4820 259     nRBC 08/21/2023 0     Neutrophils Relative 08/21/2023 79 (H)     Immat GRANS % 08/21/2023 0     Lymphocytes Relative 08/21/2023 16     Monocytes Relative 08/21/2023 5     Eosinophils Relative 08/21/2023 0     Basophils Relative 08/21/2023 0     Neutrophils Absolute 08/21/2023 12.84 (H)     Immature Grans Absolute 08/21/2023 0.05 Lymphocytes Absolute 08/21/2023 2.70     Monocytes Absolute 08/21/2023 0.84     Eosinophils Absolute 08/21/2023 0.03     Basophils Absolute 08/21/2023 0.05     Sodium 08/21/2023 133 (L)     Potassium 08/21/2023 3.1 (L)     Chloride 08/21/2023 98     CO2 08/21/2023 24     ANION GAP 08/21/2023 11     BUN 08/21/2023 10     Creatinine 08/21/2023 0.87     Glucose 08/21/2023 102     Calcium 08/21/2023 10.0     AST 08/21/2023 18     ALT 08/21/2023 33     Alkaline Phosphatase 08/21/2023 80     Total Protein 08/21/2023 8.6 (H)     Albumin 08/21/2023 4.3     Total Bilirubin 08/21/2023 1.15 (H)     eGFR 08/21/2023 89     Lipase 08/21/2023 74     Color, UA 08/21/2023 Light Yellow     Clarity, UA 08/21/2023 Clear     Specific Gravity, UA 08/21/2023 1.007     pH, UA 08/21/2023 7.5     Leukocytes, UA 08/21/2023 Negative     Nitrite, UA 08/21/2023 Negative     Protein, UA 08/21/2023 Negative     Glucose, UA 08/21/2023 Negative     Ketones, UA 08/21/2023 80 (3+) (A)     Urobilinogen, UA 08/21/2023 <2.0     Bilirubin, UA 08/21/2023 Negative     Occult Blood, UA 08/21/2023 Negative     EXT Preg Test, Ur 08/21/2023 Negative     Control 08/21/2023 Valid     Color, UA 08/21/2023 Yellow     Clarity, UA 08/21/2023 Clear     pH, UA 08/21/2023 7.5     Leukocytes, UA 08/21/2023 Negative     Nitrite, UA 08/21/2023 Negative     Protein, UA 08/21/2023 Negative     Glucose, UA 08/21/2023 Negative     Ketones, UA 08/21/2023 80 (3+) (A)     Urobilinogen, UA 08/21/2023 1.0     Bilirubin, UA 08/21/2023 Negative     Occult Blood, UA 08/21/2023 Negative     Specific Gravity, UA 08/21/2023 1.020          Radiology Results:   No results found.    Answers submitted by the patient for this visit:  Abdominal Pain Questionnaire (Submitted on 10/13/2023)  Chief Complaint: Abdominal pain  Chronicity: recurrent  Onset: more than 1 year ago  Onset quality: sudden  Frequency: daily  Episode duration: 2 Hours  Progression since onset: waxing and waning  Pain location: generalized abdominal region  Pain - numeric: 9/10  Pain quality: burning, cramping, dull, sharp  Radiates to: LLQ, LUQ, RLQ, RUQ, epigastric region, back, left flank, right flank, pelvis  anorexia: No  arthralgias: Yes  belching: No  constipation: No  diarrhea: Yes  dysuria: No  fever: No  flatus: No  frequency: No  headaches: Yes  hematochezia: Yes  hematuria: No  melena:  Yes  myalgias: Yes  nausea: Yes  weight loss: Yes  vomiting: Yes  Aggravated by: bowel movement, certain positions, drinking alcohol, eating, vomiting  Relieved by: bowel movements, certain positions, palpation, recumbency  Diagnostic workup: CT scan, ultrasound

## 2023-10-13 NOTE — PATIENT INSTRUCTIONS
Scheduled date of EGD/colonoscopy (as of today):10/19/2023  Physician performing EGD/colonoscopy: Toribio  Location of EGD/colonoscopy:  [facility]  Desired bowel prep reviewed with patient:Rian Schmitz  Instructions reviewed with patient by:Nati  Clearances:  None    Gave lab orders and stool studies   Scheduled follow up with [facility]

## 2023-10-15 ENCOUNTER — APPOINTMENT (OUTPATIENT)
Dept: LAB | Age: 33
End: 2023-10-15
Payer: COMMERCIAL

## 2023-10-15 DIAGNOSIS — R19.7 DIARRHEA, UNSPECIFIED TYPE: ICD-10-CM

## 2023-10-15 DIAGNOSIS — R11.2 INTRACTABLE NAUSEA AND VOMITING: ICD-10-CM

## 2023-10-15 LAB
ALBUMIN SERPL BCP-MCNC: 4.3 G/DL (ref 3.5–5)
ALP SERPL-CCNC: 73 U/L (ref 34–104)
ALT SERPL W P-5'-P-CCNC: 15 U/L (ref 7–52)
ANION GAP SERPL CALCULATED.3IONS-SCNC: 8 MMOL/L
AST SERPL W P-5'-P-CCNC: 17 U/L (ref 13–39)
BILIRUB SERPL-MCNC: 0.42 MG/DL (ref 0.2–1)
BUN SERPL-MCNC: 8 MG/DL (ref 5–25)
CALCIUM SERPL-MCNC: 9.4 MG/DL (ref 8.4–10.2)
CHLORIDE SERPL-SCNC: 106 MMOL/L (ref 96–108)
CO2 SERPL-SCNC: 26 MMOL/L (ref 21–32)
CREAT SERPL-MCNC: 0.75 MG/DL (ref 0.6–1.3)
GFR SERPL CREATININE-BSD FRML MDRD: 106 ML/MIN/1.73SQ M
GLUCOSE P FAST SERPL-MCNC: 106 MG/DL (ref 65–99)
POTASSIUM SERPL-SCNC: 4 MMOL/L (ref 3.5–5.3)
PROT SERPL-MCNC: 7.2 G/DL (ref 6.4–8.4)
SODIUM SERPL-SCNC: 140 MMOL/L (ref 135–147)
TSH SERPL DL<=0.05 MIU/L-ACNC: 0.67 UIU/ML (ref 0.45–4.5)

## 2023-10-15 PROCEDURE — 86364 TISS TRNSGLTMNASE EA IG CLAS: CPT

## 2023-10-15 PROCEDURE — 80053 COMPREHEN METABOLIC PANEL: CPT

## 2023-10-15 PROCEDURE — 84443 ASSAY THYROID STIM HORMONE: CPT

## 2023-10-15 PROCEDURE — 82784 ASSAY IGA/IGD/IGG/IGM EACH: CPT

## 2023-10-15 PROCEDURE — 36415 COLL VENOUS BLD VENIPUNCTURE: CPT

## 2023-10-15 PROCEDURE — 86258 DGP ANTIBODY EACH IG CLASS: CPT

## 2023-10-15 PROCEDURE — 86231 EMA EACH IG CLASS: CPT

## 2023-10-16 ENCOUNTER — ANESTHESIA EVENT (OUTPATIENT)
Dept: GASTROENTEROLOGY | Facility: AMBULARY SURGERY CENTER | Age: 33
End: 2023-10-16

## 2023-10-16 ENCOUNTER — ANESTHESIA (OUTPATIENT)
Dept: ANESTHESIOLOGY | Facility: HOSPITAL | Age: 33
End: 2023-10-16

## 2023-10-16 ENCOUNTER — ANESTHESIA EVENT (OUTPATIENT)
Dept: ANESTHESIOLOGY | Facility: HOSPITAL | Age: 33
End: 2023-10-16

## 2023-10-16 LAB
ENDOMYSIUM IGA SER QL: NEGATIVE
GLIADIN PEPTIDE IGA SER-ACNC: 4 UNITS (ref 0–19)
GLIADIN PEPTIDE IGG SER-ACNC: 2 UNITS (ref 0–19)
IGA SERPL-MCNC: 261 MG/DL (ref 87–352)
TTG IGA SER-ACNC: <2 U/ML (ref 0–3)
TTG IGG SER-ACNC: <2 U/ML (ref 0–5)

## 2023-10-17 ENCOUNTER — ANESTHESIA (OUTPATIENT)
Dept: ANESTHESIOLOGY | Facility: HOSPITAL | Age: 33
End: 2023-10-17

## 2023-10-17 ENCOUNTER — ANESTHESIA EVENT (OUTPATIENT)
Dept: ANESTHESIOLOGY | Facility: HOSPITAL | Age: 33
End: 2023-10-17

## 2023-10-19 ENCOUNTER — HOSPITAL ENCOUNTER (OUTPATIENT)
Dept: GASTROENTEROLOGY | Facility: AMBULARY SURGERY CENTER | Age: 33
Setting detail: OUTPATIENT SURGERY
End: 2023-10-19
Payer: COMMERCIAL

## 2023-10-19 ENCOUNTER — ANESTHESIA (OUTPATIENT)
Dept: GASTROENTEROLOGY | Facility: AMBULARY SURGERY CENTER | Age: 33
End: 2023-10-19

## 2023-10-19 VITALS
DIASTOLIC BLOOD PRESSURE: 65 MMHG | SYSTOLIC BLOOD PRESSURE: 111 MMHG | HEART RATE: 85 BPM | OXYGEN SATURATION: 100 % | TEMPERATURE: 97.3 F | RESPIRATION RATE: 20 BRPM

## 2023-10-19 DIAGNOSIS — R11.2 INTRACTABLE NAUSEA AND VOMITING: ICD-10-CM

## 2023-10-19 DIAGNOSIS — R19.7 DIARRHEA, UNSPECIFIED TYPE: ICD-10-CM

## 2023-10-19 LAB
EXT PREGNANCY TEST URINE: NEGATIVE
EXT. CONTROL: NORMAL

## 2023-10-19 PROCEDURE — 45380 COLONOSCOPY AND BIOPSY: CPT | Performed by: INTERNAL MEDICINE

## 2023-10-19 PROCEDURE — 81025 URINE PREGNANCY TEST: CPT | Performed by: INTERNAL MEDICINE

## 2023-10-19 PROCEDURE — 88305 TISSUE EXAM BY PATHOLOGIST: CPT | Performed by: PATHOLOGY

## 2023-10-19 PROCEDURE — 43239 EGD BIOPSY SINGLE/MULTIPLE: CPT | Performed by: INTERNAL MEDICINE

## 2023-10-19 PROCEDURE — 45385 COLONOSCOPY W/LESION REMOVAL: CPT | Performed by: INTERNAL MEDICINE

## 2023-10-19 RX ORDER — PROPOFOL 10 MG/ML
INJECTION, EMULSION INTRAVENOUS AS NEEDED
Status: DISCONTINUED | OUTPATIENT
Start: 2023-10-19 | End: 2023-10-19

## 2023-10-19 RX ORDER — SODIUM CHLORIDE, SODIUM LACTATE, POTASSIUM CHLORIDE, CALCIUM CHLORIDE 600; 310; 30; 20 MG/100ML; MG/100ML; MG/100ML; MG/100ML
INJECTION, SOLUTION INTRAVENOUS CONTINUOUS PRN
Status: DISCONTINUED | OUTPATIENT
Start: 2023-10-19 | End: 2023-10-19

## 2023-10-19 RX ORDER — LIDOCAINE HYDROCHLORIDE 10 MG/ML
INJECTION, SOLUTION EPIDURAL; INFILTRATION; INTRACAUDAL; PERINEURAL AS NEEDED
Status: DISCONTINUED | OUTPATIENT
Start: 2023-10-19 | End: 2023-10-19

## 2023-10-19 RX ADMIN — LIDOCAINE HYDROCHLORIDE 50 MG: 10 INJECTION, SOLUTION EPIDURAL; INFILTRATION; INTRACAUDAL; PERINEURAL at 12:38

## 2023-10-19 RX ADMIN — PROPOFOL 100 MG: 10 INJECTION, EMULSION INTRAVENOUS at 12:40

## 2023-10-19 RX ADMIN — PROPOFOL 50 MG: 10 INJECTION, EMULSION INTRAVENOUS at 13:00

## 2023-10-19 RX ADMIN — Medication 40 MG: at 12:52

## 2023-10-19 RX ADMIN — SODIUM CHLORIDE, SODIUM LACTATE, POTASSIUM CHLORIDE, AND CALCIUM CHLORIDE: .6; .31; .03; .02 INJECTION, SOLUTION INTRAVENOUS at 12:50

## 2023-10-19 RX ADMIN — SODIUM CHLORIDE, SODIUM LACTATE, POTASSIUM CHLORIDE, AND CALCIUM CHLORIDE: .6; .31; .03; .02 INJECTION, SOLUTION INTRAVENOUS at 11:20

## 2023-10-19 RX ADMIN — PROPOFOL 50 MG: 10 INJECTION, EMULSION INTRAVENOUS at 13:03

## 2023-10-19 RX ADMIN — PROPOFOL 50 MG: 10 INJECTION, EMULSION INTRAVENOUS at 12:51

## 2023-10-19 RX ADMIN — PROPOFOL 50 MG: 10 INJECTION, EMULSION INTRAVENOUS at 12:46

## 2023-10-19 RX ADMIN — PROPOFOL 100 MG: 10 INJECTION, EMULSION INTRAVENOUS at 12:38

## 2023-10-19 RX ADMIN — PROPOFOL 50 MG: 10 INJECTION, EMULSION INTRAVENOUS at 13:06

## 2023-10-19 RX ADMIN — PROPOFOL 50 MG: 10 INJECTION, EMULSION INTRAVENOUS at 12:56

## 2023-10-19 NOTE — ANESTHESIA POSTPROCEDURE EVALUATION
Post-Op Assessment Note    CV Status:  Stable  Pain Score: 0    Pain management: adequate     Mental Status:  Alert and awake   Hydration Status:  Euvolemic   PONV Controlled:  Controlled   Airway Patency:  Patent      Post Op Vitals Reviewed: Yes      Staff: CRNA         No notable events documented.     BP 92/62 (10/19/23 1308)    Temp     Pulse 78 (10/19/23 1308)   Resp 16 (10/19/23 1308)    SpO2 99 % (10/19/23 1308)

## 2023-10-19 NOTE — ANESTHESIA PREPROCEDURE EVALUATION
Procedure:  COLONOSCOPY  EGD    Relevant Problems   MUSCULOSKELETAL   (+) Acute on chronic bilateral low back pain without sciatica   (+) Back pain   (+) Chronic bilateral low back pain without sciatica      NEURO/PSYCH   (+) Chronic bilateral low back pain without sciatica   (+) Depression with suicidal ideation        Physical Exam    Airway    Mallampati score: II  TM Distance: >3 FB  Neck ROM: full     Dental   No notable dental hx     Cardiovascular  Cardiovascular exam normal    Pulmonary  Pulmonary exam normal     Other Findings        Anesthesia Plan  ASA Score- 2     Anesthesia Type- IV sedation with anesthesia with ASA Monitors. Additional Monitors:     Airway Plan:            Plan Factors-    Chart reviewed. Existing labs reviewed. Patient summary reviewed. Induction- intravenous. Postoperative Plan-     Informed Consent- Anesthetic plan and risks discussed with patient. I personally reviewed this patient with the CRNA. Discussed and agreed on the Anesthesia Plan with the CRNA. Noy Fairbanks

## 2023-10-23 PROCEDURE — 88305 TISSUE EXAM BY PATHOLOGIST: CPT | Performed by: PATHOLOGY

## 2023-11-11 ENCOUNTER — HOSPITAL ENCOUNTER (OUTPATIENT)
Facility: HOSPITAL | Age: 33
Setting detail: OBSERVATION
Discharge: HOME | End: 2023-11-13
Attending: EMERGENCY MEDICINE | Admitting: INTERNAL MEDICINE
Payer: COMMERCIAL

## 2023-11-11 DIAGNOSIS — R11.15 CYCLICAL VOMITING: ICD-10-CM

## 2023-11-11 DIAGNOSIS — R11.2 NAUSEA AND VOMITING: Primary | ICD-10-CM

## 2023-11-11 DIAGNOSIS — E87.6 HYPOKALEMIA: ICD-10-CM

## 2023-11-11 PROBLEM — E86.0 DEHYDRATION: Status: ACTIVE | Noted: 2023-11-11

## 2023-11-11 PROBLEM — R11.10 VOMITING: Status: ACTIVE | Noted: 2023-11-11

## 2023-11-11 LAB
ALBUMIN SERPL BCP-MCNC: 5 G/DL (ref 3.5–5)
ALP SERPL-CCNC: 88 U/L (ref 34–104)
ALT SERPL W P-5'-P-CCNC: 11 U/L (ref 7–52)
ANION GAP SERPL CALCULATED.3IONS-SCNC: 13 MMOL/L
AST SERPL W P-5'-P-CCNC: 11 U/L (ref 13–39)
ATRIAL RATE: 73 BPM
BASOPHILS # BLD AUTO: 0.03 THOUSANDS/ÂΜL (ref 0–0.1)
BASOPHILS NFR BLD AUTO: 0 % (ref 0–1)
BILIRUB SERPL-MCNC: 1.03 MG/DL (ref 0.2–1)
BUN SERPL-MCNC: 14 MG/DL (ref 5–25)
CALCIUM SERPL-MCNC: 10.1 MG/DL (ref 8.4–10.2)
CHLORIDE SERPL-SCNC: 88 MMOL/L (ref 96–108)
CO2 SERPL-SCNC: 32 MMOL/L (ref 21–32)
CREAT SERPL-MCNC: 0.77 MG/DL (ref 0.6–1.3)
EOSINOPHIL # BLD AUTO: 0 THOUSAND/ÂΜL (ref 0–0.61)
EOSINOPHIL NFR BLD AUTO: 0 % (ref 0–6)
ERYTHROCYTE [DISTWIDTH] IN BLOOD BY AUTOMATED COUNT: 12.5 % (ref 11.6–15.1)
GFR SERPL CREATININE-BSD FRML MDRD: 103 ML/MIN/1.73SQ M
GLUCOSE SERPL-MCNC: 120 MG/DL (ref 65–140)
GLUCOSE SERPL-MCNC: 124 MG/DL (ref 65–140)
HCG SERPL QL: NEGATIVE
HCT VFR BLD AUTO: 47.2 % (ref 34.8–46.1)
HGB BLD-MCNC: 16.8 G/DL (ref 11.5–15.4)
IMM GRANULOCYTES # BLD AUTO: 0.07 THOUSAND/UL (ref 0–0.2)
IMM GRANULOCYTES NFR BLD AUTO: 0 % (ref 0–2)
LIPASE SERPL-CCNC: 7 U/L (ref 11–82)
LYMPHOCYTES # BLD AUTO: 2.17 THOUSANDS/ÂΜL (ref 0.6–4.47)
LYMPHOCYTES NFR BLD AUTO: 13 % (ref 14–44)
MAGNESIUM SERPL-MCNC: 2.3 MG/DL (ref 1.9–2.7)
MCH RBC QN AUTO: 30.3 PG (ref 26.8–34.3)
MCHC RBC AUTO-ENTMCNC: 35.6 G/DL (ref 31.4–37.4)
MCV RBC AUTO: 85 FL (ref 82–98)
MONOCYTES # BLD AUTO: 1.26 THOUSAND/ÂΜL (ref 0.17–1.22)
MONOCYTES NFR BLD AUTO: 8 % (ref 4–12)
NEUTROPHILS # BLD AUTO: 13.21 THOUSANDS/ÂΜL (ref 1.85–7.62)
NEUTS SEG NFR BLD AUTO: 79 % (ref 43–75)
NRBC BLD AUTO-RTO: 0 /100 WBCS
P AXIS: 73 DEGREES
PLATELET # BLD AUTO: 276 THOUSANDS/UL (ref 149–390)
PMV BLD AUTO: 11.4 FL (ref 8.9–12.7)
POTASSIUM SERPL-SCNC: 2.8 MMOL/L (ref 3.5–5.3)
PR INTERVAL: 130 MS
PROT SERPL-MCNC: 8.6 G/DL (ref 6.4–8.4)
QRS AXIS: 90 DEGREES
QRSD INTERVAL: 86 MS
QT INTERVAL: 578 MS
QTC INTERVAL: 636 MS
RBC # BLD AUTO: 5.54 MILLION/UL (ref 3.81–5.12)
SODIUM SERPL-SCNC: 133 MMOL/L (ref 135–147)
T WAVE AXIS: 60 DEGREES
VENTRICULAR RATE: 73 BPM
WBC # BLD AUTO: 16.74 THOUSAND/UL (ref 4.31–10.16)

## 2023-11-11 PROCEDURE — 96375 TX/PRO/DX INJ NEW DRUG ADDON: CPT

## 2023-11-11 PROCEDURE — 96365 THER/PROPH/DIAG IV INF INIT: CPT

## 2023-11-11 PROCEDURE — 80053 COMPREHEN METABOLIC PANEL: CPT

## 2023-11-11 PROCEDURE — 93010 ELECTROCARDIOGRAM REPORT: CPT | Performed by: INTERNAL MEDICINE

## 2023-11-11 PROCEDURE — 84703 CHORIONIC GONADOTROPIN ASSAY: CPT

## 2023-11-11 PROCEDURE — 85025 COMPLETE CBC W/AUTO DIFF WBC: CPT

## 2023-11-11 PROCEDURE — 36415 COLL VENOUS BLD VENIPUNCTURE: CPT

## 2023-11-11 PROCEDURE — 99285 EMERGENCY DEPT VISIT HI MDM: CPT | Performed by: EMERGENCY MEDICINE

## 2023-11-11 PROCEDURE — 99284 EMERGENCY DEPT VISIT MOD MDM: CPT

## 2023-11-11 PROCEDURE — 83735 ASSAY OF MAGNESIUM: CPT | Performed by: INTERNAL MEDICINE

## 2023-11-11 PROCEDURE — 99223 1ST HOSP IP/OBS HIGH 75: CPT | Performed by: INTERNAL MEDICINE

## 2023-11-11 PROCEDURE — 83690 ASSAY OF LIPASE: CPT

## 2023-11-11 PROCEDURE — 93005 ELECTROCARDIOGRAM TRACING: CPT

## 2023-11-11 PROCEDURE — 82948 REAGENT STRIP/BLOOD GLUCOSE: CPT

## 2023-11-11 RX ORDER — ONDANSETRON 2 MG/ML
4 INJECTION INTRAMUSCULAR; INTRAVENOUS EVERY 6 HOURS PRN
Status: DISCONTINUED | OUTPATIENT
Start: 2023-11-11 | End: 2023-11-13 | Stop reason: HOSPADM

## 2023-11-11 RX ORDER — DROPERIDOL 2.5 MG/ML
0.62 INJECTION, SOLUTION INTRAMUSCULAR; INTRAVENOUS ONCE
Status: COMPLETED | OUTPATIENT
Start: 2023-11-11 | End: 2023-11-11

## 2023-11-11 RX ORDER — POTASSIUM CHLORIDE 20 MEQ/1
40 TABLET, EXTENDED RELEASE ORAL ONCE
Status: COMPLETED | OUTPATIENT
Start: 2023-11-11 | End: 2023-11-11

## 2023-11-11 RX ORDER — ONDANSETRON 2 MG/ML
4 INJECTION INTRAMUSCULAR; INTRAVENOUS ONCE
Status: COMPLETED | OUTPATIENT
Start: 2023-11-11 | End: 2023-11-11

## 2023-11-11 RX ORDER — PROMETHAZINE HYDROCHLORIDE 25 MG/ML
25 INJECTION, SOLUTION INTRAMUSCULAR; INTRAVENOUS EVERY 6 HOURS PRN
Status: DISCONTINUED | OUTPATIENT
Start: 2023-11-11 | End: 2023-11-13 | Stop reason: HOSPADM

## 2023-11-11 RX ORDER — SODIUM CHLORIDE, SODIUM GLUCONATE, SODIUM ACETATE, POTASSIUM CHLORIDE, MAGNESIUM CHLORIDE, SODIUM PHOSPHATE, DIBASIC, AND POTASSIUM PHOSPHATE .53; .5; .37; .037; .03; .012; .00082 G/100ML; G/100ML; G/100ML; G/100ML; G/100ML; G/100ML; G/100ML
100 INJECTION, SOLUTION INTRAVENOUS CONTINUOUS
Status: DISCONTINUED | OUTPATIENT
Start: 2023-11-11 | End: 2023-11-13

## 2023-11-11 RX ORDER — ACETAMINOPHEN 325 MG/1
650 TABLET ORAL EVERY 6 HOURS PRN
Status: DISCONTINUED | OUTPATIENT
Start: 2023-11-11 | End: 2023-11-13 | Stop reason: HOSPADM

## 2023-11-11 RX ORDER — ENOXAPARIN SODIUM 100 MG/ML
40 INJECTION SUBCUTANEOUS DAILY
Status: DISCONTINUED | OUTPATIENT
Start: 2023-11-11 | End: 2023-11-13 | Stop reason: HOSPADM

## 2023-11-11 RX ORDER — POTASSIUM CHLORIDE 20 MEQ/1
20 TABLET, EXTENDED RELEASE ORAL
Status: DISPENSED | OUTPATIENT
Start: 2023-11-11 | End: 2023-11-13

## 2023-11-11 RX ORDER — SODIUM CHLORIDE, SODIUM GLUCONATE, SODIUM ACETATE, POTASSIUM CHLORIDE, MAGNESIUM CHLORIDE, SODIUM PHOSPHATE, DIBASIC, AND POTASSIUM PHOSPHATE .53; .5; .37; .037; .03; .012; .00082 G/100ML; G/100ML; G/100ML; G/100ML; G/100ML; G/100ML; G/100ML
1000 INJECTION, SOLUTION INTRAVENOUS ONCE
Status: COMPLETED | OUTPATIENT
Start: 2023-11-11 | End: 2023-11-11

## 2023-11-11 RX ORDER — METHOCARBAMOL 500 MG/1
500 TABLET, FILM COATED ORAL EVERY 6 HOURS PRN
Status: DISCONTINUED | OUTPATIENT
Start: 2023-11-11 | End: 2023-11-13 | Stop reason: HOSPADM

## 2023-11-11 RX ADMIN — METHOCARBAMOL 500 MG: 500 TABLET ORAL at 19:51

## 2023-11-11 RX ADMIN — DROPERIDOL 0.62 MG: 2.5 INJECTION, SOLUTION INTRAMUSCULAR; INTRAVENOUS at 07:14

## 2023-11-11 RX ADMIN — PROMETHAZINE HYDROCHLORIDE 25 MG: 25 INJECTION INTRAMUSCULAR; INTRAVENOUS at 11:38

## 2023-11-11 RX ADMIN — SODIUM CHLORIDE, SODIUM GLUCONATE, SODIUM ACETATE, POTASSIUM CHLORIDE, MAGNESIUM CHLORIDE, SODIUM PHOSPHATE, DIBASIC, AND POTASSIUM PHOSPHATE 100 ML/HR: .53; .5; .37; .037; .03; .012; .00082 INJECTION, SOLUTION INTRAVENOUS at 09:46

## 2023-11-11 RX ADMIN — SODIUM CHLORIDE, SODIUM GLUCONATE, SODIUM ACETATE, POTASSIUM CHLORIDE, MAGNESIUM CHLORIDE, SODIUM PHOSPHATE, DIBASIC, AND POTASSIUM PHOSPHATE 1000 ML: .53; .5; .37; .037; .03; .012; .00082 INJECTION, SOLUTION INTRAVENOUS at 07:10

## 2023-11-11 RX ADMIN — ENOXAPARIN SODIUM 40 MG: 40 INJECTION SUBCUTANEOUS at 09:46

## 2023-11-11 RX ADMIN — POTASSIUM CHLORIDE 40 MEQ: 1500 TABLET, EXTENDED RELEASE ORAL at 08:11

## 2023-11-11 RX ADMIN — POTASSIUM CHLORIDE 20 MEQ: 1500 TABLET, EXTENDED RELEASE ORAL at 14:14

## 2023-11-11 RX ADMIN — ONDANSETRON 4 MG: 2 INJECTION INTRAMUSCULAR; INTRAVENOUS at 08:11

## 2023-11-11 RX ADMIN — ACETAMINOPHEN 650 MG: 325 TABLET, FILM COATED ORAL at 14:14

## 2023-11-11 NOTE — PLAN OF CARE
Problem: Potential for Falls  Goal: Patient will remain free of falls  Description: INTERVENTIONS:  - Educate patient/family on patient safety including physical limitations  - Instruct patient to call for assistance with activity   - Consult OT/PT to assist with strengthening/mobility   - Keep Call bell within reach  - Keep bed low and locked with side rails adjusted as appropriate  - Keep care items and personal belongings within reach  - Initiate and maintain comfort rounds  - Make Fall Risk Sign visible to staff  - Apply yellow socks and bracelet for high fall risk patients  - Consider moving patient to room near nurses station  11/11/2023 1615 by Walter Antonio, RN  Outcome: Progressing  11/11/2023 1615 by Walter Antonio RN  Outcome: Progressing

## 2023-11-11 NOTE — Clinical Note
Parminder Toscano was seen and treated in our emergency department on 11/11/2023. Diagnosis:     Steph  . She may return on this date: 11/14/2023         If you have any questions or concerns, please don't hesitate to call.       Laurel Laureano, DO    ______________________________           _______________          _______________  Hospital Representative                              Date                                Time

## 2023-11-11 NOTE — ED ATTENDING ATTESTATION
11/11/2023  IGreg MD, saw and evaluated the patient. I have discussed the patient with the resident/non-physician practitioner and agree with the resident's/non-physician practitioner's findings, Plan of Care, and MDM as documented in the resident's/non-physician practitioner's note, except where noted. All available labs and Radiology studies were reviewed. I was present for key portions of any procedure(s) performed by the resident/non-physician practitioner and I was immediately available to provide assistance. At this point I agree with the current assessment done in the Emergency Department. I have conducted an independent evaluation of this patient a history and physical is as follows:    ED Course     50-year-old female, history of multiple emergency department visits in July and early August for nausea and vomiting, has subsequently followed up with gastroenterology and undergone endoscopy and colonoscopy. Patient thought to have cannabis hyperemesis. Patient continues to use cannabis. Patient is presenting to the emergency department for evaluation of 3-day history of midepigastric abdominal discomfort, nausea, vomiting of yellow material.  No reported fever. Patient with pasty stools. No dysuria, urinary frequency, or urgency. No chest pain, cough, shortness of breath. On examination the patient appears mildly uncomfortable. Head is normocephalic and atraumatic. Eyelids lashes normal.  Mucosal membranes are mildly dry. Neck is supple. Lungs are clear bilaterally. Heart is regular rate and rhythm with no murmurs rubs or gallops. Abdomen is nondistended, soft, with tenderness in the epigastric region. The patient has slightly hyperactive bowel sounds. Extremities unremarkable. GCS is 15. Motor is 5 out of 5 bilateral upper and lower extremities.     MEDICAL DECISION MAKING    Number and Complexity of Problems  Differential diagnosis: Cyclic vomiting, cannabis hyperemesis, pancreatitis, gastritis. Medical Decision Making Data  External documents reviewed: Reviewed GI office visit on 10/13/2023. My EKG interpretation: Normal sinus rhythm at 73 bpm.  Diffuse ST depression. No orders to display       Labs Reviewed   CBC AND DIFFERENTIAL - Abnormal       Result Value Ref Range Status    WBC 16.74 (*) 4.31 - 10.16 Thousand/uL Final    RBC 5.54 (*) 3.81 - 5.12 Million/uL Final    Hemoglobin 16.8 (*) 11.5 - 15.4 g/dL Final    Hematocrit 47.2 (*) 34.8 - 46.1 % Final    MCV 85  82 - 98 fL Final    MCH 30.3  26.8 - 34.3 pg Final    MCHC 35.6  31.4 - 37.4 g/dL Final    RDW 12.5  11.6 - 15.1 % Final    MPV 11.4  8.9 - 12.7 fL Final    Platelets 726  532 - 390 Thousands/uL Final    nRBC 0  /100 WBCs Final    Neutrophils Relative 79 (*) 43 - 75 % Final    Immat GRANS % 0  0 - 2 % Final    Lymphocytes Relative 13 (*) 14 - 44 % Final    Monocytes Relative 8  4 - 12 % Final    Eosinophils Relative 0  0 - 6 % Final    Basophils Relative 0  0 - 1 % Final    Neutrophils Absolute 13.21 (*) 1.85 - 7.62 Thousands/µL Final    Immature Grans Absolute 0.07  0.00 - 0.20 Thousand/uL Final    Lymphocytes Absolute 2.17  0.60 - 4.47 Thousands/µL Final    Monocytes Absolute 1.26 (*) 0.17 - 1.22 Thousand/µL Final    Eosinophils Absolute 0.00  0.00 - 0.61 Thousand/µL Final    Basophils Absolute 0.03  0.00 - 0.10 Thousands/µL Final   COMPREHENSIVE METABOLIC PANEL - Abnormal    Sodium 133 (*) 135 - 147 mmol/L Final    Potassium 2.8 (*) 3.5 - 5.3 mmol/L Final    Chloride 88 (*) 96 - 108 mmol/L Final    CO2 32  21 - 32 mmol/L Final    ANION GAP 13  mmol/L Final    BUN 14  5 - 25 mg/dL Final    Creatinine 0.77  0.60 - 1.30 mg/dL Final    Comment: Standardized to IDMS reference method    Glucose 124  65 - 140 mg/dL Final    Comment: If the patient is fasting, the ADA then defines impaired fasting glucose as > 100 mg/dL and diabetes as > or equal to 123 mg/dL.     Calcium 10.1  8.4 - 10.2 mg/dL Final AST 11 (*) 13 - 39 U/L Final    ALT 11  7 - 52 U/L Final    Comment: Specimen collection should occur prior to Sulfasalazine administration due to the potential for falsely depressed results. Alkaline Phosphatase 88  34 - 104 U/L Final    Total Protein 8.6 (*) 6.4 - 8.4 g/dL Final    Albumin 5.0  3.5 - 5.0 g/dL Final    Total Bilirubin 1.03 (*) 0.20 - 1.00 mg/dL Final    Comment: Use of this assay is not recommended for patients undergoing treatment with eltrombopag due to the potential for falsely elevated results. N-acetyl-p-benzoquinone imine (metabolite of Acetaminophen) will generate erroneously low results in samples for patients that have taken an overdose of Acetaminophen. eGFR 103  ml/min/1.73sq m Final    Narrative:     [place] guidelines for Chronic Kidney Disease (CKD):     Stage 1 with normal or high GFR (GFR > 90 mL/min/1.73 square meters)    Stage 2 Mild CKD (GFR = 60-89 mL/min/1.73 square meters)    Stage 3A Moderate CKD (GFR = 45-59 mL/min/1.73 square meters)    Stage 3B Moderate CKD (GFR = 30-44 mL/min/1.73 square meters)    Stage 4 Severe CKD (GFR = 15-29 mL/min/1.73 square meters)    Stage 5 End Stage CKD (GFR <15 mL/min/1.73 square meters)  Note: GFR calculation is accurate only with a steady state creatinine   LIPASE - Abnormal    Lipase 7 (*) 11 - 82 u/L Final   PREGNANCY TEST (HCG QUALITATIVE) - Normal    Preg, Serum Negative  Negative Final   POCT GLUCOSE - Normal    POC Glucose 120  65 - 140 mg/dl Final   STOOL ENTERIC BACTERIAL PANEL BY PCR   CLOSTRIDIUM DIFFICILE TOXIN BY PCR WITH EIA   OVA AND PARASITE EXAMINATION   MAGNESIUM       Labs reviewed by me are significant for:  Elevated white blood cell count of 16,000. Potassium of 2.8. Discussed case with: Admitting hospitalist.  Considered admission for: Persistent nausea and vomiting and hypokalemia.     Treatment and Disposition  ED course: Patient underwent treatment in the emergency department with antiemetics and IV fluids. Hypokalemia noted on EKG, repleted in the emergency department. Patient had persistent nausea and vomiting prompting admission for observation. Shared decision making: Patient agreeable with plan. Code status: Full code.               Critical Care Time  Procedures

## 2023-11-11 NOTE — ED PROVIDER NOTES
History  Chief Complaint   Patient presents with    Vomiting     Pt vomiting since Wednesday, reports history of admissions for GI related issues. +abdominal pain and dizziness. Patient is a 31-year-old female with past medical history of cannabis use hyperemesis, GERD who presents to the emergency department for evaluation of intractable nausea and vomiting with epigastric abdominal pain for the past 4 days. States she has been unable to eat or drink and has had persistent abdominal pain since onset that has been progressively worsening. Patient states she is currently on her menstrual period. Patient had a recent EGD and colonoscopy which was negative for acute pathology according to her. Patient notes she has been smoking marijuana. She denies any fevers, chills, headaches, changes in vision or hearing, chest pain, shortness of breath, dysuria, hematuria, vaginal discharge, diarrhea, or any other complaints or concerns at this time. Prior to Admission Medications   Prescriptions Last Dose Informant Patient Reported? Taking?   acetaminophen (TYLENOL) 325 mg tablet  Self No No   Sig: Take 2 tablets (650 mg total) by mouth every 6 (six) hours as needed for mild pain   famotidine (PEPCID) 20 mg tablet   No No   Sig: Take 1 tablet (20 mg total) by mouth daily at bedtime for 14 days   pantoprazole (PROTONIX) 40 mg tablet   No No   Sig: Take 1 tablet (40 mg total) by mouth 2 (two) times a day for 14 days   polyethylene glycol (GOLYTELY) 4000 mL solution   No No   Sig: Take 4,000 mL by mouth once for 1 dose      Facility-Administered Medications: None       Past Medical History:   Diagnosis Date    Arthritis     Asthma     History of stomach ulcers     Psychiatric disorder     anxiety       Past Surgical History:   Procedure Laterality Date    BREAST SURGERY      COSMETIC SURGERY      EGD      WISDOM TOOTH EXTRACTION         History reviewed. No pertinent family history.   I have reviewed and agree with the history as documented. E-Cigarette/Vaping    E-Cigarette Use Never User      E-Cigarette/Vaping Substances    Nicotine No     THC Yes     CBD Yes     Flavoring No     Other No     Unknown No      Social History     Tobacco Use    Smoking status: Never    Smokeless tobacco: Never   Vaping Use    Vaping Use: Never used   Substance Use Topics    Alcohol use: Not Currently    Drug use: Not Currently     Types: Marijuana        Review of Systems   Constitutional:  Positive for activity change, appetite change and fatigue. Negative for chills and fever. HENT:  Negative for congestion, ear pain and sore throat. Eyes:  Negative for pain and visual disturbance. Respiratory:  Negative for cough and shortness of breath. Cardiovascular:  Negative for chest pain and palpitations. Gastrointestinal:  Positive for abdominal pain, nausea and vomiting. Negative for blood in stool, constipation and diarrhea. Genitourinary:  Negative for dysuria and hematuria. Musculoskeletal:  Negative for arthralgias and back pain. Skin:  Negative for color change and rash. Neurological:  Negative for dizziness, seizures, syncope, weakness, light-headedness, numbness and headaches. Psychiatric/Behavioral:  Negative for agitation and behavioral problems. All other systems reviewed and are negative. Physical Exam  ED Triage Vitals [11/11/23 0539]   Temperature Pulse Respirations Blood Pressure SpO2   98 °F (36.7 °C) (!) 117 22 126/84 97 %      Temp Source Heart Rate Source Patient Position - Orthostatic VS BP Location FiO2 (%)   Tympanic Monitor Sitting Left arm --      Pain Score       --             Orthostatic Vital Signs  Vitals:    11/11/23 0539 11/11/23 0717   BP: 126/84 121/82   Pulse: (!) 117 104   Patient Position - Orthostatic VS: Sitting        Physical Exam  Vitals and nursing note reviewed. Constitutional:       General: She is not in acute distress. Appearance: She is well-developed.    HENT: Head: Normocephalic and atraumatic. Right Ear: External ear normal.      Left Ear: External ear normal.      Nose: Nose normal.      Mouth/Throat:      Mouth: Mucous membranes are dry. Eyes:      Extraocular Movements: Extraocular movements intact. Conjunctiva/sclera: Conjunctivae normal.   Cardiovascular:      Rate and Rhythm: Normal rate and regular rhythm. Heart sounds: No murmur heard. Pulmonary:      Effort: Pulmonary effort is normal. No respiratory distress. Breath sounds: Normal breath sounds. Abdominal:      General: Abdomen is flat. Palpations: Abdomen is soft. Tenderness: There is no abdominal tenderness. There is no guarding or rebound. Musculoskeletal:         General: No swelling. Cervical back: Normal range of motion and neck supple. Skin:     General: Skin is warm and dry. Capillary Refill: Capillary refill takes less than 2 seconds. Neurological:      General: No focal deficit present. Mental Status: She is alert and oriented to person, place, and time.    Psychiatric:         Mood and Affect: Mood normal.         ED Medications  Medications   multi-electrolyte (PLASMALYTE-A/ISOLYTE-S PH 7.4) IV solution (has no administration in time range)   acetaminophen (TYLENOL) tablet 650 mg (has no administration in time range)   ondansetron (ZOFRAN) injection 4 mg (has no administration in time range)   enoxaparin (LOVENOX) subcutaneous injection 40 mg (has no administration in time range)   promethazine (PHENERGAN) injection 25 mg (has no administration in time range)   droperidol (INAPSINE) injection 0.625 mg (0.625 mg Intravenous Given 11/11/23 0714)   multi-electrolyte (ISOLYTE-S PH 7.4) bolus 1,000 mL (1,000 mL Intravenous New Bag 11/11/23 0710)   potassium chloride (K-DUR,KLOR-CON) CR tablet 40 mEq (40 mEq Oral Given 11/11/23 0811)   ondansetron (ZOFRAN) injection 4 mg (4 mg Intravenous Given 11/11/23 4418)       Diagnostic Studies  Results Reviewed       Procedure Component Value Units Date/Time    Ova and parasite examination [[id number]]     Lab Status: No result Specimen: Stool     Stool Enteric Bacterial Panel by PCR [[id number]]     Lab Status: No result Specimen: Stool     Clostridium difficile toxin by PCR with EIA [[id number]]     Lab Status: No result Specimen: Stool     Magnesium [[id number]]     Lab Status: No result Specimen: Blood     hCG, qualitative pregnancy [[id number]]  (Normal) Collected: 11/11/23 0708    Lab Status: Final result Specimen: Blood from Arm, Right Updated: 11/11/23 0754     Preg, Serum Negative    Comprehensive metabolic panel [[id number]]  (Abnormal) Collected: 11/11/23 0708    Lab Status: Final result Specimen: Blood from Arm, Right Updated: 11/11/23 0738     Sodium 133 mmol/L      Potassium 2.8 mmol/L      Chloride 88 mmol/L      CO2 32 mmol/L      ANION GAP 13 mmol/L      BUN 14 mg/dL      Creatinine 0.77 mg/dL      Glucose 124 mg/dL      Calcium 10.1 mg/dL      AST 11 U/L      ALT 11 U/L      Alkaline Phosphatase 88 U/L      Total Protein 8.6 g/dL      Albumin 5.0 g/dL      Total Bilirubin 1.03 mg/dL      eGFR 103 ml/min/1.73sq m     Narrative:      [facility] guidelines for Chronic Kidney Disease (CKD):     Stage 1 with normal or high GFR (GFR > 90 mL/min/1.73 square meters)    Stage 2 Mild CKD (GFR = 60-89 mL/min/1.73 square meters)    Stage 3A Moderate CKD (GFR = 45-59 mL/min/1.73 square meters)    Stage 3B Moderate CKD (GFR = 30-44 mL/min/1.73 square meters)    Stage 4 Severe CKD (GFR = 15-29 mL/min/1.73 square meters)    Stage 5 End Stage CKD (GFR <15 mL/min/1.73 square meters)  Note: GFR calculation is accurate only with a steady state creatinine    Lipase [[id number]]  (Abnormal) Collected: 11/11/23 0708    Lab Status: Final result Specimen: Blood from Arm, Right Updated: 11/11/23 0738     Lipase 7 u/L     Fingerstick Glucose (POCT) [[id number]]  (Normal) Collected: 11/11/23 0721    Lab Status: Final result Updated: 11/11/23 0722     POC Glucose 120 mg/dl     CBC and differential [[id number]]  (Abnormal) Collected: 11/11/23 0708    Lab Status: Final result Specimen: Blood from Arm, Right Updated: 11/11/23 0720     WBC 16.74 Thousand/uL      RBC 5.54 Million/uL      Hemoglobin 16.8 g/dL      Hematocrit 47.2 %      MCV 85 fL      MCH 30.3 pg      MCHC 35.6 g/dL      RDW 12.5 %      MPV 11.4 fL      Platelets 853 Thousands/uL      nRBC 0 /100 WBCs      Neutrophils Relative 79 %      Immat GRANS % 0 %      Lymphocytes Relative 13 %      Monocytes Relative 8 %      Eosinophils Relative 0 %      Basophils Relative 0 %      Neutrophils Absolute 13.21 Thousands/µL      Immature Grans Absolute 0.07 Thousand/uL      Lymphocytes Absolute 2.17 Thousands/µL      Monocytes Absolute 1.26 Thousand/µL      Eosinophils Absolute 0.00 Thousand/µL      Basophils Absolute 0.03 Thousands/µL                    No orders to display         Procedures  ECG 12 Lead Documentation Only    Date/Time: 11/11/2023 8:06 AM    Performed by: Letitia Bowser DO  Authorized by: Letitia Bowser DO    ECG reviewed by me, the ED Provider: yes    Patient location:  ED  Previous ECG:     Previous ECG:  Compared to current    Similarity:  Changes noted  Interpretation:     Interpretation: abnormal    Rhythm:     Rhythm: sinus bradycardia    Ectopy:     Ectopy: none    QRS:     QRS axis:  Normal  ST segments:     ST segments:  Abnormal  Other findings:     Other findings: U wave    Comments:      Consistent with hypokalemia        ED Course  ED Course as of 11/11/23 0900   Sat Nov 11, 2023   0641 Blood Pressure: 126/84   0641 Temperature: 98 °F (36.7 °C)   0641 Temp Source: Tympanic   0641 Pulse(!): 117   0641 Respirations: 22   0641 SpO2: 97 %  - given the presentation, will check CBC for marked leukocytosis  - CMP for liver enzyme elevation that could signal cholecystitis, biliary obstructive disease.  Check RFTs for ROBYN / markers of dehydration.  - Lipase given abdominal pain to evaluate specifically for pancreatitis. - Pregnancy test: given she is female, could if positive, could be ectopic and would change workup to ultrasound instead of CT scan. - abd soft nontender non distended- likely hyperemesis again given patient still smoking, will treat symptomatically with 1L fluid for dry mucous membranes, droperidol for nv  - encouraged patient to stop smoking weed  - Will frequently re-evaluate     0723 WBC(!): 16.74  Likely result of hemoconcentration. Getting 1L fluid bolus now. 9305 Blood Pressure: 121/82   0738 Pulse: 104   0738 SpO2: 99 %   0739 Potassium(!): 2.8  Low k will repelete   0745 Patient still symptomatic requesting more medications. Patient aware of labs. Patient would like to be admitted. Reached out for obs admission for hypok and intractable nv patient abd soft, NT, ND.   0755 PREGNANCY, SERUM: Negative                                       Medical Decision Making  Amount and/or Complexity of Data Reviewed  Labs: ordered. Decision-making details documented in ED Course. Risk  Prescription drug management. Decision regarding hospitalization.           Disposition  Final diagnoses:   Nausea and vomiting   Hypokalemia   Cyclical vomiting     Time reflects when diagnosis was documented in both MDM as applicable and the Disposition within this note       Time User Action Codes Description Comment    11/11/2023  7:29 AM Agusto Mendez Add [R11.2] Nausea and vomiting     11/11/2023  7:40 AM Agusto Mendez Add [E87.6] Hypokalemia     11/11/2023  8:02 AM Agusto Mendez Add [F59.17] Cyclical vomiting           ED Disposition       ED Disposition   Admit    Condition   Stable    Date/Time   Sat Nov 11, 2023  7:40 AM    Comment   Case was discussed with               Follow-up Information       Follow up With Specialties Details Why Contact Info Additional [address], DO Family Medicine Schedule an appointment as soon as possible for a visit  for follow up [address] [facility] 3 [facility] Emergency Department Emergency Medicine Go to  As needed, If symptoms worsen [address]  [id number] [phone] [facility] Emergency Department, [address], [place], [address] [facility] Schedule an appointment as soon as possible for a visit   [address] [facility] [id number]  [address] [facility], [address], [address] [facility], [place], [address]            Patient's Medications   Discharge Prescriptions    No medications on file     No discharge procedures on file. PDMP Review         Value Time User    PDMP Reviewed  Yes 1/20/2021  2:27 PM [place] ELMIRA Lamb             ED Provider  Attending physically available and evaluated Brent Munoz. I managed the patient along with the ED Attending.     Electronically Signed by           Laurel Laureano DO  11/11/23 0900

## 2023-11-11 NOTE — H&P
[facility] [facility]  H&P  Name: Shruthi Ty 33 y.o. female I MRN: [id number]  Unit/Bed#: ES3 006-08 I Date of Admission: 11/11/2023   Date of Service: 11/11/2023 I Hospital Day: 0      Assessment/Plan   * Vomiting  Assessment & Plan  Possible infectious gastroenteritis, reports she had Corbin food at work prior to the onset of her symptoms. Monitor temps, WBCs  Check stool studies  Supportive care for now with IV hydration and antiemeics  Clear liquid diet, advance as tolerated    Dehydration  Assessment & Plan  Due to vomiting and poor oral intake  IV hydration with isolate  BMP daily    Hypokalemia  Assessment & Plan  Due to vomiting and poor oral intake  Serum Mg is WNL  PO supplementation  BMP in AM           VTE Pharmacologic Prophylaxis:   Moderate Risk (Score 3-4) - Pharmacological DVT Prophylaxis Ordered: enoxaparin (Lovenox). Code Status: Level 1 - Full Code   Discussion with family: Patient declined call to . Anticipated Length of Stay: Patient will be admitted on an observation basis with an anticipated length of stay of less than 2 midnights secondary to vomiting. Total Time Spent on Date of Encounter in care of patient: 45 mins. This time was spent on one or more of the following: performing physical exam; counseling and coordination of care; obtaining or reviewing history; documenting in the medical record; reviewing/ordering tests, medications or procedures; communicating with other healthcare professionals and discussing with patient's family/caregivers. Chief Complaint: vomiting    History of Present Illness:  Shruthi Ty is a 33 y.o. female with a PMH of n/A who presents with vomiting. For the past 4 days she reports consistent vomiting and inability to tolerate PO intake. Symptoms started after a meal of Corbin food she had at her place of employment. Since then she reports vomiting.  Denies any diarrhea but reports she chronically has loose stools. Last BM was Wednesday. Denies any sick contacts. Denies any fever or chills. Has mild epigastric pain. Denies any alcohol use. Review of Systems:  Review of Systems   All other systems reviewed and are negative. Past Medical and Surgical History:   Past Medical History:   Diagnosis Date    Arthritis     Asthma     History of stomach ulcers     Psychiatric disorder     anxiety       Past Surgical History:   Procedure Laterality Date    BREAST SURGERY      COSMETIC SURGERY      EGD      WISDOM TOOTH EXTRACTION         Meds/Allergies:  Prior to Admission medications    Medication Sig Start Date End Date Taking?  Authorizing Provider   acetaminophen (TYLENOL) 325 mg tablet Take 2 tablets (650 mg total) by mouth every 6 (six) hours as needed for mild pain 12/5/20   Anaid Smith MD   famotidine (PEPCID) 20 mg tablet Take 1 tablet (20 mg total) by mouth daily at bedtime for 14 days 5/30/22 6/13/22  Rosa Peralta MD   pantoprazole (PROTONIX) 40 mg tablet Take 1 tablet (40 mg total) by mouth 2 (two) times a day for 14 days 5/30/22 6/13/22  Rosa Peralta MD   polyethylene glycol (GOLYTELY) 4000 mL solution Take 4,000 mL by mouth once for 1 dose 10/13/23 10/13/23  Anupam Johnson PA-C   capsaicin (ZOSTRIX) 0.025 % cream Apply 1 Application topically 2 (two) times a day  Patient not taking: Reported on 10/13/2023 7/17/23 11/11/23  Dagmar Mojica MD   dicyclomine (BENTYL) 10 mg capsule Take 1 capsule (10 mg total) by mouth 4 (four) times a day (before meals and at bedtime) As needed for abdominal pain 10/13/23 11/11/23  Anupam Johnson PA-C   folic acid (FOLVITE) 1 mg tablet Take 1 tablet (1 mg total) by mouth daily 5/30/22 6/3/22  Rosa Peralta MD   Pyridoxine HCl (vitamin B-6) 25 MG tablet Take 1 tablet (25 mg total) by mouth 3 (three) times a day  Patient not taking: Reported on 10/13/2023 5/30/22 11/11/23  Rosa Peralta MD   thiamine 100 MG tablet Take 1 tablet (100 mg total) by mouth daily  Patient not taking: Reported on 10/13/2023 5/30/22 11/11/23  Evan Esparza MD     I have reviewed home medications with patient personally. Allergies: Allergies   Allergen Reactions    Pollen Extract Chest Pain       Social History:  Marital Status: Single   Occupation: [employer]  Patient Pre-hospital Living Situation: Home  Patient Pre-hospital Level of Mobility: walks  Patient Pre-hospital Diet Restrictions: None  Substance Use History:   Social History     Substance and Sexual Activity   Alcohol Use Not Currently     Social History     Tobacco Use   Smoking Status Never   Smokeless Tobacco Never     Social History     Substance and Sexual Activity   Drug Use Not Currently    Types: Marijuana       Family History:  History reviewed. No pertinent family history. Physical Exam:     Vitals:   Blood Pressure: 120/80 (11/11/23 1103)  Pulse: 98 (11/11/23 0948)  Temperature: 98.4 °F (36.9 °C) (11/11/23 1103)  Temp Source: Tympanic (11/11/23 0539)  Respirations: 16 (11/11/23 0948)  SpO2: 99 % (11/11/23 0948)    Physical Exam  Constitutional:       General: She is not in acute distress. Appearance: Normal appearance. She is ill-appearing. She is not toxic-appearing. HENT:      Head: Normocephalic and atraumatic. Nose: Nose normal.   Eyes:      Extraocular Movements: Extraocular movements intact. Cardiovascular:      Rate and Rhythm: Normal rate and regular rhythm. Pulmonary:      Effort: Pulmonary effort is normal.   Abdominal:      General: There is no distension. Palpations: Abdomen is soft. Tenderness: There is no abdominal tenderness. There is no guarding or rebound. Musculoskeletal:      Right lower leg: No edema. Left lower leg: No edema. Skin:     General: Skin is warm and dry. Neurological:      General: No focal deficit present. Mental Status: She is alert and oriented to person, place, and time.    Psychiatric:         Mood and Affect: Mood normal. Behavior: Behavior normal.          Additional Data:     Lab Results:  Results from last 7 days   Lab Units 11/11/23  0708   WBC Thousand/uL 16.74*   HEMOGLOBIN g/dL 16.8*   HEMATOCRIT % 47.2*   PLATELETS Thousands/uL 276   NEUTROS PCT % 79*   LYMPHS PCT % 13*   MONOS PCT % 8   EOS PCT % 0     Results from last 7 days   Lab Units 11/11/23  0708   SODIUM mmol/L 133*   POTASSIUM mmol/L 2.8*   CHLORIDE mmol/L 88*   CO2 mmol/L 32   BUN mg/dL 14   CREATININE mg/dL 0.77   ANION GAP mmol/L 13   CALCIUM mg/dL 10.1   ALBUMIN g/dL 5.0   TOTAL BILIRUBIN mg/dL 1.03*   ALK PHOS U/L 88   ALT U/L 11   AST U/L 11*   GLUCOSE RANDOM mg/dL 124         Results from last 7 days   Lab Units 11/11/23  0721   POC GLUCOSE mg/dl 120               Lines/Drains:  Invasive Devices       Peripheral Intravenous Line  Duration             Peripheral IV 11/11/23 Proximal;Right;Ventral (anterior) Forearm <1 day                        Imaging: No pertinent imaging reviewed. No orders to display       EKG and Other Studies Reviewed on Admission:   EKG: No EKG obtained. ** Please Note: This note has been constructed using a voice recognition system.  **

## 2023-11-11 NOTE — ASSESSMENT & PLAN NOTE
Possible infectious gastroenteritis, reports she had Corbin food at work prior to the onset of her symptoms.   Monitor temps, WBCs  Check stool studies  Supportive care for now with IV hydration and antiemeics  Clear liquid diet, advance as tolerated

## 2023-11-12 LAB
ALBUMIN SERPL BCP-MCNC: 4.3 G/DL (ref 3.5–5)
ALP SERPL-CCNC: 70 U/L (ref 34–104)
ALT SERPL W P-5'-P-CCNC: 9 U/L (ref 7–52)
ANION GAP SERPL CALCULATED.3IONS-SCNC: 13 MMOL/L
AST SERPL W P-5'-P-CCNC: 13 U/L (ref 13–39)
BILIRUB SERPL-MCNC: 0.83 MG/DL (ref 0.2–1)
BUN SERPL-MCNC: 10 MG/DL (ref 5–25)
CALCIUM SERPL-MCNC: 9 MG/DL (ref 8.4–10.2)
CHLORIDE SERPL-SCNC: 92 MMOL/L (ref 96–108)
CO2 SERPL-SCNC: 31 MMOL/L (ref 21–32)
CREAT SERPL-MCNC: 0.61 MG/DL (ref 0.6–1.3)
ERYTHROCYTE [DISTWIDTH] IN BLOOD BY AUTOMATED COUNT: 12.4 % (ref 11.6–15.1)
GFR SERPL CREATININE-BSD FRML MDRD: 121 ML/MIN/1.73SQ M
GLUCOSE SERPL-MCNC: 123 MG/DL (ref 65–140)
HCT VFR BLD AUTO: 43.4 % (ref 34.8–46.1)
HGB BLD-MCNC: 15.6 G/DL (ref 11.5–15.4)
MAGNESIUM SERPL-MCNC: 2.4 MG/DL (ref 1.9–2.7)
MCH RBC QN AUTO: 30.4 PG (ref 26.8–34.3)
MCHC RBC AUTO-ENTMCNC: 35.9 G/DL (ref 31.4–37.4)
MCV RBC AUTO: 85 FL (ref 82–98)
PHOSPHATE SERPL-MCNC: 2.8 MG/DL (ref 2.7–4.5)
PLATELET # BLD AUTO: 219 THOUSANDS/UL (ref 149–390)
PMV BLD AUTO: 11.2 FL (ref 8.9–12.7)
POTASSIUM SERPL-SCNC: 2.5 MMOL/L (ref 3.5–5.3)
PROT SERPL-MCNC: 7.2 G/DL (ref 6.4–8.4)
RBC # BLD AUTO: 5.13 MILLION/UL (ref 3.81–5.12)
SODIUM SERPL-SCNC: 136 MMOL/L (ref 135–147)
WBC # BLD AUTO: 13.38 THOUSAND/UL (ref 4.31–10.16)

## 2023-11-12 PROCEDURE — 99232 SBSQ HOSP IP/OBS MODERATE 35: CPT | Performed by: INTERNAL MEDICINE

## 2023-11-12 PROCEDURE — 80053 COMPREHEN METABOLIC PANEL: CPT | Performed by: INTERNAL MEDICINE

## 2023-11-12 PROCEDURE — 85027 COMPLETE CBC AUTOMATED: CPT | Performed by: INTERNAL MEDICINE

## 2023-11-12 PROCEDURE — 84100 ASSAY OF PHOSPHORUS: CPT | Performed by: INTERNAL MEDICINE

## 2023-11-12 PROCEDURE — 83735 ASSAY OF MAGNESIUM: CPT | Performed by: INTERNAL MEDICINE

## 2023-11-12 RX ORDER — POTASSIUM CHLORIDE 20 MEQ/1
40 TABLET, EXTENDED RELEASE ORAL ONCE
Status: COMPLETED | OUTPATIENT
Start: 2023-11-12 | End: 2023-11-12

## 2023-11-12 RX ORDER — POTASSIUM CHLORIDE 14.9 MG/ML
20 INJECTION INTRAVENOUS
Status: COMPLETED | OUTPATIENT
Start: 2023-11-12 | End: 2023-11-13

## 2023-11-12 RX ORDER — LANOLIN ALCOHOL/MO/W.PET/CERES
3 CREAM (GRAM) TOPICAL
Status: DISCONTINUED | OUTPATIENT
Start: 2023-11-12 | End: 2023-11-13 | Stop reason: HOSPADM

## 2023-11-12 RX ADMIN — POTASSIUM CHLORIDE 20 MEQ: 14.9 INJECTION, SOLUTION INTRAVENOUS at 12:50

## 2023-11-12 RX ADMIN — SODIUM CHLORIDE, SODIUM GLUCONATE, SODIUM ACETATE, POTASSIUM CHLORIDE, MAGNESIUM CHLORIDE, SODIUM PHOSPHATE, DIBASIC, AND POTASSIUM PHOSPHATE 100 ML/HR: .53; .5; .37; .037; .03; .012; .00082 INJECTION, SOLUTION INTRAVENOUS at 03:35

## 2023-11-12 RX ADMIN — SODIUM CHLORIDE, SODIUM GLUCONATE, SODIUM ACETATE, POTASSIUM CHLORIDE, MAGNESIUM CHLORIDE, SODIUM PHOSPHATE, DIBASIC, AND POTASSIUM PHOSPHATE 100 ML/HR: .53; .5; .37; .037; .03; .012; .00082 INJECTION, SOLUTION INTRAVENOUS at 09:39

## 2023-11-12 RX ADMIN — SODIUM CHLORIDE, SODIUM GLUCONATE, SODIUM ACETATE, POTASSIUM CHLORIDE, MAGNESIUM CHLORIDE, SODIUM PHOSPHATE, DIBASIC, AND POTASSIUM PHOSPHATE 100 ML/HR: .53; .5; .37; .037; .03; .012; .00082 INJECTION, SOLUTION INTRAVENOUS at 18:40

## 2023-11-12 RX ADMIN — PROMETHAZINE HYDROCHLORIDE 25 MG: 25 INJECTION INTRAMUSCULAR; INTRAVENOUS at 04:25

## 2023-11-12 RX ADMIN — Medication 3 MG: at 01:30

## 2023-11-12 RX ADMIN — Medication 3 MG: at 22:15

## 2023-11-12 RX ADMIN — POTASSIUM CHLORIDE 20 MEQ: 14.9 INJECTION, SOLUTION INTRAVENOUS at 07:00

## 2023-11-12 RX ADMIN — POTASSIUM CHLORIDE 40 MEQ: 1500 TABLET, EXTENDED RELEASE ORAL at 06:51

## 2023-11-12 NOTE — ASSESSMENT & PLAN NOTE
Possible infectious gastroenteritis, vs cyclic vomiting syndrome -  reports she had Corbin food at work prior to the onset of her symptoms.   Monitor temps, WBCs, afebrile so far  No diarrhea since admission, cancel stool studies  Supportive care for now with IV hydration and antiemeics  Continue clear liquid diet, advance when tolerating  liquids

## 2023-11-12 NOTE — PROGRESS NOTES
[address] [facility]  Progress Note  Name: Zeinab Portillo  MRN: [id number]  Unit/Bed#: JH1 216-02 I Date of Admission: 11/11/2023   Date of Service: 11/12/2023 I Hospital Day: 0    Assessment/Plan   * Vomiting  Assessment & Plan  Possible infectious gastroenteritis, vs cyclic vomiting syndrome -  reports she had Corbin food at work prior to the onset of her symptoms. Monitor temps, WBCs, afebrile so far  No diarrhea since admission, cancel stool studies  Supportive care for now with IV hydration and antiemeics  Continue clear liquid diet, advance when tolerating  liquids    Dehydration  Assessment & Plan  Due to vomiting and poor oral intake  IV hydration with isolate  BMP daily    Hypokalemia  Assessment & Plan  Due to vomiting and poor oral intake  Serum Mg is WNL  PO supplementation  BMP in AM               VTE Pharmacologic Prophylaxis: VTE Score: 1 Moderate Risk (Score 3-4) - Pharmacological DVT Prophylaxis Ordered: heparin. Patient Centered Rounds: I performed bedside rounds with nursing staff today. Discussions with Specialists or Other Care Team Provider: nurse, WILMAN      Total Time Spent on Date of Encounter in care of patient: 35 mins. This time was spent on one or more of the following: performing physical exam; counseling and coordination of care; obtaining or reviewing history; documenting in the medical record; reviewing/ordering tests, medications or procedures; communicating with other healthcare professionals and discussing with patient's family/caregivers. Current Length of Stay: 0 day(s)  Current Patient Status: Observation   Certification Statement: The patient will continue to require additional inpatient hospital stay due to vomiting  Discharge Plan: Anticipate discharge in 24-48 hrs to home. Code Status: Level 1 - Full Code    Subjective:   Reports that she is still nauseated with vomiting. Denies any abdominal pain or diarrhea.     Objective: Vitals:   Temp (24hrs), Av.3 °F (36.8 °C), Min:97.4 °F (36.3 °C), Max:99.1 °F (37.3 °C)    Temp:  [97.4 °F (36.3 °C)-99.1 °F (37.3 °C)] 97.4 °F (36.3 °C)  BP: (122-137)/(81-85) 137/85  SpO2:  [99 %] 99 %  There is no height or weight on file to calculate BMI. Input and Output Summary (last 24 hours): Intake/Output Summary (Last 24 hours) at 2023 1115  Last data filed at 2023  Gross per 24 hour   Intake --   Output 600 ml   Net -600 ml       Physical Exam:   Physical Exam  Constitutional:       Appearance: Normal appearance. HENT:      Head: Normocephalic and atraumatic. Nose: Nose normal.   Eyes:      Extraocular Movements: Extraocular movements intact. Cardiovascular:      Rate and Rhythm: Normal rate and regular rhythm. Pulmonary:      Effort: Pulmonary effort is normal.   Abdominal:      General: There is no distension. Skin:     General: Skin is warm and dry. Neurological:      Mental Status: She is alert and oriented to person, place, and time. Mental status is at baseline.    Psychiatric:         Mood and Affect: Mood normal.          Additional Data:     Labs:  Results from last 7 days   Lab Units 235 23  0708   WBC Thousand/uL 13.38* 16.74*   HEMOGLOBIN g/dL 15.6* 16.8*   HEMATOCRIT % 43.4 47.2*   PLATELETS Thousands/uL 219 276   NEUTROS PCT %  --  79*   LYMPHS PCT %  --  13*   MONOS PCT %  --  8   EOS PCT %  --  0     Results from last 7 days   Lab Units 23  0425   SODIUM mmol/L 136   POTASSIUM mmol/L 2.5*   CHLORIDE mmol/L 92*   CO2 mmol/L 31   BUN mg/dL 10   CREATININE mg/dL 0.61   ANION GAP mmol/L 13   CALCIUM mg/dL 9.0   ALBUMIN g/dL 4.3   TOTAL BILIRUBIN mg/dL 0.83   ALK PHOS U/L 70   ALT U/L 9   AST U/L 13   GLUCOSE RANDOM mg/dL 123         Results from last 7 days   Lab Units 23  0721   POC GLUCOSE mg/dl 120               Lines/Drains:  Invasive Devices       Peripheral Intravenous Line  Duration             Peripheral IV 11/11/23 Proximal;Right;Ventral (anterior) Forearm 1 day                          Imaging: No pertinent imaging reviewed. Recent Cultures (last 7 days):         Last 24 Hours Medication List:   Current Facility-Administered Medications   Medication Dose Route Frequency Provider Last Rate    acetaminophen  650 mg Oral Q6H PRN Cassie Reid MD      enoxaparin  40 mg Subcutaneous Daily Cassie Reid MD      melatonin  3 mg Oral HS Carol Mensah PA-C      methocarbamol  500 mg Oral Q6H PRN Cassie Reid MD      multi-electrolyte  100 mL/hr Intravenous Continuous Cassie Reid  mL/hr (11/12/23 0939)    ondansetron  4 mg Intravenous Q6H PRN Cassie Reid MD      potassium chloride  20 mEq Oral TID With Meals Cassie Reid MD      potassium chloride  20 mEq Intravenous Q2H Carol Mensah PA-C 20 mEq (11/12/23 0700)    promethazine  25 mg Intravenous Q6H PRN Cassie Reid MD          Today, Patient Was Seen By: Gordon Stevens MD    **Please Note: This note may have been constructed using a voice recognition system. **

## 2023-11-12 NOTE — TREATMENT PLAN
PT reassessed. Tolerated lunch and nausea is improved. Will advance to regular diet and monitor for tolerance. Continue PRN antiemetics.

## 2023-11-12 NOTE — UTILIZATION REVIEW
Initial Clinical Review    Admission: Date/Time/Statement:   Admission Orders (From admission, onward)       Ordered        11/11/23 0833  Place in Observation  Once                          Orders Placed This Encounter   Procedures    Place in Observation     Standing Status:   Standing     Number of Occurrences:   1     Order Specific Question:   Level of Care     Answer:   Med Surg [16]     ED Arrival Information       Expected   -    Arrival   11/11/2023 05:35    Acuity   Urgent              Means of arrival   Walk-In    Escorted by   Self    Service   Hospitalist    Admission type   Emergency              Arrival complaint   Vomiting             Chief Complaint   Patient presents with    Vomiting     Pt vomiting since Wednesday, reports history of admissions for GI related issues. +abdominal pain and dizziness. Initial Presentation: 33 y.o. female who presented self from home to [address] [place] [address] ED. Observation  admission for evaluation and treatment of Nausea and vomiting, with Hypokalemia and  a history of Cyclical vomiting. PMHx: She  has a past medical history of Arthritis, Asthma, History of stomach ulcers, and Psychiatric disorder. She has a hx of cannabis use hyperemesis, GERD. Presented w/ intractable nausea and vomiting with epigastric abdominal pain for the past 4 days. She reports she has been unable to eat or drink due to the persistent abd pain that has progressively worsened. She does report she is currently on her menstrual period. She had a recent EGD & colonoscopy which was negative for acute pathology according to her. She admits to  marijuana use. She reports she ate Corbin food prior to this onset of her abd pain. Her last reported BM was Wednesday. She is started on IV hydration and antiemetics, clear liquid diet, advance as tolerated. BMP daily, replete lytes as needed. Date: 11/12/2023  Day 2:  She reports continued nausea with emesis.  She denies abd pain or diarrhea. Continue supportive care, IV hydration and antiemeics. Clear liquid diet advance when tolerating liquids. K 2.5 replete.      ED Triage Vitals   Temperature Pulse Respirations Blood Pressure SpO2   11/11/23 0539 11/11/23 0539 11/11/23 0539 11/11/23 0539 11/11/23 0539   98 °F (36.7 °C) (!) 117 22 126/84 97 %      Temp Source Heart Rate Source Patient Position - Orthostatic VS BP Location FiO2 (%)   11/11/23 0539 11/11/23 0539 11/11/23 0539 11/11/23 0539 --   Tympanic Monitor Sitting Left arm       Pain Score       11/11/23 1414       8          Wt Readings from Last 1 Encounters:   10/13/23 77.1 kg (170 lb)     Additional Vital Signs:   Date/Time Temp Pulse Resp BP MAP (mmHg) SpO2 O2 Device Patient Position - Orthostatic VS   11/12/23 0900 -- -- -- -- -- -- None (Room air) --   11/11/23 23:28:14 97.4 °F (36.3 °C) Abnormal  -- -- 137/85 102 -- -- --   11/11/23 2047 -- -- -- -- -- 99 % None (Room air) --   11/11/23 15:27:20 99.1 °F (37.3 °C) -- -- 122/81 95 -- -- --   11/11/23 11:03:50 98.4 °F (36.9 °C) -- -- 120/80 93 -- None (Room air) --   11/11/23 0948 -- 98 16 122/68 90 99 % None (Room air) --   11/11/23 0717 -- 104 18 121/82 95 99 % None (Room air) --   11/11/23 0539 98 °F (36.7 °C) 117 Abnormal  22 126/84 95 97 % -- Sitting         Pertinent Labs/Diagnostic Test Results:   No orders to display         Results from last 7 days   Lab Units 11/12/23  0425 11/11/23  0708   WBC Thousand/uL 13.38* 16.74*   HEMOGLOBIN g/dL 15.6* 16.8*   HEMATOCRIT % 43.4 47.2*   PLATELETS Thousands/uL 219 276   NEUTROS ABS Thousands/µL  --  13.21*         Results from last 7 days   Lab Units 11/12/23  0425 11/11/23  0708   SODIUM mmol/L 136 133*   POTASSIUM mmol/L 2.5* 2.8*   CHLORIDE mmol/L 92* 88*   CO2 mmol/L 31 32   ANION GAP mmol/L 13 13   BUN mg/dL 10 14   CREATININE mg/dL 0.61 0.77   EGFR ml/min/1.73sq m 121 103   CALCIUM mg/dL 9.0 10.1   MAGNESIUM mg/dL 2.4 2.3   PHOSPHORUS mg/dL 2.8  -- Results from last 7 days   Lab Units 11/12/23  0425 11/11/23  0708   AST U/L 13 11*   ALT U/L 9 11   ALK PHOS U/L 70 88   TOTAL PROTEIN g/dL 7.2 8.6*   ALBUMIN g/dL 4.3 5.0   TOTAL BILIRUBIN mg/dL 0.83 1.03*     Results from last 7 days   Lab Units 11/11/23  0721   POC GLUCOSE mg/dl 120     Results from last 7 days   Lab Units 11/12/23  0425 11/11/23  0708   GLUCOSE RANDOM mg/dL 123 124       Results from last 7 days   Lab Units 11/11/23  0708   LIPASE u/L 7*         ED Treatment:   Medication Administration from 11/11/2023 0535 to 11/11/2023 1058         Date/Time Order Dose Route Action Comments     11/11/2023 0714 EST droperidol (INAPSINE) injection 0.625 mg 0.625 mg Intravenous Given --     11/11/2023 0710 EST multi-electrolyte (ISOLYTE-S PH 7.4) bolus 1,000 mL 1,000 mL Intravenous New Bag --     11/11/2023 0811 EST potassium chloride (K-DUR,KLOR-CON) CR tablet 40 mEq 40 mEq Oral Given --     11/11/2023 0811 EST ondansetron (ZOFRAN) injection 4 mg 4 mg Intravenous Given --     11/11/2023 0946 EST multi-electrolyte (PLASMALYTE-A/ISOLYTE-S PH 7.4) IV solution 100 mL/hr Intravenous New Bag --     11/11/2023 0946 EST enoxaparin (LOVENOX) subcutaneous injection 40 mg 40 mg Subcutaneous Given --          Past Medical History:   Diagnosis Date    Arthritis     Asthma     History of stomach ulcers     Psychiatric disorder     anxiety     Present on Admission:   Hypokalemia      Admitting Diagnosis: Cyclical vomiting [K65.97]  Hypokalemia [E87.6]  Vomiting [R11.10]  Nausea and vomiting [R11.2]  Age/Sex: 33 y.o. female      Admission Orders:  VS - up & OOB as tolerated - SCD's to le's - Diet clears- adv to reg on 11/12 PM       Scheduled Medications:  enoxaparin, 40 mg, Subcutaneous, Daily  melatonin, 3 mg, Oral, HS  potassium chloride, 20 mEq, Oral, TID With Meals  potassium chloride, 20 mEq, Intravenous, Q2H  11/12 x 2  K dur 40 meq po once 11/12 x 1         Continuous IV Infusions:  multi-electrolyte, 100 mL/hr, Intravenous, Continuous      PRN Meds:  acetaminophen, 650 mg, Oral, Q6H PRN-11/11 x 1   methocarbamol, 500 mg, Oral, Q6H PRN-11/11 x 1   ondansetron, 4 mg, Intravenous, Q6H PRN  promethazine, 25 mg, Intravenous, Q6H PRN-11/11 x 1 - 11/12 x 1             Network Utilization Review Department  ATTENTION: Please call with any questions or concerns to [phone] and carefully listen to the prompts so that you are directed to the right person. All voicemails are confidential.   For Discharge needs, contact Care Management DC Support Team at [phone] opt. 2  Send all requests for admission clinical reviews, approved or denied determinations and any other requests to dedicated fax number below belonging to the campus where the patient is receiving treatment.  List of dedicated fax numbers for the Facilities:  [place] DENIALS (Administrative/Medical Necessity) [phone]   DISCHARGE SUPPORT TEAM (NETWORK) [id number] (Maternity/NICU/Pediatrics) [phone]   [address] [place] [address] Drive [address] [facility] [address] [facility] [phone]   [address] [facility] [address] [facility] [address] [facility] [address] [address] [facility] [address] [facility] [address] [address] [facility] [address] [facility] [address] [facility]  [address] [phone]

## 2023-11-13 VITALS
DIASTOLIC BLOOD PRESSURE: 103 MMHG | SYSTOLIC BLOOD PRESSURE: 144 MMHG | OXYGEN SATURATION: 99 % | HEART RATE: 98 BPM | TEMPERATURE: 99 F | RESPIRATION RATE: 16 BRPM

## 2023-11-13 LAB
ANION GAP SERPL CALCULATED.3IONS-SCNC: 10 MMOL/L
BUN SERPL-MCNC: 9 MG/DL (ref 5–25)
CALCIUM SERPL-MCNC: 8.8 MG/DL (ref 8.4–10.2)
CHLORIDE SERPL-SCNC: 97 MMOL/L (ref 96–108)
CO2 SERPL-SCNC: 26 MMOL/L (ref 21–32)
CREAT SERPL-MCNC: 0.65 MG/DL (ref 0.6–1.3)
GFR SERPL CREATININE-BSD FRML MDRD: 118 ML/MIN/1.73SQ M
GLUCOSE SERPL-MCNC: 143 MG/DL (ref 65–140)
POTASSIUM SERPL-SCNC: 3 MMOL/L (ref 3.5–5.3)
SODIUM SERPL-SCNC: 133 MMOL/L (ref 135–147)

## 2023-11-13 PROCEDURE — 99239 HOSP IP/OBS DSCHRG MGMT >30: CPT | Performed by: INTERNAL MEDICINE

## 2023-11-13 PROCEDURE — 80048 BASIC METABOLIC PNL TOTAL CA: CPT | Performed by: INTERNAL MEDICINE

## 2023-11-13 RX ORDER — PROMETHAZINE HYDROCHLORIDE 25 MG/1
25 TABLET ORAL EVERY 6 HOURS PRN
Qty: 20 TABLET | Refills: 0 | Status: SHIPPED | OUTPATIENT
Start: 2023-11-13

## 2023-11-13 RX ADMIN — ONDANSETRON 4 MG: 2 INJECTION INTRAMUSCULAR; INTRAVENOUS at 01:03

## 2023-11-13 RX ADMIN — POTASSIUM CHLORIDE 20 MEQ: 1500 TABLET, EXTENDED RELEASE ORAL at 08:29

## 2023-11-13 RX ADMIN — SODIUM CHLORIDE, SODIUM GLUCONATE, SODIUM ACETATE, POTASSIUM CHLORIDE, MAGNESIUM CHLORIDE, SODIUM PHOSPHATE, DIBASIC, AND POTASSIUM PHOSPHATE 100 ML/HR: .53; .5; .37; .037; .03; .012; .00082 INJECTION, SOLUTION INTRAVENOUS at 03:48

## 2023-11-13 NOTE — DISCHARGE SUMMARY
[address] [facility]  Discharge- Ron Claros 1990, 33 y.o. female MRN: [id number]  Unit/Bed#: CW2 216-02 Encounter: [id number]  Primary Care Provider: Can Ashford DO   Date and time admitted to hospital: 11/11/2023  6:34 AM    * Vomiting  Assessment & Plan  Possible infectious gastroenteritis, vs cyclic vomiting syndrome -  reports she had Corbin food at work prior to the onset of her symptoms. Monitor temps, WBCs, afebrile so far  No diarrhea since admission, cancel stool studies  Tolerating solid diet now  Discharge home and follow up with GI for possible gastric emptying scan    Dehydration  Assessment & Plan  Due to vomiting and poor oral intake  IV hydration with isolate  BMP daily    Hypokalemia  Assessment & Plan  Due to vomiting and poor oral intake  Serum Mg is WNL  Improved  High K diet education discussed        Medical Problems       Resolved Problems  Date Reviewed: 10/13/2023   None       Discharging Physician / Practitioner: Hipolito Hwang MD  PCP: Can Ashford DO  Admission Date:   Admission Orders (From admission, onward)       Ordered        11/11/23 0833  Place in Observation  Once                          Discharge Date: 11/13/23    Consultations During Hospital Stay:  None    Procedures Performed:   None    Significant Findings / Test Results:   None    Incidental Findings:   None       Test Results Pending at Discharge (will require follow up): None     Outpatient Tests Requested:  BMP in 1 week    Complications:  None    Reason for Admission: vomiting    Hospital Course:   Ron Claros is a 33 y.o. female patient who originally presented to the hospital on 11/11/2023 due to vomiting. She was seen in the ED for evaluation and admitted for inpatient management. During her hospital course she was provided with IV hydration, electrolyte supplementation and as needed antiemetics. Her diet was advanced which she tolerated. She improved and was discharged home. She has follow up arranged with GI      Please see above list of diagnoses and related plan for additional information. Condition at Discharge: stable    Discharge Day Visit / Exam:   Subjective:  denies any new complaints. Tolerating her diet now  Vitals: Blood Pressure: (!) 144/103 (11/13/23 0832)  Pulse: 98 (11/11/23 0948)  Temperature: 99 °F (37.2 °C) (11/13/23 0832)  Temp Source: Oral (11/13/23 0832)  Respirations: 16 (11/11/23 0948)  SpO2: 99 % (11/12/23 2215)  Exam:   Physical Exam  Constitutional:       Appearance: Normal appearance. HENT:      Head: Normocephalic and atraumatic. Nose: Nose normal.   Eyes:      Extraocular Movements: Extraocular movements intact. Cardiovascular:      Rate and Rhythm: Normal rate and regular rhythm. Pulmonary:      Effort: Pulmonary effort is normal.   Abdominal:      General: There is no distension. Skin:     General: Skin is warm and dry. Neurological:      Mental Status: She is alert and oriented to person, place, and time. Psychiatric:         Mood and Affect: Mood normal.         Behavior: Behavior normal.            Discharge instructions/Information to patient and family:   See after visit summary for information provided to patient and family. Provisions for Follow-Up Care:  See after visit summary for information related to follow-up care and any pertinent home health orders. Disposition:   Home    Planned Readmission: No     Discharge Statement:  I spent 45 minutes discharging the patient. This time was spent on the day of discharge. I had direct contact with the patient on the day of discharge. Greater than 50% of the total time was spent examining patient, answering all patient questions, arranging and discussing plan of care with patient as well as directly providing post-discharge instructions. Additional time then spent on discharge activities.     Discharge Medications:  See after visit summary for reconciled discharge medications provided to patient and/or family.       **Please Note: This note may have been constructed using a voice recognition system**

## 2023-11-13 NOTE — UTILIZATION REVIEW
Continued Stay Review    Date:  11/13/2023                        Current Patient Class:  Observation   Current Level of Care:  Med Surg     HPI:32 y.o. female initially admitted on  11/11   with nausea and vomiting with hypokalemia. Assessment/Plan: 11/13/23 - diet advanced as tolerated She improved and is discharged to home on 11/13.      Vital Signs:   Date/Time Temp Pulse Resp BP MAP (mmHg) SpO2 O2 Device Patient Position - Orthostatic VS   11/12/23 23:16:40 -- -- -- 128/85 99 -- -- --   11/12/23 2215 -- -- -- -- -- 99 % None (Room air) --   11/12/23 0900 -- -- -- -- -- -- None (Room air) --   11/11/23 23:28:14 97.4 °F (36.3 °C) Abnormal  -- -- 137/85 102 -- -- --   11/11/23 2047 -- -- -- -- -- 99 % None (Room air) --   11/11/23 15:27:20 99.1 °F (37.3 °C) -- -- 122/81 95 -- -- --   11/11/23 11:03:50 98.4 °F (36.9 °C) -- -- 120/80 93 -- None (Room air) --       Pertinent Labs/Diagnostic Results:       Results from last 7 days   Lab Units 11/12/23 0425 11/11/23  0708   WBC Thousand/uL 13.38* 16.74*   HEMOGLOBIN g/dL 15.6* 16.8*   HEMATOCRIT % 43.4 47.2*   PLATELETS Thousands/uL 219 276   NEUTROS ABS Thousands/µL  --  13.21*         Results from last 7 days   Lab Units 11/12/23  0425 11/11/23  0708   SODIUM mmol/L 136 133*   POTASSIUM mmol/L 2.5* 2.8*   CHLORIDE mmol/L 92* 88*   CO2 mmol/L 31 32   ANION GAP mmol/L 13 13   BUN mg/dL 10 14   CREATININE mg/dL 0.61 0.77   EGFR ml/min/1.73sq m 121 103   CALCIUM mg/dL 9.0 10.1   MAGNESIUM mg/dL 2.4 2.3   PHOSPHORUS mg/dL 2.8  --      Results from last 7 days   Lab Units 11/12/23  0425 11/11/23  0708   AST U/L 13 11*   ALT U/L 9 11   ALK PHOS U/L 70 88   TOTAL PROTEIN g/dL 7.2 8.6*   ALBUMIN g/dL 4.3 5.0   TOTAL BILIRUBIN mg/dL 0.83 1.03*     Results from last 7 days   Lab Units 11/11/23  0721   POC GLUCOSE mg/dl 120     Results from last 7 days   Lab Units 11/12/23  0425 11/11/23  0708   GLUCOSE RANDOM mg/dL 123 124       Results from last 7 days   Lab Units 11/11/23  0708   LIPASE u/L 7*     Orders:  Clear Liquid diet - up and OOB as tolerated - SCD's to le's     Medications:   Scheduled Medications:  enoxaparin, 40 mg, Subcutaneous, Daily  melatonin, 3 mg, Oral, HS        Continuous IV Infusions:  multi-electrolyte, 100 mL/hr, Intravenous, Continuous      PRN Meds:  acetaminophen, 650 mg, Oral, Q6H PRN-11/11 x 1   methocarbamol, 500 mg, Oral, Q6H PRN-11/11 x 1   ondansetron, 4 mg, Intravenous, Q6H PRN-11/13 x 1   promethazine, 25 mg, Intravenous, Q6H PRN-11/11 x 1 - 11/12 x 1         Discharge Plan: D     Network Utilization Review Department  ATTENTION: Please call with any questions or concerns to [phone] and carefully listen to the prompts so that you are directed to the right person. All voicemails are confidential.   For Discharge needs, contact Care Management DC Support Team at [phone] opt. 2  Send all requests for admission clinical reviews, approved or denied determinations and any other requests to dedicated fax number below belonging to the campus where the patient is receiving treatment.  List of dedicated fax numbers for the Facilities:  [place] DENIALS (Administrative/Medical Necessity) [phone]   DISCHARGE SUPPORT TEAM (NETWORK) [id number] (Maternity/NICU/Pediatrics) [phone]   [address] [facility] [phone]   [address] [facility] [address] [address] [facility] [address] [facility]  [address] [phone]

## 2023-11-13 NOTE — ASSESSMENT & PLAN NOTE
Possible infectious gastroenteritis, vs cyclic vomiting syndrome -  reports she had [place] food at work prior to the onset of her symptoms.   Monitor temps, WBCs, afebrile so far  No diarrhea since admission, cancel stool studies  Tolerating solid diet now  Discharge home and follow up with GI for possible gastric emptying scan

## 2023-11-13 NOTE — PLAN OF CARE
Problem: Potential for Falls  Goal: Patient will remain free of falls  Description: INTERVENTIONS:  - Educate patient/family on patient safety including physical limitations  - Instruct patient to call for assistance with activity   - Consult OT/PT to assist with strengthening/mobility   - Keep Call bell within reach  - Keep bed low and locked with side rails adjusted as appropriate  - Keep care items and personal belongings within reach  - Initiate and maintain comfort rounds  - Make Fall Risk Sign visible to staff  Problem: PAIN - ADULT  Goal: Verbalizes/displays adequate comfort level or baseline comfort level  Description: Interventions:  - Encourage patient to monitor pain and request assistance  - Assess pain using appropriate pain scale  - Administer analgesics based on type and severity of pain and evaluate response  - Implement non-pharmacological measures as appropriate and evaluate response  - Consider cultural and social influences on pain and pain management  - Notify physician/advanced practitioner if interventions unsuccessful or patient reports new pain  Outcome: Progressing     - Apply yellow socks and bracelet for high fall risk patients  - Consider moving patient to room near nurses station  Outcome: Progressing    Problem: DISCHARGE PLANNING  Goal: Discharge to home or other facility with appropriate resources  Description: INTERVENTIONS:  - Identify barriers to discharge w/patient and caregiver  - Arrange for needed discharge resources and transportation as appropriate  - Identify discharge learning needs (meds, wound care, etc.)  - Arrange for interpretive services to assist at discharge as needed  - Refer to Case Management Department for coordinating discharge planning if the patient needs post-hospital services based on physician/advanced practitioner order or complex needs related to functional status, cognitive ability, or social support system  Outcome: Progressing     Problem: Knowledge Deficit  Goal: Patient/family/caregiver demonstrates understanding of disease process, treatment plan, medications, and discharge instructions  Description: Complete learning assessment and assess knowledge base.   Interventions:  - Provide teaching at level of understanding  - Provide teaching via preferred learning methods  Outcome: Progressing     Problem: Prexisting or High Potential for Compromised Skin Integrity  Goal: Skin integrity is maintained or improved  Description: INTERVENTIONS:  - Identify patients at risk for skin breakdown  - Assess and monitor skin integrity  - Assess and monitor nutrition and hydration status  - Monitor labs   - Assess for incontinence   - Turn and reposition patient  - Assist with mobility/ambulation  - Relieve pressure over bony prominences  - Avoid friction and shearing  - Provide appropriate hygiene as needed including keeping skin clean and dry  - Evaluate need for skin moisturizer/barrier cream  - Collaborate with interdisciplinary team   - Patient/family teaching  - Consider wound care consult   Outcome: Progressing

## 2024-01-10 PROBLEM — E86.0 DEHYDRATION: Status: RESOLVED | Noted: 2023-11-11 | Resolved: 2024-01-10

## 2024-02-04 ENCOUNTER — HOSPITAL ENCOUNTER (EMERGENCY)
Facility: HOSPITAL | Age: 34
Discharge: HOME | End: 2024-02-04
Attending: EMERGENCY MEDICINE
Payer: COMMERCIAL

## 2024-02-04 ENCOUNTER — APPOINTMENT (EMERGENCY)
Dept: RADIOLOGY | Facility: HOSPITAL | Age: 34
End: 2024-02-04
Payer: COMMERCIAL

## 2024-02-04 VITALS
SYSTOLIC BLOOD PRESSURE: 114 MMHG | BODY MASS INDEX: 28.29 KG/M2 | HEART RATE: 95 BPM | WEIGHT: 170 LBS | RESPIRATION RATE: 16 BRPM | TEMPERATURE: 99 F | DIASTOLIC BLOOD PRESSURE: 60 MMHG | OXYGEN SATURATION: 99 %

## 2024-02-04 DIAGNOSIS — R11.2 NAUSEA AND VOMITING: Primary | ICD-10-CM

## 2024-02-04 DIAGNOSIS — N93.9 VAGINAL BLEEDING: ICD-10-CM

## 2024-02-04 DIAGNOSIS — R10.9 ABDOMINAL PAIN: ICD-10-CM

## 2024-02-04 LAB
ALBUMIN SERPL BCP-MCNC: 5.1 G/DL (ref 3.5–5)
ALP SERPL-CCNC: 69 U/L (ref 34–104)
ALT SERPL W P-5'-P-CCNC: 10 U/L (ref 7–52)
ANION GAP SERPL CALCULATED.3IONS-SCNC: 15 MMOL/L
AST SERPL W P-5'-P-CCNC: 10 U/L (ref 13–39)
B-HCG SERPL-ACNC: 849 MIU/ML (ref 0–5)
BACTERIA UR QL AUTO: ABNORMAL /HPF
BASOPHILS # BLD MANUAL: 0 THOUSAND/UL (ref 0–0.1)
BASOPHILS NFR MAR MANUAL: 0 % (ref 0–1)
BILIRUB SERPL-MCNC: 0.89 MG/DL (ref 0.2–1)
BILIRUB UR QL STRIP: NEGATIVE
BUN SERPL-MCNC: 15 MG/DL (ref 5–25)
CALCIUM SERPL-MCNC: 10.3 MG/DL (ref 8.4–10.2)
CHLORIDE SERPL-SCNC: 85 MMOL/L (ref 96–108)
CLARITY UR: CLEAR
CO2 SERPL-SCNC: 29 MMOL/L (ref 21–32)
COLOR UR: YELLOW
CREAT SERPL-MCNC: 0.91 MG/DL (ref 0.6–1.3)
EOSINOPHIL # BLD MANUAL: 0 THOUSAND/UL (ref 0–0.4)
EOSINOPHIL NFR BLD MANUAL: 0 % (ref 0–6)
ERYTHROCYTE [DISTWIDTH] IN BLOOD BY AUTOMATED COUNT: 12.5 % (ref 11.6–15.1)
GFR SERPL CREATININE-BSD FRML MDRD: 83 ML/MIN/1.73SQ M
GLUCOSE SERPL-MCNC: 210 MG/DL (ref 65–140)
GLUCOSE UR STRIP-MCNC: ABNORMAL MG/DL
HCT VFR BLD AUTO: 44.5 % (ref 34.8–46.1)
HGB BLD-MCNC: 16.3 G/DL (ref 11.5–15.4)
HGB UR QL STRIP.AUTO: ABNORMAL
HYALINE CASTS #/AREA URNS LPF: ABNORMAL /LPF
KETONES UR STRIP-MCNC: NEGATIVE MG/DL
LEUKOCYTE ESTERASE UR QL STRIP: NEGATIVE
LIPASE SERPL-CCNC: 27 U/L (ref 11–82)
LYMPHOCYTES # BLD AUTO: 3.82 THOUSAND/UL (ref 0.6–4.47)
LYMPHOCYTES # BLD AUTO: 9 % (ref 14–44)
MCH RBC QN AUTO: 30.1 PG (ref 26.8–34.3)
MCHC RBC AUTO-ENTMCNC: 36.6 G/DL (ref 31.4–37.4)
MCV RBC AUTO: 82 FL (ref 82–98)
MONOCYTES # BLD AUTO: 0.6 THOUSAND/UL (ref 0–1.22)
MONOCYTES NFR BLD: 3 % (ref 4–12)
MUCOUS THREADS UR QL AUTO: ABNORMAL
NEUTROPHILS # BLD MANUAL: 15.67 THOUSAND/UL (ref 1.85–7.62)
NEUTS SEG NFR BLD AUTO: 78 % (ref 43–75)
NITRITE UR QL STRIP: NEGATIVE
NON-SQ EPI CELLS URNS QL MICRO: ABNORMAL /HPF
PH UR STRIP.AUTO: 8 [PH]
PLATELET # BLD AUTO: 304 THOUSANDS/UL (ref 149–390)
PLATELET BLD QL SMEAR: ADEQUATE
PMV BLD AUTO: 11.4 FL (ref 8.9–12.7)
POTASSIUM SERPL-SCNC: 2.5 MMOL/L (ref 3.5–5.3)
PROT SERPL-MCNC: 8.3 G/DL (ref 6.4–8.4)
PROT UR STRIP-MCNC: ABNORMAL MG/DL
RBC # BLD AUTO: 5.42 MILLION/UL (ref 3.81–5.12)
RBC #/AREA URNS AUTO: ABNORMAL /HPF
RBC MORPH BLD: PRESENT
SODIUM SERPL-SCNC: 129 MMOL/L (ref 135–147)
SP GR UR STRIP.AUTO: 1.02 (ref 1–1.03)
UROBILINOGEN UR STRIP-ACNC: <2 MG/DL
VARIANT LYMPHS # BLD AUTO: 10 %
WBC # BLD AUTO: 20.09 THOUSAND/UL (ref 4.31–10.16)
WBC #/AREA URNS AUTO: ABNORMAL /HPF
WBC TOXIC VACUOLES BLD QL SMEAR: PRESENT

## 2024-02-04 PROCEDURE — 85027 COMPLETE CBC AUTOMATED: CPT | Performed by: EMERGENCY MEDICINE

## 2024-02-04 PROCEDURE — 80053 COMPREHEN METABOLIC PANEL: CPT | Performed by: EMERGENCY MEDICINE

## 2024-02-04 PROCEDURE — 99284 EMERGENCY DEPT VISIT MOD MDM: CPT

## 2024-02-04 PROCEDURE — 96365 THER/PROPH/DIAG IV INF INIT: CPT

## 2024-02-04 PROCEDURE — 36415 COLL VENOUS BLD VENIPUNCTURE: CPT

## 2024-02-04 PROCEDURE — 99285 EMERGENCY DEPT VISIT HI MDM: CPT | Performed by: EMERGENCY MEDICINE

## 2024-02-04 PROCEDURE — 76815 OB US LIMITED FETUS(S): CPT

## 2024-02-04 PROCEDURE — 96361 HYDRATE IV INFUSION ADD-ON: CPT

## 2024-02-04 PROCEDURE — 84702 CHORIONIC GONADOTROPIN TEST: CPT | Performed by: EMERGENCY MEDICINE

## 2024-02-04 PROCEDURE — 96375 TX/PRO/DX INJ NEW DRUG ADDON: CPT

## 2024-02-04 PROCEDURE — 85007 BL SMEAR W/DIFF WBC COUNT: CPT | Performed by: EMERGENCY MEDICINE

## 2024-02-04 PROCEDURE — 81001 URINALYSIS AUTO W/SCOPE: CPT

## 2024-02-04 PROCEDURE — 96366 THER/PROPH/DIAG IV INF ADDON: CPT

## 2024-02-04 PROCEDURE — 83690 ASSAY OF LIPASE: CPT | Performed by: EMERGENCY MEDICINE

## 2024-02-04 RX ORDER — NAPROXEN 500 MG/1
500 TABLET ORAL 2 TIMES DAILY WITH MEALS
Qty: 6 TABLET | Refills: 0 | Status: SHIPPED | OUTPATIENT
Start: 2024-02-04 | End: 2024-02-07

## 2024-02-04 RX ORDER — METOCLOPRAMIDE HYDROCHLORIDE 5 MG/ML
10 INJECTION INTRAMUSCULAR; INTRAVENOUS ONCE
Status: COMPLETED | OUTPATIENT
Start: 2024-02-04 | End: 2024-02-04

## 2024-02-04 RX ORDER — ONDANSETRON 2 MG/ML
4 INJECTION INTRAMUSCULAR; INTRAVENOUS ONCE
Status: DISCONTINUED | OUTPATIENT
Start: 2024-02-04 | End: 2024-02-04

## 2024-02-04 RX ORDER — POTASSIUM CHLORIDE 14.9 MG/ML
20 INJECTION INTRAVENOUS ONCE
Status: COMPLETED | OUTPATIENT
Start: 2024-02-04 | End: 2024-02-04

## 2024-02-04 RX ORDER — POTASSIUM CHLORIDE 20 MEQ/1
40 TABLET, EXTENDED RELEASE ORAL ONCE
Status: COMPLETED | OUTPATIENT
Start: 2024-02-04 | End: 2024-02-04

## 2024-02-04 RX ORDER — HYDROMORPHONE HCL/PF 1 MG/ML
0.5 SYRINGE (ML) INJECTION ONCE
Status: COMPLETED | OUTPATIENT
Start: 2024-02-04 | End: 2024-02-04

## 2024-02-04 RX ORDER — ONDANSETRON 4 MG/1
4 TABLET, ORALLY DISINTEGRATING ORAL ONCE
Status: COMPLETED | OUTPATIENT
Start: 2024-02-04 | End: 2024-02-04

## 2024-02-04 RX ORDER — ONDANSETRON 2 MG/ML
4 INJECTION INTRAMUSCULAR; INTRAVENOUS ONCE
Status: COMPLETED | OUTPATIENT
Start: 2024-02-04 | End: 2024-02-04

## 2024-02-04 RX ORDER — METOCLOPRAMIDE 10 MG/1
10 TABLET ORAL EVERY 6 HOURS
Qty: 12 TABLET | Refills: 0 | Status: SHIPPED | OUTPATIENT
Start: 2024-02-04 | End: 2024-02-07

## 2024-02-04 RX ORDER — KETOROLAC TROMETHAMINE 30 MG/ML
15 INJECTION, SOLUTION INTRAMUSCULAR; INTRAVENOUS ONCE
Status: COMPLETED | OUTPATIENT
Start: 2024-02-04 | End: 2024-02-04

## 2024-02-04 RX ADMIN — SODIUM CHLORIDE 1000 ML: 0.9 INJECTION, SOLUTION INTRAVENOUS at 12:05

## 2024-02-04 RX ADMIN — HYDROMORPHONE HYDROCHLORIDE 0.5 MG: 1 INJECTION, SOLUTION INTRAMUSCULAR; INTRAVENOUS; SUBCUTANEOUS at 13:20

## 2024-02-04 RX ADMIN — KETOROLAC TROMETHAMINE 15 MG: 30 INJECTION, SOLUTION INTRAMUSCULAR; INTRAVENOUS at 12:05

## 2024-02-04 RX ADMIN — ONDANSETRON 4 MG: 2 INJECTION INTRAMUSCULAR; INTRAVENOUS at 12:05

## 2024-02-04 RX ADMIN — ONDANSETRON 4 MG: 4 TABLET, ORALLY DISINTEGRATING ORAL at 10:49

## 2024-02-04 RX ADMIN — POTASSIUM CHLORIDE 40 MEQ: 1500 TABLET, EXTENDED RELEASE ORAL at 13:53

## 2024-02-04 RX ADMIN — POTASSIUM CHLORIDE 20 MEQ: 14.9 INJECTION, SOLUTION INTRAVENOUS at 13:21

## 2024-02-04 RX ADMIN — METOCLOPRAMIDE HYDROCHLORIDE 10 MG: 5 INJECTION INTRAMUSCULAR; INTRAVENOUS at 13:41

## 2024-02-04 NOTE — DISCHARGE INSTRUCTIONS
Please follow-up with OB/GYN in the next few days.  Please follow-up with primary care provider.  Please return to the ED with new or worsening symptoms-see attached for problem including but not limited to worsening vaginal bleeding, worsening abdominal pain, constant nausea and vomiting not improved with medication.

## 2024-02-04 NOTE — ED ATTENDING ATTESTATION
2024  I, Lucy Newton MD, saw and evaluated the patient. I have discussed the patient with the resident/non-physician practitioner and agree with the resident's/non-physician practitioner's findings, Plan of Care, and MDM as documented in the resident's/non-physician practitioner's note, except where noted. All available labs and Radiology studies were reviewed.  I was present for key portions of any procedure(s) performed by the resident/non-physician practitioner and I was immediately available to provide assistance.       At this point I agree with the current assessment done in the Emergency Department.  I have conducted an independent evaluation of this patient a history and physical is as follows:    ED Course         Critical Care Time  Procedures    34 yo female found out she was pregnant on Tuesday, got medical  on Thursday, Friday and now having increased vaginal bleeding and clotting and rlq abdominal pain.  Pt pain radiating to back.  No diarrhea, no fever, no urinary complaints, no cp, no sob.  Vss, afebrile, tachy, dry mucous membranes, lungs cta, rrr, abdomen soft tender lower, right cva tenderness.  Labs, urine preg, serum hcg, urine, ivf, toradol, pelvic u/s

## 2024-02-04 NOTE — ED PROVIDER NOTES
History  Chief Complaint   Patient presents with    Vomiting    Abdominal Pain     Pt states she had a  on Friday and is having increased vaginal bleeding, and bad cramping.     Patient is a 33-year-old female with past medical history of asthma, stomach ulcers, recent pregnancy with medical  2/3 days ago.  She denies increased vaginal bleeding and vomiting with right lower quadrant pain.  States that the pain is radiating to the right.  She is also having significant amount of nausea and vomiting.  She denies fever, chills, diaphoresis, lightheadedness, dizziness, chest pain, shortness of breath, urinary symptoms, numbness, tingling, or weakness.        Prior to Admission Medications   Prescriptions Last Dose Informant Patient Reported? Taking?   promethazine (PHENERGAN) 25 mg tablet   No No   Sig: Take 1 tablet (25 mg total) by mouth every 6 (six) hours as needed for nausea or vomiting      Facility-Administered Medications: None       Past Medical History:   Diagnosis Date    Arthritis     Asthma     History of stomach ulcers     Psychiatric disorder     anxiety       Past Surgical History:   Procedure Laterality Date    BREAST SURGERY      COSMETIC SURGERY      EGD      WISDOM TOOTH EXTRACTION         History reviewed. No pertinent family history.  I have reviewed and agree with the history as documented.    E-Cigarette/Vaping    E-Cigarette Use Never User      E-Cigarette/Vaping Substances    Nicotine No     THC Yes     CBD Yes     Flavoring No     Other No     Unknown No      Social History     Tobacco Use    Smoking status: Never    Smokeless tobacco: Never   Vaping Use    Vaping status: Never Used   Substance Use Topics    Alcohol use: Not Currently    Drug use: Yes     Types: Marijuana        Review of Systems    Physical Exam  ED Triage Vitals [24 1044]   Temperature Pulse Respirations Blood Pressure SpO2   99 °F (37.2 °C) (!) 120 20 102/92 98 %      Temp Source Heart Rate Source  Patient Position - Orthostatic VS BP Location FiO2 (%)   Oral Monitor Sitting Left arm --      Pain Score       10 - Worst Possible Pain             Orthostatic Vital Signs  Vitals:    02/04/24 1044 02/04/24 1127 02/04/24 1330 02/04/24 1537   BP: 102/92 130/84 126/60 114/60   Pulse: (!) 120 (!) 124 98 95   Patient Position - Orthostatic VS: Sitting          Physical Exam  Vitals and nursing note reviewed.   Constitutional:       General: She is not in acute distress.     Appearance: She is well-developed. She is not ill-appearing, toxic-appearing or diaphoretic.      Comments: Appears uncomfortable.   HENT:      Head: Normocephalic and atraumatic.   Cardiovascular:      Rate and Rhythm: Normal rate and regular rhythm.      Heart sounds: Normal heart sounds.   Pulmonary:      Effort: Pulmonary effort is normal. No respiratory distress.      Breath sounds: Normal breath sounds.   Abdominal:      General: Abdomen is flat.      Palpations: Abdomen is soft.      Tenderness: There is abdominal tenderness in the right lower quadrant. There is right CVA tenderness. There is no guarding or rebound.   Skin:     General: Skin is warm and dry.      Capillary Refill: Capillary refill takes less than 2 seconds.   Neurological:      General: No focal deficit present.      Mental Status: She is alert and oriented to person, place, and time.         ED Medications  Medications   ondansetron (ZOFRAN-ODT) dispersible tablet 4 mg (4 mg Oral Given 2/4/24 1049)   ketorolac (TORADOL) injection 15 mg (15 mg Intravenous Given 2/4/24 1205)   ondansetron (ZOFRAN) injection 4 mg (4 mg Intravenous Given 2/4/24 1205)   sodium chloride 0.9 % bolus 1,000 mL (0 mL Intravenous Stopped 2/4/24 1330)   potassium chloride 20 mEq IVPB (premix) (0 mEq Intravenous Stopped 2/4/24 1521)   potassium chloride (Klor-Con M20) CR tablet 40 mEq (40 mEq Oral Given 2/4/24 1353)   HYDROmorphone (DILAUDID) injection 0.5 mg (0.5 mg Intravenous Given 2/4/24 1320)    metoclopramide (REGLAN) injection 10 mg (10 mg Intravenous Given 2/4/24 1341)       Diagnostic Studies  Results Reviewed       Procedure Component Value Units Date/Time    Urine Microscopic [[id number]]  (Abnormal) Collected: 02/04/24 1206    Lab Status: Final result Specimen: Urine, Other Updated: 02/04/24 1408     RBC, UA 4-10 /hpf      WBC, UA 2-4 /hpf      Epithelial Cells Occasional /hpf      Bacteria, UA Occasional /hpf      MUCUS THREADS Moderate     Hyaline Casts, UA 10-25 /lpf     UA (URINE) with reflex to Scope [[id number]]  (Abnormal) Collected: 02/04/24 1206    Lab Status: Final result Specimen: Urine, Other Updated: 02/04/24 1349     Color, UA Yellow     Clarity, UA Clear     Specific Gravity, UA 1.022     pH, UA 8.0     Leukocytes, UA Negative     Nitrite, UA Negative     Protein,  (2+) mg/dl      Glucose, UA Trace mg/dl      Ketones, UA Negative mg/dl      Urobilinogen, UA <2.0 mg/dl      Bilirubin, UA Negative     Occult Blood, UA Large    Pregnancy, hCG, quantitative [[id number]]  (Abnormal) Collected: 02/04/24 1048    Lab Status: Final result Specimen: Blood from Arm, Left Updated: 02/04/24 1232     HCG, Quant 849 mIU/mL     Narrative:       Expected Ranges:    HCG results between 5 and 25 mIU/mL may be indicative of early pregnancy but should be interpreted in light of the total clinical presentation.    HCG can rise to detectable levels in [place] and post menopausal women (0-11.6 mIU/mL).     Approximate               Approximate HCG  Gestation age          Concentration ( mIU/mL)  _____________          ______________________   Weeks                      HCG values  0.2-1                       5-50  1-2                           2-3                         100-5000  3-4                         500-38256  4-5                         1000-71017  5-6                         [id number]  6-8                         29309-042877  8-12                        78113-174429      Comprehensive  metabolic panel [[id number]]  (Abnormal) Collected: 02/04/24 1048    Lab Status: Final result Specimen: Blood from Arm, Left Updated: 02/04/24 1211     Sodium 129 mmol/L      Potassium 2.5 mmol/L      Chloride 85 mmol/L      CO2 29 mmol/L      ANION GAP 15 mmol/L      BUN 15 mg/dL      Creatinine 0.91 mg/dL      Glucose 210 mg/dL      Calcium 10.3 mg/dL      AST 10 U/L      ALT 10 U/L      Alkaline Phosphatase 69 U/L      Total Protein 8.3 g/dL      Albumin 5.1 g/dL      Total Bilirubin 0.89 mg/dL      eGFR 83 ml/min/1.73sq m     Narrative:      National Kidney Disease Foundation guidelines for Chronic Kidney Disease (CKD):     Stage 1 with normal or high GFR (GFR > 90 mL/min/1.73 square meters)    Stage 2 Mild CKD (GFR = 60-89 mL/min/1.73 square meters)    Stage 3A Moderate CKD (GFR = 45-59 mL/min/1.73 square meters)    Stage 3B Moderate CKD (GFR = 30-44 mL/min/1.73 square meters)    Stage 4 Severe CKD (GFR = 15-29 mL/min/1.73 square meters)    Stage 5 End Stage CKD (GFR <15 mL/min/1.73 square meters)  Note: GFR calculation is accurate only with a steady state creatinine    RBC Morphology Reflex Test [[id number]] Collected: 02/04/24 1048    Lab Status: Final result Specimen: Blood from Arm, Left Updated: 02/04/24 1201    Lipase [[id number]]  (Normal) Collected: 02/04/24 1048    Lab Status: Final result Specimen: Blood from Arm, Left Updated: 02/04/24 1157     Lipase 27 u/L     Manual Differential(PHLEBS Do Not Order) [[id number]]  (Abnormal) Collected: 02/04/24 1048    Lab Status: Final result Specimen: Blood from Arm, Left Updated: 02/04/24 1136     Segmented % 78 %      Lymphocytes % 9 %      Monocytes % 3 %      Eosinophils, % 0 %      Basophils % 0 %      Atypical Lymphocytes % 10 %      Absolute Neutrophils 15.67 Thousand/uL      Lymphocytes Absolute 3.82 Thousand/uL      Monocytes Absolute 0.60 Thousand/uL      Eosinophils Absolute 0.00 Thousand/uL      Basophils Absolute 0.00 Thousand/uL      Total Counted --      Toxic Vacuolated Neutrophils Present     RBC Morphology Present     Platelet Estimate Adequate    CBC and differential [[id number]]  (Abnormal) Collected: 02/04/24 1048    Lab Status: Final result Specimen: Blood from Arm, Left Updated: 02/04/24 1136     WBC 20.09 Thousand/uL      RBC 5.42 Million/uL      Hemoglobin 16.3 g/dL      Hematocrit 44.5 %      MCV 82 fL      MCH 30.1 pg      MCHC 36.6 g/dL      RDW 12.5 %      MPV 11.4 fL      Platelets 304 Thousands/uL     Narrative:      This is an appended report.  These results have been appended to a previously verified report.                   US OB pregnancy limited with transvaginal   Final Result by Isak Frazier MD (02/04 1402)   No intrauterine gestation or adnexal mass identified.   Small amount of probable hemorrhagic material within endometrial canal without abnormal vascularity to suggest retained products of conception.            Workstation performed: [id number]               Procedures  Procedures      ED Course  ED Course as of 02/06/24 1601   Sun Feb 04, 2024   1214 Sodium(!): 129   1214 Potassium(!!): 2.5                             SBIRT 20yo+      Flowsheet Row Most Recent Value   Initial Alcohol Screen: US AUDIT-C     1. How often do you have a drink containing alcohol? 0 Filed at: 02/04/2024 1047   2. How many drinks containing alcohol do you have on a typical day you are drinking?  0 Filed at: 02/04/2024 1047   3a. Male UNDER 65: How often do you have five or more drinks on one occasion? 0 Filed at: 02/04/2024 1047   3b. FEMALE Any Age, or MALE 65+: How often do you have 4 or more drinks on one occassion? 0 Filed at: 02/04/2024 1047   Audit-C Score 0 Filed at: 02/04/2024 1047   CATHY: How many times in the past year have you...    Used an illegal drug or used a prescription medication for non-medical reasons? Never Filed at: 02/04/2024 1047                  Medical Decision Making  Patient is a 33-year-old female presenting with abdominal pain  status post medical .    Differential includes side effects from the medical , retained products, ectopic pregnancy, anemia from vaginal bleeding.  Labs, symptom control, pelvic ultrasound.  Labs significant for hypokalemia and hyponatremia-repleted.  Ultrasound negative for retained products or signs of ectopic pregnancy.  Patient felt significantly better after symptom control.    Patient was cleared for discharge with PCP and OB/GYN follow-up.  She was given return precautions and medications for symptom control.    Amount and/or Complexity of Data Reviewed  Labs: ordered. Decision-making details documented in ED Course.  Radiology: ordered.    Risk  Prescription drug management.          Disposition  Final diagnoses:   Nausea and vomiting   Abdominal pain   Vaginal bleeding     Time reflects when diagnosis was documented in both MDM as applicable and the Disposition within this note       Time User Action Codes Description Comment    2024  3:41 PM Vikram Tamez Add [R11.2] Nausea and vomiting     2024  3:41 PM Vikram Tamez Add [R10.9] Abdominal pain     2024  3:41 PM Vikram Tamez Add [N93.9] Vaginal bleeding           ED Disposition       ED Disposition   Discharge    Condition   Stable    Date/Time   Sun 2024 1541    Comment   Steph Zhao discharge to home/self care.                   Follow-up Information       Follow up With Specialties Details Why Contact Info Additional Information    Juju Ashford DO Family Medicine   [address] [place] [id number]  [phone]       [facility] Emergency Department Emergency Medicine   [address] [facility] [id number] [phone] [facility], [address] [place], [place], [address]   [phone]            Discharge Medication List as of 2024  3:46 PM        START taking these medications    Details   metoclopramide  (Reglan) 10 mg tablet Take 1 tablet (10 mg total) by mouth every 6 (six) hours for 3 days, Starting Sun 2/4/2024, Until Wed 2/7/2024, Normal      naproxen (Naprosyn) 500 mg tablet Take 1 tablet (500 mg total) by mouth 2 (two) times a day with meals for 3 days, Starting Sun 2/4/2024, Until Wed 2/7/2024, Normal           CONTINUE these medications which have NOT CHANGED    Details   promethazine (PHENERGAN) 25 mg tablet Take 1 tablet (25 mg total) by mouth every 6 (six) hours as needed for nausea or vomiting, Starting Mon 11/13/2023, Normal           No discharge procedures on file.    PDMP Review         Value Time User    PDMP Reviewed  Yes 1/20/2021  2:27 PM Demetri Souza PA-C             ED Provider  Attending physically available and evaluated Steph Pandyaarthy. I managed the patient along with the ED Attending.    Electronically Signed by           Vikram Tamez MD  02/06/24 5334

## 2024-02-08 ENCOUNTER — HOSPITAL ENCOUNTER (EMERGENCY)
Facility: HOSPITAL | Age: 34
Discharge: HOME | End: 2024-02-08
Attending: EMERGENCY MEDICINE
Payer: COMMERCIAL

## 2024-02-08 ENCOUNTER — APPOINTMENT (EMERGENCY)
Dept: RADIOLOGY | Facility: HOSPITAL | Age: 34
End: 2024-02-08
Payer: COMMERCIAL

## 2024-02-08 VITALS
SYSTOLIC BLOOD PRESSURE: 121 MMHG | RESPIRATION RATE: 18 BRPM | OXYGEN SATURATION: 99 % | HEART RATE: 116 BPM | TEMPERATURE: 97.9 F | DIASTOLIC BLOOD PRESSURE: 75 MMHG

## 2024-02-08 DIAGNOSIS — R11.10 VOMITING: Primary | ICD-10-CM

## 2024-02-08 DIAGNOSIS — E87.1 HYPONATREMIA: ICD-10-CM

## 2024-02-08 DIAGNOSIS — E87.6 HYPOKALEMIA: ICD-10-CM

## 2024-02-08 LAB
2HR DELTA HS TROPONIN: 10 NG/L
4HR DELTA HS TROPONIN: 9 NG/L
ALBUMIN SERPL BCP-MCNC: 4.6 G/DL (ref 3.5–5)
ALP SERPL-CCNC: 72 U/L (ref 34–104)
ALT SERPL W P-5'-P-CCNC: 29 U/L (ref 7–52)
ANION GAP SERPL CALCULATED.3IONS-SCNC: 15 MMOL/L
APTT PPP: 26 SECONDS (ref 23–37)
AST SERPL W P-5'-P-CCNC: 19 U/L (ref 13–39)
B-HCG SERPL-ACNC: 75 MIU/ML (ref 0–5)
BACTERIA UR QL AUTO: ABNORMAL /HPF
BASOPHILS # BLD AUTO: 0.07 THOUSANDS/ÂΜL (ref 0–0.1)
BASOPHILS NFR BLD AUTO: 0 % (ref 0–1)
BILIRUB SERPL-MCNC: 0.97 MG/DL (ref 0.2–1)
BILIRUB UR QL STRIP: NEGATIVE
BUN SERPL-MCNC: 11 MG/DL (ref 5–25)
CALCIUM SERPL-MCNC: 10 MG/DL (ref 8.4–10.2)
CARDIAC TROPONIN I PNL SERPL HS: 16 NG/L
CARDIAC TROPONIN I PNL SERPL HS: 25 NG/L
CARDIAC TROPONIN I PNL SERPL HS: 26 NG/L
CHLORIDE SERPL-SCNC: 85 MMOL/L (ref 96–108)
CLARITY UR: ABNORMAL
CO2 SERPL-SCNC: 28 MMOL/L (ref 21–32)
COLOR UR: ABNORMAL
CREAT SERPL-MCNC: 0.84 MG/DL (ref 0.6–1.3)
EOSINOPHIL # BLD AUTO: 0.12 THOUSAND/ÂΜL (ref 0–0.61)
EOSINOPHIL NFR BLD AUTO: 1 % (ref 0–6)
ERYTHROCYTE [DISTWIDTH] IN BLOOD BY AUTOMATED COUNT: 12.2 % (ref 11.6–15.1)
GFR SERPL CREATININE-BSD FRML MDRD: 92 ML/MIN/1.73SQ M
GLUCOSE SERPL-MCNC: 122 MG/DL (ref 65–140)
GLUCOSE UR STRIP-MCNC: NEGATIVE MG/DL
HCT VFR BLD AUTO: 42.7 % (ref 34.8–46.1)
HGB BLD-MCNC: 15.8 G/DL (ref 11.5–15.4)
HGB UR QL STRIP.AUTO: ABNORMAL
IMM GRANULOCYTES # BLD AUTO: 0.12 THOUSAND/UL (ref 0–0.2)
IMM GRANULOCYTES NFR BLD AUTO: 1 % (ref 0–2)
INR PPP: 1.01 (ref 0.84–1.19)
KETONES UR STRIP-MCNC: NEGATIVE MG/DL
LEUKOCYTE ESTERASE UR QL STRIP: ABNORMAL
LIPASE SERPL-CCNC: 9 U/L (ref 11–82)
LYMPHOCYTES # BLD AUTO: 4.9 THOUSANDS/ÂΜL (ref 0.6–4.47)
LYMPHOCYTES NFR BLD AUTO: 22 % (ref 14–44)
MCH RBC QN AUTO: 30.2 PG (ref 26.8–34.3)
MCHC RBC AUTO-ENTMCNC: 37 G/DL (ref 31.4–37.4)
MCV RBC AUTO: 82 FL (ref 82–98)
MONOCYTES # BLD AUTO: 1.48 THOUSAND/ÂΜL (ref 0.17–1.22)
MONOCYTES NFR BLD AUTO: 7 % (ref 4–12)
NEUTROPHILS # BLD AUTO: 16 THOUSANDS/ÂΜL (ref 1.85–7.62)
NEUTS SEG NFR BLD AUTO: 69 % (ref 43–75)
NITRITE UR QL STRIP: NEGATIVE
NON-SQ EPI CELLS URNS QL MICRO: ABNORMAL /HPF
NRBC BLD AUTO-RTO: 0 /100 WBCS
PH UR STRIP.AUTO: 8 [PH]
PLATELET # BLD AUTO: 321 THOUSANDS/UL (ref 149–390)
PMV BLD AUTO: 10.8 FL (ref 8.9–12.7)
POTASSIUM SERPL-SCNC: 2.3 MMOL/L (ref 3.5–5.3)
PROT SERPL-MCNC: 8.1 G/DL (ref 6.4–8.4)
PROT UR STRIP-MCNC: ABNORMAL MG/DL
PROTHROMBIN TIME: 13.2 SECONDS (ref 11.6–14.5)
RBC # BLD AUTO: 5.24 MILLION/UL (ref 3.81–5.12)
RBC #/AREA URNS AUTO: ABNORMAL /HPF
SODIUM SERPL-SCNC: 128 MMOL/L (ref 135–147)
SP GR UR STRIP.AUTO: >=1.05 (ref 1–1.03)
UROBILINOGEN UR STRIP-ACNC: <2 MG/DL
WBC # BLD AUTO: 22.69 THOUSAND/UL (ref 4.31–10.16)
WBC #/AREA URNS AUTO: ABNORMAL /HPF

## 2024-02-08 PROCEDURE — 96361 HYDRATE IV INFUSION ADD-ON: CPT

## 2024-02-08 PROCEDURE — 83690 ASSAY OF LIPASE: CPT

## 2024-02-08 PROCEDURE — 85730 THROMBOPLASTIN TIME PARTIAL: CPT

## 2024-02-08 PROCEDURE — G1004 CDSM NDSC: HCPCS

## 2024-02-08 PROCEDURE — 80053 COMPREHEN METABOLIC PANEL: CPT | Performed by: EMERGENCY MEDICINE

## 2024-02-08 PROCEDURE — 99284 EMERGENCY DEPT VISIT MOD MDM: CPT

## 2024-02-08 PROCEDURE — 85610 PROTHROMBIN TIME: CPT

## 2024-02-08 PROCEDURE — 99285 EMERGENCY DEPT VISIT HI MDM: CPT | Performed by: EMERGENCY MEDICINE

## 2024-02-08 PROCEDURE — 84484 ASSAY OF TROPONIN QUANT: CPT | Performed by: EMERGENCY MEDICINE

## 2024-02-08 PROCEDURE — 85025 COMPLETE CBC W/AUTO DIFF WBC: CPT | Performed by: EMERGENCY MEDICINE

## 2024-02-08 PROCEDURE — 93005 ELECTROCARDIOGRAM TRACING: CPT

## 2024-02-08 PROCEDURE — 96366 THER/PROPH/DIAG IV INF ADDON: CPT

## 2024-02-08 PROCEDURE — 87086 URINE CULTURE/COLONY COUNT: CPT

## 2024-02-08 PROCEDURE — 84702 CHORIONIC GONADOTROPIN TEST: CPT

## 2024-02-08 PROCEDURE — 96375 TX/PRO/DX INJ NEW DRUG ADDON: CPT

## 2024-02-08 PROCEDURE — 74177 CT ABD & PELVIS W/CONTRAST: CPT

## 2024-02-08 PROCEDURE — 36415 COLL VENOUS BLD VENIPUNCTURE: CPT

## 2024-02-08 PROCEDURE — 71045 X-RAY EXAM CHEST 1 VIEW: CPT

## 2024-02-08 PROCEDURE — 81001 URINALYSIS AUTO W/SCOPE: CPT

## 2024-02-08 PROCEDURE — 96365 THER/PROPH/DIAG IV INF INIT: CPT

## 2024-02-08 RX ORDER — DROPERIDOL 2.5 MG/ML
0.62 INJECTION, SOLUTION INTRAMUSCULAR; INTRAVENOUS ONCE
Status: COMPLETED | OUTPATIENT
Start: 2024-02-08 | End: 2024-02-08

## 2024-02-08 RX ORDER — MORPHINE SULFATE 4 MG/ML
4 INJECTION, SOLUTION INTRAMUSCULAR; INTRAVENOUS ONCE
Status: COMPLETED | OUTPATIENT
Start: 2024-02-08 | End: 2024-02-08

## 2024-02-08 RX ORDER — POTASSIUM CHLORIDE 14.9 MG/ML
20 INJECTION INTRAVENOUS
Status: DISCONTINUED | OUTPATIENT
Start: 2024-02-08 | End: 2024-02-08 | Stop reason: HOSPADM

## 2024-02-08 RX ORDER — ONDANSETRON 2 MG/ML
4 INJECTION INTRAMUSCULAR; INTRAVENOUS ONCE
Status: DISCONTINUED | OUTPATIENT
Start: 2024-02-08 | End: 2024-02-08

## 2024-02-08 RX ADMIN — MORPHINE SULFATE 4 MG: 4 INJECTION INTRAVENOUS at 19:13

## 2024-02-08 RX ADMIN — POTASSIUM CHLORIDE 20 MEQ: 14.9 INJECTION, SOLUTION INTRAVENOUS at 19:45

## 2024-02-08 RX ADMIN — IOHEXOL 100 ML: 350 INJECTION, SOLUTION INTRAVENOUS at 19:39

## 2024-02-08 RX ADMIN — SODIUM CHLORIDE 1000 ML: 0.9 INJECTION, SOLUTION INTRAVENOUS at 19:13

## 2024-02-08 RX ADMIN — DROPERIDOL 0.62 MG: 2.5 INJECTION, SOLUTION INTRAMUSCULAR; INTRAVENOUS at 19:13

## 2024-02-08 RX ADMIN — POTASSIUM CHLORIDE 20 MEQ: 14.9 INJECTION, SOLUTION INTRAVENOUS at 20:33

## 2024-02-08 NOTE — ED PROVIDER NOTES
History  Chief Complaint   Patient presents with    Vomiting     Pt states she been having vomiting since , found out she was pregnant and took  pills , reports having minimal bleeding and cramping at this time, c/o lightheadedness and dizziness, states potassium was low when seen here       Patient is a 33-year-old female presenting for evaluation of vomiting.  Will patient reports that her symptoms began on 2024 has been having persistent vomiting since that time.  On that day she found out she was pregnant had medically induced  at that time.  Was seen and evaluated in the emergency department on 2024 per chart review had workup that included labs urine and ultrasound to rule out ectopic and retained products of conception.  Workup was negative patient was discharged home.  States that her symptoms have persisted since that time she is also complaining of associated abdominal pain, nausea, vaginal bleeding.  Denies fever chills chest pain shortness of breath diarrhea constipation or any other complaints at this time.      Vomiting  Associated symptoms: abdominal pain    Associated symptoms: no arthralgias, no chills, no cough, no fever and no sore throat        Prior to Admission Medications   Prescriptions Last Dose Informant Patient Reported? Taking?   metoclopramide (Reglan) 10 mg tablet   No No   Sig: Take 1 tablet (10 mg total) by mouth every 6 (six) hours for 3 days   naproxen (Naprosyn) 500 mg tablet   No No   Sig: Take 1 tablet (500 mg total) by mouth 2 (two) times a day with meals for 3 days   promethazine (PHENERGAN) 25 mg tablet   No No   Sig: Take 1 tablet (25 mg total) by mouth every 6 (six) hours as needed for nausea or vomiting      Facility-Administered Medications: None       Past Medical History:   Diagnosis Date    Arthritis     Asthma     History of stomach ulcers     Psychiatric disorder     anxiety       Past Surgical History:   Procedure Laterality Date     BREAST SURGERY      COSMETIC SURGERY      EGD      WISDOM TOOTH EXTRACTION         History reviewed. No pertinent family history.  I have reviewed and agree with the history as documented.    E-Cigarette/Vaping    E-Cigarette Use Never User      E-Cigarette/Vaping Substances    Nicotine No     THC Yes     CBD Yes     Flavoring No     Other No     Unknown No      Social History     Tobacco Use    Smoking status: Never    Smokeless tobacco: Never   Vaping Use    Vaping status: Never Used   Substance Use Topics    Alcohol use: Not Currently    Drug use: Yes     Types: Marijuana        Review of Systems   Constitutional:  Negative for chills and fever.   HENT:  Negative for ear pain and sore throat.    Eyes:  Negative for pain and visual disturbance.   Respiratory:  Negative for cough and shortness of breath.    Cardiovascular:  Negative for chest pain and palpitations.   Gastrointestinal:  Positive for abdominal pain, nausea and vomiting.   Genitourinary:  Positive for vaginal bleeding. Negative for dysuria, hematuria and vaginal discharge.   Musculoskeletal:  Negative for arthralgias and back pain.   Skin:  Negative for color change and rash.   Neurological:  Negative for seizures and syncope.   All other systems reviewed and are negative.      Physical Exam  ED Triage Vitals   Temperature Pulse Respirations Blood Pressure SpO2   02/08/24 1704 02/08/24 1704 02/08/24 1704 02/08/24 1704 02/08/24 1704   97.9 °F (36.6 °C) (!) 116 18 121/75 99 %      Temp Source Heart Rate Source Patient Position - Orthostatic VS BP Location FiO2 (%)   02/08/24 1704 02/08/24 1704 02/08/24 1704 02/08/24 1704 --   Oral Monitor Sitting Left arm       Pain Score       02/08/24 1913       7             Orthostatic Vital Signs  Vitals:    02/08/24 1704   BP: 121/75   Pulse: (!) 116   Patient Position - Orthostatic VS: Sitting       Physical Exam  Vitals and nursing note reviewed.   Constitutional:       General: She is in acute distress.       Appearance: She is well-developed. She is not ill-appearing, toxic-appearing or diaphoretic.   HENT:      Head: Normocephalic and atraumatic.      Mouth/Throat:      Mouth: Mucous membranes are moist.   Eyes:      Conjunctiva/sclera: Conjunctivae normal.   Cardiovascular:      Rate and Rhythm: Normal rate and regular rhythm.      Heart sounds: No murmur heard.  Pulmonary:      Effort: Pulmonary effort is normal. No respiratory distress.      Breath sounds: Normal breath sounds.   Abdominal:      Palpations: Abdomen is soft.      Tenderness: There is abdominal tenderness in the suprapubic area and left upper quadrant. There is no guarding or rebound.      Comments: Patient actively vomiting upon initial evaluation   Musculoskeletal:         General: No swelling. Normal range of motion.      Cervical back: Normal range of motion and neck supple.   Skin:     General: Skin is warm and dry.      Capillary Refill: Capillary refill takes less than 2 seconds.   Neurological:      General: No focal deficit present.      Mental Status: She is alert and oriented to person, place, and time.   Psychiatric:         Mood and Affect: Mood normal.         ED Medications  Medications   sodium chloride 0.9 % bolus 1,000 mL (0 mL Intravenous Stopped 2/8/24 2013)   morphine injection 4 mg (4 mg Intravenous Given 2/8/24 1913)   droperidol (INAPSINE) injection 0.625 mg (0.625 mg Intravenous Given 2/8/24 1913)   iohexol (OMNIPAQUE) 350 MG/ML injection (MULTI-DOSE) 100 mL (100 mL Intravenous Given 2/8/24 1939)       Diagnostic Studies  Results Reviewed       Procedure Component Value Units Date/Time    Urine culture [[id number]] Collected: 02/08/24 1917    Lab Status: Final result Specimen: Urine, Clean Catch Updated: 02/10/24 0822     Urine Culture >100,000 cfu/ml    Urine Microscopic [[id number]]  (Abnormal) Collected: 02/08/24 1917    Lab Status: Final result Specimen: Urine, Clean Catch Updated: 02/08/24 2233     RBC, UA Innumerable /hpf       WBC, UA Innumerable /hpf      Epithelial Cells Innumerable /hpf      Bacteria, UA Moderate /hpf     UA w Reflex to Microscopic w Reflex to Culture [[id number]]  (Abnormal) Collected: 02/08/24 1917    Lab Status: Final result Specimen: Urine, Clean Catch Updated: 02/08/24 2223     Color, UA Light Red     Clarity, UA Hazy     Specific Gravity, UA >=1.050     pH, UA 8.0     Leukocytes, UA Moderate     Nitrite, UA Negative     Protein, UA 50 (1+) mg/dl      Glucose, UA Negative mg/dl      Ketones, UA Negative mg/dl      Urobilinogen, UA <2.0 mg/dl      Bilirubin, UA Negative     Occult Blood, UA Large    HS Troponin I 4hr [[id number]]  (Normal) Collected: 02/08/24 2111    Lab Status: Final result Specimen: Blood from Arm, Right Updated: 02/08/24 2148     hs TnI 4hr 25 ng/L      Delta 4hr hsTnI 9 ng/L     HS Troponin I 2hr [[id number]]  (Normal) Resulted: 02/08/24 1957    Lab Status: Final result Specimen: Blood Updated: 02/08/24 1957     hs TnI 2hr 26 ng/L      Delta 2hr hsTnI 10 ng/L     Quantitative hCG [[id number]]  (Abnormal) Collected: 02/08/24 1848    Lab Status: Final result Specimen: Blood from Arm, Right Updated: 02/08/24 1929     HCG, Quant 75 mIU/mL     Narrative:       Expected Ranges:    HCG results between 5 and 25 mIU/mL may be indicative of early pregnancy but should be interpreted in light of the total clinical presentation.    HCG can rise to detectable levels in [place] and post menopausal women (0-11.6 mIU/mL).     Approximate               Approximate HCG  Gestation age          Concentration ( mIU/mL)  _____________          ______________________   Weeks                      HCG values  0.2-1                       5-50  1-2                           2-3                         100-5000  3-4                         500-88863  4-5                         1000-14519  5-6                         [id number]  6-8                         91009-349188  8-12                        51824-238003       Lipase [[id number]]  (Abnormal) Collected: 02/08/24 1848    Lab Status: Final result Specimen: Blood from Arm, Right Updated: 02/08/24 1921     Lipase 9 u/L     Protime-INR [[id number]]  (Normal) Collected: 02/08/24 1848    Lab Status: Final result Specimen: Blood from Arm, Right Updated: 02/08/24 1909     Protime 13.2 seconds      INR 1.01    APTT [[id number]]  (Normal) Collected: 02/08/24 1848    Lab Status: Final result Specimen: Blood from Arm, Right Updated: 02/08/24 1909     PTT 26 seconds     Comprehensive metabolic panel [[id number]]  (Abnormal) Collected: 02/08/24 1715    Lab Status: Final result Specimen: Blood from Hand, Left Updated: 02/08/24 1904     Sodium 128 mmol/L      Potassium 2.3 mmol/L      Chloride 85 mmol/L      CO2 28 mmol/L      ANION GAP 15 mmol/L      BUN 11 mg/dL      Creatinine 0.84 mg/dL      Glucose 122 mg/dL      Calcium 10.0 mg/dL      AST 19 U/L      ALT 29 U/L      Alkaline Phosphatase 72 U/L      Total Protein 8.1 g/dL      Albumin 4.6 g/dL      Total Bilirubin 0.97 mg/dL      eGFR 92 ml/min/1.73sq m     Narrative:      [employer] guidelines for Chronic Kidney Disease (CKD):     Stage 1 with normal or high GFR (GFR > 90 mL/min/1.73 square meters)    Stage 2 Mild CKD (GFR = 60-89 mL/min/1.73 square meters)    Stage 3A Moderate CKD (GFR = 45-59 mL/min/1.73 square meters)    Stage 3B Moderate CKD (GFR = 30-44 mL/min/1.73 square meters)    Stage 4 Severe CKD (GFR = 15-29 mL/min/1.73 square meters)    Stage 5 End Stage CKD (GFR <15 mL/min/1.73 square meters)  Note: GFR calculation is accurate only with a steady state creatinine    HS Troponin 0hr (reflex protocol) [[id number]]  (Normal) Collected: 02/08/24 1715    Lab Status: Final result Specimen: Blood from Hand, Left Updated: 02/08/24 1750     hs TnI 0hr 16 ng/L     CBC and differential [[id number]]  (Abnormal) Collected: 02/08/24 6953    Lab Status: Final result Specimen: Blood from Hand, Left Updated: 02/08/24  1723     WBC 22.69 Thousand/uL      RBC 5.24 Million/uL      Hemoglobin 15.8 g/dL      Hematocrit 42.7 %      MCV 82 fL      MCH 30.2 pg      MCHC 37.0 g/dL      RDW 12.2 %      MPV 10.8 fL      Platelets 321 Thousands/uL      nRBC 0 /100 WBCs      Neutrophils Relative 69 %      Immat GRANS % 1 %      Lymphocytes Relative 22 %      Monocytes Relative 7 %      Eosinophils Relative 1 %      Basophils Relative 0 %      Neutrophils Absolute 16.00 Thousands/µL      Immature Grans Absolute 0.12 Thousand/uL      Lymphocytes Absolute 4.90 Thousands/µL      Monocytes Absolute 1.48 Thousand/µL      Eosinophils Absolute 0.12 Thousand/µL      Basophils Absolute 0.07 Thousands/µL                    CT abdomen pelvis with contrast   Final Result by Eze Liang MD (02/08 2105)      1. Focal small bowel intussusception in the left hemiabdomen with mild small bowel dilatation.  The proximal small bowel is not dilated to suggest obstruction. Small bowel intussusception in itself is nonspecific and can be seen as a normal transient    phenomenon but consider follow-up with CT enterography if patient's symptoms persist.         Workstation performed: [id number]         XR chest 1 view portable   ED Interpretation by Jayson Menezes DO (02/08 1858)   Wet read-no free air under the diaphragm.  Normal chest x-ray      Final Result by Travis Bucio MD (02/09 0838)      No acute cardiopulmonary disease.            Workstation performed: [id number]               Procedures  Procedures      ED Course  ED Course as of 02/10/24 1555   Thu Feb 08, 2024 1910 WBC(!): 22.69  See MDM - baseline but elevated   1910 Potassium(!!): 2.3  Had hypokalemia at baseline but this is lower than prior, was 2.5 on 2/4/24. Will replete IV as pt vomiting may not tolerate PO yet   1911 Sodium(!): 128  Likely from persistent vomiting    1912 BUN: 11   1912 Creatinine: 0.84   1912 Carbon Dioxide: 28   1912 ANION GAP: 15   1952 HCG QUANTITATIVE(!): 75  Down  from 849 x4 days ago   1952 Lipase(!): 9  No pancreatitis   2015 On reassessment patient states that she feels a bit better.  On reexam her abdomen is now nontender in all quadrants, remains soft, no guarding or rebound or rigidity.  Pending final CT read                                       Medical Decision Making  Patient is a 33-year-old female presenting for evaluation of vomiting    Differential: Intra-abdominal catastrophe versus ectopic versus medication side effect versus cannabinoid hyperemesis    Plan: Labs CT imaging medications monitor and reassess.  Final dispo pending    From chart review patient has had numerous visits to the emergency department for vomiting has been diagnosed with cyclic vomiting from cannabinoid hyperemesis in the past.  Still is smoking marijuana daily.    Labs notable for increasing leukocytosis 26 from 20.  Patient appears to have a bit of a leukocytosis at baseline however this is elevated from her baseline and has increased from the prior value 4 days ago.  Unclear if this is indicative of of new/developing intra-abdominal pathology versus sequelae of her cyclic vomiting/cannabinoid hyperemesis.  Further labs pending    See ED course regarding the rest of lab workup.  CT negative for acute pathology.  Shared decision making with the patient regarding her electrolyte abnormalities patient does not wish to be admitted understands the risks of having these electrolyte abnormalities and not correcting the she still does not wish to be admitted has capacity make this decision.  Impressed to her that should she change her mind or symptoms worsen or persist she is welcome to and should return to the emergency department for further evaluation and management, patient verbalized understanding is in agreement with this plan.  At this point she is hemodynamically stable cleared for discharge outpatient follow-up with her PCP.  Also counseled patient on cessation of marijuana use as I  believe that this is a large component of her symptoms.      Amount and/or Complexity of Data Reviewed  Labs: ordered. Decision-making details documented in ED Course.  Radiology: ordered and independent interpretation performed.    Risk  Prescription drug management.          Disposition  Final diagnoses:   Vomiting   Hypokalemia   Hyponatremia     Time reflects when diagnosis was documented in both MDM as applicable and the Disposition within this note       Time User Action Codes Description Comment    2/8/2024  6:55 PM Jayson Menezes Add [R11.10] Vomiting     2/8/2024  9:18 PM Jayson Menezes Add [E87.6] Hypokalemia     2/8/2024  9:18 PM Jayson Menezes Add [E87.1] Hyponatremia           ED Disposition       ED Disposition   Discharge    Condition   Stable    Date/Time   Thu Feb 8, 2024  9:30 PM    Comment   Steph Zhao discharge to home/self care.                   Follow-up Information       Follow up With Specialties Details Why Contact Info    Juju Ashford DO [address] [address]  [phone]              Discharge Medication List as of 2/8/2024  9:33 PM        CONTINUE these medications which have NOT CHANGED    Details   naproxen (Naprosyn) 500 mg tablet Take 1 tablet (500 mg total) by mouth 2 (two) times a day with meals for 3 days, Starting Sun 2/4/2024, Until Wed 2/7/2024, Normal      promethazine (PHENERGAN) 25 mg tablet Take 1 tablet (25 mg total) by mouth every 6 (six) hours as needed for nausea or vomiting, Starting Mon 11/13/2023, Normal           STOP taking these medications       metoclopramide (Reglan) 10 mg tablet Comments:   Reason for Stopping:             No discharge procedures on file.    PDMP Review         Value Time User    PDMP Reviewed  Yes 1/20/2021  2:27 PM Demetri Souza PA-C             ED Provider  Attending physically available and evaluated Steph Zhao. I managed the patient along with the ED  Attending.    Electronically Signed by           Jayson Menezes,   02/10/24 1600

## 2024-02-08 NOTE — Clinical Note
Steph Zhao was seen and treated in our emergency department on 2/8/2024.    No restrictions            Diagnosis:     Steph  may return to work on return date.    She may return on this date: 02/12/2024         If you have any questions or concerns, please don't hesitate to call.      Jayson Menezes, DO    ______________________________           _______________          _______________  Hospital Representative                              Date                                Time

## 2024-02-08 NOTE — ED ATTENDING ATTESTATION
I, Smith Hurtado MD, saw and evaluated the patient. I have discussed the patient with the resident and agree with the resident's findings, Plan of Care, and MDM as documented in the resident's note, except where noted. All available labs and Radiology studies were reviewed.  I was present for key portions of any procedure(s) performed by the resident and I was immediately available to provide assistance.    At this point I agree with the current assessment done in the Emergency Department.  I have conducted an independent evaluation of this patient a history and physical is as follows:    32 yo female with a history of asthma, chronic low back pain, daily marijuana use, and cannabinoid hyperemesis presents to the ED complaining of intractable nausea and vomiting. The patient says her symptoms started on  after she had a (+) pregnancy test. She subsequently had a medically induced  on the . The patient was experiencing continued vomiting, abdominal pain, and vaginal bleeding so she came to the ED on  --> she underwent a thorough workup including pelvic ultrasound at that time. She was discharge to home after an unremarkable workup. For the past 4 days she has continued to experience all of these symptoms. No fevers, chills, chest pain, shortness of breath, or diarrhea. No other specific complaints. Of note, the patient has been seen in the ED many times for similar complaints. She is still using marijuana daily.    ROS: per resident physician note    Gen: uncomfortable appearing, AA&Ox3  HEENT: PERRL, EOMI, (+) mmm  Neck: supple  CV: (+) tachycardic  Lungs: CTA B/L  Abdomen: soft, (+) tender to palpation in LUQ and suprapubic areas  Back: no CVA tenderness  Ext: no swelling or deformity  Neuro: 5/5 strength all extremities, sensation grossly intact  Skin: no rash    ED Course  The patient is uncomfortable appearing and tachycardic on arrival. (+) Left upper and suprapubic tenderness on exam.  "Cannabinoid hyperemesis vs retained products vs gastritis vs pancreatitis vs UTI? Will check EKG, basic labs, lipase, beta hCG, CT A/P, and UA. IVFs, Morphine and Zofran administered. Will continue to monitor in the ED. Disposition per workup and reassessment.    2130 All symptoms resolved. The patient says she feels \"much better\" and is requesting discharge. (+) Multiple lab abnormalities including hypokalemia, hyponatremia, and a leukocytosis. [facility] was strongly recommended but the patient refused. She says she feels \"good\" and needs to take care of her child at home. Plan for supportive care and close follow up with her doctor tomorrow. The patient is agreeable to this plan. Strict return precautions provided.    Critical Care Time  Procedures   "

## 2024-02-09 LAB
ATRIAL RATE: 118 BPM
P AXIS: 82 DEGREES
PR INTERVAL: 128 MS
QRS AXIS: 79 DEGREES
QRSD INTERVAL: 74 MS
QT INTERVAL: 438 MS
QTC INTERVAL: 613 MS
T WAVE AXIS: 44 DEGREES
VENTRICULAR RATE: 118 BPM

## 2024-02-09 PROCEDURE — 93010 ELECTROCARDIOGRAM REPORT: CPT | Performed by: INTERNAL MEDICINE

## 2024-02-09 NOTE — DISCHARGE INSTRUCTIONS
You were seen and evaluated in the emergency department for vomiting.  No acute intra-abdominal abnormalities on CAT scan that would suggest the cause of your symptoms.  You had lab abnormalities, low potassium and low sodium see attached instructions, recommend admission, understand that you cannot stay in the hospital tonight.  If your symptoms worsen or persist or recur have a low threshold to return to the emergency department for further evaluation and management.  Very likely that your symptoms are related to your daily marijuana usage, strongly recommend that you refrain from smoking marijuana as this should greatly improve and/or resolve your symptoms.  Please keep your scheduled PCP appointment follow-up tomorrow.  Please also follow-up with OB/GYN regarding recent elective medical  and follow-up with that.  Your beta hCG which we used to measure for pregnancy was downtrending from 4 days ago to today which was to be expected again please follow-up with OB/GYN.

## 2024-02-10 LAB — BACTERIA UR CULT: NORMAL

## 2024-07-18 ENCOUNTER — APPOINTMENT (EMERGENCY)
Dept: RADIOLOGY | Facility: HOSPITAL | Age: 34
End: 2024-07-18
Payer: COMMERCIAL

## 2024-07-18 ENCOUNTER — HOSPITAL ENCOUNTER (EMERGENCY)
Facility: HOSPITAL | Age: 34
Discharge: HOME | End: 2024-07-18
Attending: EMERGENCY MEDICINE
Payer: COMMERCIAL

## 2024-07-18 VITALS
DIASTOLIC BLOOD PRESSURE: 61 MMHG | RESPIRATION RATE: 20 BRPM | SYSTOLIC BLOOD PRESSURE: 114 MMHG | HEART RATE: 60 BPM | OXYGEN SATURATION: 98 % | TEMPERATURE: 98 F

## 2024-07-18 DIAGNOSIS — E87.1 HYPONATREMIA: ICD-10-CM

## 2024-07-18 DIAGNOSIS — R11.2 NAUSEA AND VOMITING, UNSPECIFIED VOMITING TYPE: Primary | ICD-10-CM

## 2024-07-18 DIAGNOSIS — E87.6 HYPOKALEMIA: ICD-10-CM

## 2024-07-18 LAB
ALBUMIN SERPL BCG-MCNC: 4.8 G/DL (ref 3.5–5)
ALP SERPL-CCNC: 80 U/L (ref 34–104)
ALT SERPL W P-5'-P-CCNC: 14 U/L (ref 7–52)
ANION GAP SERPL CALCULATED.3IONS-SCNC: 15 MMOL/L (ref 4–13)
AST SERPL W P-5'-P-CCNC: 13 U/L (ref 13–39)
ATRIAL RATE: 80 BPM
ATRIAL RATE: 94 BPM
BACTERIA UR QL AUTO: NORMAL /HPF
BASOPHILS # BLD AUTO: 0.04 THOUSANDS/ÂΜL (ref 0–0.1)
BASOPHILS NFR BLD AUTO: 0 % (ref 0–1)
BILIRUB SERPL-MCNC: 0.69 MG/DL (ref 0.2–1)
BILIRUB UR QL STRIP: NEGATIVE
BUN SERPL-MCNC: 12 MG/DL (ref 5–25)
CALCIUM SERPL-MCNC: 10 MG/DL (ref 8.4–10.2)
CHLORIDE SERPL-SCNC: 90 MMOL/L (ref 96–108)
CLARITY UR: CLEAR
CO2 SERPL-SCNC: 29 MMOL/L (ref 21–32)
COLOR UR: ABNORMAL
CREAT SERPL-MCNC: 0.86 MG/DL (ref 0.6–1.3)
EOSINOPHIL # BLD AUTO: 0.01 THOUSAND/ÂΜL (ref 0–0.61)
EOSINOPHIL NFR BLD AUTO: 0 % (ref 0–6)
ERYTHROCYTE [DISTWIDTH] IN BLOOD BY AUTOMATED COUNT: 13.2 % (ref 11.6–15.1)
EXT PREGNANCY TEST URINE: NEGATIVE
EXT. CONTROL: NORMAL
GFR SERPL CREATININE-BSD FRML MDRD: 89 ML/MIN/1.73SQ M
GLUCOSE SERPL-MCNC: 161 MG/DL (ref 65–140)
GLUCOSE UR STRIP-MCNC: NEGATIVE MG/DL
HCT VFR BLD AUTO: 44.4 % (ref 34.8–46.1)
HGB BLD-MCNC: 15.3 G/DL (ref 11.5–15.4)
HGB UR QL STRIP.AUTO: ABNORMAL
IMM GRANULOCYTES # BLD AUTO: 0.08 THOUSAND/UL (ref 0–0.2)
IMM GRANULOCYTES NFR BLD AUTO: 1 % (ref 0–2)
KETONES UR STRIP-MCNC: ABNORMAL MG/DL
LEUKOCYTE ESTERASE UR QL STRIP: NEGATIVE
LIPASE SERPL-CCNC: 8 U/L (ref 11–82)
LYMPHOCYTES # BLD AUTO: 2.6 THOUSANDS/ÂΜL (ref 0.6–4.47)
LYMPHOCYTES NFR BLD AUTO: 16 % (ref 14–44)
MCH RBC QN AUTO: 29.3 PG (ref 26.8–34.3)
MCHC RBC AUTO-ENTMCNC: 34.5 G/DL (ref 31.4–37.4)
MCV RBC AUTO: 85 FL (ref 82–98)
MONOCYTES # BLD AUTO: 0.78 THOUSAND/ÂΜL (ref 0.17–1.22)
MONOCYTES NFR BLD AUTO: 5 % (ref 4–12)
NEUTROPHILS # BLD AUTO: 12.51 THOUSANDS/ÂΜL (ref 1.85–7.62)
NEUTS SEG NFR BLD AUTO: 78 % (ref 43–75)
NITRITE UR QL STRIP: NEGATIVE
NON-SQ EPI CELLS URNS QL MICRO: NORMAL /HPF
NRBC BLD AUTO-RTO: 0 /100 WBCS
P AXIS: 72 DEGREES
P AXIS: 81 DEGREES
PH UR STRIP.AUTO: 8 [PH]
PLATELET # BLD AUTO: 289 THOUSANDS/UL (ref 149–390)
PMV BLD AUTO: 11.7 FL (ref 8.9–12.7)
POTASSIUM SERPL-SCNC: 3 MMOL/L (ref 3.5–5.3)
PR INTERVAL: 138 MS
PR INTERVAL: 146 MS
PROT SERPL-MCNC: 8.3 G/DL (ref 6.4–8.4)
PROT UR STRIP-MCNC: ABNORMAL MG/DL
QRS AXIS: 88 DEGREES
QRS AXIS: 90 DEGREES
QRSD INTERVAL: 84 MS
QRSD INTERVAL: 86 MS
QT INTERVAL: 420 MS
QT INTERVAL: 436 MS
QTC INTERVAL: 463 MS
QTC INTERVAL: 525 MS
RBC # BLD AUTO: 5.23 MILLION/UL (ref 3.81–5.12)
RBC #/AREA URNS AUTO: NORMAL /HPF
SODIUM SERPL-SCNC: 134 MMOL/L (ref 135–147)
SP GR UR STRIP.AUTO: 1.01 (ref 1–1.03)
T WAVE AXIS: 50 DEGREES
T WAVE AXIS: 58 DEGREES
UROBILINOGEN UR STRIP-ACNC: <2 MG/DL
VENTRICULAR RATE: 68 BPM
VENTRICULAR RATE: 94 BPM
WBC # BLD AUTO: 16.02 THOUSAND/UL (ref 4.31–10.16)
WBC #/AREA URNS AUTO: NORMAL /HPF

## 2024-07-18 PROCEDURE — 93005 ELECTROCARDIOGRAM TRACING: CPT

## 2024-07-18 PROCEDURE — 74177 CT ABD & PELVIS W/CONTRAST: CPT

## 2024-07-18 PROCEDURE — 80053 COMPREHEN METABOLIC PANEL: CPT | Performed by: PHYSICIAN ASSISTANT

## 2024-07-18 PROCEDURE — 99285 EMERGENCY DEPT VISIT HI MDM: CPT | Performed by: PHYSICIAN ASSISTANT

## 2024-07-18 PROCEDURE — 81001 URINALYSIS AUTO W/SCOPE: CPT | Performed by: PHYSICIAN ASSISTANT

## 2024-07-18 PROCEDURE — 85025 COMPLETE CBC W/AUTO DIFF WBC: CPT | Performed by: PHYSICIAN ASSISTANT

## 2024-07-18 PROCEDURE — 96360 HYDRATION IV INFUSION INIT: CPT

## 2024-07-18 PROCEDURE — 83690 ASSAY OF LIPASE: CPT | Performed by: PHYSICIAN ASSISTANT

## 2024-07-18 PROCEDURE — 96361 HYDRATE IV INFUSION ADD-ON: CPT

## 2024-07-18 PROCEDURE — 81025 URINE PREGNANCY TEST: CPT | Performed by: PHYSICIAN ASSISTANT

## 2024-07-18 PROCEDURE — 96372 THER/PROPH/DIAG INJ SC/IM: CPT

## 2024-07-18 PROCEDURE — 99284 EMERGENCY DEPT VISIT MOD MDM: CPT

## 2024-07-18 PROCEDURE — 36415 COLL VENOUS BLD VENIPUNCTURE: CPT | Performed by: PHYSICIAN ASSISTANT

## 2024-07-18 PROCEDURE — 93010 ELECTROCARDIOGRAM REPORT: CPT | Performed by: INTERNAL MEDICINE

## 2024-07-18 RX ORDER — HALOPERIDOL 5 MG/ML
5 INJECTION INTRAMUSCULAR ONCE
Status: COMPLETED | OUTPATIENT
Start: 2024-07-18 | End: 2024-07-18

## 2024-07-18 RX ORDER — ONDANSETRON 4 MG/1
4 TABLET, FILM COATED ORAL EVERY 6 HOURS
Qty: 12 TABLET | Refills: 0 | Status: SHIPPED | OUTPATIENT
Start: 2024-07-18

## 2024-07-18 RX ADMIN — IOHEXOL 100 ML: 350 INJECTION, SOLUTION INTRAVENOUS at 08:58

## 2024-07-18 RX ADMIN — SODIUM CHLORIDE 1000 ML: 0.9 INJECTION, SOLUTION INTRAVENOUS at 07:26

## 2024-07-18 RX ADMIN — SODIUM CHLORIDE 1000 ML: 0.9 INJECTION, SOLUTION INTRAVENOUS at 09:28

## 2024-07-18 RX ADMIN — HALOPERIDOL LACTATE 5 MG: 5 INJECTION, SOLUTION INTRAMUSCULAR at 07:26

## 2024-07-18 NOTE — ED PROVIDER NOTES
History  Chief Complaint   Patient presents with    Vomiting     Reports vomiting since Monday, last episode was this morning.     33-year-old female presents emergency department with complaints of nausea, vomiting, and diarrhea over the past 4 days.  States that she has been unable to tolerate food or liquid during that time.  Now with bilious vomit.  Denies fevers at home.  History of similar symptoms in February and seen in the emergency department.  States that she does have a history of cannabinoid use but has not used anything in the past 3 weeks.      History provided by:  Patient   used: No    Vomiting  Severity:  Moderate  Duration:  4 days  Timing:  Constant  Quality:  Bilious material  Progression:  Unable to specify  Chronicity:  Recurrent  Relieved by:  Nothing  Worsened by:  Nothing  Ineffective treatments:  None tried  Associated symptoms: abdominal pain    Associated symptoms: no arthralgias, no chills, no cough, no diarrhea, no fever, no headaches, no myalgias, no sore throat and no URI        Prior to Admission Medications   Prescriptions Last Dose Informant Patient Reported? Taking?   naproxen (Naprosyn) 500 mg tablet   No No   Sig: Take 1 tablet (500 mg total) by mouth 2 (two) times a day with meals for 3 days   promethazine (PHENERGAN) 25 mg tablet   No No   Sig: Take 1 tablet (25 mg total) by mouth every 6 (six) hours as needed for nausea or vomiting      Facility-Administered Medications: None       Past Medical History:   Diagnosis Date    Arthritis     Asthma     History of stomach ulcers     Psychiatric disorder     anxiety       Past Surgical History:   Procedure Laterality Date    BREAST SURGERY      COSMETIC SURGERY      EGD      WISDOM TOOTH EXTRACTION         History reviewed. No pertinent family history.  I have reviewed and agree with the history as documented.    E-Cigarette/Vaping    E-Cigarette Use Never User      E-Cigarette/Vaping Substances    Nicotine No      THC Yes     CBD Yes     Flavoring No     Other No     Unknown No      Social History     Tobacco Use    Smoking status: Never    Smokeless tobacco: Never   Vaping Use    Vaping status: Never Used   Substance Use Topics    Alcohol use: Not Currently    Drug use: Yes     Types: Marijuana       Review of Systems   Constitutional:  Negative for activity change, appetite change, chills and fever.   HENT:  Negative for congestion, dental problem, drooling, ear discharge, ear pain, mouth sores, nosebleeds, rhinorrhea, sore throat and trouble swallowing.    Eyes:  Negative for pain, discharge and itching.   Respiratory:  Negative for cough, chest tightness, shortness of breath and wheezing.    Cardiovascular:  Negative for chest pain and palpitations.   Gastrointestinal:  Positive for abdominal pain and vomiting. Negative for blood in stool, constipation, diarrhea and nausea.   Endocrine: Negative for cold intolerance and heat intolerance.   Genitourinary:  Negative for difficulty urinating, dysuria, flank pain, frequency and urgency.   Musculoskeletal:  Negative for arthralgias and myalgias.   Skin:  Negative for rash and wound.   Allergic/Immunologic: Negative for food allergies and immunocompromised state.   Neurological:  Negative for dizziness, seizures, syncope, weakness, numbness and headaches.   Psychiatric/Behavioral:  Negative for agitation, behavioral problems and confusion.        Physical Exam  Physical Exam  Vitals and nursing note reviewed.   Constitutional:       General: She is in acute distress.      Appearance: She is not diaphoretic.   HENT:      Head: Normocephalic and atraumatic.      Right Ear: External ear normal.      Left Ear: External ear normal.      Mouth/Throat:      Mouth: Oropharynx is clear and moist. Mucous membranes are dry.   Eyes:      Conjunctiva/sclera: Conjunctivae normal.   Neck:      Vascular: No JVD.      Trachea: No tracheal deviation.   Cardiovascular:      Rate and Rhythm:  Normal rate and regular rhythm.      Heart sounds: Normal heart sounds. No murmur heard.     No friction rub. No gallop.   Pulmonary:      Effort: Pulmonary effort is normal. No respiratory distress.      Breath sounds: Normal breath sounds. No wheezing or rales.   Chest:      Chest wall: No tenderness.   Abdominal:      General: Bowel sounds are normal. There is no distension.      Palpations: Abdomen is soft.      Tenderness: There is abdominal tenderness in the right upper quadrant and epigastric area. There is no guarding.   Musculoskeletal:         General: No tenderness, deformity or edema. Normal range of motion.   Lymphadenopathy:      Cervical: No cervical adenopathy.   Skin:     General: Skin is warm and dry.      Findings: No erythema or rash.   Neurological:      Mental Status: She is alert and oriented to person, place, and time.   Psychiatric:         Mood and Affect: Mood and affect and mood normal.         Behavior: Behavior normal.         Vital Signs  ED Triage Vitals   Temperature Pulse Respirations Blood Pressure SpO2   07/18/24 0655 07/18/24 0655 07/18/24 0655 07/18/24 0655 07/18/24 0655   98 °F (36.7 °C) 81 18 133/93 99 %      Temp Source Heart Rate Source Patient Position - Orthostatic VS BP Location FiO2 (%)   07/18/24 0655 07/18/24 0655 -- -- --   Temporal Monitor         Pain Score       07/18/24 0730       6           Vitals:    07/18/24 0730 07/18/24 0815 07/18/24 0930 07/18/24 1000   BP: 128/71 108/59 128/82 114/61   Pulse: 73 99 55 60         Visual Acuity      ED Medications  Medications   sodium chloride 0.9 % bolus 1,000 mL (0 mL Intravenous Stopped 7/18/24 0838)   haloperidol lactate (HALDOL) injection 5 mg (5 mg Intramuscular Given 7/18/24 0726)   iohexol (OMNIPAQUE) 350 MG/ML injection (MULTI-DOSE) 100 mL (100 mL Intravenous Given 7/18/24 0858)   sodium chloride 0.9 % bolus 1,000 mL (0 mL Intravenous Stopped 7/18/24 1056)       Diagnostic Studies  Results Reviewed       Procedure  Component Value Units Date/Time    Urine Microscopic [[id number]]  (Normal) Collected: 07/18/24 0841    Lab Status: Final result Specimen: Urine, Clean Catch Updated: 07/18/24 0923     RBC, UA 1-2 /hpf      WBC, UA None Seen /hpf      Epithelial Cells Occasional /hpf      Bacteria, UA Occasional /hpf     UA w Reflex to Microscopic w Reflex to Culture [[id number]]  (Abnormal) Collected: 07/18/24 0841    Lab Status: Final result Specimen: Urine, Clean Catch Updated: 07/18/24 0916     Color, UA Light Yellow     Clarity, UA Clear     Specific Gravity, UA 1.008     pH, UA 8.0     Leukocytes, UA Negative     Nitrite, UA Negative     Protein, UA Trace mg/dl      Glucose, UA Negative mg/dl      Ketones, UA 10 (1+) mg/dl      Urobilinogen, UA <2.0 mg/dl      Bilirubin, UA Negative     Occult Blood, UA Small    POCT pregnancy, urine [[id number]]  (Normal) Resulted: 07/18/24 0844    Lab Status: Final result Updated: 07/18/24 0844     EXT Preg Test, Ur Negative     Control Valid    Comprehensive metabolic panel [[id number]]  (Abnormal) Collected: 07/18/24 0721    Lab Status: Final result Specimen: Blood from Arm, Left Updated: 07/18/24 0753     Sodium 134 mmol/L      Potassium 3.0 mmol/L      Chloride 90 mmol/L      CO2 29 mmol/L      ANION GAP 15 mmol/L      BUN 12 mg/dL      Creatinine 0.86 mg/dL      Glucose 161 mg/dL      Calcium 10.0 mg/dL      AST 13 U/L      ALT 14 U/L      Alkaline Phosphatase 80 U/L      Total Protein 8.3 g/dL      Albumin 4.8 g/dL      Total Bilirubin 0.69 mg/dL      eGFR 89 ml/min/1.73sq m     Narrative:      National Kidney Disease Foundation guidelines for Chronic Kidney Disease (CKD):     Stage 1 with normal or high GFR (GFR > 90 mL/min/1.73 square meters)    Stage 2 Mild CKD (GFR = 60-89 mL/min/1.73 square meters)    Stage 3A Moderate CKD (GFR = 45-59 mL/min/1.73 square meters)    Stage 3B Moderate CKD (GFR = 30-44 mL/min/1.73 square meters)    Stage 4 Severe CKD (GFR = 15-29 mL/min/1.73 square  meters)    Stage 5 End Stage CKD (GFR <15 mL/min/1.73 square meters)  Note: GFR calculation is accurate only with a steady state creatinine    Lipase [[id number]]  (Abnormal) Collected: 07/18/24 0721    Lab Status: Final result Specimen: Blood from Arm, Left Updated: 07/18/24 0753     Lipase 8 u/L     CBC and differential [[id number]]  (Abnormal) Collected: 07/18/24 0721    Lab Status: Final result Specimen: Blood from Arm, Left Updated: 07/18/24 0736     WBC 16.02 Thousand/uL      RBC 5.23 Million/uL      Hemoglobin 15.3 g/dL      Hematocrit 44.4 %      MCV 85 fL      MCH 29.3 pg      MCHC 34.5 g/dL      RDW 13.2 %      MPV 11.7 fL      Platelets 289 Thousands/uL      nRBC 0 /100 WBCs      Segmented % 78 %      Immature Grans % 1 %      Lymphocytes % 16 %      Monocytes % 5 %      Eosinophils Relative 0 %      Basophils Relative 0 %      Absolute Neutrophils 12.51 Thousands/µL      Absolute Immature Grans 0.08 Thousand/uL      Absolute Lymphocytes 2.60 Thousands/µL      Absolute Monocytes 0.78 Thousand/µL      Eosinophils Absolute 0.01 Thousand/µL      Basophils Absolute 0.04 Thousands/µL                    CT abdomen pelvis with contrast   Final Result by Jayson Harrison MD (07/18 0915)      Liquid stool in the colon, suggesting a diarrheal illness.      Otherwise, no acute abnormality in the abdomen or pelvis.         Workstation performed: [id number]                    Procedures  Procedures         ED Course  ED Course as of 07/18/24 1140   Thu Jul 18, 2024   0842 Patient with some improvement of nausea and abdominal pain with Haldol.                                 SBIRT 20yo+      Flowsheet Row Most Recent Value   Initial Alcohol Screen: US AUDIT-C     1. How often do you have a drink containing alcohol? 0 Filed at: 07/18/2024 0711   2. How many drinks containing alcohol do you have on a typical day you are drinking?  0 Filed at: 07/18/2024 0711   3b. FEMALE Any Age, or MALE 65+: How often do you have 4 or  more drinks on one occassion? 0 Filed at: 07/18/2024 0711   Audit-C Score 0 Filed at: 07/18/2024 0711   CATHY: How many times in the past year have you...    Used an illegal drug or used a prescription medication for non-medical reasons? Never Filed at: 07/18/2024 0711                      Medical Decision Making  Differential diagnosis includes but not limited to: Cannabinoid hyperemesis, electro abnormality, dehydration, abdominal infection    Problems Addressed:  Nausea and vomiting, unspecified vomiting type: acute illness or injury    Amount and/or Complexity of Data Reviewed  Labs: ordered. Decision-making details documented in ED Course.  Radiology: ordered. Decision-making details documented in ED Course.    Risk  Prescription drug management.                 Disposition  Final diagnoses:   Nausea and vomiting, unspecified vomiting type   Hypokalemia   Hyponatremia     Time reflects when diagnosis was documented in both MDM as applicable and the Disposition within this note       Time User Action Codes Description Comment    7/18/2024 10:51 AM Katerina Bustamante Add [R11.2] Nausea and vomiting, unspecified vomiting type     7/18/2024 11:39 AM Katerina Bustamante Add [E87.6] Hypokalemia     7/18/2024 11:39 AM Katerina Bustamante Add [E87.1] Hyponatremia           ED Disposition       ED Disposition   Discharge    Condition   Stable    Date/Time   Thu Jul 18, 2024 1051    Comment   Steph Zhao discharge to home/self care.                   Follow-up Information       Follow up With Specialties Details Why Contact Info    Juju Ashford DO [address] [id number] [phone]              Discharge Medication List as of 7/18/2024 10:52 AM        START taking these medications    Details   ondansetron (ZOFRAN) 4 mg tablet Take 1 tablet (4 mg total) by mouth every 6 (six) hours, Starting Thu 7/18/2024, Normal           CONTINUE these medications which have NOT CHANGED    Details    naproxen (Naprosyn) 500 mg tablet Take 1 tablet (500 mg total) by mouth 2 (two) times a day with meals for 3 days, Starting Sun 2/4/2024, Until Wed 2/7/2024, Normal      promethazine (PHENERGAN) 25 mg tablet Take 1 tablet (25 mg total) by mouth every 6 (six) hours as needed for nausea or vomiting, Starting Mon 11/13/2023, Normal             No discharge procedures on file.    PDMP Review         Value Time User    PDMP Reviewed  Yes 1/20/2021  2:27 PM Demetri Souza PA-C            ED Provider  Electronically Signed by             Katerina Bustamante PA-C  07/18/24 8211

## 2024-07-18 NOTE — ED NOTES
Pt ambulated to the bathroom with no c/o dizziness or being light headed.  Pt states that she feels much better and inquiring if will be going home     Ada Bob RN  07/18/24 5659

## 2024-07-18 NOTE — ED NOTES
Patient returned from cat scan c/o chest pain  EKG performed and physican advised     Ada Bob RN  07/18/24 0714

## 2024-07-18 NOTE — ED NOTES
Pt became very lightheaded and dizzy when attempted to go to rest room  Pt states that she feltlike going to pass out       Ada Bob RN  07/18/24 5360

## 2024-07-18 NOTE — ED NOTES
Pt o2 sat dropped to 85%  while sleeping  When pt is awake sat is 95 % or better  O2 started at 2l via nasal cannula     Ada Bob RN  07/18/24 0844

## 2024-07-20 ENCOUNTER — HOSPITAL ENCOUNTER (EMERGENCY)
Facility: HOSPITAL | Age: 34
Discharge: HOME | End: 2024-07-20
Attending: EMERGENCY MEDICINE
Payer: COMMERCIAL

## 2024-07-20 VITALS
RESPIRATION RATE: 20 BRPM | OXYGEN SATURATION: 98 % | SYSTOLIC BLOOD PRESSURE: 142 MMHG | DIASTOLIC BLOOD PRESSURE: 101 MMHG | TEMPERATURE: 98.3 F | HEART RATE: 113 BPM

## 2024-07-20 DIAGNOSIS — K08.89 DENTALGIA: ICD-10-CM

## 2024-07-20 DIAGNOSIS — E87.6 HYPOKALEMIA: ICD-10-CM

## 2024-07-20 DIAGNOSIS — R11.2 NAUSEA AND VOMITING: Primary | ICD-10-CM

## 2024-07-20 LAB
ANION GAP SERPL CALCULATED.3IONS-SCNC: 16 MMOL/L (ref 4–13)
ATRIAL RATE: 93 BPM
BASOPHILS # BLD MANUAL: 0 THOUSAND/UL (ref 0–0.1)
BASOPHILS NFR MAR MANUAL: 0 % (ref 0–1)
BUN SERPL-MCNC: 11 MG/DL (ref 5–25)
CALCIUM SERPL-MCNC: 10.3 MG/DL (ref 8.4–10.2)
CHLORIDE SERPL-SCNC: 86 MMOL/L (ref 96–108)
CO2 SERPL-SCNC: 30 MMOL/L (ref 21–32)
CREAT SERPL-MCNC: 1.03 MG/DL (ref 0.6–1.3)
EOSINOPHIL # BLD MANUAL: 0 THOUSAND/UL (ref 0–0.4)
EOSINOPHIL NFR BLD MANUAL: 0 % (ref 0–6)
ERYTHROCYTE [DISTWIDTH] IN BLOOD BY AUTOMATED COUNT: 12.7 % (ref 11.6–15.1)
GFR SERPL CREATININE-BSD FRML MDRD: 72 ML/MIN/1.73SQ M
GLUCOSE SERPL-MCNC: 110 MG/DL (ref 65–140)
HCT VFR BLD AUTO: 43.7 % (ref 34.8–46.1)
HGB BLD-MCNC: 15.6 G/DL (ref 11.5–15.4)
LYMPHOCYTES # BLD AUTO: 2.4 THOUSAND/UL (ref 0.6–4.47)
LYMPHOCYTES # BLD AUTO: 8 % (ref 14–44)
MCH RBC QN AUTO: 29.1 PG (ref 26.8–34.3)
MCHC RBC AUTO-ENTMCNC: 35.7 G/DL (ref 31.4–37.4)
MCV RBC AUTO: 82 FL (ref 82–98)
MONOCYTES # BLD AUTO: 1.76 THOUSAND/UL (ref 0–1.22)
MONOCYTES NFR BLD: 11 % (ref 4–12)
NEUTROPHILS # BLD MANUAL: 11.83 THOUSAND/UL (ref 1.85–7.62)
NEUTS BAND NFR BLD MANUAL: 1 % (ref 0–8)
NEUTS SEG NFR BLD AUTO: 73 % (ref 43–75)
P AXIS: 79 DEGREES
PLATELET # BLD AUTO: 290 THOUSANDS/UL (ref 149–390)
PLATELET BLD QL SMEAR: ADEQUATE
PMV BLD AUTO: 11.3 FL (ref 8.9–12.7)
POTASSIUM SERPL-SCNC: 2.5 MMOL/L (ref 3.5–5.3)
PR INTERVAL: 140 MS
QRS AXIS: 80 DEGREES
QRSD INTERVAL: 84 MS
QT INTERVAL: 434 MS
QTC INTERVAL: 539 MS
RBC # BLD AUTO: 5.36 MILLION/UL (ref 3.81–5.12)
RBC MORPH BLD: NORMAL
SODIUM SERPL-SCNC: 132 MMOL/L (ref 135–147)
T WAVE AXIS: 37 DEGREES
VARIANT LYMPHS # BLD AUTO: 7 %
VENTRICULAR RATE: 93 BPM
WBC # BLD AUTO: 15.99 THOUSAND/UL (ref 4.31–10.16)

## 2024-07-20 PROCEDURE — 99283 EMERGENCY DEPT VISIT LOW MDM: CPT

## 2024-07-20 PROCEDURE — 96368 THER/DIAG CONCURRENT INF: CPT

## 2024-07-20 PROCEDURE — 96367 TX/PROPH/DG ADDL SEQ IV INF: CPT

## 2024-07-20 PROCEDURE — 96365 THER/PROPH/DIAG IV INF INIT: CPT

## 2024-07-20 PROCEDURE — 96366 THER/PROPH/DIAG IV INF ADDON: CPT

## 2024-07-20 PROCEDURE — 36415 COLL VENOUS BLD VENIPUNCTURE: CPT

## 2024-07-20 PROCEDURE — 80048 BASIC METABOLIC PNL TOTAL CA: CPT

## 2024-07-20 PROCEDURE — 85007 BL SMEAR W/DIFF WBC COUNT: CPT

## 2024-07-20 PROCEDURE — 96375 TX/PRO/DX INJ NEW DRUG ADDON: CPT

## 2024-07-20 PROCEDURE — 99285 EMERGENCY DEPT VISIT HI MDM: CPT | Performed by: EMERGENCY MEDICINE

## 2024-07-20 PROCEDURE — 93005 ELECTROCARDIOGRAM TRACING: CPT

## 2024-07-20 PROCEDURE — 93010 ELECTROCARDIOGRAM REPORT: CPT | Performed by: INTERNAL MEDICINE

## 2024-07-20 PROCEDURE — 85027 COMPLETE CBC AUTOMATED: CPT

## 2024-07-20 RX ORDER — SODIUM CHLORIDE, SODIUM GLUCONATE, SODIUM ACETATE, POTASSIUM CHLORIDE, MAGNESIUM CHLORIDE, SODIUM PHOSPHATE, DIBASIC, AND POTASSIUM PHOSPHATE .53; .5; .37; .037; .03; .012; .00082 G/100ML; G/100ML; G/100ML; G/100ML; G/100ML; G/100ML; G/100ML
1000 INJECTION, SOLUTION INTRAVENOUS ONCE
Status: COMPLETED | OUTPATIENT
Start: 2024-07-20 | End: 2024-07-20

## 2024-07-20 RX ORDER — POTASSIUM CHLORIDE 20MEQ/15ML
40 LIQUID (ML) ORAL ONCE
Status: COMPLETED | OUTPATIENT
Start: 2024-07-20 | End: 2024-07-20

## 2024-07-20 RX ORDER — POTASSIUM CHLORIDE 14.9 MG/ML
20 INJECTION INTRAVENOUS
Status: DISCONTINUED | OUTPATIENT
Start: 2024-07-20 | End: 2024-07-20 | Stop reason: HOSPADM

## 2024-07-20 RX ORDER — LANOLIN ALCOHOL/MO/W.PET/CERES
400 CREAM (GRAM) TOPICAL 2 TIMES DAILY
Status: DISCONTINUED | OUTPATIENT
Start: 2024-07-20 | End: 2024-07-20 | Stop reason: HOSPADM

## 2024-07-20 RX ORDER — DROPERIDOL 2.5 MG/ML
0.62 INJECTION, SOLUTION INTRAMUSCULAR; INTRAVENOUS ONCE
Status: COMPLETED | OUTPATIENT
Start: 2024-07-20 | End: 2024-07-20

## 2024-07-20 RX ORDER — KETOROLAC TROMETHAMINE 30 MG/ML
15 INJECTION, SOLUTION INTRAMUSCULAR; INTRAVENOUS ONCE
Status: COMPLETED | OUTPATIENT
Start: 2024-07-20 | End: 2024-07-20

## 2024-07-20 RX ORDER — ACETAMINOPHEN 325 MG/1
975 TABLET ORAL ONCE
Status: COMPLETED | OUTPATIENT
Start: 2024-07-20 | End: 2024-07-20

## 2024-07-20 RX ADMIN — DROPERIDOL 0.62 MG: 2.5 INJECTION, SOLUTION INTRAMUSCULAR; INTRAVENOUS at 11:37

## 2024-07-20 RX ADMIN — ACETAMINOPHEN 975 MG: 325 TABLET, FILM COATED ORAL at 13:21

## 2024-07-20 RX ADMIN — KETOROLAC TROMETHAMINE 15 MG: 30 INJECTION, SOLUTION INTRAMUSCULAR at 13:22

## 2024-07-20 RX ADMIN — POTASSIUM CHLORIDE 20 MEQ: 14.9 INJECTION, SOLUTION INTRAVENOUS at 13:21

## 2024-07-20 RX ADMIN — SODIUM CHLORIDE, SODIUM GLUCONATE, SODIUM ACETATE, POTASSIUM CHLORIDE, MAGNESIUM CHLORIDE, SODIUM PHOSPHATE, DIBASIC, AND POTASSIUM PHOSPHATE 1000 ML: .53; .5; .37; .037; .03; .012; .00082 INJECTION, SOLUTION INTRAVENOUS at 13:22

## 2024-07-20 RX ADMIN — SODIUM CHLORIDE, SODIUM GLUCONATE, SODIUM ACETATE, POTASSIUM CHLORIDE, MAGNESIUM CHLORIDE, SODIUM PHOSPHATE, DIBASIC, AND POTASSIUM PHOSPHATE 1000 ML: .53; .5; .37; .037; .03; .012; .00082 INJECTION, SOLUTION INTRAVENOUS at 11:37

## 2024-07-20 RX ADMIN — POTASSIUM CHLORIDE 40 MEQ: 1.5 SOLUTION ORAL at 13:21

## 2024-07-20 RX ADMIN — MAGNESIUM OXIDE TAB 400 MG (241.3 MG ELEMENTAL MG) 400 MG: 400 (241.3 MG) TAB at 14:17

## 2024-07-20 NOTE — Clinical Note
Steph Zhao was seen and treated in our emergency department on 7/20/2024.            as tolerated    Diagnosis: ED visit    Steph  may return to work on return date.    She may return on this date: 07/21/2024         If you have any questions or concerns, please don't hesitate to call.      Artemio Doshi, DO    ______________________________           _______________          _______________  Hospital Representative                              Date                                Time

## 2024-07-20 NOTE — ED PROVIDER NOTES
History  Chief Complaint   Patient presents with    Vomiting     Prior hx of nausea and vomiting. Presents to ER with vomiting since Monday.      33-year-old female with a past medical history of recurrent vomiting and emesis, concern for cannabinoid hyperemesis syndrome, presents to the emergency department complaining of abdominal pain, nausea and vomiting earlier's morning.  She states she was vomiting so frequently she cracked her tooth.  She has has not tried any medicine at home since she has been unable to keep food down.        Prior to Admission Medications   Prescriptions Last Dose Informant Patient Reported? Taking?   naproxen (Naprosyn) 500 mg tablet   No No   Sig: Take 1 tablet (500 mg total) by mouth 2 (two) times a day with meals for 3 days   ondansetron (ZOFRAN) 4 mg tablet   No No   Sig: Take 1 tablet (4 mg total) by mouth every 6 (six) hours   promethazine (PHENERGAN) 25 mg tablet   No No   Sig: Take 1 tablet (25 mg total) by mouth every 6 (six) hours as needed for nausea or vomiting      Facility-Administered Medications: None       Past Medical History:   Diagnosis Date    Arthritis     Asthma     History of stomach ulcers     Psychiatric disorder     anxiety       Past Surgical History:   Procedure Laterality Date    BREAST SURGERY      COSMETIC SURGERY      EGD      WISDOM TOOTH EXTRACTION         History reviewed. No pertinent family history.  I have reviewed and agree with the history as documented.    E-Cigarette/Vaping    E-Cigarette Use Never User      E-Cigarette/Vaping Substances    Nicotine No     THC Yes     CBD Yes     Flavoring No     Other No     Unknown No      Social History     Tobacco Use    Smoking status: Never    Smokeless tobacco: Never   Vaping Use    Vaping status: Never Used   Substance Use Topics    Alcohol use: Not Currently    Drug use: Yes     Types: Marijuana        Review of Systems   Gastrointestinal:  Positive for abdominal pain, nausea and vomiting.   All other  systems reviewed and are negative.      Physical Exam  ED Triage Vitals   Temperature Pulse Respirations Blood Pressure SpO2   07/20/24 1104 07/20/24 1104 07/20/24 1104 07/20/24 1104 07/20/24 1104   98.3 °F (36.8 °C) (!) 113 20 (!) 142/101 98 %      Temp Source Heart Rate Source Patient Position - Orthostatic VS BP Location FiO2 (%)   07/20/24 1104 07/20/24 1104 07/20/24 1104 07/20/24 1104 --   Temporal Monitor Sitting Left arm       Pain Score       07/20/24 1321       8             Orthostatic Vital Signs  Vitals:    07/20/24 1104   BP: (!) 142/101   Pulse: (!) 113   Patient Position - Orthostatic VS: Sitting       Physical Exam  Vitals and nursing note reviewed.   Constitutional:       General: She is not in acute distress.     Appearance: She is well-developed.   HENT:      Head: Normocephalic and atraumatic.      Mouth/Throat:     Eyes:      Conjunctiva/sclera: Conjunctivae normal.   Cardiovascular:      Rate and Rhythm: Regular rhythm. Tachycardia present.      Heart sounds: No murmur heard.  Pulmonary:      Effort: Pulmonary effort is normal. No respiratory distress.      Breath sounds: Normal breath sounds.   Abdominal:      Palpations: Abdomen is soft.      Tenderness: There is abdominal tenderness.   Musculoskeletal:         General: No swelling.      Cervical back: Neck supple.   Skin:     General: Skin is warm and dry.      Capillary Refill: Capillary refill takes less than 2 seconds.   Neurological:      Mental Status: She is alert.   Psychiatric:         Mood and Affect: Mood normal.         ED Medications  Medications   potassium chloride 20 mEq IVPB (premix) (0 mEq Intravenous Stopped 7/20/24 1448)   magnesium Oxide (MAG-OX) tablet 400 mg (400 mg Oral Given 7/20/24 1417)   droperidol (INAPSINE) injection 0.625 mg (0.625 mg Intravenous Given 7/20/24 1137)   multi-electrolyte (ISOLYTE-S PH 7.4) bolus 1,000 mL (0 mL Intravenous Stopped 7/20/24 1322)   potassium chloride oral solution 40 mEq (40 mEq Oral  Given 7/20/24 1321)   ketorolac (TORADOL) injection 15 mg (15 mg Intravenous Given 7/20/24 1322)   acetaminophen (TYLENOL) tablet 975 mg (975 mg Oral Given 7/20/24 1321)   multi-electrolyte (ISOLYTE-S PH 7.4) bolus 1,000 mL (0 mL Intravenous Stopped 7/20/24 1419)       Diagnostic Studies  Results Reviewed       Procedure Component Value Units Date/Time    RBC Morphology Reflex Test [[id number]] Collected: 07/20/24 1139    Lab Status: Final result Specimen: Blood from Arm, Left Updated: 07/20/24 1301    Basic metabolic panel [[id number]]  (Abnormal) Collected: 07/20/24 1139    Lab Status: Final result Specimen: Blood from Arm, Left Updated: 07/20/24 1240     Sodium 132 mmol/L      Potassium 2.5 mmol/L      Chloride 86 mmol/L      CO2 30 mmol/L      ANION GAP 16 mmol/L      BUN 11 mg/dL      Creatinine 1.03 mg/dL      Glucose 110 mg/dL      Calcium 10.3 mg/dL      eGFR 72 ml/min/1.73sq m     Narrative:      National Kidney Disease Foundation guidelines for Chronic Kidney Disease (CKD):     Stage 1 with normal or high GFR (GFR > 90 mL/min/1.73 square meters)    Stage 2 Mild CKD (GFR = 60-89 mL/min/1.73 square meters)    Stage 3A Moderate CKD (GFR = 45-59 mL/min/1.73 square meters)    Stage 3B Moderate CKD (GFR = 30-44 mL/min/1.73 square meters)    Stage 4 Severe CKD (GFR = 15-29 mL/min/1.73 square meters)    Stage 5 End Stage CKD (GFR <15 mL/min/1.73 square meters)  Note: GFR calculation is accurate only with a steady state creatinine    CBC and differential [[id number]]  (Abnormal) Collected: 07/20/24 1139    Lab Status: Final result Specimen: Blood from Arm, Left Updated: 07/20/24 1216     WBC 15.99 Thousand/uL      RBC 5.36 Million/uL      Hemoglobin 15.6 g/dL      Hematocrit 43.7 %      MCV 82 fL      MCH 29.1 pg      MCHC 35.7 g/dL      RDW 12.7 %      MPV 11.3 fL      Platelets 290 Thousands/uL     Narrative:      This is an appended report.  These results have been appended to a previously verified report.     Manual Differential(PHLEBS Do Not Order) [[id number]]  (Abnormal) Collected: 07/20/24 1139    Lab Status: Final result Specimen: Blood from Arm, Left Updated: 07/20/24 1216     Segmented % 73 %      Bands % 1 %      Lymphocytes % 8 %      Monocytes % 11 %      Eosinophils % 0 %      Basophils % 0 %      Atypical Lymphocytes % 7 %      Absolute Neutrophils 11.83 Thousand/uL      Absolute Lymphocytes 2.40 Thousand/uL      Absolute Monocytes 1.76 Thousand/uL      Absolute Eosinophils 0.00 Thousand/uL      Absolute Basophils 0.00 Thousand/uL      Total Counted --     RBC Morphology Normal     Platelet Estimate Adequate                   No orders to display         Procedures  Procedures      ED Course  ED Course as of 07/20/24 1455   Sat Jul 20, 2024   1250 Potassium(!): 2.5  Will replete and likely admit      1258 Patient updated of all test results at this point in time.  Patient offered admission to the hospital for potassium repletion of hypokalemia however patient declines.  Will evaluate with ECG and replete potassium with 40 mEq IV and 40 mEq oral.   1304 Procedure Note: EKG  Date/Time: 07/20/24 1:04 PM   Interpreted by: Artemio Doshi  Indications / Diagnosis: hypoK  ECG reviewed by me, the ED Provider: yes   The EKG demonstrates:  Rate: 93 BPM  Rhythm: normal sinus  Intervals: normal intervals, QT prolongation 539 ms   Axis: normal axis  QRS/Blocks: normal QRS  ST Changes: none                                          Medical Decision Making      DDx: Cyclical vomiting, cannabinoid hyperemesis, tooth fracture, low concern for infectious etiology, gastroenteritis, peptic ulcer disease, gastritis    Plan: Droperidol, basic labs, IV fluids    Previous emergency department visit reviewed.  Patient was noted to have hypokalemia.  Pregnancy test was negative.  Urinalysis was negative.  Will treat with droperidol.  Labs are concerning for hypokalemia.  Patient will receive repletion.  Discussed admission/observation  with patient however patient declines.  Patient states that she would like her potassium repleted and to discharge home and follow-up.  She request number for dentist.  Patient will be provided with referral for dentist as well as phone number to call.  Instructed to take a multivitamin and eat foods that are rich in potassium.  Just with patient about marijuana use.  Instructed patient that cessation of marijuana will likely result in cessation of her symptoms.  She verbalized understanding but stated she will most likely continue to use marijuana.      Amount and/or Complexity of Data Reviewed  Labs: ordered. Decision-making details documented in ED Course.    Risk  OTC drugs.  Prescription drug management.          Disposition  Final diagnoses:   Nausea and vomiting   Hypokalemia   Dentalgia     Time reflects when diagnosis was documented in both MDM as applicable and the Disposition within this note       Time User Action Codes Description Comment    7/20/2024 12:58 PM Artemio Doshi [R11.2] Nausea and vomiting     7/20/2024 12:58 PM Artemio Doshi [E87.6] Hypokalemia     7/20/2024  1:22 PM Artemio Doshi [K08.89] Dentalgia           ED Disposition       ED Disposition   Discharge    Condition   Stable    Date/Time   Sat Jul 20, 2024  2:44 PM    Comment   Steph Zhao discharge to home/self care.                   Follow-up Information       Follow up With Specialties Details Why Contact Info    [facility] [address] [id number] [phone]    Juju Ashford,  [place] [address]  [phone]              Discharge Medication List as of 7/20/2024  2:44 PM        CONTINUE these medications which have NOT CHANGED    Details   naproxen (Naprosyn) 500 mg tablet Take 1 tablet (500 mg total) by mouth 2 (two) times a day with meals for 3 days, Starting Sun 2/4/2024,  Until Wed 2/7/2024, Normal      ondansetron (ZOFRAN) 4 mg tablet Take 1 tablet (4 mg total) by mouth every 6 (six) hours, Starting Thu 7/18/2024, Normal      promethazine (PHENERGAN) 25 mg tablet Take 1 tablet (25 mg total) by mouth every 6 (six) hours as needed for nausea or vomiting, Starting Mon 11/13/2023, Normal               PDMP Review         Value Time User    PDMP Reviewed  Yes 1/20/2021  2:27 PM Demetri Souza PA-C             ED Provider  Attending physically available and evaluated Steph Pandyaarthy. I managed the patient along with the ED Attending.    Electronically Signed by           Artemio Doshi DO  07/20/24 5752

## 2024-07-20 NOTE — DISCHARGE INSTRUCTIONS
Follow-up with your primary care provider for hypokalemia.  Follow-up with the dentist provided below for dentalgia pain

## 2024-07-23 NOTE — ED ATTENDING ATTESTATION
7/20/2024  I, Rubén Sultana MD, saw and evaluated the patient. I have discussed the patient with the resident/non-physician practitioner and agree with the resident's/non-physician practitioner's findings, Plan of Care, and MDM as documented in the resident's/non-physician practitioner's note, except where noted. All available labs and Radiology studies were reviewed.  I was present for key portions of any procedure(s) performed by the resident/non-physician practitioner and I was immediately available to provide assistance.       At this point I agree with the current assessment done in the Emergency Department.  I have conducted an independent evaluation of this patient a history and physical is as follows:    ED Course     Impression nausea vomiting history of cannabinoid hyperemesis syndrome.    Differential diagnosis: Marijuana hyperemesis syndrome, cyclical vomiting syndrome, gastritis, gastroenteritis, pancreatitis, electrolyte abnormality,    Plan to check labs IV fluids antiemetics reassess.    Labs reviewed CBC remarkable for leukocytosis with elevated hemoglobin BMP remarkable for hyponatremia and hypokalemia likely due in the face of recurrent emesis.    Patient was offered admission for hypokalemia and intractable nausea vomiting however states her symptoms have improved and would like to be discharged home at this time.  Patient was repleted  with IV and oral potassium.  Return precautions given.    Critical Care Time  Procedures

## 2024-10-13 ENCOUNTER — HOSPITAL ENCOUNTER (EMERGENCY)
Facility: HOSPITAL | Age: 34
Discharge: HOME | End: 2024-10-13
Attending: EMERGENCY MEDICINE | Admitting: EMERGENCY MEDICINE
Payer: COMMERCIAL

## 2024-10-13 VITALS
DIASTOLIC BLOOD PRESSURE: 77 MMHG | OXYGEN SATURATION: 95 % | TEMPERATURE: 98.9 F | SYSTOLIC BLOOD PRESSURE: 117 MMHG | RESPIRATION RATE: 12 BRPM | HEART RATE: 76 BPM

## 2024-10-13 DIAGNOSIS — R11.2 NAUSEA AND VOMITING: Primary | ICD-10-CM

## 2024-10-13 DIAGNOSIS — E87.6 HYPOKALEMIA: ICD-10-CM

## 2024-10-13 LAB
ALBUMIN SERPL BCG-MCNC: 4.8 G/DL (ref 3.5–5)
ALP SERPL-CCNC: 70 U/L (ref 34–104)
ALT SERPL W P-5'-P-CCNC: 28 U/L (ref 7–52)
ANION GAP SERPL CALCULATED.3IONS-SCNC: 15 MMOL/L (ref 4–13)
AST SERPL W P-5'-P-CCNC: 28 U/L (ref 13–39)
ATRIAL RATE: 106 BPM
BASOPHILS # BLD AUTO: 0.05 THOUSANDS/ΜL (ref 0–0.1)
BASOPHILS NFR BLD AUTO: 0 % (ref 0–1)
BILIRUB SERPL-MCNC: 1.34 MG/DL (ref 0.2–1)
BUN SERPL-MCNC: 13 MG/DL (ref 5–25)
CALCIUM SERPL-MCNC: 9.7 MG/DL (ref 8.4–10.2)
CHLORIDE SERPL-SCNC: 90 MMOL/L (ref 96–108)
CO2 SERPL-SCNC: 29 MMOL/L (ref 21–32)
CREAT SERPL-MCNC: 0.78 MG/DL (ref 0.6–1.3)
EOSINOPHIL # BLD AUTO: 0.04 THOUSAND/ΜL (ref 0–0.61)
EOSINOPHIL NFR BLD AUTO: 0 % (ref 0–6)
ERYTHROCYTE [DISTWIDTH] IN BLOOD BY AUTOMATED COUNT: 12.7 % (ref 11.6–15.1)
GFR SERPL CREATININE-BSD FRML MDRD: 100 ML/MIN/1.73SQ M
GLUCOSE SERPL-MCNC: 111 MG/DL (ref 65–140)
HCT VFR BLD AUTO: 44.3 % (ref 34.8–46.1)
HGB BLD-MCNC: 15.9 G/DL (ref 11.5–15.4)
IMM GRANULOCYTES # BLD AUTO: 0.05 THOUSAND/UL (ref 0–0.2)
IMM GRANULOCYTES NFR BLD AUTO: 0 % (ref 0–2)
LYMPHOCYTES # BLD AUTO: 3.68 THOUSANDS/ΜL (ref 0.6–4.47)
LYMPHOCYTES NFR BLD AUTO: 23 % (ref 14–44)
MAGNESIUM SERPL-MCNC: 2.2 MG/DL (ref 1.9–2.7)
MCH RBC QN AUTO: 29.8 PG (ref 26.8–34.3)
MCHC RBC AUTO-ENTMCNC: 35.9 G/DL (ref 31.4–37.4)
MCV RBC AUTO: 83 FL (ref 82–98)
MONOCYTES # BLD AUTO: 1.24 THOUSAND/ΜL (ref 0.17–1.22)
MONOCYTES NFR BLD AUTO: 8 % (ref 4–12)
NEUTROPHILS # BLD AUTO: 11 THOUSANDS/ΜL (ref 1.85–7.62)
NEUTS SEG NFR BLD AUTO: 69 % (ref 43–75)
NRBC BLD AUTO-RTO: 0 /100 WBCS
P AXIS: 80 DEGREES
PLATELET # BLD AUTO: 248 THOUSANDS/UL (ref 149–390)
PMV BLD AUTO: 11.6 FL (ref 8.9–12.7)
POTASSIUM SERPL-SCNC: 2.6 MMOL/L (ref 3.5–5.3)
PR INTERVAL: 130 MS
PROT SERPL-MCNC: 7.8 G/DL (ref 6.4–8.4)
QRS AXIS: 87 DEGREES
QRSD INTERVAL: 80 MS
QT INTERVAL: 300 MS
QTC INTERVAL: 398 MS
RBC # BLD AUTO: 5.33 MILLION/UL (ref 3.81–5.12)
SODIUM SERPL-SCNC: 134 MMOL/L (ref 135–147)
T WAVE AXIS: -84 DEGREES
VENTRICULAR RATE: 106 BPM
WBC # BLD AUTO: 16.06 THOUSAND/UL (ref 4.31–10.16)

## 2024-10-13 PROCEDURE — 99284 EMERGENCY DEPT VISIT MOD MDM: CPT

## 2024-10-13 PROCEDURE — 96361 HYDRATE IV INFUSION ADD-ON: CPT

## 2024-10-13 PROCEDURE — 93010 ELECTROCARDIOGRAM REPORT: CPT | Performed by: INTERNAL MEDICINE

## 2024-10-13 PROCEDURE — 93005 ELECTROCARDIOGRAM TRACING: CPT

## 2024-10-13 PROCEDURE — 36415 COLL VENOUS BLD VENIPUNCTURE: CPT

## 2024-10-13 PROCEDURE — 96374 THER/PROPH/DIAG INJ IV PUSH: CPT

## 2024-10-13 PROCEDURE — 83735 ASSAY OF MAGNESIUM: CPT

## 2024-10-13 PROCEDURE — 85025 COMPLETE CBC W/AUTO DIFF WBC: CPT

## 2024-10-13 PROCEDURE — 99285 EMERGENCY DEPT VISIT HI MDM: CPT | Performed by: EMERGENCY MEDICINE

## 2024-10-13 PROCEDURE — 80053 COMPREHEN METABOLIC PANEL: CPT

## 2024-10-13 RX ORDER — POTASSIUM CHLORIDE 20MEQ/15ML
40 LIQUID (ML) ORAL ONCE
Status: COMPLETED | OUTPATIENT
Start: 2024-10-13 | End: 2024-10-13

## 2024-10-13 RX ORDER — DROPERIDOL 2.5 MG/ML
1.25 INJECTION, SOLUTION INTRAMUSCULAR; INTRAVENOUS ONCE
Status: COMPLETED | OUTPATIENT
Start: 2024-10-13 | End: 2024-10-13

## 2024-10-13 RX ORDER — ONDANSETRON 4 MG/1
4 TABLET, ORALLY DISINTEGRATING ORAL EVERY 6 HOURS PRN
Qty: 12 TABLET | Refills: 0 | Status: SHIPPED | OUTPATIENT
Start: 2024-10-13

## 2024-10-13 RX ADMIN — DROPERIDOL 1.25 MG: 2.5 INJECTION, SOLUTION INTRAMUSCULAR; INTRAVENOUS at 06:31

## 2024-10-13 RX ADMIN — POTASSIUM CHLORIDE 40 MEQ: 1.5 SOLUTION ORAL at 07:17

## 2024-10-13 RX ADMIN — SODIUM CHLORIDE 1000 ML: 0.9 INJECTION, SOLUTION INTRAVENOUS at 06:31

## 2024-10-13 NOTE — ED ATTENDING ATTESTATION
10/13/2024  ICarlos MD, saw and evaluated the patient. I have discussed the patient with the resident/non-physician practitioner and agree with the resident's/non-physician practitioner's findings, Plan of Care, and MDM as documented in the resident's/non-physician practitioner's note, except where noted. All available labs and Radiology studies were reviewed.  I was present for key portions of any procedure(s) performed by the resident/non-physician practitioner and I was immediately available to provide assistance.       At this point I agree with the current assessment done in the Emergency Department.  I have conducted an independent evaluation of this patient a history and physical is as follows:        Final Diagnosis:  1. Nausea and vomiting    2. Hypokalemia      Chief Complaint   Patient presents with    Vomiting     PT c/o vomiting since last Monday. PT was seen at [facility] Friday and was sent home after receiving fluids which didn't help. PT states she has been throwing up since she left. PT c/o abd pain.            A:  -33-year-old female presents with abdominal cramping and vomiting.      P:  -Likely cyclic vomiting syndrome versus cannabinoid hyperemesis syndrome.  Consider hepatitis versus gastroparesis.  Less likely cholecystitis.  -CBC to evaluate for evidence of marked leukocytosis or anemia.  -CMP to evaluate LFTs, renal function, electrolytes.  -Treat symptomatically with IV fluids and IV droperidol.  -Reassess.      H:    33-year-old female who presents with vomiting.  Has been ongoing since Monday.  Admits to generalized abdominal cramping.  No diarrhea, still passing gas.  No known sick contacts.  Denies any alcohol use or change in medications.  Does smoke marijuana, but has not used since getting sick on Monday.  Seen at [facility] on 10/11/2024.  Had an elevated WBC, but workup was otherwise unremarkable.      PMH:  Past Medical History:   Diagnosis Date    Arthritis     Asthma     History  of stomach ulcers     Psychiatric disorder     anxiety       PSH:  Past Surgical History:   Procedure Laterality Date    BREAST SURGERY      COSMETIC SURGERY      EGD      WISDOM TOOTH EXTRACTION           PE:   Vitals:    10/13/24 0520 10/13/24 0645 10/13/24 0658 10/13/24 0800   BP: 129/82   117/77   BP Location:    Right arm   Pulse: (!) 123 74  76   Resp: 22 14  12   Temp: 98.9 °F (37.2 °C)      SpO2: 99% (!) 84% 95% 95%       Constitutional: Vital signs are normal. She appears well-developed. She is cooperative. No distress.   HENT:   Mouth/Throat: Uvula is midline, oropharynx is clear and moist and mucous membranes are normal.   Eyes: Pupils are equal, round, and reactive to light. Conjunctivae and EOM are normal.   Neck: Trachea normal. No thyroid mass and no thyromegaly present.   Cardiovascular: Tachycardic, regular rhythm, normal heart sounds.   No murmur heard.  Pulmonary/Chest: Effort normal and breath sounds normal.   Abdominal: Soft. Normal appearance and bowel sounds are normal. There is generalized tenderness. There is no rebound, no guarding.   Neurological: She is alert.   Skin: Skin is warm, dry and intact.   Psychiatric: Appears anxious.        - 13 point ROS was performed and all are normal unless stated in the history above.   - Nursing note reviewed. Vitals reviewed.   - Orders placed by myself and/or advanced practitioner / resident.    - Previous chart was reviewed  - No language barrier.   - History obtained from patient.   - There are no limitations to the history obtained. Reasons ROS could not be obtained:  N/A         Medications   sodium chloride 0.9 % bolus 1,000 mL (0 mL Intravenous Stopped 10/13/24 0828)   droperidol (INAPSINE) injection 1.25 mg (1.25 mg Intravenous Given 10/13/24 0631)   potassium chloride oral solution 40 mEq (40 mEq Oral Given 10/13/24 0717)     No orders to display     Orders Placed This Encounter   Procedures    CBC and differential    Comprehensive metabolic  panel    Magnesium    ECG 12 lead    ECG 12 lead     Labs Reviewed   CBC AND DIFFERENTIAL - Abnormal       Result Value Ref Range Status    WBC 16.06 (*) 4.31 - 10.16 Thousand/uL Final    RBC 5.33 (*) 3.81 - 5.12 Million/uL Final    Hemoglobin 15.9 (*) 11.5 - 15.4 g/dL Final    Hematocrit 44.3  34.8 - 46.1 % Final    MCV 83  82 - 98 fL Final    MCH 29.8  26.8 - 34.3 pg Final    MCHC 35.9  31.4 - 37.4 g/dL Final    RDW 12.7  11.6 - 15.1 % Final    MPV 11.6  8.9 - 12.7 fL Final    Platelets 248  149 - 390 Thousands/uL Final    nRBC 0  /100 WBCs Final    Segmented % 69  43 - 75 % Final    Immature Grans % 0  0 - 2 % Final    Lymphocytes % 23  14 - 44 % Final    Monocytes % 8  4 - 12 % Final    Eosinophils Relative 0  0 - 6 % Final    Basophils Relative 0  0 - 1 % Final    Absolute Neutrophils 11.00 (*) 1.85 - 7.62 Thousands/µL Final    Absolute Immature Grans 0.05  0.00 - 0.20 Thousand/uL Final    Absolute Lymphocytes 3.68  0.60 - 4.47 Thousands/µL Final    Absolute Monocytes 1.24 (*) 0.17 - 1.22 Thousand/µL Final    Eosinophils Absolute 0.04  0.00 - 0.61 Thousand/µL Final    Basophils Absolute 0.05  0.00 - 0.10 Thousands/µL Final   COMPREHENSIVE METABOLIC PANEL - Abnormal    Sodium 134 (*) 135 - 147 mmol/L Final    Potassium 2.6 (*) 3.5 - 5.3 mmol/L Final    Chloride 90 (*) 96 - 108 mmol/L Final    CO2 29  21 - 32 mmol/L Final    ANION GAP 15 (*) 4 - 13 mmol/L Final    BUN 13  5 - 25 mg/dL Final    Creatinine 0.78  0.60 - 1.30 mg/dL Final    Comment: Standardized to IDMS reference method    Glucose 111  65 - 140 mg/dL Final    Comment: If the patient is fasting, the ADA then defines impaired fasting glucose as > 100 mg/dL and diabetes as > or equal to 123 mg/dL.    Calcium 9.7  8.4 - 10.2 mg/dL Final    AST 28  13 - 39 U/L Final    ALT 28  7 - 52 U/L Final    Comment: Specimen collection should occur prior to Sulfasalazine administration due to the potential for falsely depressed results.     Alkaline Phosphatase 70   34 - 104 U/L Final    Total Protein 7.8  6.4 - 8.4 g/dL Final    Albumin 4.8  3.5 - 5.0 g/dL Final    Total Bilirubin 1.34 (*) 0.20 - 1.00 mg/dL Final    Comment: Use of this assay is not recommended for patients undergoing treatment with eltrombopag due to the potential for falsely elevated results.  N-acetyl-p-benzoquinone imine (metabolite of Acetaminophen) will generate erroneously low results in samples for patients that have taken an overdose of Acetaminophen.    eGFR 100  ml/min/1.73sq m Final    Narrative:     National Kidney Disease Foundation guidelines for Chronic Kidney Disease (CKD):     Stage 1 with normal or high GFR (GFR > 90 mL/min/1.73 square meters)    Stage 2 Mild CKD (GFR = 60-89 mL/min/1.73 square meters)    Stage 3A Moderate CKD (GFR = 45-59 mL/min/1.73 square meters)    Stage 3B Moderate CKD (GFR = 30-44 mL/min/1.73 square meters)    Stage 4 Severe CKD (GFR = 15-29 mL/min/1.73 square meters)    Stage 5 End Stage CKD (GFR <15 mL/min/1.73 square meters)  Note: GFR calculation is accurate only with a steady state creatinine   MAGNESIUM - Normal    Magnesium 2.2  1.9 - 2.7 mg/dL Final     Time reflects when diagnosis was documented in both MDM as applicable and the Disposition within this note       Time User Action Codes Description Comment    10/13/2024  6:26 AM Ray Li Add [R11.2] Nausea and vomiting     10/13/2024  8:34 AM Destin Jeffery Add [E87.6] Hypokalemia           ED Disposition       ED Disposition   Discharge    Condition   Stable    Date/Time   Sun Oct 13, 2024  8:32 AM    Comment   Steph Zhao discharge to home/self care.                   Follow-up Information       Follow up With Specialties Details Why Contact Info Additional Information    Juju Ashford DO Family Medicine Schedule an appointment as soon as possible for a visit in 2 days  [address] [place] [id number] [phone]       Critical access hospital Emergency Department  "Emergency Medicine Go to  If symptoms worsen [address] [facility] [id number] [phone] [facility] Emergency Department, [address] [place], [place], [id number] [phone]          Discharge Medication List as of 10/13/2024  8:34 AM        START taking these medications    Details   ondansetron (ZOFRAN-ODT) 4 mg disintegrating tablet Take 1 tablet (4 mg total) by mouth every 6 (six) hours as needed for nausea or vomiting for up to 12 doses, Starting Sun 10/13/2024, Normal           CONTINUE these medications which have NOT CHANGED    Details   naproxen (Naprosyn) 500 mg tablet Take 1 tablet (500 mg total) by mouth 2 (two) times a day with meals for 3 days, Starting Sun 2/4/2024, Until Wed 2/7/2024, Normal      ondansetron (ZOFRAN) 4 mg tablet Take 1 tablet (4 mg total) by mouth every 6 (six) hours, Starting Thu 7/18/2024, Normal      promethazine (PHENERGAN) 25 mg tablet Take 1 tablet (25 mg total) by mouth every 6 (six) hours as needed for nausea or vomiting, Starting Mon 11/13/2023, Normal           No discharge procedures on file.  Prior to Admission Medications   Prescriptions Last Dose Informant Patient Reported? Taking?   naproxen (Naprosyn) 500 mg tablet   No No   Sig: Take 1 tablet (500 mg total) by mouth 2 (two) times a day with meals for 3 days   ondansetron (ZOFRAN) 4 mg tablet   No No   Sig: Take 1 tablet (4 mg total) by mouth every 6 (six) hours   promethazine (PHENERGAN) 25 mg tablet   No No   Sig: Take 1 tablet (25 mg total) by mouth every 6 (six) hours as needed for nausea or vomiting      Facility-Administered Medications: None       Portions of the record may have been created with voice recognition software. Occasional wrong word or \"sound a like\" substitutions may have occurred due to the inherent limitations of voice recognition software. Read the chart carefully and recognize, using context, where substitutions have occurred.          ED " Course         Critical Care Time  Procedures

## 2024-10-13 NOTE — ED PROVIDER NOTES
Time reflects when diagnosis was documented in both MDM as applicable and the Disposition within this note       Time User Action Codes Description Comment    10/13/2024  6:26 AM Ray Li Add [R11.2] Nausea and vomiting     10/13/2024  8:34 AM Destin Jeffery Add [E87.6] Hypokalemia           ED Disposition       ED Disposition   Discharge    Condition   Stable    Date/Time   Sun Oct 13, 2024  8:32 AM    Comment   Steph Zhao discharge to home/self care.                   Assessment & Plan       Medical Decision Making  Steph Zhao is a 33 y.o. female who presents to the emergency department for nausea and vomiting    Based on patient's clinical history and physical exam there are no red flag signs or symptoms     Patient explicitly denies any symptoms on direct questioning except those explicitly noted in the HPI and ROS.    I will order appropriate testing to narrow my differential    Differential diagnosis includes but is not limited to: Nausea and vomiting, viral illness    Patient well-appearing in the room.  History and physical exam reassuring.  She has not tried any medications for her nausea and vomiting at home.  She has no abdominal pain or tenderness.    Will get basic labs here seems to have a history of electrolyte disturbances due to vomiting.  Will give IV fluids.  Will start with droperidol for nausea and vomiting.    Patient's potassium here is 2.6.  Will replete with 40 mEq of oral potassium.  Patient has a history of  low potassium.  Nausea improved with droperidol, IV fluids.  Patient discharged with PCP follow-up and strict return precautions to which she is agreeable.    Amount and/or Complexity of Data Reviewed  Labs: ordered.    Risk  Prescription drug management.             Medications   sodium chloride 0.9 % bolus 1,000 mL (0 mL Intravenous Stopped 10/13/24 0828)   droperidol (INAPSINE) injection 1.25 mg (1.25 mg Intravenous Given 10/13/24 0631)   potassium chloride  oral solution 40 mEq (40 mEq Oral Given 10/13/24 0717)       ED Risk Strat Scores                           SBIRT 22yo+      Flowsheet Row Most Recent Value   Initial Alcohol Screen: US AUDIT-C     1. How often do you have a drink containing alcohol? 0 Filed at: 10/13/2024 0522   2. How many drinks containing alcohol do you have on a typical day you are drinking?  0 Filed at: 10/13/2024 0522   3a. Male UNDER 65: How often do you have five or more drinks on one occasion? 0 Filed at: 10/13/2024 0522   3b. FEMALE Any Age, or MALE 65+: How often do you have 4 or more drinks on one occassion? 0 Filed at: 10/13/2024 0522   Audit-C Score 0 Filed at: 10/13/2024 0522   CATHY: How many times in the past year have you...    Used an illegal drug or used a prescription medication for non-medical reasons? Never Filed at: 10/13/2024 0522                            History of Present Illness       Chief Complaint   Patient presents with    Vomiting     PT c/o vomiting since last Monday. PT was seen at [facility] Friday and was sent home after receiving fluids which didn't help. PT states she has been throwing up since she left. PT c/o abd pain.        Past Medical History:   Diagnosis Date    Arthritis     Asthma     History of stomach ulcers     Psychiatric disorder     anxiety      Past Surgical History:   Procedure Laterality Date    BREAST SURGERY      COSMETIC SURGERY      EGD      WISDOM TOOTH EXTRACTION        History reviewed. No pertinent family history.   Social History     Tobacco Use    Smoking status: Never    Smokeless tobacco: Never   Vaping Use    Vaping status: Never Used   Substance Use Topics    Alcohol use: Not Currently    Drug use: Yes     Types: Marijuana      E-Cigarette/Vaping    E-Cigarette Use Never User       E-Cigarette/Vaping Substances    Nicotine No     THC Yes     CBD Yes     Flavoring No     Other No     Unknown No       I have reviewed and agree with the history as documented.     Patient is a 31-year-old  female with no relevant past medical history presents to the ED with nausea and vomiting since Monday of this past week.  No fevers or chills.  No diarrhea.  No abdominal pain.  She has history of nausea and vomiting of undetermined cause.  No medication use at home.  No chest pain or shortness of breath.        Review of Systems   Constitutional:  Negative for chills and fever.   Respiratory:  Negative for cough and shortness of breath.    Cardiovascular:  Negative for chest pain and leg swelling.   Gastrointestinal:  Negative for abdominal pain.   Genitourinary:  Negative for dysuria.   Neurological:  Negative for seizures and syncope.   All other systems reviewed and are negative.          Objective       ED Triage Vitals   Temperature Pulse Blood Pressure Respirations SpO2 Patient Position - Orthostatic VS   10/13/24 0520 10/13/24 0520 10/13/24 0520 10/13/24 0520 10/13/24 0520 10/13/24 0800   98.9 °F (37.2 °C) (!) 123 129/82 22 99 % Lying      Temp src Heart Rate Source BP Location FiO2 (%) Pain Score    -- 10/13/24 0520 10/13/24 0800 -- 10/13/24 0520     Monitor Right arm  8      Vitals      Date and Time Temp Pulse SpO2 Resp BP Pain Score FACES Pain Rating User   10/13/24 0800 -- 76 95 % 12 117/77 -- -- EM   10/13/24 0658 -- -- 95 % -- -- -- -- [facility]   10/13/24 0645 -- 74 84 % 14 -- -- -- [facility]   10/13/24 0520 98.9 °F (37.2 °C) 123 99 % 22 129/82 8 -- AG            Physical Exam  Vitals and nursing note reviewed.   Constitutional:       General: She is not in acute distress.     Appearance: Normal appearance. She is not ill-appearing, toxic-appearing or diaphoretic.   HENT:      Head: Normocephalic and atraumatic.      Nose: Nose normal.      Mouth/Throat:      Mouth: Mucous membranes are moist.   Eyes:      General: No scleral icterus.        Right eye: No discharge.         Left eye: No discharge.      Extraocular Movements: Extraocular movements intact.      Conjunctiva/sclera: Conjunctivae normal.      Pupils:  Pupils are equal, round, and reactive to light.   Cardiovascular:      Rate and Rhythm: Normal rate and regular rhythm.      Pulses: Normal pulses.      Heart sounds: Normal heart sounds. No murmur heard.     No friction rub. No gallop.   Pulmonary:      Effort: Pulmonary effort is normal. No tachypnea, bradypnea, accessory muscle usage or respiratory distress.      Breath sounds: Normal breath sounds. No stridor. No wheezing, rhonchi or rales.   Chest:      Chest wall: No tenderness.   Abdominal:      General: Abdomen is flat. There is no distension.      Palpations: Abdomen is soft. There is no mass.      Tenderness: There is no abdominal tenderness. There is no right CVA tenderness, left CVA tenderness, guarding or rebound.      Hernia: No hernia is present.   Musculoskeletal:      Cervical back: Normal range of motion and neck supple. No rigidity.      Right lower leg: No edema.      Left lower leg: No edema.   Skin:     General: Skin is warm and dry.      Capillary Refill: Capillary refill takes less than 2 seconds.      Coloration: Skin is not jaundiced.      Findings: No bruising.   Neurological:      Mental Status: She is alert and oriented to person, place, and time.      GCS: GCS eye subscore is 4. GCS verbal subscore is 5. GCS motor subscore is 6.      Cranial Nerves: No dysarthria or facial asymmetry.   Psychiatric:         Mood and Affect: Mood normal.         Behavior: Behavior normal. Behavior is cooperative.         Thought Content: Thought content normal.         Judgment: Judgment normal.         Results Reviewed       Procedure Component Value Units Date/Time    CBC and differential [[id number]]  (Abnormal) Collected: 10/13/24 0625    Lab Status: Final result Specimen: Blood from Arm, Left Updated: 10/13/24 0713     WBC 16.06 Thousand/uL      RBC 5.33 Million/uL      Hemoglobin 15.9 g/dL      Hematocrit 44.3 %      MCV 83 fL      MCH 29.8 pg      MCHC 35.9 g/dL      RDW 12.7 %      MPV 11.6 fL       Platelets 248 Thousands/uL      nRBC 0 /100 WBCs      Segmented % 69 %      Immature Grans % 0 %      Lymphocytes % 23 %      Monocytes % 8 %      Eosinophils Relative 0 %      Basophils Relative 0 %      Absolute Neutrophils 11.00 Thousands/µL      Absolute Immature Grans 0.05 Thousand/uL      Absolute Lymphocytes 3.68 Thousands/µL      Absolute Monocytes 1.24 Thousand/µL      Eosinophils Absolute 0.04 Thousand/µL      Basophils Absolute 0.05 Thousands/µL     Comprehensive metabolic panel [[id number]]  (Abnormal) Collected: 10/13/24 0625    Lab Status: Final result Specimen: Blood from Arm, Left Updated: 10/13/24 0704     Sodium 134 mmol/L      Potassium 2.6 mmol/L      Chloride 90 mmol/L      CO2 29 mmol/L      ANION GAP 15 mmol/L      BUN 13 mg/dL      Creatinine 0.78 mg/dL      Glucose 111 mg/dL      Calcium 9.7 mg/dL      AST 28 U/L      ALT 28 U/L      Alkaline Phosphatase 70 U/L      Total Protein 7.8 g/dL      Albumin 4.8 g/dL      Total Bilirubin 1.34 mg/dL      eGFR 100 ml/min/1.73sq m     Narrative:      National Kidney Disease Foundation guidelines for Chronic Kidney Disease (CKD):     Stage 1 with normal or high GFR (GFR > 90 mL/min/1.73 square meters)    Stage 2 Mild CKD (GFR = 60-89 mL/min/1.73 square meters)    Stage 3A Moderate CKD (GFR = 45-59 mL/min/1.73 square meters)    Stage 3B Moderate CKD (GFR = 30-44 mL/min/1.73 square meters)    Stage 4 Severe CKD (GFR = 15-29 mL/min/1.73 square meters)    Stage 5 End Stage CKD (GFR <15 mL/min/1.73 square meters)  Note: GFR calculation is accurate only with a steady state creatinine    Magnesium [[id number]]  (Normal) Collected: 10/13/24 0625    Lab Status: Final result Specimen: Blood from Arm, Left Updated: 10/13/24 0704     Magnesium 2.2 mg/dL             No orders to display       Procedures    ED Medication and Procedure Management   Prior to Admission Medications   Prescriptions Last Dose Informant Patient Reported? Taking?   naproxen (Naprosyn) 500 mg  tablet   No No   Sig: Take 1 tablet (500 mg total) by mouth 2 (two) times a day with meals for 3 days   ondansetron (ZOFRAN) 4 mg tablet   No No   Sig: Take 1 tablet (4 mg total) by mouth every 6 (six) hours   promethazine (PHENERGAN) 25 mg tablet   No No   Sig: Take 1 tablet (25 mg total) by mouth every 6 (six) hours as needed for nausea or vomiting      Facility-Administered Medications: None     Discharge Medication List as of 10/13/2024  8:34 AM        START taking these medications    Details   ondansetron (ZOFRAN-ODT) 4 mg disintegrating tablet Take 1 tablet (4 mg total) by mouth every 6 (six) hours as needed for nausea or vomiting for up to 12 doses, Starting Sun 10/13/2024, Normal           CONTINUE these medications which have NOT CHANGED    Details   naproxen (Naprosyn) 500 mg tablet Take 1 tablet (500 mg total) by mouth 2 (two) times a day with meals for 3 days, Starting Sun 2/4/2024, Until Wed 2/7/2024, Normal      ondansetron (ZOFRAN) 4 mg tablet Take 1 tablet (4 mg total) by mouth every 6 (six) hours, Starting Thu 7/18/2024, Normal      promethazine (PHENERGAN) 25 mg tablet Take 1 tablet (25 mg total) by mouth every 6 (six) hours as needed for nausea or vomiting, Starting Mon 11/13/2023, Normal           No discharge procedures on file.  ED SEPSIS DOCUMENTATION   Time reflects when diagnosis was documented in both MDM as applicable and the Disposition within this note       Time User Action Codes Description Comment    10/13/2024  6:26 AM Ray Li Add [R11.2] Nausea and vomiting     10/13/2024  8:34 AM Destin Jeffery [E87.6] Hypokalemia                  Ray Li MD  10/14/24 8147

## 2024-10-13 NOTE — DISCHARGE INSTRUCTIONS
Hello you were seen today for nausea and vomiting    Please follow-up with your family doctor within 2 days    You have been given a prescription for Zofran, it is at the [facility] on [address] [facility].    Please return to the ER if you have any new symptoms or worsening symptoms, abdominal pain, fever, chills, diarrhea, anything else that is concerning to you